# Patient Record
Sex: MALE | Race: BLACK OR AFRICAN AMERICAN | NOT HISPANIC OR LATINO | Employment: OTHER | URBAN - METROPOLITAN AREA
[De-identification: names, ages, dates, MRNs, and addresses within clinical notes are randomized per-mention and may not be internally consistent; named-entity substitution may affect disease eponyms.]

---

## 2023-03-07 LAB
6-ACETYLMORPHINE: <25 NG/ML
7-AMINOCLONAZEPAM: <25 NG/ML
ALPHA-HYDROXYALPRAZOLAM: <25 NG/ML
ALPHA-HYDROXYMIDAZOLAM: <25 NG/ML
ALPRAZOLAM: <25 NG/ML
AMPHETAMINE (PRESENCE) IN URINE BY SCREEN METHOD: ABNORMAL
BARBITURATES PRESENCE IN URINE BY SCREEN METHOD: ABNORMAL
CANNABINOIDS IN URINE BY SCREEN METHOD: ABNORMAL
CHLORDIAZEPOXIDE: <25 NG/ML
CLONAZEPAM: <25 NG/ML
COCAINE (PRESENCE) IN URINE BY SCREEN METHOD: ABNORMAL
CODEINE: <50 NG/ML
CREATINE, URINE FOR DRUG: 83.6 MG/DL
DIAZEPAM: <25 NG/ML
DRUG SCREEN COMMENT URINE: ABNORMAL
EDDP: <25 NG/ML
FENTANYL CONFIRMATION, URINE: <2.5 NG/ML
HYDROCODONE: <25 NG/ML
HYDROMORPHONE: <25 NG/ML
LORAZEPAM: <25 NG/ML
METHADONE CONFIRMATION,URINE: <25 NG/ML
MIDAZOLAM: <25 NG/ML
MORPHINE URINE: <50 NG/ML
NORDIAZEPAM: <25 NG/ML
NORFENTANYL: <2.5 NG/ML
NORHYDROCODONE: <25 NG/ML
NOROXYCODONE: 268 NG/ML
O-DESMETHYLTRAMADOL: <50 NG/ML
OXAZEPAM: <25 NG/ML
OXYCODONE: 400 NG/ML
OXYMORPHONE: 186 NG/ML
PHENCYCLIDINE (PRESENCE) IN URINE BY SCREEN METHOD: ABNORMAL
TEMAZEPAM: <25 NG/ML
TRAMADOL: <50 NG/ML
ZOLPIDEM METABOLITE (ZCA): <25 NG/ML
ZOLPIDEM: <25 NG/ML

## 2023-03-12 DIAGNOSIS — M25.562 PAIN IN BOTH KNEES, UNSPECIFIED CHRONICITY: Primary | ICD-10-CM

## 2023-03-12 DIAGNOSIS — M25.561 PAIN IN BOTH KNEES, UNSPECIFIED CHRONICITY: Primary | ICD-10-CM

## 2023-03-14 RX ORDER — DICLOFENAC SODIUM 10 MG/G
GEL TOPICAL
Qty: 100 G | Refills: 2 | Status: SHIPPED | OUTPATIENT
Start: 2023-03-14 | End: 2023-04-11

## 2023-03-31 DIAGNOSIS — M96.1 LUMBAR POST-LAMINECTOMY SYNDROME: Primary | ICD-10-CM

## 2023-03-31 RX ORDER — ASPIRIN 81 MG/1
TABLET ORAL 2 TIMES DAILY
COMMUNITY
End: 2023-11-24 | Stop reason: ALTCHOICE

## 2023-03-31 RX ORDER — OXYCODONE AND ACETAMINOPHEN 5; 325 MG/1; MG/1
1 TABLET ORAL EVERY 6 HOURS PRN
Qty: 90 TABLET | Refills: 0 | Status: SHIPPED | OUTPATIENT
Start: 2023-03-31 | End: 2023-04-25 | Stop reason: SDUPTHER

## 2023-03-31 RX ORDER — ATORVASTATIN CALCIUM 80 MG/1
80 TABLET, FILM COATED ORAL NIGHTLY
COMMUNITY
End: 2023-07-03

## 2023-03-31 RX ORDER — NALOXONE HYDROCHLORIDE 4 MG/.1ML
4 SPRAY NASAL AS NEEDED
Qty: 2 EACH | Refills: 11 | Status: SHIPPED | OUTPATIENT
Start: 2023-03-31 | End: 2024-01-29 | Stop reason: WASHOUT

## 2023-03-31 RX ORDER — ALBUTEROL SULFATE 0.83 MG/ML
SOLUTION RESPIRATORY (INHALATION)
COMMUNITY
Start: 2018-06-07 | End: 2023-11-20

## 2023-03-31 RX ORDER — ALBUTEROL SULFATE 90 UG/1
AEROSOL, METERED RESPIRATORY (INHALATION)
COMMUNITY
End: 2023-10-17 | Stop reason: SDUPTHER

## 2023-03-31 RX ORDER — AMLODIPINE BESYLATE 10 MG/1
10 TABLET ORAL DAILY
COMMUNITY
End: 2023-11-14

## 2023-03-31 RX ORDER — CHOLECALCIFEROL (VITAMIN D3) 50 MCG
50 TABLET ORAL DAILY
COMMUNITY
End: 2023-08-29 | Stop reason: SDUPTHER

## 2023-03-31 RX ORDER — METFORMIN HYDROCHLORIDE 850 MG/1
850 TABLET ORAL DAILY
COMMUNITY
End: 2023-07-03

## 2023-03-31 RX ORDER — BACLOFEN 10 MG/1
TABLET ORAL
COMMUNITY
Start: 2016-12-15 | End: 2023-08-01 | Stop reason: ALTCHOICE

## 2023-03-31 RX ORDER — BLOOD-GLUCOSE METER
KIT MISCELLANEOUS
COMMUNITY
Start: 2017-06-22 | End: 2023-11-24 | Stop reason: ALTCHOICE

## 2023-03-31 RX ORDER — BUDESONIDE AND FORMOTEROL FUMARATE DIHYDRATE 160; 4.5 UG/1; UG/1
2 AEROSOL RESPIRATORY (INHALATION) 2 TIMES DAILY
COMMUNITY

## 2023-03-31 RX ORDER — OXYCODONE AND ACETAMINOPHEN 5; 325 MG/1; MG/1
1 TABLET ORAL EVERY 6 HOURS PRN
COMMUNITY
End: 2023-03-31 | Stop reason: SDUPTHER

## 2023-03-31 RX ORDER — NALOXONE HYDROCHLORIDE 4 MG/.1ML
SPRAY NASAL
COMMUNITY
End: 2023-03-31 | Stop reason: SDUPTHER

## 2023-03-31 RX ORDER — TAMSULOSIN HYDROCHLORIDE 0.4 MG/1
1 CAPSULE ORAL NIGHTLY
COMMUNITY
Start: 2021-04-13 | End: 2023-08-06 | Stop reason: SDUPTHER

## 2023-03-31 NOTE — TELEPHONE ENCOUNTER
Patient called the refill line requesting a refill for pended medication. He has a follow up appt scheduled for April. He states he is almost out of the medication.     -----------------------  Received above message:   - Patient sent refill in previous EMR, seen 3/1/23, with no concerns. Previous sent script for 30 days, with second script for 30 days.  - Called pharmacy and they did not have a copy of second script sent 3/9/23.  ([facility] on file)[id number]  - UDS as expected    - OARRS checked with no concerns.     Sending over 30 day supply, appointment scheduled in 4/23. No further refills until patients appointment with provider.   Also refilled Narcan for patient, with multiple refills on file.      Bradley Hoffman MD  FM & PM Resident

## 2023-04-01 DIAGNOSIS — N52.9 ERECTILE DYSFUNCTION, UNSPECIFIED ERECTILE DYSFUNCTION TYPE: Primary | ICD-10-CM

## 2023-04-10 DIAGNOSIS — M25.561 PAIN IN BOTH KNEES, UNSPECIFIED CHRONICITY: ICD-10-CM

## 2023-04-10 DIAGNOSIS — J30.9 ALLERGIC RHINITIS, UNSPECIFIED SEASONALITY, UNSPECIFIED TRIGGER: Primary | ICD-10-CM

## 2023-04-10 DIAGNOSIS — M25.562 PAIN IN BOTH KNEES, UNSPECIFIED CHRONICITY: ICD-10-CM

## 2023-04-11 RX ORDER — DICLOFENAC SODIUM 10 MG/G
GEL TOPICAL
Qty: 100 G | Refills: 2 | Status: SHIPPED | OUTPATIENT
Start: 2023-04-11 | End: 2023-06-09 | Stop reason: SDUPTHER

## 2023-04-11 RX ORDER — LORATADINE 10 MG/1
10 TABLET ORAL DAILY
Qty: 30 TABLET | Refills: 2 | Status: SHIPPED | OUTPATIENT
Start: 2023-04-11 | End: 2023-07-27

## 2023-04-12 RX ORDER — SILDENAFIL 100 MG/1
TABLET, FILM COATED ORAL
COMMUNITY
End: 2023-04-13 | Stop reason: SDUPTHER

## 2023-04-13 RX ORDER — SILDENAFIL 100 MG/1
TABLET, FILM COATED ORAL
Qty: 10 TABLET | Refills: 3 | Status: SHIPPED | OUTPATIENT
Start: 2023-04-13 | End: 2023-08-01 | Stop reason: SDUPTHER

## 2023-04-20 NOTE — PROGRESS NOTES
Phone Call 4/20/22.     I received two notifications regarding a refill for this patient. They state he has an upcoming appointment with you but I do not see it.     He was last seen in clinic 3/6, meds refilled with additional refill sent on 3/31. That should last him until 5/6. He had an appointment scheduled 4/26 however it was a scheduling error and I was not in clinic.     Spoke to patient who states that both Dr. Mcnair and myself have no upcoming appointments and does not need medications at this time. Just wanted to get a FU Apt. Prior to his next expected refill. Clerical staff at [facility] was tasked and will FU w/ patient to schedule in the next 2 weeks.     FYI:   Patient last seen 3/6/23. Scripts were sent on 3/6/23 w/ refill on 3/31/23. Will need appointment before 3/5/23.      Reviewed and approved by CELINE THEODORE on 4/20/23 at 5:51 PM.

## 2023-04-25 ENCOUNTER — OFFICE VISIT (OUTPATIENT)
Dept: PRIMARY CARE | Facility: CLINIC | Age: 48
End: 2023-04-25
Payer: MEDICARE

## 2023-04-25 VITALS
DIASTOLIC BLOOD PRESSURE: 73 MMHG | HEART RATE: 67 BPM | TEMPERATURE: 97.1 F | OXYGEN SATURATION: 95 % | HEIGHT: 65 IN | BODY MASS INDEX: 47.23 KG/M2 | WEIGHT: 283.5 LBS | SYSTOLIC BLOOD PRESSURE: 129 MMHG

## 2023-04-25 DIAGNOSIS — Z79.891 OPIOID CONTRACT EXISTS: ICD-10-CM

## 2023-04-25 DIAGNOSIS — M96.1 LUMBAR POST-LAMINECTOMY SYNDROME: ICD-10-CM

## 2023-04-25 DIAGNOSIS — F11.90 CHRONIC, CONTINUOUS USE OF OPIOIDS: Primary | ICD-10-CM

## 2023-04-25 PROCEDURE — 80373 DRUG SCREENING TRAMADOL: CPT

## 2023-04-25 PROCEDURE — 80361 OPIATES 1 OR MORE: CPT

## 2023-04-25 PROCEDURE — 80365 DRUG SCREENING OXYCODONE: CPT

## 2023-04-25 PROCEDURE — 80354 DRUG SCREENING FENTANYL: CPT

## 2023-04-25 PROCEDURE — 80358 DRUG SCREENING METHADONE: CPT

## 2023-04-25 PROCEDURE — 80346 BENZODIAZEPINES1-12: CPT

## 2023-04-25 PROCEDURE — 99214 OFFICE O/P EST MOD 30 MIN: CPT | Performed by: STUDENT IN AN ORGANIZED HEALTH CARE EDUCATION/TRAINING PROGRAM

## 2023-04-25 PROCEDURE — 80368 SEDATIVE HYPNOTICS: CPT

## 2023-04-25 PROCEDURE — 80307 DRUG TEST PRSMV CHEM ANLYZR: CPT

## 2023-04-25 RX ORDER — OXYCODONE AND ACETAMINOPHEN 5; 325 MG/1; MG/1
1 TABLET ORAL EVERY 6 HOURS PRN
Qty: 90 TABLET | Refills: 0 | Status: SHIPPED | OUTPATIENT
Start: 2023-05-01 | End: 2023-06-09 | Stop reason: SDUPTHER

## 2023-04-25 ASSESSMENT — PAIN SCALES - GENERAL: PAINLEVEL: 4

## 2023-04-25 NOTE — PROGRESS NOTES
Tomasz Maxwell is a 47 y.o. male who is here today for controlled substance refill.   He is on Percocet 5-325mg q6h PRN for chronic low back pian.     Opioids:  What is the patient's goal of therapy? Improvement of pain   Is this being achieved with current treatment? Yes     I have calculated the patient's Morphine Dose Equivalent (MED): 23.3  I have considered referral to Pain Management and/or a specialist, and do not feel it is necessary at this time.    I feel that it is clinically indicated to continue this current medication regimen after consideration of alternative therapies, and other non-opioid treatment.    Opioid Risk Screening:  No data recorded    Pain Assessment:  Analgesia  What was your pain level on average during the past week?: 6  What was your pain level at its worst during the past week?: 10 - Pain as bad as it can be  What percentage of your pain has been relieved during the past week?: 6 %  Is the amount of pain relief you are now obtaining from your current pain relievers enough to make a real difference in your life?: Y  Query to Clinician: Is the patient's pain relief clinically significant?: Yes    Activities of Daily Living  Physical Functioning: Better  Family Relationships: Better  Social Relationships: Better  Mood: Better  Sleep Patterns: Better  Overall Functioning: Better    Adverse Events  Is patient experiencing any side effects from current pain relievers?: N  Patient's Overall Severity of Side Effects: None      Assessment  Is your overall impression that this patient is benefiting from opioid therapy?: Yes  Specific Analgesic Plan: Continue present regimen      Review of systems:   Review of systems is negative besides what is mentioned in the HPI      Objcetives:   Vital signs:   Vitals:    04/25/23 1120   BP: 129/73   BP Location: Left arm   Patient Position: Sitting   BP Cuff Size: Adult long   Pulse: 67   Temp: 36.2 °C (97.1 °F)   TempSrc: Temporal   SpO2: 95%   Weight:  "129 kg (283 lb 8 oz)   Height: 1.651 m (5' 5\")        Physical exam:   General: Alert and oriented*3, not in acute distress   Cardiac: S1, S2 normal, no murmurs, no lower extremity edema.  Respiratory: CTAB, no wheezing or crackles   Psych: appropriate mood and affect to the clinical setting      Assessment and plan:   Tomasz Maxwell is a 47 y.o. male   Here today for controlled substance refill, addressed as follows:     # Controlled substance refill:  - OARRS reviewed and doesn't show any alarming/ red flags   - patient is well known to clinic/ PCP Dr. Hoffman,  and has a valid controlled substance agreement Aug/2022   - monitoring labs ordered today (urine), last test consistent with Rx    - refill for one month, and follow up with PCP for further management/ refills.     Patient's HPI, physical exam and plan of care was discussed with the attending physician Dr. aTte Lock MD  Family Medicine, PGY-3  [place]     "

## 2023-04-25 NOTE — PROGRESS NOTES
I saw and evaluated the patient. I personally obtained the key and critical portions of the history and physical exam or was physically present for key and critical portions performed by the resident/fellow. I reviewed the resident/fellow's documentation and discussed the patient with the resident/fellow. I agree with the resident/fellow's medical decision making as documented in the note.    Avelino Ybarra MD

## 2023-04-27 LAB
6-ACETYLMORPHINE: <25 NG/ML
7-AMINOCLONAZEPAM: <25 NG/ML
ALPHA-HYDROXYALPRAZOLAM: <25 NG/ML
ALPHA-HYDROXYMIDAZOLAM: <25 NG/ML
ALPRAZOLAM: <25 NG/ML
AMPHETAMINE (PRESENCE) IN URINE BY SCREEN METHOD: ABNORMAL
BARBITURATES PRESENCE IN URINE BY SCREEN METHOD: ABNORMAL
CANNABINOIDS IN URINE BY SCREEN METHOD: ABNORMAL
CHLORDIAZEPOXIDE: <25 NG/ML
CLONAZEPAM: <25 NG/ML
COCAINE (PRESENCE) IN URINE BY SCREEN METHOD: ABNORMAL
CODEINE: <50 NG/ML
CREATINE, URINE FOR DRUG: 220.7 MG/DL
DIAZEPAM: <25 NG/ML
DRUG SCREEN COMMENT URINE: ABNORMAL
EDDP: <25 NG/ML
FENTANYL CONFIRMATION, URINE: <2.5 NG/ML
HYDROCODONE: <25 NG/ML
HYDROMORPHONE: <25 NG/ML
LORAZEPAM: <25 NG/ML
METHADONE CONFIRMATION,URINE: <25 NG/ML
MIDAZOLAM: <25 NG/ML
MORPHINE URINE: <50 NG/ML
NORDIAZEPAM: <25 NG/ML
NORFENTANYL: <2.5 NG/ML
NORHYDROCODONE: <25 NG/ML
NOROXYCODONE: 186 NG/ML
O-DESMETHYLTRAMADOL: <50 NG/ML
OXAZEPAM: <25 NG/ML
OXYCODONE: 362 NG/ML
OXYMORPHONE: 87 NG/ML
PHENCYCLIDINE (PRESENCE) IN URINE BY SCREEN METHOD: ABNORMAL
TEMAZEPAM: <25 NG/ML
TRAMADOL: <50 NG/ML
ZOLPIDEM METABOLITE (ZCA): <25 NG/ML
ZOLPIDEM: <25 NG/ML

## 2023-04-28 ENCOUNTER — APPOINTMENT (OUTPATIENT)
Dept: PRIMARY CARE | Facility: CLINIC | Age: 48
End: 2023-04-28
Payer: MEDICARE

## 2023-05-10 ENCOUNTER — TELEPHONE (OUTPATIENT)
Dept: PRIMARY CARE | Facility: CLINIC | Age: 48
End: 2023-05-10
Payer: MEDICARE

## 2023-05-10 NOTE — TELEPHONE ENCOUNTER
Patient needs a referral to get his toenails clipped and his medication needs a refill put in .   FAX NUMBER [phone] FOR THE REFERRAL TO BE FAXED OVER

## 2023-05-12 DIAGNOSIS — L60.2 ONYCHAUXIS: Primary | ICD-10-CM

## 2023-05-17 ENCOUNTER — TELEPHONE (OUTPATIENT)
Dept: PRIMARY CARE | Facility: CLINIC | Age: 48
End: 2023-05-17
Payer: MEDICARE

## 2023-06-09 ENCOUNTER — OFFICE VISIT (OUTPATIENT)
Dept: PRIMARY CARE | Facility: CLINIC | Age: 48
End: 2023-06-09
Payer: MEDICARE

## 2023-06-09 VITALS
TEMPERATURE: 98.6 F | DIASTOLIC BLOOD PRESSURE: 72 MMHG | SYSTOLIC BLOOD PRESSURE: 124 MMHG | WEIGHT: 290 LBS | BODY MASS INDEX: 48.32 KG/M2 | OXYGEN SATURATION: 98 % | HEIGHT: 65 IN | HEART RATE: 89 BPM

## 2023-06-09 DIAGNOSIS — M25.561 PAIN IN BOTH KNEES, UNSPECIFIED CHRONICITY: ICD-10-CM

## 2023-06-09 DIAGNOSIS — M25.562 PAIN IN BOTH KNEES, UNSPECIFIED CHRONICITY: ICD-10-CM

## 2023-06-09 DIAGNOSIS — M96.1 LUMBAR POST-LAMINECTOMY SYNDROME: ICD-10-CM

## 2023-06-09 PROCEDURE — 1036F TOBACCO NON-USER: CPT | Performed by: STUDENT IN AN ORGANIZED HEALTH CARE EDUCATION/TRAINING PROGRAM

## 2023-06-09 PROCEDURE — 99214 OFFICE O/P EST MOD 30 MIN: CPT | Performed by: STUDENT IN AN ORGANIZED HEALTH CARE EDUCATION/TRAINING PROGRAM

## 2023-06-09 RX ORDER — OXYCODONE AND ACETAMINOPHEN 5; 325 MG/1; MG/1
1 TABLET ORAL EVERY 4 HOURS PRN
COMMUNITY
End: 2023-06-09 | Stop reason: SDUPTHER

## 2023-06-09 RX ORDER — DICLOFENAC SODIUM 10 MG/G
GEL TOPICAL
Qty: 100 G | Refills: 2 | Status: SHIPPED | OUTPATIENT
Start: 2023-06-09 | End: 2023-08-01 | Stop reason: ALTCHOICE

## 2023-06-09 RX ORDER — OXYCODONE AND ACETAMINOPHEN 5; 325 MG/1; MG/1
1 TABLET ORAL EVERY 6 HOURS PRN
Qty: 90 TABLET | Refills: 0 | Status: SHIPPED | OUTPATIENT
Start: 2023-06-09 | End: 2023-06-10 | Stop reason: SDUPTHER

## 2023-06-09 RX ORDER — CYCLOBENZAPRINE HCL 10 MG
10 TABLET ORAL EVERY 8 HOURS PRN
COMMUNITY
Start: 2015-10-06 | End: 2023-08-01 | Stop reason: ALTCHOICE

## 2023-06-09 ASSESSMENT — ENCOUNTER SYMPTOMS
POLYPHAGIA: 0
PALPITATIONS: 0
ACTIVITY CHANGE: 0
DYSURIA: 0
SINUS PAIN: 0
CHILLS: 0
LIGHT-HEADEDNESS: 0
SINUS PRESSURE: 0
POLYDIPSIA: 0
TREMORS: 0
HEADACHES: 0
COLOR CHANGE: 0
SORE THROAT: 0
EYE REDNESS: 0
FEVER: 0

## 2023-06-09 ASSESSMENT — PAIN SCALES - GENERAL: PAINLEVEL: 8

## 2023-06-09 NOTE — PROGRESS NOTES
"Subjective   Patient ID: Tomasz Maxwell is a 47 y.o. male who presents for Follow-up and Med Refill.    Pain Assessment:  Analgesia  What was your pain level on average during the past week?: 10 - Pain as bad as it can be  What was your pain level at its worst during the past week?: 10 - Pain as bad as it can be  What percentage of your pain has been relieved during the past week?: 40 %  Is the amount of pain relief you are now obtaining from your current pain relievers enough to make a real difference in your life?: Y  Query to Clinician: Is the patient's pain relief clinically significant?: Yes    Activities of Daily Living  Physical Functioning: Better  Family Relationships: Better  Social Relationships: Better  Mood: Better  Sleep Patterns: Better  Overall Functioning: Better    Adverse Events  Is patient experiencing any side effects from current pain relievers?: N  Patient's Overall Severity of Side Effects: None      Assessment  Is your overall impression that this patient is benefiting from opioid therapy?: Yes  Specific Analgesic Plan: Continue present regimen    Med Refill  Pertinent negatives include no chest pain, chills, fever, headaches, rash or sore throat.        Review of Systems   Constitutional:  Negative for activity change, chills and fever.   HENT:  Negative for sinus pressure, sinus pain and sore throat.    Eyes:  Negative for redness.   Cardiovascular:  Negative for chest pain and palpitations.   Endocrine: Negative for polydipsia, polyphagia and polyuria.   Genitourinary:  Negative for dysuria.   Skin:  Negative for color change and rash.   Neurological:  Negative for tremors, light-headedness and headaches.       Objective   /72 (BP Location: Right arm)   Pulse 89   Temp 37 °C (98.6 °F) (Temporal)   Ht 1.651 m (5' 5\")   Wt 132 kg (290 lb)   SpO2 98%   BMI 48.26 kg/m²     Physical Exam  Constitutional:       Appearance: Normal appearance. He is normal weight.   HENT:      Head: " Normocephalic and atraumatic.      Right Ear: External ear normal.      Left Ear: External ear normal.      Mouth/Throat:      Mouth: Mucous membranes are moist.   Eyes:      Extraocular Movements: Extraocular movements intact.   Cardiovascular:      Rate and Rhythm: Normal rate and regular rhythm.      Heart sounds: No murmur heard.     No friction rub. No gallop.   Pulmonary:      Effort: Pulmonary effort is normal. No respiratory distress.   Musculoskeletal:         General: Normal range of motion.      Cervical back: Normal range of motion.   Skin:     General: Skin is warm and dry.   Neurological:      General: No focal deficit present.      Mental Status: He is alert.   Psychiatric:         Mood and Affect: Mood normal.         Behavior: Behavior normal.       Assessment/Plan   Problem List Items Addressed This Visit    None  Visit Diagnoses       Lumbar post-laminectomy syndrome        Relevant Medications    oxyCODONE-acetaminophen (Percocet) 5-325 mg tablet    Other Relevant Orders    [facility]    Pain in both knees, unspecified chronicity        Relevant Medications    diclofenac sodium (Voltaren) 1 % gel gel    Other Relevant Orders    Referral to [facility]            Tomasz Maxwell is a 48yo M w/ PMHx. Chronic pain due to lumbar postlaminectomy syndrome with recent right TKR and rotator cuff dysfunction presents to clinic for Medication Refill.     Opioids:  What is the patient's goal of therapy?  To help w/ improving quality of life and functionality.   Is this being achieved with current treatment? Yes, patient has seen a drastic (40%) improvement in his pain since starting treatment    I have calculated the patient's Morphine Dose Equivalent (MED): 22.5    After discussion w/ patient, he is amenable to referral to [facility] and will have no issues w/ transportation to the new facility. He understands that treatment will require more  than managing pain but also the mental effect it can have and is willing to get treatment at this time.     I feel that it is clinically indicated to continue this current medication regimen after consideration of alternative therapies, and other non-opioid treatment.    Opioid Risk Screening: Score 0   OARRS reviewed w/ no acute concerns. Patient has no other providers of Opioid. Current Overdose Risk Score of 180.   Patient has Narcan at home and knows appropriate use of it.   He takes a pill 3x a day every 6-8 hours aside from when he sleeps and did not require any other refills prior to his appointment.   Last Urine Drug Screen completed 4/25, presumably negative at that time.     Refilled patients Oxy 5- Acetaminophen 325mgs Q6-8Hs x 90 for 30 days.   Refilled Voltaren 1% Gel    #Next Appointment  - Patient will require an independent appointment for HM & NIDDM FU.   - Labs Due: HgA1C, Urine Albumin Spot, Hep C Ab, Lipid Panel,   - Opioid Screening w/ UDS.   - Ensure patient has FU Apt w/ Podiatry and Ophthalmology  - Due for Tdap,   - Colonoscopy completed in 2018, w/ 1 Hyperplastic Polyp of 5mm. Repeat Colonoscopy in 2022    Ensure patient has contact information for [facility], including my personal business card.     RTC in 2months for FU or as need.     Seen and Discussed w/ Dr. Edward.     Bradley Hoffamn MD  FM & PM Resident

## 2023-06-10 RX ORDER — OXYCODONE AND ACETAMINOPHEN 5; 325 MG/1; MG/1
1 TABLET ORAL EVERY 6 HOURS PRN
Qty: 90 TABLET | Refills: 0 | Status: SHIPPED | OUTPATIENT
Start: 2023-06-10 | End: 2023-08-01 | Stop reason: SDUPTHER

## 2023-06-21 NOTE — PROGRESS NOTES
I reviewed with the resident the medical history and the resident’s findings on physical examination.  I discussed with the resident the patient’s diagnosis and concur with the treatment plan as documented in the resident note.     Today's reason for visit: medication management  Verónica Edward MD

## 2023-06-30 DIAGNOSIS — E11.9 TYPE 2 DIABETES MELLITUS WITHOUT COMPLICATION, WITHOUT LONG-TERM CURRENT USE OF INSULIN (MULTI): ICD-10-CM

## 2023-06-30 DIAGNOSIS — E78.2 MIXED HYPERLIPIDEMIA: Primary | ICD-10-CM

## 2023-07-03 RX ORDER — METFORMIN HYDROCHLORIDE 850 MG/1
TABLET ORAL
Qty: 90 TABLET | Refills: 3 | Status: SHIPPED | OUTPATIENT
Start: 2023-07-03 | End: 2023-11-24 | Stop reason: ALTCHOICE

## 2023-07-03 RX ORDER — ATORVASTATIN CALCIUM 80 MG/1
TABLET, FILM COATED ORAL
Qty: 90 TABLET | Refills: 3 | Status: SHIPPED | OUTPATIENT
Start: 2023-07-03

## 2023-08-01 ENCOUNTER — OFFICE VISIT (OUTPATIENT)
Dept: PRIMARY CARE | Facility: CLINIC | Age: 48
End: 2023-08-01
Payer: MEDICARE

## 2023-08-01 ENCOUNTER — LAB (OUTPATIENT)
Dept: LAB | Facility: LAB | Age: 48
End: 2023-08-01
Payer: MEDICARE

## 2023-08-01 VITALS
HEIGHT: 65 IN | WEIGHT: 291 LBS | SYSTOLIC BLOOD PRESSURE: 129 MMHG | BODY MASS INDEX: 48.48 KG/M2 | HEART RATE: 64 BPM | DIASTOLIC BLOOD PRESSURE: 74 MMHG | OXYGEN SATURATION: 96 % | TEMPERATURE: 97.1 F

## 2023-08-01 DIAGNOSIS — R35.1 NOCTURIA: ICD-10-CM

## 2023-08-01 DIAGNOSIS — F11.90 CHRONIC, CONTINUOUS USE OF OPIOIDS: ICD-10-CM

## 2023-08-01 DIAGNOSIS — M96.1 LUMBAR POST-LAMINECTOMY SYNDROME: ICD-10-CM

## 2023-08-01 DIAGNOSIS — K59.04 CHRONIC IDIOPATHIC CONSTIPATION: ICD-10-CM

## 2023-08-01 DIAGNOSIS — N52.9 ERECTILE DYSFUNCTION, UNSPECIFIED ERECTILE DYSFUNCTION TYPE: ICD-10-CM

## 2023-08-01 DIAGNOSIS — M96.1 LUMBAR POST-LAMINECTOMY SYNDROME: Primary | ICD-10-CM

## 2023-08-01 LAB — PROSTATE SPECIFIC ANTIGEN,SCREEN: <0.1 NG/ML (ref 0–4)

## 2023-08-01 PROCEDURE — G0103 PSA SCREENING: HCPCS

## 2023-08-01 PROCEDURE — 36415 COLL VENOUS BLD VENIPUNCTURE: CPT

## 2023-08-01 PROCEDURE — 99213 OFFICE O/P EST LOW 20 MIN: CPT | Performed by: STUDENT IN AN ORGANIZED HEALTH CARE EDUCATION/TRAINING PROGRAM

## 2023-08-01 PROCEDURE — 1036F TOBACCO NON-USER: CPT | Performed by: STUDENT IN AN ORGANIZED HEALTH CARE EDUCATION/TRAINING PROGRAM

## 2023-08-01 RX ORDER — AMOXICILLIN 250 MG
1 CAPSULE ORAL DAILY
Qty: 30 TABLET | Refills: 11 | Status: SHIPPED | OUTPATIENT
Start: 2023-08-01 | End: 2024-01-29 | Stop reason: WASHOUT

## 2023-08-01 RX ORDER — SILDENAFIL 100 MG/1
TABLET, FILM COATED ORAL
Qty: 10 TABLET | Refills: 3 | Status: SHIPPED | OUTPATIENT
Start: 2023-08-01 | End: 2023-09-08 | Stop reason: SDUPTHER

## 2023-08-01 RX ORDER — OXYCODONE AND ACETAMINOPHEN 5; 325 MG/1; MG/1
1 TABLET ORAL EVERY 6 HOURS PRN
Qty: 90 TABLET | Refills: 0 | Status: SHIPPED | OUTPATIENT
Start: 2023-08-01 | End: 2023-08-28 | Stop reason: SDUPTHER

## 2023-08-01 RX ORDER — POLYETHYLENE GLYCOL 3350 17 G/17G
17 POWDER, FOR SOLUTION ORAL DAILY
Qty: 51 G | Refills: 0 | Status: SHIPPED | OUTPATIENT
Start: 2023-08-01 | End: 2023-08-04

## 2023-08-01 ASSESSMENT — ENCOUNTER SYMPTOMS
ARTHRALGIAS: 1
BACK PAIN: 1
SHORTNESS OF BREATH: 0
CONSTIPATION: 0
NECK PAIN: 1
FEVER: 0
MYALGIAS: 1
FREQUENCY: 1
CHILLS: 0
LIGHT-HEADEDNESS: 0
DIZZINESS: 0

## 2023-08-01 ASSESSMENT — PAIN SCALES - GENERAL: PAINLEVEL: 5

## 2023-08-01 NOTE — PROGRESS NOTES
CHRONIC PAIN ASSESSMENT FORM    1. Where do you feel your pain? {PAIN LOCATION:92237}    2. Which word best describes your pain? {PAIN QUALITIES:99525}    3. On a scale of 0 to 10, which best describes your pain at its WORST in the last month: {PAIN SCALE NUMBERS:34649}    4. On a scale of 0 to 10, which best describes your pain at its LEAST in the last month: {PAIN SCALE NUMBERS:21002}    5. On a scale of 0 to 10, which best describes your pain at ON AVERAGE in the last month: {PAIN SCALE NUMBERS:89951}    6. On a scale of 0 to 10, which best describes your pain RIGHT NOW: {PAIN SCALE NUMBERS:36282}    7. What side effects are you having?    a. Nausea: {PAIN SCALE NUMBERS:59362}  b. Vomiting: {PAIN SCALE NUMBERS:54025}  c. Constipation: {PAIN SCALE NUMBERS:84852}  d. Lack of appetite: {PAIN SCALE NUMBERS:22128}  e. Tired: {PAIN SCALE NUMBERS:03078}  f.  Itching: {PAIN SCALE NUMBERS:43629}  g. Nightmares: {PAIN SCALE NUMBERS:58747}  h. Sweating: {PAIN SCALE NUMBERS:66448}  i. Trouble thinking: {PAIN SCALE NUMBERS:06867}  j. Insomnia: {PAIN SCALE NUMBERS:51111}    8. On a scale of 0 to 10, which best describes how, DURING THE PAST WEEK, the pain has INTERFERED with your:    a. General activity: {PAIN SCALE NUMBERS:28863}  b. Mood: {PAIN SCALE NUMBERS:59499}  c. Normal work: {PAIN SCALE NUMBERS:97837}  d. Sleep: {PAIN SCALE NUMBERS:28512}  e. Enjoyment of life: {PAIN SCALE NUMBERS:22180}   f. Ability to concentrate: {PAIN SCALE NUMBERS:24292}  g. Relations with others: {PAIN SCALE NUMBERS:53645}

## 2023-08-01 NOTE — PROGRESS NOTES
Subjective   Patient ID: Tomasz Maxwell is a 47 y.o. male with a PMH of lumbar post-laminectomy syndrome, recent TKR (Feb 2022, revision in March 2022), and rotator cuff dysfunction (surgery in early 2023), 3 abdominal surgeries, 2 hip replacements who presents for Med Refill and Follow-up. He needs refills of Percocet and Viagra.    HPI   Patient presenting for medication refill. He has an extensive is surgical history and is following with a pain management doctor. He continues to experience pain throughout his body. He has an MRI of his neck and back scheduled for 8/12. He has a spine stimulator for pain.    Pain level with medication and spine stimulator: 5/10  Without medication: 8 or 9/10    Pain assessment:  What percentage of your pain has been relieved during the past week?: 30% when used with spine stimulator  Is the amount of pain relief you are now obtaining from your current pain relievers enough to make a real difference in your life?: Y  Query to Clinician: Is the patient's pain relief clinically significant?: Yes    Opioids:  What is the patient's goal of therapy? Adequate pain control for ADLs  Is this being achieved with current treatment? Yes    OARRS:  No data recorded  I have personally reviewed the OARRS report for Tomasz Maxwell. I have considered the risks of abuse, dependence, addiction and diversion    Is the patient prescribed a combination of a benzodiazepine and opioid?  No    Last Urine Drug Screen:  Recent Results (from the past 97207 hour(s))   OPIATE/OPIOID/BENZO PRESCRIPTION COMPLIANCE    Collection Time: 04/25/23 12:02 PM   Result Value Ref Range    DRUG SCREEN COMMENT URINE SEE BELOW     Creatine, Urine 220.7 mg/dL    Amphetamine Screen, Urine PRESUMPTIVE NEGATIVE NEGATIVE    Barbiturate Screen, Urine PRESUMPTIVE NEGATIVE NEGATIVE    Cannabinoid Screen, Urine PRESUMPTIVE NEGATIVE NEGATIVE    Cocaine Screen, Urine PRESUMPTIVE NEGATIVE NEGATIVE    PCP Screen, Urine PRESUMPTIVE  NEGATIVE NEGATIVE    7-Aminoclonazepam <25 Cutoff <25 ng/mL    Alpha-Hydroxyalprazolam <25 Cutoff <25 ng/mL    Alpha-Hydroxymidazolam <25 Cutoff <25 ng/mL    Alprazolam <25 Cutoff <25 ng/mL    Chlordiazepoxide <25 Cutoff <25 ng/mL    Clonazepam <25 Cutoff <25 ng/mL    Diazepam <25 Cutoff <25 ng/mL    Lorazepam <25 Cutoff <25 ng/mL    Midazolam <25 Cutoff <25 ng/mL    Nordiazepam <25 Cutoff <25 ng/mL    Oxazepam <25 Cutoff <25 ng/mL    Temazepam <25 Cutoff <25 ng/mL    Zolpidem <25 Cutoff <25 ng/mL    Zolpidem Metabolite (ZCA) <25 Cutoff <25 ng/mL    6-Acetylmorphine <25 Cutoff <25 ng/mL    Codeine <50 Cutoff <50 ng/mL    Hydrocodone <25 Cutoff <25 ng/mL    Hydromorphone <25 Cutoff <25 ng/mL    Morphine Urine <50 Cutoff <50 ng/mL    Norhydrocodone <25 Cutoff <25 ng/mL    Noroxycodone 186 (A) Cutoff <25 ng/mL    Oxycodone 362 (A) Cutoff <25 ng/mL    Oxymorphone 87 (A) Cutoff <25 ng/mL    Tramadol <50 Cutoff <50 ng/mL    O-Desmethyltramadol <50 Cutoff <50 ng/mL    Fentanyl <2.5 Cutoff<2.5 ng/mL    Norfentanyl <2.5 Cutoff<2.5 ng/mL    METHADONE CONFIRMATION,URINE <25 Cutoff <25 ng/mL    EDDP <25 Cutoff <25 ng/mL   Drug Screen, Urine With Reflex to Confirmation    Collection Time: 12/07/22  3:06 PM   Result Value Ref Range    DRUG SCREEN COMMENT URINE SEE BELOW     Amphetamine Screen, Urine PRESUMPTIVE NEGATIVE NEGATIVE    Barbiturate Screen, Urine PRESUMPTIVE NEGATIVE NEGATIVE    BENZODIAZEPINE (PRESENCE) IN URINE BY SCREEN METHOD PRESUMPTIVE NEGATIVE NEGATIVE    Cannabinoid Screen, Urine PRESUMPTIVE NEGATIVE NEGATIVE    Cocaine Screen, Urine PRESUMPTIVE NEGATIVE NEGATIVE    Fentanyl, Ur PRESUMPTIVE NEGATIVE NEGATIVE    Methadone Screen, Urine PRESUMPTIVE NEGATIVE NEGATIVE    Opiate Screen, Urine PRESUMPTIVE NEGATIVE NEGATIVE    Oxycodone Screen, Ur PRESUMPTIVE POSITIVE (A) NEGATIVE    PCP Screen, Urine PRESUMPTIVE NEGATIVE NEGATIVE     Results are as expected.     Controlled Substance Agreement:  Date of the Last  "Agreement: today (8/1/2023)  Reviewed Controlled Substance Agreement including but not limited to the benefits, risks, and alternatives to treatment with a Controlled Substance medication(s).    I have calculated the patient's Morphine Dose Equivalent (MED):   Patient is already following with Pain Management and a specialist, and I do not feel another referral is necessary at this time.    #Urinary incontinence  Patient endorses dribbling throughout the day and nocturia (around 4 times a night). He is taking tamsulosin 0.4mg but does not think it helps.     Review of Systems   Constitutional:  Negative for chills and fever.   Respiratory:  Negative for shortness of breath.    Cardiovascular:  Negative for chest pain.   Gastrointestinal:  Negative for constipation.   Genitourinary:  Positive for frequency and urgency.   Musculoskeletal:  Positive for arthralgias, back pain, myalgias and neck pain.   Neurological:  Negative for dizziness and light-headedness.     Objective   /74 (BP Location: Left arm, Patient Position: Sitting, BP Cuff Size: Adult long)   Pulse 64   Temp 36.2 °C (97.1 °F) (Temporal)   Ht 1.651 m (5' 5\")   Wt 132 kg (291 lb)   SpO2 96%   BMI 48.42 kg/m²     Physical Exam  Constitutional:       Appearance: Normal appearance.   HENT:      Head: Normocephalic.   Cardiovascular:      Rate and Rhythm: Normal rate and regular rhythm.      Heart sounds: Normal heart sounds.   Pulmonary:      Breath sounds: Normal breath sounds.   Neurological:      Mental Status: He is alert and oriented to person, place, and time.   Psychiatric:         Mood and Affect: Mood normal.         Behavior: Behavior normal.       Assessment/Plan     #Lumbar Post-Laminectomy Syndrome  #Chronic Back Pain  #Chronic Neuropathy  - Stable, well controlled with percocet and spinal stimulator  - I feel that it is clinically indicated to continue this current medication regimen after consideration of alternative therapies, and " other non-opioid treatment.  - Refilled Percocet 5-325mg QID  - Ordered Senokot and Miralax for bowel regimen  - Reviewed OAARS. Completed CSA. Last UDS 4/2023 w/ expected results    #Erectile dysfunction  - likely 2/2 nerve damage from multiple lumbar surgeries  - Stable with Viagra  - Refilled Viagra 100 mg    #Nocturia  #Urinary Incontinence  - multiple urinary issues likely 2/2 BPH, overflow incontinence, and nerve damage from multiple lumbar surgeries  - Appears uncontrolled on tamsulosin.  - Increased tamsulosin dose to 0.8mg  - Ordered PSA screening due to multiple risk factors (, family history). Last PSA in 2017: 0.11  - Referral to urology for urinary issues and ED    RTC in 1-2 months  This patient was seen and discussed with the attending physician Dr. Hector.    Lanette Rendon  MS3, CWRUSOM       I saw and evaluated the patient. I personally obtained the key and critical portions of the history and physical exam. I reviewed and modified the student's documentation and discussed the patient with the medical student. I agree with the above documentation and medical decision making.      Bella Fernando MD  Family Medicine, PGY-3

## 2023-08-06 RX ORDER — TAMSULOSIN HYDROCHLORIDE 0.4 MG/1
0.8 CAPSULE ORAL NIGHTLY
Qty: 180 CAPSULE | Refills: 3 | Status: SHIPPED | OUTPATIENT
Start: 2023-08-06 | End: 2024-08-05

## 2023-08-07 NOTE — PROGRESS NOTES
I saw and evaluated the patient. I personally obtained the key and critical portions of the history and physical exam or was physically present for key and critical portions performed by the resident/fellow. I reviewed the resident/fellow's documentation and discussed the patient with the resident/fellow. I agree with the resident/fellow's medical decision making as documented in the note.    Chuy Hector MD       Mirvaso Counseling: Mirvaso is a topical medication which can decrease superficial blood flow where applied. Side effects are uncommon and include stinging, redness and allergic reactions.

## 2023-08-28 DIAGNOSIS — J30.9 CHRONIC ALLERGIC RHINITIS: Primary | ICD-10-CM

## 2023-08-28 DIAGNOSIS — M96.1 LUMBAR POST-LAMINECTOMY SYNDROME: ICD-10-CM

## 2023-08-28 RX ORDER — OXYCODONE AND ACETAMINOPHEN 5; 325 MG/1; MG/1
1 TABLET ORAL EVERY 6 HOURS PRN
Qty: 90 TABLET | Refills: 0 | Status: CANCELLED | OUTPATIENT
Start: 2023-08-28 | End: 2023-09-27

## 2023-08-28 RX ORDER — LORATADINE 10 MG/1
10 TABLET ORAL DAILY
Qty: 90 TABLET | Refills: 3 | Status: SHIPPED | OUTPATIENT
Start: 2023-08-28 | End: 2023-11-24 | Stop reason: ALTCHOICE

## 2023-08-28 RX ORDER — OXYCODONE AND ACETAMINOPHEN 5; 325 MG/1; MG/1
1 TABLET ORAL EVERY 6 HOURS PRN
Qty: 90 TABLET | Refills: 0 | Status: SHIPPED | OUTPATIENT
Start: 2023-09-01 | End: 2023-09-28 | Stop reason: SDUPTHER

## 2023-08-28 RX ORDER — LORATADINE 10 MG/1
1 TABLET ORAL DAILY
COMMUNITY
End: 2023-08-28 | Stop reason: SDUPTHER

## 2023-08-29 ENCOUNTER — TELEPHONE (OUTPATIENT)
Dept: PRIMARY CARE | Facility: CLINIC | Age: 48
End: 2023-08-29
Payer: MEDICARE

## 2023-08-29 DIAGNOSIS — J30.2 SEASONAL ALLERGIC RHINITIS, UNSPECIFIED TRIGGER: Primary | ICD-10-CM

## 2023-08-29 DIAGNOSIS — E55.9 VITAMIN D DEFICIENCY: ICD-10-CM

## 2023-08-29 RX ORDER — MONTELUKAST SODIUM 10 MG/1
10 TABLET ORAL NIGHTLY
Qty: 90 TABLET | Refills: 3 | Status: SHIPPED | OUTPATIENT
Start: 2023-08-29 | End: 2024-08-28

## 2023-08-29 RX ORDER — CHOLECALCIFEROL (VITAMIN D3) 50 MCG
50 TABLET ORAL DAILY
Qty: 90 TABLET | Refills: 3 | Status: SHIPPED | OUTPATIENT
Start: 2023-08-29 | End: 2024-08-28

## 2023-08-29 RX ORDER — MONTELUKAST SODIUM 10 MG/1
10 TABLET ORAL NIGHTLY
COMMUNITY
Start: 2023-08-06 | End: 2023-08-29 | Stop reason: SDUPTHER

## 2023-09-01 DIAGNOSIS — N52.9 ERECTILE DYSFUNCTION, UNSPECIFIED ERECTILE DYSFUNCTION TYPE: ICD-10-CM

## 2023-09-06 NOTE — TELEPHONE ENCOUNTER
I called the pharmacy to verify if he needed the pending order and the pharmacist said he doesn't see anything in the system. So can you resend his Sildenafil over again.

## 2023-09-08 DIAGNOSIS — N52.9 ERECTILE DYSFUNCTION, UNSPECIFIED ERECTILE DYSFUNCTION TYPE: ICD-10-CM

## 2023-09-08 RX ORDER — SILDENAFIL 100 MG/1
TABLET, FILM COATED ORAL
Qty: 10 TABLET | Refills: 0 | Status: SHIPPED | OUTPATIENT
Start: 2023-09-08 | End: 2023-09-28 | Stop reason: SDUPTHER

## 2023-09-08 NOTE — TELEPHONE ENCOUNTER
Pt requesting refill viagra to [facility] pharmacy. In system, sent 8/1/23, but not in pharmacy system per pt. Notes made via MA to confirm not on pharmacy end following phone call to pharmacy. PCP on vacation. Preceptor made aware.

## 2023-09-13 RX ORDER — SILDENAFIL 100 MG/1
TABLET, FILM COATED ORAL
Qty: 10 TABLET | Refills: 3 | OUTPATIENT
Start: 2023-09-13

## 2023-09-27 DIAGNOSIS — M96.1 LUMBAR POST-LAMINECTOMY SYNDROME: ICD-10-CM

## 2023-09-27 RX ORDER — OXYCODONE AND ACETAMINOPHEN 5; 325 MG/1; MG/1
1 TABLET ORAL EVERY 6 HOURS PRN
Qty: 90 TABLET | Refills: 0 | Status: CANCELLED | OUTPATIENT
Start: 2023-09-27 | End: 2023-10-27

## 2023-09-27 NOTE — TELEPHONE ENCOUNTER
Pt called requesting refills of Vit D, percocet, and loratadine. Noted available refills loratadine and Vit D. Call placed to update pt. Percocet order pended and routed to provider.

## 2023-09-28 DIAGNOSIS — M96.1 LUMBAR POST-LAMINECTOMY SYNDROME: ICD-10-CM

## 2023-09-28 DIAGNOSIS — N52.9 ERECTILE DYSFUNCTION, UNSPECIFIED ERECTILE DYSFUNCTION TYPE: ICD-10-CM

## 2023-09-28 RX ORDER — SILDENAFIL 100 MG/1
TABLET, FILM COATED ORAL
Qty: 10 TABLET | Refills: 0 | OUTPATIENT
Start: 2023-09-28

## 2023-09-28 RX ORDER — OXYCODONE AND ACETAMINOPHEN 5; 325 MG/1; MG/1
1 TABLET ORAL EVERY 6 HOURS PRN
Qty: 90 TABLET | Refills: 0 | Status: SHIPPED | OUTPATIENT
Start: 2023-09-28 | End: 2023-10-10 | Stop reason: SDUPTHER

## 2023-09-28 RX ORDER — SILDENAFIL 100 MG/1
TABLET, FILM COATED ORAL
Qty: 10 TABLET | Refills: 3 | Status: SHIPPED | OUTPATIENT
Start: 2023-09-28 | End: 2024-04-16 | Stop reason: SDUPTHER

## 2023-09-28 NOTE — TELEPHONE ENCOUNTER
Pt calling requesting new rx for percocet as his will run out on October 1st. Order pended and routed to provider yesterday. Call to pt made to inform of communication sent to provider.

## 2023-10-10 DIAGNOSIS — M96.1 LUMBAR POST-LAMINECTOMY SYNDROME: ICD-10-CM

## 2023-10-10 RX ORDER — OXYCODONE AND ACETAMINOPHEN 5; 325 MG/1; MG/1
1 TABLET ORAL EVERY 6 HOURS PRN
Qty: 90 TABLET | Refills: 0 | Status: SHIPPED | OUTPATIENT
Start: 2023-10-30 | End: 2023-11-17 | Stop reason: SDUPTHER

## 2023-10-16 DIAGNOSIS — M54.16 LUMBAR RADICULOPATHY: ICD-10-CM

## 2023-10-17 ENCOUNTER — OFFICE VISIT (OUTPATIENT)
Dept: PRIMARY CARE | Facility: CLINIC | Age: 48
End: 2023-10-17
Payer: MEDICARE

## 2023-10-17 VITALS
SYSTOLIC BLOOD PRESSURE: 127 MMHG | HEART RATE: 58 BPM | BODY MASS INDEX: 47.88 KG/M2 | HEIGHT: 65 IN | OXYGEN SATURATION: 97 % | WEIGHT: 287.4 LBS | DIASTOLIC BLOOD PRESSURE: 85 MMHG | TEMPERATURE: 97.9 F

## 2023-10-17 DIAGNOSIS — F11.90 CHRONIC, CONTINUOUS USE OF OPIOIDS: ICD-10-CM

## 2023-10-17 DIAGNOSIS — Z23 IMMUNIZATION DUE: ICD-10-CM

## 2023-10-17 DIAGNOSIS — N52.9 ERECTILE DYSFUNCTION, UNSPECIFIED ERECTILE DYSFUNCTION TYPE: ICD-10-CM

## 2023-10-17 DIAGNOSIS — M96.1 LUMBAR POST-LAMINECTOMY SYNDROME: Primary | ICD-10-CM

## 2023-10-17 DIAGNOSIS — J45.909 MODERATE ASTHMA, UNSPECIFIED WHETHER COMPLICATED, UNSPECIFIED WHETHER PERSISTENT (HHS-HCC): ICD-10-CM

## 2023-10-17 LAB
AMPHETAMINES UR QL SCN: ABNORMAL
BARBITURATES UR QL SCN: ABNORMAL
BENZODIAZ UR QL SCN: ABNORMAL
BZE UR QL SCN: ABNORMAL
CANNABINOIDS UR QL SCN: ABNORMAL
FENTANYL+NORFENTANYL UR QL SCN: ABNORMAL
OPIATES UR QL SCN: ABNORMAL
OXYCODONE+OXYMORPHONE UR QL SCN: ABNORMAL
PCP UR QL SCN: ABNORMAL

## 2023-10-17 PROCEDURE — 90686 IIV4 VACC NO PRSV 0.5 ML IM: CPT | Performed by: STUDENT IN AN ORGANIZED HEALTH CARE EDUCATION/TRAINING PROGRAM

## 2023-10-17 PROCEDURE — 80307 DRUG TEST PRSMV CHEM ANLYZR: CPT

## 2023-10-17 PROCEDURE — G0008 ADMIN INFLUENZA VIRUS VAC: HCPCS | Performed by: STUDENT IN AN ORGANIZED HEALTH CARE EDUCATION/TRAINING PROGRAM

## 2023-10-17 PROCEDURE — 80365 DRUG SCREENING OXYCODONE: CPT

## 2023-10-17 PROCEDURE — 99214 OFFICE O/P EST MOD 30 MIN: CPT | Performed by: STUDENT IN AN ORGANIZED HEALTH CARE EDUCATION/TRAINING PROGRAM

## 2023-10-17 PROCEDURE — 80361 OPIATES 1 OR MORE: CPT

## 2023-10-17 PROCEDURE — 1036F TOBACCO NON-USER: CPT | Performed by: STUDENT IN AN ORGANIZED HEALTH CARE EDUCATION/TRAINING PROGRAM

## 2023-10-17 RX ORDER — ALBUTEROL SULFATE 90 UG/1
AEROSOL, METERED RESPIRATORY (INHALATION)
Qty: 18 G | Refills: 11 | Status: SHIPPED | OUTPATIENT
Start: 2023-10-17

## 2023-10-17 ASSESSMENT — PAIN SCALES - GENERAL: PAINLEVEL: 8

## 2023-10-17 ASSESSMENT — ENCOUNTER SYMPTOMS
OCCASIONAL FEELINGS OF UNSTEADINESS: 0
LOSS OF SENSATION IN FEET: 0
DEPRESSION: 0

## 2023-10-17 NOTE — PROGRESS NOTES
Subjective   Patient ID: Tomasz Maxwell is a 47 y.o. male who presents for Follow-up and Med Refill.    HPI    #Spinal Stenosis  #Herniated Disc  - pain stimulator not effective  - still using percocets, but pain control is still poor  - follows w/ Pain Mangement  - may need another surgery given new areas of stenosis on recent imaging    #Asthma  - need refill of albuterol inhaler  - only uses intermittently    OARRS:  No data recorded  I have personally reviewed the OARRS report for Tomasz Maxwell. I have considered the risks of abuse, dependence, addiction and diversion    Is the patient prescribed a combination of a benzodiazepine and opioid?  Yes, I feel it is clincially indicated to continue the medication and have discussed with the patient risks/benefits/alternatives.    Last Urine Drug Screen / ordered today: Yes  Recent Results (from the past 8760 hour(s))   Opiate Confirmation, Urine    Collection Time: 10/17/23 10:13 AM   Result Value Ref Range    6-Acetylmorphine <25 <25 ng/mL    Codeine <50 <50 ng/mL    Hydrocodone <25 <25 ng/mL    Hydromorphone <25 <25 ng/mL    Morphine  <50 <50 ng/mL    Norhydrocodone <25 <25 ng/mL    Noroxycodone 437 (H) <25 ng/mL    Oxycodone 736 (H) <25 ng/mL    Oxymorphone 400 (H) <25 ng/mL   Drug Screen, Urine With Reflex to Confirmation    Collection Time: 10/17/23 10:13 AM   Result Value Ref Range    Amphetamine Screen, Urine Presumptive Negative Presumptive Negative    Barbiturate Screen, Urine Presumptive Negative Presumptive Negative    Benzodiazepines Screen, Urine Presumptive Negative Presumptive Negative    Cannabinoid Screen, Urine Presumptive Negative Presumptive Negative    Cocaine Metabolite Screen, Urine Presumptive Negative Presumptive Negative    Fentanyl Screen, Urine Presumptive Negative Presumptive Negative    Opiate Screen, Urine Presumptive Negative Presumptive Negative    Oxycodone Screen, Urine Presumptive Positive (A) Presumptive Negative    PCP  Screen, Urine Presumptive Negative Presumptive Negative   OPIATE/OPIOID/BENZO PRESCRIPTION COMPLIANCE    Collection Time: 04/25/23 12:02 PM   Result Value Ref Range    DRUG SCREEN COMMENT URINE SEE BELOW     Creatine, Urine 220.7 mg/dL    Amphetamine Screen, Urine PRESUMPTIVE NEGATIVE NEGATIVE    Barbiturate Screen, Urine PRESUMPTIVE NEGATIVE NEGATIVE    Cannabinoid Screen, Urine PRESUMPTIVE NEGATIVE NEGATIVE    Cocaine Screen, Urine PRESUMPTIVE NEGATIVE NEGATIVE    PCP Screen, Urine PRESUMPTIVE NEGATIVE NEGATIVE    7-Aminoclonazepam <25 Cutoff <25 ng/mL    Alpha-Hydroxyalprazolam <25 Cutoff <25 ng/mL    Alpha-Hydroxymidazolam <25 Cutoff <25 ng/mL    Alprazolam <25 Cutoff <25 ng/mL    Chlordiazepoxide <25 Cutoff <25 ng/mL    Clonazepam <25 Cutoff <25 ng/mL    Diazepam <25 Cutoff <25 ng/mL    Lorazepam <25 Cutoff <25 ng/mL    Midazolam <25 Cutoff <25 ng/mL    Nordiazepam <25 Cutoff <25 ng/mL    Oxazepam <25 Cutoff <25 ng/mL    Temazepam <25 Cutoff <25 ng/mL    Zolpidem <25 Cutoff <25 ng/mL    Zolpidem Metabolite (ZCA) <25 Cutoff <25 ng/mL    6-Acetylmorphine <25 Cutoff <25 ng/mL    Codeine <50 Cutoff <50 ng/mL    Hydrocodone <25 Cutoff <25 ng/mL    Hydromorphone <25 Cutoff <25 ng/mL    Morphine Urine <50 Cutoff <50 ng/mL    Norhydrocodone <25 Cutoff <25 ng/mL    Noroxycodone 186 (A) Cutoff <25 ng/mL    Oxycodone 362 (A) Cutoff <25 ng/mL    Oxymorphone 87 (A) Cutoff <25 ng/mL    Tramadol <50 Cutoff <50 ng/mL    O-Desmethyltramadol <50 Cutoff <50 ng/mL    Fentanyl <2.5 Cutoff<2.5 ng/mL    Norfentanyl <2.5 Cutoff<2.5 ng/mL    METHADONE CONFIRMATION,URINE <25 Cutoff <25 ng/mL    EDDP <25 Cutoff <25 ng/mL     Results are as expected.         Controlled Substance Agreement:  Date of the Last Agreement: 8/2023  Reviewed Controlled Substance Agreement including but not limited to the benefits, risks, and alternatives to treatment with a Controlled Substance medication(s).    Opioids:  What is the patient's goal of therapy?  "Pain control  Is this being achieved with current treatment? yes    I have calculated the patient's Morphine Dose Equivalent (MED):   I have considered referral to Pain Management and/or a specialist, and do not feel it is necessary at this time.    I feel that it is clinically indicated to continue this current medication regimen after consideration of alternative therapies, and other non-opioid treatment.    Opioid Risk Screening:  No data recorded    Pain Assessment:  Analgesia  What was your pain level on average during the past week?: 8  What was your pain level at its worst during the past week?: 10 - Pain as bad as it can be  What percentage of your pain has been relieved during the past week?: 100 %  Is the amount of pain relief you are now obtaining from your current pain relievers enough to make a real difference in your life?: Y  Query to Clinician: Is the patient's pain relief clinically significant?: Yes        Review of Systems   Constitutional:  Negative for chills and fever.   HENT:  Negative for congestion.    Eyes:  Negative for visual disturbance.   Respiratory:  Negative for cough and shortness of breath.    Cardiovascular:  Negative for chest pain.   Gastrointestinal:  Negative for abdominal pain, diarrhea and nausea.   Genitourinary: Negative.    Musculoskeletal:  Negative for arthralgias and joint swelling.   Neurological:  Negative for dizziness and headaches.   Psychiatric/Behavioral:          No changes in mood or behavior       Objective   /85 (BP Location: Right arm, Patient Position: Sitting, BP Cuff Size: Large adult)   Pulse 58   Temp 36.6 °C (97.9 °F) (Temporal)   Ht 1.651 m (5' 5\")   Wt 130 kg (287 lb 6.4 oz)   SpO2 97%   BMI 47.83 kg/m²      Physical Exam  Constitutional:       General: He is not in acute distress.  Eyes:      Extraocular Movements: Extraocular movements intact.      Conjunctiva/sclera: Conjunctivae normal.   Cardiovascular:      Rate and Rhythm: Normal " rate and regular rhythm.      Heart sounds: Normal heart sounds. No murmur heard.  Pulmonary:      Effort: Pulmonary effort is normal.      Breath sounds: Normal breath sounds.   Abdominal:      General: There is no distension.      Palpations: Abdomen is soft.   Musculoskeletal:         General: Normal range of motion.      Cervical back: Normal range of motion.      Comments: Uses cane for mobilization   Neurological:      General: No focal deficit present.      Mental Status: He is alert and oriented to person, place, and time.      Comments: CN 2-12 grossly intact   Psychiatric:         Mood and Affect: Mood normal.         Behavior: Behavior normal.       Assessment/Plan   Diagnoses and all orders for this visit:  Lumbar post-laminectomy syndrome  Comments:  Chronic, worsening. Will likely undergo another surgery given findings of worsening stenosis. Following w/ Pain Medicine and Neurosurgery  Orders:  -     Follow Up In Primary Care - Health Maintenance  -     Drug Screen, Urine With Reflex to Confirmation; Future  -     Follow Up In Primary Care - Health Maintenance; Future  -     Opiate Confirmation, Urine  -     OOB Internal Tracking  Chronic, continuous use of opioids  Comments:  Stable, still requiring pain meds as pain stimulator has stopped being helpful. CSA 8/2023. UDS 8/2023. Ordered repeat as pt already provided sample.  Moderate asthma, unspecified whether complicated, unspecified whether persistent  Comments:  Stable, only intermittent albuterol use. Given refill on albuterol.  Orders:  -     Ventolin HFA 90 mcg/actuation inhaler; INHALE 1 OR 2 PUFFS EVERY 4 TO 6 HOURS AS NEEDED  Erectile dysfunction, unspecified erectile dysfunction type  Comments:  Stable, likely 2/2 chronic opioid use. Will further evaluate at next visit. Due to health maintence also. Refilled viagra.  Immunization due  Comments:  Due for flu shot. Given today.  Orders:  -     Flu vaccine (IIV4) age 6 months and greater,  preservative free    Follow up/Return to the clinic in 1 month    Attending Supervision: seen and discussed with attending physician, Dr. Mo Fernando MD  Family Medicine, PGY-3

## 2023-10-19 PROBLEM — N52.9 ERECTILE DYSFUNCTION: Status: ACTIVE | Noted: 2023-10-19

## 2023-10-19 PROBLEM — F11.90 CHRONIC, CONTINUOUS USE OF OPIOIDS: Status: ACTIVE | Noted: 2023-10-19

## 2023-10-19 PROBLEM — M96.1 LUMBAR POST-LAMINECTOMY SYNDROME: Status: ACTIVE | Noted: 2023-10-19

## 2023-10-19 PROBLEM — J45.909 MODERATE ASTHMA (HHS-HCC): Status: ACTIVE | Noted: 2023-10-19

## 2023-10-19 LAB
6MAM UR CFM-MCNC: <25 NG/ML
CODEINE UR CFM-MCNC: <50 NG/ML
HYDROCODONE CTO UR CFM-MCNC: <25 NG/ML
HYDROMORPHONE UR CFM-MCNC: <25 NG/ML
MORPHINE UR CFM-MCNC: <50 NG/ML
NORHYDROCODONE UR CFM-MCNC: <25 NG/ML
NOROXYCODONE UR CFM-MCNC: 437 NG/ML
OXYCODONE UR CFM-MCNC: 736 NG/ML
OXYMORPHONE UR CFM-MCNC: 400 NG/ML

## 2023-10-19 ASSESSMENT — ENCOUNTER SYMPTOMS
FEVER: 0
NAUSEA: 0
ARTHRALGIAS: 0
CHILLS: 0
SHORTNESS OF BREATH: 0
DIARRHEA: 0
HEADACHES: 0
JOINT SWELLING: 0
COUGH: 0
DIZZINESS: 0
ABDOMINAL PAIN: 0

## 2023-10-20 NOTE — PROGRESS NOTES
I saw and evaluated the patient. I personally obtained the key and critical portions of the history and physical exam or was physically present for key and critical portions performed by the resident/fellow. I reviewed the resident/fellow's documentation and discussed the patient with the resident/fellow. I agree with the resident/fellow's medical decision making as documented in the note.    Haylee Hassan MD

## 2023-10-24 ENCOUNTER — APPOINTMENT (OUTPATIENT)
Dept: PAIN MEDICINE | Facility: HOSPITAL | Age: 48
End: 2023-10-24
Payer: MEDICARE

## 2023-10-28 PROBLEM — T84.84XA PAIN DUE TO LEFT HIP JOINT PROSTHESIS (CMS-HCC): Status: ACTIVE | Noted: 2023-10-28

## 2023-10-28 PROBLEM — Z96.651 STATUS POST TOTAL RIGHT KNEE REPLACEMENT: Status: ACTIVE | Noted: 2023-10-28

## 2023-10-28 PROBLEM — F44.6 HEARING LOSS, FUNCTIONAL: Status: ACTIVE | Noted: 2023-10-28

## 2023-10-28 PROBLEM — H35.413 LATTICE DEGENERATION OF BOTH RETINAS: Status: ACTIVE | Noted: 2023-10-28

## 2023-10-28 PROBLEM — H31.002 CHORIORETINAL SCAR OF LEFT EYE: Status: ACTIVE | Noted: 2023-10-28

## 2023-10-28 PROBLEM — H81.10 BPPV (BENIGN PAROXYSMAL POSITIONAL VERTIGO): Status: ACTIVE | Noted: 2023-10-28

## 2023-10-28 PROBLEM — H90.12 CONDUCTIVE HEARING LOSS OF LEFT EAR WITH UNRESTRICTED HEARING OF RIGHT EAR: Status: ACTIVE | Noted: 2023-10-28

## 2023-10-28 PROBLEM — G89.29 CHRONIC LOW BACK PAIN: Status: ACTIVE | Noted: 2023-10-28

## 2023-10-28 PROBLEM — F32.A DEPRESSION: Status: ACTIVE | Noted: 2023-10-28

## 2023-10-28 PROBLEM — H52.4 REGULAR ASTIGMATISM OF BOTH EYES WITH PRESBYOPIA: Status: ACTIVE | Noted: 2023-10-28

## 2023-10-28 PROBLEM — G57.50 TARSAL TUNNEL SYNDROME: Status: ACTIVE | Noted: 2023-10-28

## 2023-10-28 PROBLEM — M16.11 PRIMARY OSTEOARTHRITIS OF RIGHT HIP: Status: ACTIVE | Noted: 2023-10-28

## 2023-10-28 PROBLEM — R73.03 PREDIABETES: Status: ACTIVE | Noted: 2023-10-28

## 2023-10-28 PROBLEM — K43.2 RECURRENT VENTRAL HERNIA: Status: ACTIVE | Noted: 2023-10-28

## 2023-10-28 PROBLEM — M95.5 PELVIS TILTED: Status: ACTIVE | Noted: 2022-02-11

## 2023-10-28 PROBLEM — F91.9 CONDUCT DISORDER, UNSPECIFIED: Status: ACTIVE | Noted: 2023-10-28

## 2023-10-28 PROBLEM — H52.13 MYOPIA OF BOTH EYES WITH REGULAR ASTIGMATISM: Status: ACTIVE | Noted: 2023-10-28

## 2023-10-28 PROBLEM — M19.90 OSTEOARTHRITIS: Status: ACTIVE | Noted: 2023-10-28

## 2023-10-28 PROBLEM — H43.393 FLOATERS, BILATERAL: Status: ACTIVE | Noted: 2023-10-28

## 2023-10-28 PROBLEM — H53.142 PHOTOPHOBIA OF LEFT EYE: Status: ACTIVE | Noted: 2023-10-28

## 2023-10-28 PROBLEM — H53.8 BLURRED VISION, BILATERAL: Status: ACTIVE | Noted: 2023-10-28

## 2023-10-28 PROBLEM — M17.11 OSTEOARTHRITIS OF RIGHT KNEE: Status: ACTIVE | Noted: 2023-10-28

## 2023-10-28 PROBLEM — M75.82 TENDINITIS OF LEFT ROTATOR CUFF: Status: ACTIVE | Noted: 2023-10-28

## 2023-10-28 PROBLEM — H53.2 DIPLOPIA: Status: ACTIVE | Noted: 2023-10-28

## 2023-10-28 PROBLEM — H90.3 ASYMMETRIC SNHL (SENSORINEURAL HEARING LOSS): Status: ACTIVE | Noted: 2023-10-28

## 2023-10-28 PROBLEM — H52.223 MYOPIA OF BOTH EYES WITH REGULAR ASTIGMATISM: Status: ACTIVE | Noted: 2023-10-28

## 2023-10-28 PROBLEM — L60.3 DYSTROPHIA UNGUIUM: Status: ACTIVE | Noted: 2022-02-11

## 2023-10-28 PROBLEM — M16.12 PRIMARY OSTEOARTHRITIS OF LEFT HIP: Status: ACTIVE | Noted: 2023-10-28

## 2023-10-28 PROBLEM — R06.09 DYSPNEA ON MINIMAL EXERTION: Status: ACTIVE | Noted: 2023-10-28

## 2023-10-28 PROBLEM — N17.9 ACUTE KIDNEY INJURY (CMS-HCC): Status: ACTIVE | Noted: 2023-10-28

## 2023-10-28 PROBLEM — H04.123 DRY EYES, BILATERAL: Status: ACTIVE | Noted: 2023-10-28

## 2023-10-28 PROBLEM — H53.139 ACUTE VISUAL LOSS: Status: ACTIVE | Noted: 2023-10-28

## 2023-10-28 PROBLEM — H52.223 REGULAR ASTIGMATISM OF BOTH EYES WITH PRESBYOPIA: Status: ACTIVE | Noted: 2023-10-28

## 2023-10-28 PROBLEM — M54.10 RADICULOPATHY OF LEG: Status: ACTIVE | Noted: 2023-10-28

## 2023-10-28 PROBLEM — M23.303 DERANGEMENT OF MEDIAL MENISCUS OF RIGHT KNEE: Status: ACTIVE | Noted: 2023-10-28

## 2023-10-28 PROBLEM — K21.9 ESOPHAGEAL REFLUX: Status: ACTIVE | Noted: 2023-10-28

## 2023-10-28 PROBLEM — R63.2 POLYPHAGIA: Status: ACTIVE | Noted: 2023-10-28

## 2023-10-28 PROBLEM — R32 URINARY INCONTINENCE: Status: ACTIVE | Noted: 2023-10-28

## 2023-10-28 PROBLEM — H81.02: Status: ACTIVE | Noted: 2023-10-28

## 2023-10-28 PROBLEM — G47.33 OSA (OBSTRUCTIVE SLEEP APNEA): Status: ACTIVE | Noted: 2023-10-28

## 2023-10-28 PROBLEM — I10 HYPERTENSION: Status: ACTIVE | Noted: 2023-10-28

## 2023-10-28 PROBLEM — Z96.642 PAIN DUE TO LEFT HIP JOINT PROSTHESIS (CMS-HCC): Status: ACTIVE | Noted: 2023-10-28

## 2023-10-28 PROBLEM — I10 ESSENTIAL HYPERTENSION: Status: ACTIVE | Noted: 2022-02-24

## 2023-10-28 PROBLEM — M54.50 CHRONIC LOW BACK PAIN: Status: ACTIVE | Noted: 2023-10-28

## 2023-10-28 PROBLEM — F45.42 PAIN DISORDER ASSOCIATED WITH PSYCHOLOGICAL AND PHYSICAL FACTORS: Status: ACTIVE | Noted: 2023-10-28

## 2023-10-28 PROBLEM — R19.06 EPIGASTRIC MASS: Status: ACTIVE | Noted: 2023-10-28

## 2023-10-28 PROBLEM — M21.70 LEG LENGTH INEQUALITY: Status: ACTIVE | Noted: 2022-02-11

## 2023-10-28 PROBLEM — H33.322 RETINAL HOLE OF LEFT EYE: Status: ACTIVE | Noted: 2023-10-28

## 2023-10-28 PROBLEM — E55.9 VITAMIN D DEFICIENCY: Status: ACTIVE | Noted: 2023-10-28

## 2023-10-28 PROBLEM — E11.9 TYPE 2 DIABETES MELLITUS WITHOUT COMPLICATIONS (MULTI): Status: ACTIVE | Noted: 2022-02-24

## 2023-10-28 PROBLEM — Z98.84 BARIATRIC SURGERY STATUS: Status: ACTIVE | Noted: 2023-10-28

## 2023-10-28 PROBLEM — M17.10 ARTHRITIS OF KNEE: Status: ACTIVE | Noted: 2023-10-28

## 2023-10-28 PROBLEM — H90.3 BILATERAL SENSORINEURAL HEARING LOSS: Status: ACTIVE | Noted: 2023-10-28

## 2023-10-28 RX ORDER — MUPIROCIN 20 MG/G
OINTMENT TOPICAL
COMMUNITY
End: 2023-11-24 | Stop reason: ALTCHOICE

## 2023-10-28 RX ORDER — DICLOFENAC SODIUM 10 MG/G
2 GEL TOPICAL 4 TIMES DAILY PRN
COMMUNITY
End: 2023-11-24 | Stop reason: ALTCHOICE

## 2023-10-28 RX ORDER — LIDOCAINE 560 MG/1
1 PATCH PERCUTANEOUS; TOPICAL; TRANSDERMAL DAILY
COMMUNITY
End: 2023-11-24 | Stop reason: ALTCHOICE

## 2023-10-28 RX ORDER — BACLOFEN 10 MG/1
10 TABLET ORAL
COMMUNITY
End: 2023-11-24 | Stop reason: ALTCHOICE

## 2023-10-30 ENCOUNTER — HOSPITAL ENCOUNTER (OUTPATIENT)
Dept: RADIOLOGY | Facility: HOSPITAL | Age: 48
Discharge: HOME | End: 2023-10-30
Payer: MEDICARE

## 2023-10-30 VITALS
OXYGEN SATURATION: 96 % | HEART RATE: 67 BPM | TEMPERATURE: 98 F | HEIGHT: 65 IN | WEIGHT: 265 LBS | SYSTOLIC BLOOD PRESSURE: 118 MMHG | DIASTOLIC BLOOD PRESSURE: 42 MMHG | RESPIRATION RATE: 16 BRPM | BODY MASS INDEX: 44.15 KG/M2

## 2023-10-30 DIAGNOSIS — M54.16 LUMBAR RADICULOPATHY: ICD-10-CM

## 2023-10-30 PROCEDURE — 64483 NJX AA&/STRD TFRM EPI L/S 1: CPT | Performed by: ANESTHESIOLOGY

## 2023-10-30 PROCEDURE — 77003 FLUOROGUIDE FOR SPINE INJECT: CPT

## 2023-10-30 PROCEDURE — 2500000004 HC RX 250 GENERAL PHARMACY W/ HCPCS (ALT 636 FOR OP/ED): Performed by: ANESTHESIOLOGY

## 2023-10-30 RX ORDER — ROPIVACAINE HYDROCHLORIDE 5 MG/ML
10 INJECTION, SOLUTION EPIDURAL; INFILTRATION; PERINEURAL ONCE
Status: CANCELLED | OUTPATIENT
Start: 2023-10-30 | End: 2023-10-30

## 2023-10-30 RX ORDER — FENTANYL CITRATE 50 UG/ML
50 INJECTION, SOLUTION INTRAMUSCULAR; INTRAVENOUS ONCE
Status: CANCELLED | OUTPATIENT
Start: 2023-10-30 | End: 2023-10-30

## 2023-10-30 RX ORDER — FENTANYL CITRATE 50 UG/ML
25 INJECTION, SOLUTION INTRAMUSCULAR; INTRAVENOUS ONCE
Status: CANCELLED | OUTPATIENT
Start: 2023-10-30 | End: 2023-10-30

## 2023-10-30 RX ORDER — MIDAZOLAM HYDROCHLORIDE 1 MG/ML
INJECTION INTRAMUSCULAR; INTRAVENOUS AS NEEDED
Status: COMPLETED | OUTPATIENT
Start: 2023-10-30 | End: 2023-10-30

## 2023-10-30 RX ORDER — LIDOCAINE HYDROCHLORIDE 20 MG/ML
10 INJECTION, SOLUTION EPIDURAL; INFILTRATION; INTRACAUDAL; PERINEURAL AS NEEDED
Status: CANCELLED | OUTPATIENT
Start: 2023-10-30

## 2023-10-30 RX ORDER — MIDAZOLAM HYDROCHLORIDE 1 MG/ML
2 INJECTION, SOLUTION INTRAMUSCULAR; INTRAVENOUS ONCE
Status: CANCELLED | OUTPATIENT
Start: 2023-10-30 | End: 2023-10-30

## 2023-10-30 RX ORDER — DEXAMETHASONE SODIUM PHOSPHATE 100 MG/10ML
10 INJECTION INTRAMUSCULAR; INTRAVENOUS AS NEEDED
Status: CANCELLED | OUTPATIENT
Start: 2023-10-30

## 2023-10-30 RX ORDER — MIDAZOLAM HYDROCHLORIDE 1 MG/ML
2 INJECTION, SOLUTION INTRAMUSCULAR; INTRAVENOUS AS NEEDED
Status: CANCELLED | OUTPATIENT
Start: 2023-10-30

## 2023-10-30 RX ORDER — BUPIVACAINE HYDROCHLORIDE 2.5 MG/ML
10 INJECTION, SOLUTION INFILTRATION; PERINEURAL ONCE
Status: CANCELLED | OUTPATIENT
Start: 2023-10-30 | End: 2023-10-30

## 2023-10-30 RX ORDER — LIDOCAINE HYDROCHLORIDE 5 MG/ML
10 INJECTION, SOLUTION INFILTRATION; PERINEURAL ONCE
Status: CANCELLED | OUTPATIENT
Start: 2023-10-30 | End: 2023-10-30

## 2023-10-30 RX ADMIN — MIDAZOLAM HYDROCHLORIDE 2 MG: 1 INJECTION INTRAMUSCULAR; INTRAVENOUS at 08:56

## 2023-10-30 RX ADMIN — MIDAZOLAM HYDROCHLORIDE 2 MG: 1 INJECTION INTRAMUSCULAR; INTRAVENOUS at 08:54

## 2023-10-30 ASSESSMENT — PAIN SCALES - GENERAL
PAINLEVEL_OUTOF10: 5 - MODERATE PAIN
PAINLEVEL_OUTOF10: 7
PAINLEVEL_OUTOF10: 8
PAINLEVEL_OUTOF10: 5 - MODERATE PAIN
PAINLEVEL_OUTOF10: 8

## 2023-10-30 ASSESSMENT — PAIN - FUNCTIONAL ASSESSMENT
PAIN_FUNCTIONAL_ASSESSMENT: 0-10

## 2023-10-30 NOTE — PROCEDURES
Preoperative diagnosis/postoperative diagnosis: Lumbar disc disease, congenital narrowing of the spine, failed back surgery syndrome  Procedure: Bilateral L4 Lumbar transforaminal epidural steroid injection under fluoroscopic guidance  Surgeon: Avelina Covington  Assistant:  Fellow  Anesthesia: Local, 4 mg midazolam  Complications: Apparently none    Clinical note: Tomasz Maxwell is a 47-year-old male with history of back and leg symptoms.  He has congenital stenosis and a recurrent disc issue at the L4 level.  He is here today for the aforementioned procedure.  Of note he did see spine surgery and they do not want to do any surgical intervention.    Procedure note: The patient was met in the preoperative holding area after risks benefits and alternatives to procedure were discussed with the patient, informed consent was obtained. Patient brought back to the procedure room and placed in the prone position on the fluoroscopy table. Area over the back was exposed, prepped, draped, in the usual sterile fashion.  Skin and subcutaneous tissues to the neuroforamen was anesthetized using 0.5% lidocaine.  22-gauge Sprotte needles were inserted in the skin and advanced into the foramen. Needle tip position was confirmed in AP oblique and lateral views.  Contrast was injected which showed appropriate epidural spread, no intravascular or intrathecal uptake.  In order to see contrast spread we had 2 oblique to the left and right sides as the patient has an implanted device.  A total of 2 mL of 0.5% lidocaine mixed with 10 mg dexamethasone was injected in divided doses among the 2 needles. Needles removed, bandage applied, patient tolerated the procedure well with no immediate complications.

## 2023-10-30 NOTE — H&P
History Of Present Illness  Tomasz Maxwell is a 47 y.o. male presents for procedure state below. Endorses no changes in past medical history or medical health since last seen in clinic.     Past Medical History  He has a past medical history of Contusion of left hand, initial encounter (04/24/2017), Crushing injury of unspecified finger(s), initial encounter (04/24/2017), Essential (primary) hypertension (05/16/2022), Hyperlipidemia, unspecified (10/03/2022), Hypokalemia (04/24/2017), Idiopathic gout, right knee (04/24/2017), Latent syphilis, unspecified as early or late (04/13/2018), Local infection of the skin and subcutaneous tissue, unspecified (12/05/2017), Long term (current) use of opiate analgesic (04/24/2017), Mastitis without abscess (04/24/2017), Mastodynia (04/24/2017), Muscle spasm of back (04/24/2017), Nocturia (04/24/2017), Other microscopic hematuria (04/24/2017), Other specified postprocedural states (04/24/2017), Other specified postprocedural states (04/24/2017), Other specified symptoms and signs involving the digestive system and abdomen (09/08/2017), Pain in left wrist (04/24/2017), Personal history of other diseases of the musculoskeletal system and connective tissue, Personal history of other diseases of the nervous system and sense organs (03/07/2016), Personal history of other endocrine, nutritional and metabolic disease, Personal history of other infectious and parasitic diseases (09/10/2017), Personal history of other infectious and parasitic diseases (09/08/2017), Personal history of other infectious and parasitic diseases (04/13/2018), Personal history of other specified conditions (04/30/2018), Personal history of other specified conditions (01/23/2018), Personal history of other specified conditions (07/28/2017), Personal history of other specified conditions (10/08/2017), Proteinuria, unspecified (04/24/2017), Radiculopathy, lumbar region (04/24/2017), Spinal stenosis, site  unspecified (08/19/2015), Unspecified abnormal finding in specimens from other organs, systems and tissues (04/24/2017), and Unspecified asthma, uncomplicated.    Surgical History  He has a past surgical history that includes Hernia repair (08/26/2015); Lumbar laminectomy (08/26/2015); Other surgical history (05/20/2021); Knee surgery (03/29/2017); Other surgical history (02/24/2021); Other surgical history (09/26/2016); Back surgery (09/26/2016); MR angio head wo IV contrast (5/21/2022); MR angio neck wo IV contrast (5/21/2022); MR angio head wo IV contrast (3/12/2017); and MR angio neck wo IV contrast (3/12/2017).     Social History  He reports that he has never smoked. He has never used smokeless tobacco. He reports current drug use. Drug: Oxycodone. He reports that he does not drink alcohol.    Family History  Family History   Problem Relation Name Age of Onset    Cancer Mother      Diabetes Mother      Asthma Mother      Asthma Child          Allergies  Patient has no known allergies.    Review of Symptoms:   Constitutional: Negative for chills, diaphoresis or fever  HENT: Negative for neck swelling  Eyes:.  Negative for eye pain  Respiratory:.  Negative for cough, shortness of breath or wheezing    Cardiovascular:.  Negative for chest pain or palpitations  Gastrointestinal:.  Negative for abdominal pain, nausea and vomiting  Genitourinary:.  Negative for urgency  Musculoskeletal:  Positive for back pain. Positive for joint pain. Denies falls within the past 3 months.  Skin: Negative for wounds or itching   Neurological: Negative for dizziness, seizures, loss of consciousness and weakness  Endo/Heme/Allergies: Does not bruise/bleed easily  Psychiatric/Behavioral: Negative for depression. The patient does not appear anxious.       PHYSICAL EXAM  Vitals signs reviewed  Constitutional:       General: Not in acute distress     Appearance: Normal appearance. Not ill-appearing.  HENT:     Head: Normocephalic and  atraumatic  Eyes:     Conjunctiva/sclera: Conjunctivae normal  Cardiovascular:     Rate and Rhythm: Normal rate and regular rhythm  Pulmonary:     Effort: No respiratory distress  Abdominal:     Palpations: Abdomen is soft  Musculoskeletal: PHILLIPS  Skin:     General: Skin is warm and dry  Neurological:     General: No focal deficit present  Psychiatric:         Mood and Affect: Mood normal         Behavior: Behavior normal     Last Recorded Vitals  There were no vitals taken for this visit.    Relevant Results  Current Outpatient Medications   Medication Instructions    albuterol 2.5 mg /3 mL (0.083 %) nebulizer solution inhalation    amLODIPine (NORVASC) 10 mg, oral, Daily, as directed    aspirin 81 mg EC tablet oral, 2 times daily    atorvastatin (Lipitor) 80 mg tablet TAKE 1 TABLET AT BEDTIME.    baclofen (LIORESAL) 10 mg    cholecalciferol (VITAMIN D-3) 50 mcg, oral, Daily    diclofenac sodium (VOLTAREN) 2 g, Topical, 4 times daily PRN    FreeStyle Lite Strips strip TEST 3 TIMES DAILY.    lidocaine 4 % patch 1 patch, transdermal, Daily, Remove & discard patch within 12 hours or as directed by MD.    loratadine (CLARITIN) 10 mg, oral, Daily    metFORMIN (Glucophage) 850 mg tablet TAKE 1 TABLET BY MOUTH EVERY DAY    montelukast (SINGULAIR) 10 mg, oral, Nightly    mupirocin (Bactroban) 2 % ointment Topical, 3 times daily RT    naloxone (NARCAN) 4 mg, nasal, As needed, May repeat every 2-3 minutes if needed, alternating nostrils, until medical assistance becomes available.    oxyCODONE-acetaminophen (Percocet) 5-325 mg tablet 1 tablet, oral, Every 6 hours PRN    sennosides-docusate sodium (Maribell-Colace) 8.6-50 mg tablet 1 tablet, oral, Daily    sildenafil (Viagra) 100 mg tablet TAKE 1 TABLET DAILY 1 HOUR BEFORE NEEDED    Symbicort 160-4.5 mcg/actuation inhaler 2 puffs, inhalation, 2 times daily, Rinse mouth after use.    tamsulosin (FLOMAX) 0.8 mg, oral, Nightly    Ventolin HFA 90 mcg/actuation inhaler INHALE 1 OR 2  PUFFS EVERY 4 TO 6 HOURS AS NEEDED         MR cervical spine wo IV contrast 08/12/2023    Narrative  Interpreted By:  SARAHY SHERIDAN MD  MRN: [id number]  Patient Name: ASHLEIGH MURGUIA    STUDY:  MRI CERVICAL WO;  8/12/2023 12:22 pm    INDICATION:  pain  M54.2: Cervical pain.    COMPARISON:  None.    ACCESSION NUMBER(S):  [id number]    ORDERING CLINICIAN:  OLIVIA VELEZ    TECHNIQUE:  The cervical spine was studied in the sagittal and axial planes  utilizing T1 and T2 weighted images.    FINDINGS:  The craniovertebral junction is normal. The cord is normal in size  and signal. The marrow signal is normal.  Serial axial images reveal the following:  C2/C3  There is normal alignment and vertebral body height. The disc space  is normal. There is no evidence of canal or foraminal narrowing.  There is no evidence of bulging or herniated disc.  C3/C4  Asymmetrical bulging disc or uncovertebral joint hypertrophy to the  left with focal left-sided foraminal narrowing. No measurable canal  stenosis.  C4/C5  Asymmetrical bulging disc or uncovertebral joint hypertrophy toward  the left with left-sided foraminal narrowing. No measurable canal  stenosis.  C5/C6  Asymmetrical bulging intervertebral disc or uncovertebral joint  hypertrophy toward the left with mild left-sided foraminal narrowing.  No measurable canal stenosis.  C6/C7  Mild circumferential bulging intervertebral disc to the left without  canal stenosis. Mild left-sided foraminal narrowing.  C7/T1  There is normal alignment and vertebral body height. The disc space  is normal. There is no evidence of canal or foraminal narrowing.  There is no evidence of bulging or herniated disc.    Impression  * Cervical spondylosis as described with left-sided foraminal  narrowing at several levels  *No measurable canal stenosis or cord compression.    THIS EXAMINATION WAS INTERPRETED AT [facility] [place]      MR lumbar spine wo IV contrast 08/12/2023    Narrative  Interpreted By:   SARAHY SHERIDAN MD  MRN: [id number]  Patient Name: ASHLEIGH MAXWELL    STUDY:  MRI L-SPINE WO;  8/12/2023 12:21 pm    INDICATION:  pain  M96.1: Lumbar post-laminectomy syndrome.    COMPARISON:  August 2019.    ACCESSION NUMBER(S):  [id number]    ORDERING CLINICIAN:  OLIVIA VELEZ    TECHNIQUE:  The lumbar spine was studied in the sagital, axial and coronal planes  utiliing T1 and T2 weighted images.    FINDINGS:  The marrow signal and vertebral body height are normal. The conus and  sacrum are normal.  Images at each interspace reveal the following:  L1/L2  Bilateral facet hypertrophy without canal or foraminal narrowing  L2/L3  Bilateral facet hypertrophy without canal or foraminal narrowing  L3/L4  Previous laminectomy without canal stenosis. Circumferential bulging  of the intervertebral disc slightly asymmetrical to the left. No  measurable canal stenosis. Minor left-sided foraminal narrowing  L4/L5  Previous laminectomy without canal stenosis. Bilateral facet  hypertrophy. Interval development of herniated nucleus polyposis to  the left measuring approximately 1 cm in size on T2 weighted axial  image 44/62. Focal narrowing of the left lateral recess and neural  foramen.  L5/S1  Previous laminectomy. Bilateral facet hypertrophy. No measurable  canal or foraminal narrowing.    Impression  * Interval development of herniated nucleus polyposis or osteophyte  formation at L4/L5 on the left.    The examination was interpreted [facility]     No image results found.       1. Lumbar radiculopathy  Transforaminal    Transforaminal    FL pain management TC    FL pain management TC           ASSESSMENT/PLAN  Ashleigh Maxwell is a 47 y.o. male presents for Lumbar Transforaminal Epidural Injection    Our plan is as follows:  - Follow In pain clinic  - Continue to participate in physical therapy as well as physician directed home exercises  - Continue pain medications as prescribed       Rodriguez Cutler MD

## 2023-11-11 DIAGNOSIS — I10 HYPERTENSION, UNSPECIFIED TYPE: Primary | ICD-10-CM

## 2023-11-14 DIAGNOSIS — M96.1 LUMBAR POST-LAMINECTOMY SYNDROME: ICD-10-CM

## 2023-11-14 RX ORDER — AMLODIPINE BESYLATE 10 MG/1
10 TABLET ORAL DAILY
Qty: 90 TABLET | Refills: 3 | Status: SHIPPED | OUTPATIENT
Start: 2023-11-14 | End: 2024-11-13

## 2023-11-17 ENCOUNTER — HOSPITAL ENCOUNTER (OUTPATIENT)
Dept: RADIOLOGY | Facility: HOSPITAL | Age: 48
Discharge: HOME | End: 2023-11-17
Payer: MEDICARE

## 2023-11-17 ENCOUNTER — OFFICE VISIT (OUTPATIENT)
Dept: ORTHOPEDIC SURGERY | Facility: HOSPITAL | Age: 48
End: 2023-11-17
Payer: MEDICARE

## 2023-11-17 DIAGNOSIS — M25.511 RIGHT SHOULDER PAIN, UNSPECIFIED CHRONICITY: ICD-10-CM

## 2023-11-17 PROCEDURE — 99213 OFFICE O/P EST LOW 20 MIN: CPT | Mod: 25 | Performed by: ORTHOPAEDIC SURGERY

## 2023-11-17 PROCEDURE — 1036F TOBACCO NON-USER: CPT | Performed by: ORTHOPAEDIC SURGERY

## 2023-11-17 PROCEDURE — 73030 X-RAY EXAM OF SHOULDER: CPT | Mod: RIGHT SIDE | Performed by: RADIOLOGY

## 2023-11-17 PROCEDURE — 99213 OFFICE O/P EST LOW 20 MIN: CPT | Performed by: ORTHOPAEDIC SURGERY

## 2023-11-17 PROCEDURE — 3044F HG A1C LEVEL LT 7.0%: CPT | Performed by: ORTHOPAEDIC SURGERY

## 2023-11-17 PROCEDURE — 73030 X-RAY EXAM OF SHOULDER: CPT | Mod: RT

## 2023-11-17 RX ORDER — OXYCODONE AND ACETAMINOPHEN 5; 325 MG/1; MG/1
1 TABLET ORAL EVERY 6 HOURS PRN
Qty: 90 TABLET | Refills: 0 | Status: SHIPPED | OUTPATIENT
Start: 2023-11-30 | End: 2023-12-27 | Stop reason: SDUPTHER

## 2023-11-17 NOTE — PROGRESS NOTES
Continued symptoms in his right shoulder he has known arthritis he has had injections which only gave transient relief.  He now finds he is having compromise of his daily activities and is dropping things.    Right shoulder active elevation 90 degrees passive elevation 100 degrees with pain.  Patient has painful arc of motion at the extremes of range of motion in each direction.  His motor power is actually fairly well-preserved in abduction is 4+ external rotation 5 internal rotation 5  strength 5.    X-rays and MRI scan confirmed degenerative arthritic changes of the glenohumeral joint with preserved rotator cuff    Patient has glenohumeral arthritis of the right shoulder    I have recommended the patient consider surgery we discussed our anatomic arthroplasty in detail and the rehabilitation sequence was explained patient is interested in pursuing surgery would like to schedule sometime in the near future the procedure be right shoulder anatomic arthroplasty.    This was dictated using voice recognition software and not corrected for grammatical or spelling errors.

## 2023-11-20 DIAGNOSIS — M19.011 ARTHRITIS OF RIGHT SHOULDER REGION: ICD-10-CM

## 2023-11-20 DIAGNOSIS — J45.909 UNSPECIFIED ASTHMA, UNCOMPLICATED (HHS-HCC): ICD-10-CM

## 2023-11-20 RX ORDER — ALBUTEROL SULFATE 0.83 MG/ML
SOLUTION RESPIRATORY (INHALATION)
Qty: 60 ML | Refills: 11 | Status: SHIPPED | OUTPATIENT
Start: 2023-11-20

## 2023-11-24 ENCOUNTER — TELEMEDICINE CLINICAL SUPPORT (OUTPATIENT)
Dept: PREADMISSION TESTING | Facility: HOSPITAL | Age: 48
End: 2023-11-24
Payer: MEDICARE

## 2023-11-24 RX ORDER — LORATADINE 10 MG/1
10 TABLET ORAL DAILY
COMMUNITY

## 2023-11-24 RX ORDER — METFORMIN HYDROCHLORIDE 850 MG/1
850 TABLET ORAL
COMMUNITY
End: 2024-03-22

## 2023-11-27 ENCOUNTER — ANESTHESIA EVENT (OUTPATIENT)
Dept: OPERATING ROOM | Facility: HOSPITAL | Age: 48
End: 2023-11-27
Payer: MEDICARE

## 2023-11-27 ENCOUNTER — LAB (OUTPATIENT)
Dept: LAB | Facility: LAB | Age: 48
End: 2023-11-27
Payer: MEDICARE

## 2023-11-27 ENCOUNTER — PRE-ADMISSION TESTING (OUTPATIENT)
Dept: PREADMISSION TESTING | Facility: HOSPITAL | Age: 48
End: 2023-11-27
Payer: MEDICARE

## 2023-11-27 VITALS
DIASTOLIC BLOOD PRESSURE: 69 MMHG | WEIGHT: 273.37 LBS | SYSTOLIC BLOOD PRESSURE: 126 MMHG | RESPIRATION RATE: 18 BRPM | TEMPERATURE: 96.4 F | HEIGHT: 66 IN | BODY MASS INDEX: 43.93 KG/M2 | OXYGEN SATURATION: 97 % | HEART RATE: 64 BPM

## 2023-11-27 DIAGNOSIS — N17.9 ACUTE KIDNEY INJURY (CMS-HCC): ICD-10-CM

## 2023-11-27 DIAGNOSIS — E11.9 TYPE 2 DIABETES MELLITUS WITHOUT COMPLICATION, UNSPECIFIED WHETHER LONG TERM INSULIN USE (MULTI): ICD-10-CM

## 2023-11-27 DIAGNOSIS — N17.9 ACUTE KIDNEY INJURY (CMS-HCC): Primary | ICD-10-CM

## 2023-11-27 DIAGNOSIS — Z01.818 PREPROCEDURAL EXAMINATION: ICD-10-CM

## 2023-11-27 PROBLEM — E66.9 OBESITY: Status: ACTIVE | Noted: 2023-11-27

## 2023-11-27 LAB
ABO GROUP (TYPE) IN BLOOD: NORMAL
ALBUMIN SERPL BCP-MCNC: 4.1 G/DL (ref 3.4–5)
ALP SERPL-CCNC: 61 U/L (ref 33–120)
ALT SERPL W P-5'-P-CCNC: 26 U/L (ref 10–52)
ANION GAP SERPL CALC-SCNC: 14 MMOL/L (ref 10–20)
ANTIBODY SCREEN: NORMAL
AST SERPL W P-5'-P-CCNC: 20 U/L (ref 9–39)
BASOPHILS # BLD AUTO: 0.05 X10*3/UL (ref 0–0.1)
BASOPHILS NFR BLD AUTO: 0.9 %
BILIRUB SERPL-MCNC: 0.8 MG/DL (ref 0–1.2)
BUN SERPL-MCNC: 15 MG/DL (ref 6–23)
CALCIUM SERPL-MCNC: 8.6 MG/DL (ref 8.6–10.3)
CHLORIDE SERPL-SCNC: 101 MMOL/L (ref 98–107)
CO2 SERPL-SCNC: 26 MMOL/L (ref 21–32)
CREAT SERPL-MCNC: 1.12 MG/DL (ref 0.5–1.3)
EOSINOPHIL # BLD AUTO: 0.16 X10*3/UL (ref 0–0.7)
EOSINOPHIL NFR BLD AUTO: 2.7 %
ERYTHROCYTE [DISTWIDTH] IN BLOOD BY AUTOMATED COUNT: 15.1 % (ref 11.5–14.5)
EST. AVERAGE GLUCOSE BLD GHB EST-MCNC: 148 MG/DL
GFR SERPL CREATININE-BSD FRML MDRD: 81 ML/MIN/1.73M*2
GLUCOSE SERPL-MCNC: 99 MG/DL (ref 74–99)
HBA1C MFR BLD: 6.8 %
HCT VFR BLD AUTO: 42.8 % (ref 41–52)
HGB BLD-MCNC: 13.3 G/DL (ref 13.5–17.5)
IMM GRANULOCYTES # BLD AUTO: 0.01 X10*3/UL (ref 0–0.7)
IMM GRANULOCYTES NFR BLD AUTO: 0.2 % (ref 0–0.9)
LYMPHOCYTES # BLD AUTO: 2.5 X10*3/UL (ref 1.2–4.8)
LYMPHOCYTES NFR BLD AUTO: 42.8 %
MCH RBC QN AUTO: 27.5 PG (ref 26–34)
MCHC RBC AUTO-ENTMCNC: 31.1 G/DL (ref 32–36)
MCV RBC AUTO: 88 FL (ref 80–100)
MONOCYTES # BLD AUTO: 0.63 X10*3/UL (ref 0.1–1)
MONOCYTES NFR BLD AUTO: 10.8 %
NEUTROPHILS # BLD AUTO: 2.49 X10*3/UL (ref 1.2–7.7)
NEUTROPHILS NFR BLD AUTO: 42.6 %
NRBC BLD-RTO: 0 /100 WBCS (ref 0–0)
PLATELET # BLD AUTO: 328 X10*3/UL (ref 150–450)
POTASSIUM SERPL-SCNC: 4.3 MMOL/L (ref 3.5–5.3)
PROT SERPL-MCNC: 6.7 G/DL (ref 6.4–8.2)
RBC # BLD AUTO: 4.84 X10*6/UL (ref 4.5–5.9)
RH FACTOR (ANTIGEN D): NORMAL
SODIUM SERPL-SCNC: 137 MMOL/L (ref 136–145)
WBC # BLD AUTO: 5.8 X10*3/UL (ref 4.4–11.3)

## 2023-11-27 PROCEDURE — 86901 BLOOD TYPING SEROLOGIC RH(D): CPT

## 2023-11-27 PROCEDURE — 85025 COMPLETE CBC W/AUTO DIFF WBC: CPT

## 2023-11-27 PROCEDURE — 83036 HEMOGLOBIN GLYCOSYLATED A1C: CPT

## 2023-11-27 PROCEDURE — 87081 CULTURE SCREEN ONLY: CPT | Mod: AHULAB | Performed by: NURSE PRACTITIONER

## 2023-11-27 PROCEDURE — 80053 COMPREHEN METABOLIC PANEL: CPT

## 2023-11-27 PROCEDURE — 36415 COLL VENOUS BLD VENIPUNCTURE: CPT

## 2023-11-27 PROCEDURE — 86850 RBC ANTIBODY SCREEN: CPT

## 2023-11-27 PROCEDURE — 86900 BLOOD TYPING SEROLOGIC ABO: CPT

## 2023-11-27 PROCEDURE — 99204 OFFICE O/P NEW MOD 45 MIN: CPT | Performed by: NURSE PRACTITIONER

## 2023-11-27 RX ORDER — CHLORHEXIDINE GLUCONATE ORAL RINSE 1.2 MG/ML
15 SOLUTION DENTAL DAILY
Qty: 30 ML | Refills: 0 | Status: SHIPPED | OUTPATIENT
Start: 2023-11-27 | End: 2023-11-29

## 2023-11-27 ASSESSMENT — ENCOUNTER SYMPTOMS
NECK NEGATIVE: 1
ARTHRALGIAS: 1
RESPIRATORY NEGATIVE: 1
CARDIOVASCULAR NEGATIVE: 1
GASTROINTESTINAL NEGATIVE: 1
CONSTITUTIONAL NEGATIVE: 1
NEUROLOGICAL NEGATIVE: 1

## 2023-11-27 NOTE — CPM/PAT H&P
CPM/PAT Evaluation       Name: Tomasz Maxwell (Tomasz Maxwell)  /Age: 1975/47 y.o.     SURGEON :DR LAKISHA CLAROS    Surgery, Date, and Length:  Right Shoulder Total Arthroplasty , 23    HPI:  This a 47  y.o. male who presents for presurgical evaluation for  for above mentioned procedure. Pt states has had steroid injections. S/P LTSA.Imaging shows severe osteoarthritis right shoulder.  . After discussion of the risks and benefits with Dr. Claros the patient elects to proceed with the planned procedure.       Past Medical History:   Diagnosis Date    Contusion of left hand, initial encounter 2017    Contusion of left hand, initial encounter    Crushing injury of unspecified finger(s), initial encounter 2017    Injury, crush, finger    Essential (primary) hypertension 2022    Hypertension    Hyperlipidemia, unspecified 10/03/2022    Hyperlipidemia    Hypokalemia 2017    Hypokalemia, k= 3.8 on 23    Idiopathic gout, right knee 2017    Acute idiopathic gout of right knee    Latent syphilis, unspecified as early or late 2018    Latent syphilis with positive serology    Local infection of the skin and subcutaneous tissue, unspecified 2017    Skin infection    Long term (current) use of opiate analgesic 2017    Opioid contract exists    Mastitis without abscess 2017    Mastitis, acute    Mastodynia 2017    Breast pain, right    Muscle spasm of back 2017    Paraspinal muscle spasm    Nocturia 2017    Nocturia    Other microscopic hematuria 2017    Hematuria, microscopic    Other specified postprocedural states 2017    H/O arthroscopic knee surgery    Other specified postprocedural states 2017    Status post lumbar laminectomy    Other specified symptoms and signs involving the digestive system and abdomen 2017    Umbilical discharge    Pain in left wrist 2017    Left wrist pain    Personal  history of other diseases of the musculoskeletal system and connective tissue     History of arthritis    Personal history of other diseases of the nervous system and sense organs 03/07/2016    History of acute otitis media    Personal history of other endocrine, nutritional and metabolic disease     History of diabetes mellitus    Personal history of other infectious and parasitic diseases 09/10/2017    History of tinea corporis    Personal history of other infectious and parasitic diseases 09/08/2017    History of trichomoniasis    Personal history of other infectious and parasitic diseases 04/13/2018    History of trichomoniasis    Personal history of other specified conditions 04/30/2018    History of dyspnea    Personal history of other specified conditions 01/23/2018    History of epigastric pain    Personal history of other specified conditions 07/28/2017    History of urinary frequency    Personal history of other specified conditions 10/08/2017    History of chest pain    Proteinuria, unspecified 04/24/2017    Proteinuria    Radiculopathy, lumbar region 04/24/2017    Lumbar radiculopathy    Spinal stenosis, site unspecified 08/19/2015    Spinal stenosis, multiple sites in spine    Unspecified abnormal finding in specimens from other organs, systems and tissues 04/24/2017    Abnormal laboratory test result    Unspecified asthma, uncomplicated     Active asthma    Vertigo     ECHO 5/21/22       Past Surgical History:   Procedure Laterality Date    BACK SURGERY  09/26/2016    Back Surgeryx5, has spinal stimulator    HERNIA REPAIR  08/26/2015    Hernia Repairx2    KNEE SURGERY Right 03/29/2017    Knee Surgery, right TKR    LUMBAR LAMINECTOMY  08/26/2015    Laminectomy Lumbar    MR HEAD ANGIO WO IV CONTRAST  05/21/2022    MR HEAD ANGIO WO IV CONTRAST [place] [facility]    MR HEAD ANGIO WO IV CONTRAST  03/12/2017    MR HEAD ANGIO WO IV CONTRAST 3/12/2017 [facility]    MR NECK ANGIO WO IV  CONTRAST  05/21/2022    MR NECK ANGIO WO IV CONTRAST 5/21/2022 [facility] [place] [facility]    MR NECK ANGIO WO IV CONTRAST  03/12/2017    MR NECK ANGIO WO IV CONTRAST 3/12/2017 [facility] [place] [facility]    OTHER SURGICAL HISTORY  05/20/2021    Abdominal surgery    OTHER SURGICAL HISTORY Bilateral 02/24/2021    Hip replacement    OTHER SURGICAL HISTORY  09/26/2016    Gastrectomy    SHOULDER SURGERY Left     left TSR       Anesthesia History  Pt denies any past history of anesthetic complications such as PONV, awareness, prolonged sedation, dental damage, aspiration, cardiac arrest, difficult intubation, difficult I.V. access or unexpected hospital admissions.  NO malignant hyperthermia and or pseudo cholinesterase deficiency.    The patient is not  a [employer] and will accept blood and blood products if medically indicated.   No history of blood transfusions .Type and screen sent.     Social History  Social History     Substance and Sexual Activity   Drug Use Yes    Types: Oxycodone      Social History     Substance and Sexual Activity   Alcohol Use Never      Social History     Tobacco Use   Smoking Status Never   Smokeless Tobacco Never          Family History   Problem Relation Name Age of Onset    Hypertension Mother      Diabetes Mother      Asthma Mother      Deep vein thrombosis Mother      No Known Problems Father      No Known Problems Sister      Asthma Child         No Known Allergies    Prior to Admission medications    Medication Sig Start Date End Date Taking? Authorizing Provider   albuterol 2.5 mg /3 mL (0.083 %) nebulizer solution USE 1 VIAL IN NEBULIZER EVERY 4 HOURS - and as needed 11/20/23   Vianney Gilliland, APRN-CNP   amLODIPine (Norvasc) 10 mg tablet Take 1 tablet (10 mg) by mouth once daily. 11/14/23 11/13/24  Bella Fernando MD   atorvastatin (Lipitor) 80 mg tablet TAKE 1 TABLET AT BEDTIME. 7/3/23   Bella Fernando MD   chlorhexidine (Peridex) 0.12 % solution Use 15 mL in the mouth or  throat once daily for 2 days. 11/27/23 11/29/23  Belle Ramos, APRN-CNP   cholecalciferol (Vitamin D-3) 50 MCG (2000 UT) tablet Take 1 tablet (50 mcg) by mouth once daily. 8/29/23 8/28/24  Bella Fernando MD   loratadine (Claritin) 10 mg tablet Take 1 tablet (10 mg) by mouth once daily.    Historical Provider, MD   metFORMIN (Glucophage) 850 mg tablet Take 1 tablet (850 mg) by mouth once daily with a meal.    Historical Provider, MD   montelukast (Singulair) 10 mg tablet Take 1 tablet (10 mg) by mouth once daily at bedtime.  Patient not taking: Reported on 10/17/2023 8/29/23 8/28/24  Bella Fernando MD   naloxone (Narcan) 4 mg/0.1 mL nasal spray Administer 1 spray (4 mg) into affected nostril(s) if needed for opioid reversal. May repeat every 2-3 minutes if needed, alternating nostrils, until medical assistance becomes available.  Patient not taking: Reported on 10/17/2023 3/31/23   Bradley Hoffman MD   oxyCODONE-acetaminophen (Percocet) 5-325 mg tablet Take 1 tablet by mouth every 6 hours if needed for severe pain (7 - 10). Do not start before November 30, 2023. 11/30/23 12/30/23  Bella Fernando MD   sennosides-docusate sodium (Maribell-Colace) 8.6-50 mg tablet Take 1 tablet by mouth once daily.  Patient taking differently: Take 1 tablet by mouth if needed. 8/1/23 7/31/24  Bella Fernando MD   sildenafil (Viagra) 100 mg tablet TAKE 1 TABLET DAILY 1 HOUR BEFORE NEEDED 9/28/23   Bella Fernando MD   Symbicort 160-4.5 mcg/actuation inhaler Inhale 2 puffs 2 times a day. Rinse mouth after use.    Historical Provider, MD   tamsulosin (Flomax) 0.4 mg 24 hr capsule Take 2 capsules (0.8 mg) by mouth once daily at bedtime. 8/6/23 8/5/24  Bella Fernando MD   Ventolin HFA 90 mcg/actuation inhaler INHALE 1 OR 2 PUFFS EVERY 4 TO 6 HOURS AS NEEDED 10/17/23   Bella Fernando MD   aspirin 81 mg EC tablet Take by mouth twice a day.  11/24/23  Historical Provider, MD   baclofen (Lioresal) 10 mg tablet 1 tablet  (10 mg).  11/24/23  Historical Provider, MD   diclofenac sodium (Voltaren) 1 % gel gel Apply 0.5 Applications topically 4 times a day as needed.  11/24/23  Historical Provider, MD   FreeStyle Lite Strips strip TEST 3 TIMES DAILY. 6/22/17 11/24/23  Historical Provider, MD   lidocaine 4 % patch Place 1 patch on the skin once daily. Remove & discard patch within 12 hours or as directed by MD.  11/24/23  Historical Provider, MD   loratadine (Claritin) 10 mg tablet Take 1 tablet (10 mg) by mouth once daily.  Patient not taking: Reported on 10/17/2023 8/28/23 11/24/23  Bella Fernando MD   metFORMIN (Glucophage) 850 mg tablet TAKE 1 TABLET BY MOUTH EVERY DAY 7/3/23 11/24/23  Bella Fernando MD   mupirocin (Bactroban) 2 % ointment Apply topically 3 times a day.  11/24/23  Historical Provider, MD RANGEL ROS:   Constitutional:   neg    Neuro/Psych:   neg    Eyes:   Ears:   Nose:   neg    Mouth:   neg    Throat:   neg    Neck:   neg    Cardio:   neg    Respiratory:   neg    Endocrine:   GI:   neg    :   neg    Musculoskeletal:    arthralgias  Hematologic:   neg    Skin:  neg        Physical Exam  Vitals reviewed.   Constitutional:       Appearance: Normal appearance.   HENT:      Head: Normocephalic and atraumatic.   Eyes:      Extraocular Movements: Extraocular movements intact.      Pupils: Pupils are equal, round, and reactive to light.   Cardiovascular:      Rate and Rhythm: Regular rhythm.      Pulses: Normal pulses.      Heart sounds: Normal heart sounds.   Pulmonary:      Effort: Pulmonary effort is normal.      Breath sounds: Normal breath sounds.   Musculoskeletal:      Cervical back: Normal range of motion.      Comments: Rt shoulder pain decrease rom    Skin:     General: Skin is warm and dry.   Neurological:      Mental Status: He is oriented to person, place, and time.   Psychiatric:         Mood and Affect: Mood normal.          PAT AIRWAY:   Airway:     Mallampati::  III  normal      /69    "Pulse 64   Temp 35.8 °C (96.4 °F)   Resp 18   Ht 1.676 m (5' 6\")   Wt 124 kg (273 lb 5.9 oz)   SpO2 97%   BMI 44.12 kg/m²     Lab Results   Component Value Date    WBC 5.8 11/27/2023    HGB 13.3 (L) 11/27/2023    HCT 42.8 11/27/2023    MCV 88 11/27/2023     11/27/2023     Lab Results   Component Value Date    GLUCOSE 99 11/27/2023    CALCIUM 8.6 11/27/2023     11/27/2023    K 4.3 11/27/2023    CO2 26 11/27/2023     11/27/2023    BUN 15 11/27/2023    CREATININE 1.12 11/27/2023     Lab Results   Component Value Date    HGBA1C 6.5 (H) 01/26/2023     EKG   NSR     ASSESSMENT/PLAN    Patient is a 47year-old  scheduled for Right Shoulder Total Arthroplasty  with Dr. Claros  on  11/28/23 .  CARDIOVASCULAR:  RCRI score / Risk: The patients score is 0  based on history . Per ACC/AHA guidelines this places him at  3.9% risk for MACE undergoing a intermediate  risk procedure . The patient has the following risk factors:denies   Functional Capacity: The patients exercise tolerance is  4  METS. This is based on the patients abililty to ascend a flight of stairs  and walk 2 block w/o chest pain or other anginal equivalents.  . Patient denies  active cardiac symptoms or anginal equivalents .      PULMONARY:  The patient has the following factors that place them at increased risk of perioperative pulmonary complications;asthma//BMI greater than 27/greater than 2.5 hour procedure.  Postoperatively the patient would benefit from early pulmonary toilet/incentive spirometry q 1-2 hours while awake/pulse oximetry/cautious use of respiratory depressant medications such as opioids/elevate the HOB/oral hygiene.    DM:  The patient has well controlled diabetes.Currently the patient manages their diabetes with oral agents, his  recent A1C was 6.4  on11/24/23.  The morning of surgery, the patient hold oral antihyperglycemic medications.    HYPOKALEMIA:  The patient has a history of hypokalemia.  The patient’s " potassium appears stable when compared to prior labs.      BPH:  Pt is on (alpha 5- reductase inhibitors) Recommendations: continue throughout perioperative period, monitior for urinary retention, avoid rocha catheter if able..    DVT:  CAPRINI SCORE=8  The patient has the following factors that increase his  Risk for thrombus formation ; Virchow's triad ,htn  , Surgical procedure >2 hrs  procedure .    Recommendations: DVT prophylaxis  per Dr. Claros protocol . SCD's, CRISTIANO's, and early ambulation are recommended. Heparin or LMWH is recommended for the very high risk .      Risk assessment complete.  Patient is scheduled for  intermediate  surgical risk procedure.  Patient is considered an acceptable  risk to proceed with the planned procedure.      Preoperative medication instructions were provided and reviewed with the patient.  Any additional testing or evaluation was explained to the patient.  Nothing by mouth instructions were discussed and patient's questions were answered prior to conclusion to this encounter.  Patient verbalized understanding of preoperative instructions given in preadmission testing; discharge instructions available in EMR.

## 2023-11-27 NOTE — PREPROCEDURE INSTRUCTIONS
Medication List            Accurate as of November 27, 2023  9:09 AM. Always use your most recent med list.                amLODIPine 10 mg tablet  Commonly known as: Norvasc  Take 1 tablet (10 mg) by mouth once daily.  Medication Adjustments for Surgery: Take morning of surgery with sip of water, no other fluids     atorvastatin 80 mg tablet  Commonly known as: Lipitor  TAKE 1 TABLET AT BEDTIME.  Medication Adjustments for Surgery: Take morning of surgery with sip of water, no other fluids     chlorhexidine 0.12 % solution  Commonly known as: Peridex  Use 15 mL in the mouth or throat once daily for 2 days.  Notes to patient: Use as directed      cholecalciferol 50 MCG (2000 UT) tablet  Commonly known as: Vitamin D-3  Take 1 tablet (50 mcg) by mouth once daily.  Medication Adjustments for Surgery: Continue until night before surgery     loratadine 10 mg tablet  Commonly known as: Claritin  Medication Adjustments for Surgery: Take morning of surgery with sip of water, no other fluids     metFORMIN 850 mg tablet  Commonly known as: Glucophage  Medication Adjustments for Surgery: Stop 1 day before surgery     montelukast 10 mg tablet  Commonly known as: Singulair  Take 1 tablet (10 mg) by mouth once daily at bedtime.  Medication Adjustments for Surgery: Take morning of surgery with sip of water, no other fluids     naloxone 4 mg/0.1 mL nasal spray  Commonly known as: Narcan  Administer 1 spray (4 mg) into affected nostril(s) if needed for opioid reversal. May repeat every 2-3 minutes if needed, alternating nostrils, until medical assistance becomes available.  Notes to patient: Use if needed      oxyCODONE-acetaminophen 5-325 mg tablet  Commonly known as: Percocet  Take 1 tablet by mouth every 6 hours if needed for severe pain (7 - 10). Do not start before November 30, 2023.  Start taking on: November 30, 2023  Notes to patient: Post op order      sennosides-docusate sodium 8.6-50 mg tablet  Commonly known as:  Maribell-Colace  Take 1 tablet by mouth once daily.  Medication Adjustments for Surgery: Continue until night before surgery     sildenafil 100 mg tablet  Commonly known as: Viagra  TAKE 1 TABLET DAILY 1 HOUR BEFORE NEEDED  Medication Adjustments for Surgery: Stop 2 days before surgery     Symbicort 160-4.5 mcg/actuation inhaler  Generic drug: budesonide-formoteroL  Medication Adjustments for Surgery: Take morning of surgery with sip of water, no other fluids     tamsulosin 0.4 mg 24 hr capsule  Commonly known as: Flomax  Take 2 capsules (0.8 mg) by mouth once daily at bedtime.  Medication Adjustments for Surgery: Take morning of surgery with sip of water, no other fluids     * Ventolin HFA 90 mcg/actuation inhaler  Generic drug: albuterol  INHALE 1 OR 2 PUFFS EVERY 4 TO 6 HOURS AS NEEDED  Notes to patient: BRING TO HOSPITAL DAY OF SURGERY , USE IF NEEDED      * albuterol 2.5 mg /3 mL (0.083 %) nebulizer solution  USE 1 VIAL IN NEBULIZER EVERY 4 HOURS - and as needed  Notes to patient: Use if needed            * This list has 2 medication(s) that are the same as other medications prescribed for you. Read the directions carefully, and ask your doctor or other care provider to review them with you.                            CONTACT SURGEON'S OFFICE IF YOU DEVELOP:  * Fever = 100.4 F   * New respiratory symptoms (e.g. cough, shortness of breath, respiratory distress, sore throat)  * Recent loss of taste or smell  *Flu like symptoms such as headache, fatigue or gastrointestinal symptoms  * You develop any open sores, shingles, burning or painful urination   AND/OR:  * You no longer wish to have the surgery.  * Any other personal circumstances change that may lead to the need to cancel or defer this surgery.  *You were admitted to any hospital within one week of your planned procedure.    SMOKING:  *Quitting smoking can make a huge difference to your health and recovery from surgery.    *If you need help with quitting, call  1-800-QUIT-NOW.    THE DAY BEFORE SURGERY:  *Do not eat any food after midnight the night before surgery.   *You are permitted to drink clear liquids (i.e. water, black coffee, tea, clear broth, apple juice) up to 2 hours before your surgery.  DIABETICS:  Please check fasting blood sugar  upon waking up.  If fasting sugar is <80 mg/dl, please drink 100ml/3oz of apple juice no later than 2 hours prior to surgery.      SURGICAL TIME  *You will be contacted between 2 p.m. and 6 p.m. the business day before your surgery with your arrival time.  *If you haven't received a call by 6pm, call [phone].  *Scheduled surgery times may change and you will be notified if this occurs-check your personal voicemail for any updates.    ON THE MORNING OF SURGERY:  *Wear comfortable, loose fitting clothing.   *Do not use moisturizers, creams, lotions or perfume.  *All jewelry and valuables should be left at home.  *Prosthetic devices such as contact lenses, hearing aids, dentures, eyelash extensions, hairpins and body piercing must be removed before surgery.    BRING WITH YOU:  *Photo ID and insurance card  *Current list of medicines and allergies  *Pacemaker/Defibrillator/Heart stent cards  *CPAP machine and mask  *Slings/splints/crutches  *Copy of your complete Advanced Directive/DHPOA-if applicable  *Neurostimulator implant remote    PARKING AND ARRIVAL:  *Check in at the Main Entrance desk and let them know you are here for surgery.  *You will be directed to the 2nd floor surgical waiting area.    AFTER OUTPATIENT SURGERY:  *A responsible adult MUST accompany you at the time of discharge and stay with you for 24 hours after your surgery.  *You may NOT drive yourself home after surgery.  *You may use a taxi or ride sharing service ([employer], [employer]) to return home ONLY if you are accompanied by a friend or family member.  *Instructions for resuming your medications will be provided by your surgeon.      YOU HAVE REVIEWED THE  MEDICATIONS ON THIS SHEET AND YOU VERIFY THESE ARE ALL THE MEDICATIONS AND OVER THE COUNTER MEDICATIONS THAT YOU TAKE .

## 2023-11-27 NOTE — ANESTHESIA PREPROCEDURE EVALUATION
Patient: Tomasz Maxwell    Procedure Information       Date/Time: 11/28/23 0830    Procedure: Right Shoulder Total Arthroplasty (Right: Shoulder)    Location: [facility] A OR 07 / [facility] A OR    Surgeons: Devon Claros MD                                                    Pre-Anesthesia Evaluation      Tomasz Maxwell is a 47 y.o. male who presents for procedure stated above. Patient endorses no changes in health or medications since PAT visit.       Medical History reviewed  He has a past medical history of Essential (primary) hypertension (05/16/2022), Hyperlipidemia, unspecified (10/03/2022), Idiopathic gout, right knee (04/24/2017), Latent syphilis, unspecified as early or late (04/13/2018),  Long term (current) use of opiate analgesic (04/24/2017),  Proteinuria, unspecified (04/24/2017), Radiculopathy, lumbar region (04/24/2017), Spinal stenosis, site unspecified (08/19/2015), Unspecified abnormal finding in specimens from other organs, systems and tissues (04/24/2017), Unspecified asthma, uncomplicated, and Vertigo.    Past Surgical History:   Procedure Laterality Date   • BACK SURGERY  09/26/2016    Back Surgeryx5, has spinal stimulator   • HERNIA REPAIR  08/26/2015    Hernia Repairx2   • KNEE SURGERY Right 03/29/2017    Knee Surgery, right TKR   • LUMBAR LAMINECTOMY  08/26/2015    Laminectomy Lumbar   • MR HEAD ANGIO WO IV CONTRAST  05/21/2022    MR HEAD ANGIO WO IV CONTRAST 5/21/2022 [facility] [place] [facility]   • MR HEAD ANGIO WO IV CONTRAST  03/12/2017    MR HEAD ANGIO WO IV CONTRAST 3/12/2017 [facility] [place] [facility]   • MR NECK ANGIO WO IV CONTRAST  05/21/2022    MR NECK ANGIO WO IV CONTRAST 5/21/2022 [facility] [place] [facility]   • MR NECK ANGIO WO IV CONTRAST  03/12/2017    MR NECK ANGIO WO IV CONTRAST 3/12/2017 [facility] [place] [facility]   • OTHER SURGICAL HISTORY  05/20/2021    Abdominal surgery   • OTHER SURGICAL HISTORY Bilateral 02/24/2021    Hip replacement   • OTHER SURGICAL HISTORY  09/26/2016    Gastrectomy  "  • SHOULDER SURGERY Left     left TSR    reviewed    Social History reviewed  He reports that he has never smoked. He has never used smokeless tobacco. He reports current drug use. Drug: Oxycodone. He reports that he does not drink alcohol.    Allergies and Medications reviewed  Patient has no known allergies.    Current Outpatient Medications   Medication Instructions   • albuterol 2.5 mg /3 mL (0.083 %) nebulizer solution USE 1 VIAL IN NEBULIZER EVERY 4 HOURS - and as needed   • amLODIPine (NORVASC) 10 mg, oral, Daily   • atorvastatin (Lipitor) 80 mg tablet TAKE 1 TABLET AT BEDTIME.   • chlorhexidine (Peridex) 0.12 % solution 15 mL, Mouth/Throat, Daily   • cholecalciferol (VITAMIN D-3) 50 mcg, oral, Daily   • loratadine (CLARITIN) 10 mg, oral, Daily   • metFORMIN (GLUCOPHAGE) 850 mg, oral, Daily with breakfast   • montelukast (SINGULAIR) 10 mg, oral, Nightly   • naloxone (NARCAN) 4 mg, nasal, As needed, May repeat every 2-3 minutes if needed, alternating nostrils, until medical assistance becomes available.   • [START ON 11/30/2023] oxyCODONE-acetaminophen (Percocet) 5-325 mg tablet 1 tablet, oral, Every 6 hours PRN   • sennosides-docusate sodium (Maribell-Colace) 8.6-50 mg tablet 1 tablet, oral, Daily   • sildenafil (Viagra) 100 mg tablet TAKE 1 TABLET DAILY 1 HOUR BEFORE NEEDED   • Symbicort 160-4.5 mcg/actuation inhaler 2 puffs, inhalation, 2 times daily, Rinse mouth after use.   • tamsulosin (FLOMAX) 0.8 mg, oral, Nightly   • Ventolin HFA 90 mcg/actuation inhaler INHALE 1 OR 2 PUFFS EVERY 4 TO 6 HOURS AS NEEDED           Relevant Results reviewed  No results found for: \"STAPHMRSASCR\"  Lab Results   Component Value Date    ABO A 11/27/2023     Lab Results   Component Value Date    WBC 5.8 11/27/2023    HGB 13.3 (L) 11/27/2023    HCT 42.8 11/27/2023     11/27/2023       Chemistry    Lab Results   Component Value Date/Time     11/27/2023 0954    K 4.3 11/27/2023 0954     11/27/2023 0954    CO2 26 " 11/27/2023 0954    BUN 15 11/27/2023 0954    CREATININE 1.12 11/27/2023 0954    Lab Results   Component Value Date/Time    CALCIUM 8.6 11/27/2023 0954    ALKPHOS 61 11/27/2023 0954    AST 20 11/27/2023 0954    ALT 26 11/27/2023 0954    BILITOT 0.8 11/27/2023 0954          Lab Results   Component Value Date/Time    PROTIME 12.4 03/29/2022 1237    INR 1.1 03/29/2022 1237   Echo 5/22- The left ventricular systolic function is normal with a 60% estimated ejection fraction.     No results found for this or any previous visit (from the past 4464 hour(s)).  No results found for this or any previous visit.  No results found for this or any previous visit.  MR cervical spine wo IV contrast 08/12/2023    Narrative  Interpreted By:  SARAHY SHERIDAN MD  MRN: [id number]  Patient Name: ASHLEIGH MURGUIA    STUDY:  MRI CERVICAL WO;  8/12/2023 12:22 pm    INDICATION:  pain  M54.2: Cervical pain.    COMPARISON:  None.    ACCESSION NUMBER(S):  [id number]    ORDERING CLINICIAN:  OLIVIA VELEZ    TECHNIQUE:  The cervical spine was studied in the sagittal and axial planes  utilizing T1 and T2 weighted images.    FINDINGS:  The craniovertebral junction is normal. The cord is normal in size  and signal. The marrow signal is normal.  Serial axial images reveal the following:  C2/C3  There is normal alignment and vertebral body height. The disc space  is normal. There is no evidence of canal or foraminal narrowing.  There is no evidence of bulging or herniated disc.  C3/C4  Asymmetrical bulging disc or uncovertebral joint hypertrophy to the  left with focal left-sided foraminal narrowing. No measurable canal  stenosis.  C4/C5  Asymmetrical bulging disc or uncovertebral joint hypertrophy toward  the left with left-sided foraminal narrowing. No measurable canal  stenosis.  C5/C6  Asymmetrical bulging intervertebral disc or uncovertebral joint  hypertrophy toward the left with mild left-sided foraminal narrowing.  No measurable canal  stenosis.  C6/C7  Mild circumferential bulging intervertebral disc to the left without  canal stenosis. Mild left-sided foraminal narrowing.  C7/T1  There is normal alignment and vertebral body height. The disc space  is normal. There is no evidence of canal or foraminal narrowing.  There is no evidence of bulging or herniated disc.    Impression  * Cervical spondylosis as described with left-sided foraminal  narrowing at several levels  *No measurable canal stenosis or cord compression.    THIS EXAMINATION WAS INTERPRETED AT [facility] [place] [facility]      MR lumbar spine wo IV contrast 08/12/2023    Narrative  Interpreted By:  SARAHY SHERIDAN MD  MRN: [id number]  Patient Name: ASHLEIGH MURGUIA    STUDY:  MRI L-SPINE WO;  8/12/2023 12:21 pm    INDICATION:  pain  M96.1: Lumbar post-laminectomy syndrome.    COMPARISON:  August 2019.    ACCESSION NUMBER(S):  [id number]    ORDERING CLINICIAN:  OLIVIA VELEZ    TECHNIQUE:  The lumbar spine was studied in the sagital, axial and coronal planes  utiliing T1 and T2 weighted images.    FINDINGS:  The marrow signal and vertebral body height are normal. The conus and  sacrum are normal.  Images at each interspace reveal the following:  L1/L2  Bilateral facet hypertrophy without canal or foraminal narrowing  L2/L3  Bilateral facet hypertrophy without canal or foraminal narrowing  L3/L4  Previous laminectomy without canal stenosis. Circumferential bulging  of the intervertebral disc slightly asymmetrical to the left. No  measurable canal stenosis. Minor left-sided foraminal narrowing  L4/L5  Previous laminectomy without canal stenosis. Bilateral facet  hypertrophy. Interval development of herniated nucleus polyposis to  the left measuring approximately 1 cm in size on T2 weighted axial  image 44/62. Focal narrowing of the left lateral recess and neural  foramen.  L5/S1  Previous laminectomy. Bilateral facet hypertrophy. No measurable  canal or foraminal narrowing.    Impression  * Interval  "development of herniated nucleus polyposis or osteophyte  formation at L4/L5 on the left.    The examination was interpreted [facility]     No image results found.    Vitals  Visit Vitals  /74   Pulse 70   Temp 36.1 °C (97 °F) (Temporal)   Resp 20   Ht 1.67 m (5' 5.75\")   Wt 131 kg (289 lb 3.9 oz)   SpO2 97%   BMI 47.04 kg/m²   Smoking Status Never   BSA 2.47 m²                         Relevant Problems   Anesthesia (within normal limits)      Cardiovascular   (+) Essential hypertension   (+) Hyperlipidemia   (+) Hypertension      Endocrine   (+) Obesity   (+) Type 2 diabetes mellitus without complications (CMS/HCC)      GI   (+) Esophageal reflux      /Renal   (+) Acute kidney injury (CMS/HCC)      Neuro/Psych   (+) Depression   (+) Opioid use disorder   (+) Radiculopathy of leg   (+) Tarsal tunnel syndrome      Pulmonary   (+) Dyspnea on minimal exertion   (+) Moderate asthma   (+) ES (obstructive sleep apnea)      Musculoskeletal   (+) Chronic low back pain   (+) Osteoarthritis   (+) Osteoarthritis of right knee   (+) Primary osteoarthritis of left hip   (+) Primary osteoarthritis of right hip      Eyes, Ears, Nose, and Throat   (+) Asymmetric SNHL (sensorineural hearing loss)   (+) Bilateral sensorineural hearing loss   (+) Conductive hearing loss of left ear with unrestricted hearing of right ear   (+) Hearing loss, functional      Other   (+) Arthritis of knee       Clinical information reviewed:    Allergies  Meds               NPO Detail:  NPO/Void Status  Date of Last Liquid: 11/27/23  Time of Last Liquid: 2030  Date of Last Solid: 11/27/23  Time of Last Solid: 1930         Physical Exam    Airway  Mallampati: III  TM distance: >3 FB  Neck ROM: full  Comments: Short neck, Likely difficult airway   Cardiovascular - normal exam     Dental - normal exam     Pulmonary - normal exam     Abdominal          Anesthesia Plan    ASA 3     general and regional     intravenous induction "   Anesthetic plan and risks discussed with patient.  Use of blood products discussed with patient who consented to blood products.    Plan discussed with CRNA and CAA.

## 2023-11-27 NOTE — PREPROCEDURE INSTRUCTIONS
Medication List            Accurate as of November 27, 2023  8:51 AM. Always use your most recent med list.                amLODIPine 10 mg tablet  Commonly known as: Norvasc  Take 1 tablet (10 mg) by mouth once daily.  Medication Adjustments for Surgery: Take morning of surgery with sip of water, no other fluids     atorvastatin 80 mg tablet  Commonly known as: Lipitor  TAKE 1 TABLET AT BEDTIME.  Medication Adjustments for Surgery: Take morning of surgery with sip of water, no other fluids     chlorhexidine 0.12 % solution  Commonly known as: Peridex  Use 15 mL in the mouth or throat once daily for 2 days.  Notes to patient: Use as directed      cholecalciferol 50 MCG (2000 UT) tablet  Commonly known as: Vitamin D-3  Take 1 tablet (50 mcg) by mouth once daily.  Medication Adjustments for Surgery: Continue until night before surgery     loratadine 10 mg tablet  Commonly known as: Claritin  Medication Adjustments for Surgery: Take morning of surgery with sip of water, no other fluids     metFORMIN 850 mg tablet  Commonly known as: Glucophage  Medication Adjustments for Surgery: Stop 1 day before surgery     montelukast 10 mg tablet  Commonly known as: Singulair  Take 1 tablet (10 mg) by mouth once daily at bedtime.  Medication Adjustments for Surgery: Take morning of surgery with sip of water, no other fluids     naloxone 4 mg/0.1 mL nasal spray  Commonly known as: Narcan  Administer 1 spray (4 mg) into affected nostril(s) if needed for opioid reversal. May repeat every 2-3 minutes if needed, alternating nostrils, until medical assistance becomes available.  Notes to patient: Use if needed      oxyCODONE-acetaminophen 5-325 mg tablet  Commonly known as: Percocet  Take 1 tablet by mouth every 6 hours if needed for severe pain (7 - 10). Do not start before November 30, 2023.  Start taking on: November 30, 2023  Notes to patient: Post op order      sennosides-docusate sodium 8.6-50 mg tablet  Commonly known as:  Maribell-Colace  Take 1 tablet by mouth once daily.  Medication Adjustments for Surgery: Continue until night before surgery     sildenafil 100 mg tablet  Commonly known as: Viagra  TAKE 1 TABLET DAILY 1 HOUR BEFORE NEEDED     Symbicort 160-4.5 mcg/actuation inhaler  Generic drug: budesonide-formoteroL  Medication Adjustments for Surgery: Stop 2 days before surgery     tamsulosin 0.4 mg 24 hr capsule  Commonly known as: Flomax  Take 2 capsules (0.8 mg) by mouth once daily at bedtime.  Medication Adjustments for Surgery: Take morning of surgery with sip of water, no other fluids     * Ventolin HFA 90 mcg/actuation inhaler  Generic drug: albuterol  INHALE 1 OR 2 PUFFS EVERY 4 TO 6 HOURS AS NEEDED  Notes to patient: BRING TO HOSPITAL DAY OF SURGERY , USE IF NEEDED      * albuterol 2.5 mg /3 mL (0.083 %) nebulizer solution  USE 1 VIAL IN NEBULIZER EVERY 4 HOURS - and as needed  Notes to patient: Use if needed            * This list has 2 medication(s) that are the same as other medications prescribed for you. Read the directions carefully, and ask your doctor or other care provider to review them with you.                       CONTACT SURGEON'S OFFICE IF YOU DEVELOP:  * Fever = 100.4 F   * New respiratory symptoms (e.g. cough, shortness of breath, respiratory distress, sore throat)  * Recent loss of taste or smell  *Flu like symptoms such as headache, fatigue or gastrointestinal symptoms  * You develop any open sores, shingles, burning or painful urination   AND/OR:  * You no longer wish to have the surgery.  * Any other personal circumstances change that may lead to the need to cancel or defer this surgery.  *You were admitted to any hospital within one week of your planned procedure.    SMOKING:  *Quitting smoking can make a huge difference to your health and recovery from surgery.    *If you need help with quitting, call 7-298-QUIT-NOW.    THE DAY BEFORE SURGERY:  *Do not eat any food after midnight the night before  surgery.   *You are permitted to drink clear liquids (i.e. water, black coffee, tea, clear broth, apple juice) up to 2 hours before your surgery.  DIABETICS:  Please check fasting blood sugar  upon waking up.  If fasting sugar is <80 mg/dl, please drink 100ml/3oz of apple juice no later than 2 hours prior to surgery.      SURGICAL TIME  *You will be contacted between 2 p.m. and 6 p.m. the business day before your surgery with your arrival time.  *If you haven't received a call by 6pm, call [phone].  *Scheduled surgery times may change and you will be notified if this occurs-check your personal voicemail for any updates.    ON THE MORNING OF SURGERY:  *Wear comfortable, loose fitting clothing.   *Do not use moisturizers, creams, lotions or perfume.  *All jewelry and valuables should be left at home.  *Prosthetic devices such as contact lenses, hearing aids, dentures, eyelash extensions, hairpins and body piercing must be removed before surgery.    BRING WITH YOU:  *Photo ID and insurance card  *Current list of medicines and allergies  *Pacemaker/Defibrillator/Heart stent cards  *CPAP machine and mask  *Slings/splints/crutches  *Copy of your complete Advanced Directive/DHPOA-if applicable  *Neurostimulator implant remote    PARKING AND ARRIVAL:  *Check in at the Main Entrance desk and let them know you are here for surgery.  *You will be directed to the 2nd floor surgical waiting area.    AFTER OUTPATIENT SURGERY:  *A responsible adult MUST accompany you at the time of discharge and stay with you for 24 hours after your surgery.  *You may NOT drive yourself home after surgery.  *You may use a taxi or ride sharing service ([employer], [employer]) to return home ONLY if you are accompanied by a friend or family member.  *Instructions for resuming your medications will be provided by your surgeon.           YOU HAVE REVIEWED THE MEDICATIONS ON THIS SHEET AND YOU VERIFY THESE ARE ALL THE MEDICATIONS AND OVER THE COUNTER  MEDICATIONS THAT YOU TAKE .

## 2023-11-28 ENCOUNTER — HOSPITAL ENCOUNTER (OUTPATIENT)
Facility: HOSPITAL | Age: 48
Setting detail: OUTPATIENT SURGERY
Discharge: HOME | End: 2023-11-28
Attending: ORTHOPAEDIC SURGERY | Admitting: ORTHOPAEDIC SURGERY
Payer: MEDICARE

## 2023-11-28 ENCOUNTER — ANESTHESIA (OUTPATIENT)
Dept: OPERATING ROOM | Facility: HOSPITAL | Age: 48
End: 2023-11-28
Payer: MEDICARE

## 2023-11-28 ENCOUNTER — APPOINTMENT (OUTPATIENT)
Dept: RADIOLOGY | Facility: HOSPITAL | Age: 48
End: 2023-11-28
Payer: MEDICARE

## 2023-11-28 VITALS
DIASTOLIC BLOOD PRESSURE: 75 MMHG | WEIGHT: 289.24 LBS | SYSTOLIC BLOOD PRESSURE: 142 MMHG | HEIGHT: 66 IN | OXYGEN SATURATION: 95 % | RESPIRATION RATE: 16 BRPM | BODY MASS INDEX: 46.49 KG/M2 | TEMPERATURE: 99 F | HEART RATE: 74 BPM

## 2023-11-28 DIAGNOSIS — M19.011 ARTHRITIS OF RIGHT SHOULDER REGION: Primary | ICD-10-CM

## 2023-11-28 PROBLEM — Z01.818 AT RISK FOR DIFFICULT INTUBATION ON PRE-INTUBATION AIRWAY ASSESSMENT: Status: ACTIVE | Noted: 2023-11-28

## 2023-11-28 PROBLEM — Z91.89 AT RISK FOR DIFFICULT INTUBATION ON PRE-INTUBATION AIRWAY ASSESSMENT: Status: ACTIVE | Noted: 2023-11-28

## 2023-11-28 LAB
GLUCOSE BLD MANUAL STRIP-MCNC: 110 MG/DL (ref 74–99)
GLUCOSE BLD MANUAL STRIP-MCNC: 123 MG/DL (ref 74–99)

## 2023-11-28 PROCEDURE — 73020 X-RAY EXAM OF SHOULDER: CPT | Mod: RT,FY

## 2023-11-28 PROCEDURE — 3700000002 HC GENERAL ANESTHESIA TIME - EACH INCREMENTAL 1 MINUTE: Performed by: ORTHOPAEDIC SURGERY

## 2023-11-28 PROCEDURE — 82947 ASSAY GLUCOSE BLOOD QUANT: CPT

## 2023-11-28 PROCEDURE — 3600000004 HC OR TIME - INITIAL BASE CHARGE - PROCEDURE LEVEL FOUR: Performed by: ORTHOPAEDIC SURGERY

## 2023-11-28 PROCEDURE — 3600000009 HC OR TIME - EACH INCREMENTAL 1 MINUTE - PROCEDURE LEVEL FOUR: Performed by: ORTHOPAEDIC SURGERY

## 2023-11-28 PROCEDURE — 23472 RECONSTRUCT SHOULDER JOINT: CPT | Performed by: ORTHOPAEDIC SURGERY

## 2023-11-28 PROCEDURE — 2500000004 HC RX 250 GENERAL PHARMACY W/ HCPCS (ALT 636 FOR OP/ED): Performed by: ORTHOPAEDIC SURGERY

## 2023-11-28 PROCEDURE — 94762 N-INVAS EAR/PLS OXIMTRY CONT: CPT

## 2023-11-28 PROCEDURE — A23472 PR RECONSTR TOTAL SHOULDER IMPLANT: Performed by: ANESTHESIOLOGY

## 2023-11-28 PROCEDURE — 2780000003 HC OR 278 NO HCPCS: Performed by: ORTHOPAEDIC SURGERY

## 2023-11-28 PROCEDURE — C1776 JOINT DEVICE (IMPLANTABLE): HCPCS | Performed by: ORTHOPAEDIC SURGERY

## 2023-11-28 PROCEDURE — 73020 X-RAY EXAM OF SHOULDER: CPT | Mod: RIGHT SIDE | Performed by: RADIOLOGY

## 2023-11-28 PROCEDURE — 2500000005 HC RX 250 GENERAL PHARMACY W/O HCPCS: Performed by: NURSE ANESTHETIST, CERTIFIED REGISTERED

## 2023-11-28 PROCEDURE — 2500000004 HC RX 250 GENERAL PHARMACY W/ HCPCS (ALT 636 FOR OP/ED): Performed by: ANESTHESIOLOGY

## 2023-11-28 PROCEDURE — C1713 ANCHOR/SCREW BN/BN,TIS/BN: HCPCS | Performed by: ORTHOPAEDIC SURGERY

## 2023-11-28 PROCEDURE — 64415 NJX AA&/STRD BRCH PLXS IMG: CPT | Performed by: ANESTHESIOLOGY

## 2023-11-28 PROCEDURE — 7100000002 HC RECOVERY ROOM TIME - EACH INCREMENTAL 1 MINUTE: Performed by: ORTHOPAEDIC SURGERY

## 2023-11-28 PROCEDURE — 7100000001 HC RECOVERY ROOM TIME - INITIAL BASE CHARGE: Performed by: ORTHOPAEDIC SURGERY

## 2023-11-28 PROCEDURE — 23430 REPAIR BICEPS TENDON: CPT | Performed by: PHYSICIAN ASSISTANT

## 2023-11-28 PROCEDURE — 2500000002 HC RX 250 W HCPCS SELF ADMINISTERED DRUGS (ALT 637 FOR MEDICARE OP, ALT 636 FOR OP/ED): Performed by: STUDENT IN AN ORGANIZED HEALTH CARE EDUCATION/TRAINING PROGRAM

## 2023-11-28 PROCEDURE — 3700000001 HC GENERAL ANESTHESIA TIME - INITIAL BASE CHARGE: Performed by: ORTHOPAEDIC SURGERY

## 2023-11-28 PROCEDURE — 23430 REPAIR BICEPS TENDON: CPT | Performed by: ORTHOPAEDIC SURGERY

## 2023-11-28 PROCEDURE — 7100000009 HC PHASE TWO TIME - INITIAL BASE CHARGE: Performed by: ORTHOPAEDIC SURGERY

## 2023-11-28 PROCEDURE — 2500000005 HC RX 250 GENERAL PHARMACY W/O HCPCS: Performed by: ANESTHESIOLOGY

## 2023-11-28 PROCEDURE — 2500000004 HC RX 250 GENERAL PHARMACY W/ HCPCS (ALT 636 FOR OP/ED): Performed by: STUDENT IN AN ORGANIZED HEALTH CARE EDUCATION/TRAINING PROGRAM

## 2023-11-28 PROCEDURE — A23472 PR RECONSTR TOTAL SHOULDER IMPLANT: Performed by: NURSE ANESTHETIST, CERTIFIED REGISTERED

## 2023-11-28 PROCEDURE — 7100000010 HC PHASE TWO TIME - EACH INCREMENTAL 1 MINUTE: Performed by: ORTHOPAEDIC SURGERY

## 2023-11-28 PROCEDURE — A4217 STERILE WATER/SALINE, 500 ML: HCPCS | Performed by: ORTHOPAEDIC SURGERY

## 2023-11-28 PROCEDURE — 23472 RECONSTRUCT SHOULDER JOINT: CPT | Performed by: PHYSICIAN ASSISTANT

## 2023-11-28 PROCEDURE — 2500000004 HC RX 250 GENERAL PHARMACY W/ HCPCS (ALT 636 FOR OP/ED): Performed by: NURSE ANESTHETIST, CERTIFIED REGISTERED

## 2023-11-28 PROCEDURE — 2720000007 HC OR 272 NO HCPCS: Performed by: ORTHOPAEDIC SURGERY

## 2023-11-28 PROCEDURE — A6213 FOAM DRG >16<=48 SQ IN W/BDR: HCPCS | Performed by: ORTHOPAEDIC SURGERY

## 2023-11-28 DEVICE — IMPLANTABLE DEVICE: Type: IMPLANTABLE DEVICE | Site: SHOULDER | Status: FUNCTIONAL

## 2023-11-28 DEVICE — CEMENT, BONE, SIMPLEX P, RADIOPAQUE, FULL DOSE, 40 GM: Type: IMPLANTABLE DEVICE | Site: SHOULDER | Status: FUNCTIONAL

## 2023-11-28 DEVICE — GUIDE PIN, 3 X 75MM, STERILE: Type: IMPLANTABLE DEVICE | Site: SHOULDER | Status: NON-FUNCTIONAL

## 2023-11-28 RX ORDER — SODIUM CHLORIDE, SODIUM LACTATE, POTASSIUM CHLORIDE, CALCIUM CHLORIDE 600; 310; 30; 20 MG/100ML; MG/100ML; MG/100ML; MG/100ML
100 INJECTION, SOLUTION INTRAVENOUS CONTINUOUS
Status: DISCONTINUED | OUTPATIENT
Start: 2023-11-28 | End: 2023-11-28 | Stop reason: HOSPADM

## 2023-11-28 RX ORDER — LIDOCAINE HYDROCHLORIDE 10 MG/ML
INJECTION, SOLUTION EPIDURAL; INFILTRATION; INTRACAUDAL; PERINEURAL
Status: DISCONTINUED
Start: 2023-11-28 | End: 2023-11-28 | Stop reason: HOSPADM

## 2023-11-28 RX ORDER — MIDAZOLAM HYDROCHLORIDE 1 MG/ML
2 INJECTION, SOLUTION INTRAMUSCULAR; INTRAVENOUS ONCE
Status: COMPLETED | OUTPATIENT
Start: 2023-11-28 | End: 2023-11-28

## 2023-11-28 RX ORDER — SODIUM CHLORIDE, SODIUM LACTATE, POTASSIUM CHLORIDE, CALCIUM CHLORIDE 600; 310; 30; 20 MG/100ML; MG/100ML; MG/100ML; MG/100ML
100 INJECTION, SOLUTION INTRAVENOUS ONCE
Status: CANCELLED | OUTPATIENT
Start: 2023-11-28 | End: 2023-11-28

## 2023-11-28 RX ORDER — HYDRALAZINE HYDROCHLORIDE 20 MG/ML
5 INJECTION INTRAMUSCULAR; INTRAVENOUS EVERY 30 MIN PRN
Status: DISCONTINUED | OUTPATIENT
Start: 2023-11-28 | End: 2023-11-28 | Stop reason: HOSPADM

## 2023-11-28 RX ORDER — ONDANSETRON HYDROCHLORIDE 2 MG/ML
4 INJECTION, SOLUTION INTRAVENOUS ONCE AS NEEDED
Status: DISCONTINUED | OUTPATIENT
Start: 2023-11-28 | End: 2023-11-28 | Stop reason: HOSPADM

## 2023-11-28 RX ORDER — METHOCARBAMOL 100 MG/ML
INJECTION, SOLUTION INTRAMUSCULAR; INTRAVENOUS AS NEEDED
Status: DISCONTINUED | OUTPATIENT
Start: 2023-11-28 | End: 2023-11-28

## 2023-11-28 RX ORDER — KETOROLAC TROMETHAMINE 30 MG/ML
INJECTION, SOLUTION INTRAMUSCULAR; INTRAVENOUS AS NEEDED
Status: DISCONTINUED | OUTPATIENT
Start: 2023-11-28 | End: 2023-11-28

## 2023-11-28 RX ORDER — FENTANYL CITRATE 50 UG/ML
25 INJECTION, SOLUTION INTRAMUSCULAR; INTRAVENOUS ONCE
Status: COMPLETED | OUTPATIENT
Start: 2023-11-28 | End: 2023-11-28

## 2023-11-28 RX ORDER — DEXAMETHASONE SODIUM PHOSPHATE 4 MG/ML
INJECTION, SOLUTION INTRA-ARTICULAR; INTRALESIONAL; INTRAMUSCULAR; INTRAVENOUS; SOFT TISSUE
Status: DISCONTINUED
Start: 2023-11-28 | End: 2023-11-28 | Stop reason: HOSPADM

## 2023-11-28 RX ORDER — PROPOFOL 10 MG/ML
INJECTION, EMULSION INTRAVENOUS AS NEEDED
Status: DISCONTINUED | OUTPATIENT
Start: 2023-11-28 | End: 2023-11-28

## 2023-11-28 RX ORDER — DEXAMETHASONE SODIUM PHOSPHATE 4 MG/ML
INJECTION, SOLUTION INTRA-ARTICULAR; INTRALESIONAL; INTRAMUSCULAR; INTRAVENOUS; SOFT TISSUE AS NEEDED
Status: DISCONTINUED | OUTPATIENT
Start: 2023-11-28 | End: 2023-11-28

## 2023-11-28 RX ORDER — CEFADROXIL 500 MG/1
500 CAPSULE ORAL 2 TIMES DAILY
Qty: 14 CAPSULE | Refills: 0 | Status: SHIPPED | OUTPATIENT
Start: 2023-11-28 | End: 2023-12-05

## 2023-11-28 RX ORDER — IPRATROPIUM BROMIDE AND ALBUTEROL SULFATE 2.5; .5 MG/3ML; MG/3ML
3 SOLUTION RESPIRATORY (INHALATION)
Status: DISCONTINUED | OUTPATIENT
Start: 2023-11-28 | End: 2023-11-28 | Stop reason: HOSPADM

## 2023-11-28 RX ORDER — ONDANSETRON HYDROCHLORIDE 2 MG/ML
INJECTION, SOLUTION INTRAVENOUS AS NEEDED
Status: DISCONTINUED | OUTPATIENT
Start: 2023-11-28 | End: 2023-11-28

## 2023-11-28 RX ORDER — DOCUSATE SODIUM 100 MG/1
100 CAPSULE, LIQUID FILLED ORAL 2 TIMES DAILY
Qty: 14 CAPSULE | Refills: 0 | Status: SHIPPED | OUTPATIENT
Start: 2023-11-28 | End: 2024-01-29 | Stop reason: WASHOUT

## 2023-11-28 RX ORDER — KETAMINE HYDROCHLORIDE 100 MG/ML
INJECTION, SOLUTION INTRAMUSCULAR; INTRAVENOUS AS NEEDED
Status: DISCONTINUED | OUTPATIENT
Start: 2023-11-28 | End: 2023-11-28

## 2023-11-28 RX ORDER — LABETALOL HYDROCHLORIDE 5 MG/ML
INJECTION, SOLUTION INTRAVENOUS AS NEEDED
Status: DISCONTINUED | OUTPATIENT
Start: 2023-11-28 | End: 2023-11-28

## 2023-11-28 RX ORDER — CEFAZOLIN 1 G/1
3 INJECTION, POWDER, FOR SOLUTION INTRAVENOUS ONCE
Status: COMPLETED | OUTPATIENT
Start: 2023-11-28 | End: 2023-11-28

## 2023-11-28 RX ORDER — OXYCODONE HYDROCHLORIDE 5 MG/1
5 TABLET ORAL EVERY 4 HOURS PRN
Status: DISCONTINUED | OUTPATIENT
Start: 2023-11-28 | End: 2023-11-28 | Stop reason: HOSPADM

## 2023-11-28 RX ORDER — ROCURONIUM BROMIDE 10 MG/ML
INJECTION, SOLUTION INTRAVENOUS AS NEEDED
Status: DISCONTINUED | OUTPATIENT
Start: 2023-11-28 | End: 2023-11-28

## 2023-11-28 RX ORDER — BUPIVACAINE HCL/EPINEPHRINE 0.5-1:200K
VIAL (ML) INJECTION
Status: DISCONTINUED
Start: 2023-11-28 | End: 2023-11-28 | Stop reason: HOSPADM

## 2023-11-28 RX ORDER — SODIUM CHLORIDE 0.9 G/100ML
IRRIGANT IRRIGATION AS NEEDED
Status: DISCONTINUED | OUTPATIENT
Start: 2023-11-28 | End: 2023-11-28 | Stop reason: HOSPADM

## 2023-11-28 RX ORDER — OXYCODONE AND ACETAMINOPHEN 7.5; 325 MG/1; MG/1
1 TABLET ORAL EVERY 6 HOURS PRN
Qty: 24 TABLET | Refills: 0 | Status: SHIPPED | OUTPATIENT
Start: 2023-11-28 | End: 2023-12-08 | Stop reason: WASHOUT

## 2023-11-28 RX ORDER — SUCCINYLCHOLINE CHLORIDE 20 MG/ML
INJECTION INTRAMUSCULAR; INTRAVENOUS AS NEEDED
Status: DISCONTINUED | OUTPATIENT
Start: 2023-11-28 | End: 2023-11-28

## 2023-11-28 RX ADMIN — GLYCOPYRROLATE 0.2 MCG: 0.2 INJECTION, SOLUTION INTRAMUSCULAR; INTRAVITREAL at 09:10

## 2023-11-28 RX ADMIN — ROCURONIUM BROMIDE 20 MG: 10 INJECTION, SOLUTION INTRAVENOUS at 10:15

## 2023-11-28 RX ADMIN — LABETALOL HYDROCHLORIDE 5 MG: 5 INJECTION, SOLUTION INTRAVENOUS at 11:15

## 2023-11-28 RX ADMIN — ROCURONIUM BROMIDE 30 MG: 10 INJECTION, SOLUTION INTRAVENOUS at 09:13

## 2023-11-28 RX ADMIN — SUCCINYLCHOLINE CHLORIDE 200 MG: 20 INJECTION, SOLUTION INTRAMUSCULAR; INTRAVENOUS at 09:01

## 2023-11-28 RX ADMIN — MIDAZOLAM HYDROCHLORIDE 5 MG: 1 INJECTION, SOLUTION INTRAMUSCULAR; INTRAVENOUS at 07:30

## 2023-11-28 RX ADMIN — METHOCARBAMOL 500 MG: 1000 INJECTION, SOLUTION INTRAMUSCULAR; INTRAVENOUS at 10:02

## 2023-11-28 RX ADMIN — SUGAMMADEX 200 MG: 100 INJECTION, SOLUTION INTRAVENOUS at 11:02

## 2023-11-28 RX ADMIN — ONDANSETRON 4 MG: 2 INJECTION INTRAMUSCULAR; INTRAVENOUS at 10:30

## 2023-11-28 RX ADMIN — HYDROMORPHONE HYDROCHLORIDE 0.5 MG: 1 INJECTION, SOLUTION INTRAMUSCULAR; INTRAVENOUS; SUBCUTANEOUS at 11:45

## 2023-11-28 RX ADMIN — PROPOFOL 230 MG: 10 INJECTION, EMULSION INTRAVENOUS at 09:01

## 2023-11-28 RX ADMIN — Medication 2 L/MIN: at 12:45

## 2023-11-28 RX ADMIN — IPRATROPIUM BROMIDE AND ALBUTEROL SULFATE 3 ML: .5; 3 SOLUTION RESPIRATORY (INHALATION) at 14:59

## 2023-11-28 RX ADMIN — KETOROLAC TROMETHAMINE 30 MG: 30 INJECTION, SOLUTION INTRAMUSCULAR at 10:37

## 2023-11-28 RX ADMIN — CEFAZOLIN SODIUM 3 G: 1 POWDER, FOR SOLUTION INTRAMUSCULAR; INTRAVENOUS at 09:05

## 2023-11-28 RX ADMIN — DEXAMETHASONE SODIUM PHOSPHATE 4 MG: 4 INJECTION, SOLUTION INTRA-ARTICULAR; INTRALESIONAL; INTRAMUSCULAR; INTRAVENOUS; SOFT TISSUE at 09:10

## 2023-11-28 RX ADMIN — KETAMINE HYDROCHLORIDE 60 MG: 100 INJECTION, SOLUTION, CONCENTRATE INTRAMUSCULAR; INTRAVENOUS at 10:20

## 2023-11-28 RX ADMIN — FENTANYL CITRATE 100 MCG: 50 INJECTION, SOLUTION INTRAMUSCULAR; INTRAVENOUS at 09:01

## 2023-11-28 RX ADMIN — METHOCARBAMOL 500 MG: 1000 INJECTION, SOLUTION INTRAMUSCULAR; INTRAVENOUS at 10:30

## 2023-11-28 RX ADMIN — ROCURONIUM BROMIDE 20 MG: 10 INJECTION, SOLUTION INTRAVENOUS at 09:30

## 2023-11-28 RX ADMIN — HYDROMORPHONE HYDROCHLORIDE 0.5 MG: 1 INJECTION, SOLUTION INTRAMUSCULAR; INTRAVENOUS; SUBCUTANEOUS at 11:58

## 2023-11-28 RX ADMIN — KETAMINE HYDROCHLORIDE 130 MG: 100 INJECTION, SOLUTION, CONCENTRATE INTRAMUSCULAR; INTRAVENOUS at 09:23

## 2023-11-28 RX ADMIN — SODIUM CHLORIDE, SODIUM LACTATE, POTASSIUM CHLORIDE, AND CALCIUM CHLORIDE: 600; 310; 30; 20 INJECTION, SOLUTION INTRAVENOUS at 08:45

## 2023-11-28 ASSESSMENT — PAIN SCALES - GENERAL
PAINLEVEL_OUTOF10: 0 - NO PAIN
PAINLEVEL_OUTOF10: 7
PAINLEVEL_OUTOF10: 0 - NO PAIN
PAINLEVEL_OUTOF10: 10 - WORST POSSIBLE PAIN
PAINLEVEL_OUTOF10: 0 - NO PAIN
PAINLEVEL_OUTOF10: 8
PAINLEVEL_OUTOF10: 9
PAINLEVEL_OUTOF10: 0 - NO PAIN
PAINLEVEL_OUTOF10: 3
PAINLEVEL_OUTOF10: 0 - NO PAIN
PAINLEVEL_OUTOF10: 10 - WORST POSSIBLE PAIN
PAINLEVEL_OUTOF10: 0 - NO PAIN
PAINLEVEL_OUTOF10: 10 - WORST POSSIBLE PAIN
PAINLEVEL_OUTOF10: 2

## 2023-11-28 ASSESSMENT — PAIN - FUNCTIONAL ASSESSMENT
PAIN_FUNCTIONAL_ASSESSMENT: 0-10

## 2023-11-28 NOTE — PROGRESS NOTES
Occupational Therapy                 Therapy Communication Note    Patient Name: Tomasz Maxwell  MRN: [id number]  Today's Date: 11/28/2023     Discipline: Occupational Therapy    Missed Visit Reason: Missed Visit Reason: Other (Comment) (RN deferred therapy at this time due to patient's low oxygen levels and requiring supplementation post op.  RN given handout for Shoulder Protocol since patient will not be ready for therapy within staffing hours.)    Missed Time: Attempt

## 2023-11-28 NOTE — SIGNIFICANT EVENT
OT came to bedside, pt not ready for discharge, reviewed a couple of tips with patient and information sheet left.

## 2023-11-28 NOTE — ANESTHESIA POSTPROCEDURE EVALUATION
Patient: Tomasz Maxwell    Procedure Summary       Date: 11/28/23 Room / Location: [facility] OR 07 / [facility]    Anesthesia Start: 0845 Anesthesia Stop: 1131    Procedure: Right Shoulder Total Arthroplasty (Right: Shoulder) Diagnosis:       Arthritis of right shoulder region      (Arthritis of right shoulder region [M19.011])    Surgeons: Devon Claros MD Responsible Provider: Nuha Nuñez MD    Anesthesia Type: general, regional ASA Status: 3            Anesthesia Type: general, regional    Vitals Value Taken Time   /78 11/28/23 1231   Temp 37.2 °C (99 °F) 11/28/23 1200   Pulse 76 11/28/23 1235   Resp 18 11/28/23 1230   SpO2 97 % 11/28/23 1235   Vitals shown include unvalidated device data.    Anesthesia Post Evaluation    Patient location during evaluation: bedside  Patient participation: complete - patient participated  Level of consciousness: awake  Pain management: satisfactory to patient  Multimodal analgesia pain management approach  Airway patency: patent  Cardiovascular status: stable  Respiratory status: spontaneous ventilation and unassisted  Hydration status: acceptable  Postoperative Nausea and Vomiting: none and mild        No notable events documented.

## 2023-11-28 NOTE — SIGNIFICANT EVENT
Dr. Roman called to bedside to assess patient readiness to go home, secondary to oxygen saturation. Pt sitting up in chair on room air. Pox ranges from 90-95%, lungs clear bilaterally, pt using incentive spirometer intermittently. Dr. Roman would like patient to maintain over 93% for 30 minutes before being comfortable with being discharge. Will continue to monitor

## 2023-11-28 NOTE — H&P
History Of Present Illness  Tomasz Maxwell is a 47 y.o. male presenting with   Right shoulder arthritis.     Past Medical History  Past Medical History:   Diagnosis Date    Contusion of left hand, initial encounter 04/24/2017    Contusion of left hand, initial encounter    Crushing injury of unspecified finger(s), initial encounter 04/24/2017    Injury, crush, finger    Essential (primary) hypertension 05/16/2022    Hypertension    Hyperlipidemia, unspecified 10/03/2022    Hyperlipidemia    Hypokalemia 04/24/2017    Hypokalemia, k= 3.8 on 1/26/23    Idiopathic gout, right knee 04/24/2017    Acute idiopathic gout of right knee    Latent syphilis, unspecified as early or late 04/13/2018    Latent syphilis with positive serology    Local infection of the skin and subcutaneous tissue, unspecified 12/05/2017    Skin infection    Long term (current) use of opiate analgesic 04/24/2017    Opioid contract exists    Mastitis without abscess 04/24/2017    Mastitis, acute    Mastodynia 04/24/2017    Breast pain, right    Muscle spasm of back 04/24/2017    Paraspinal muscle spasm    Nocturia 04/24/2017    Nocturia    Other microscopic hematuria 04/24/2017    Hematuria, microscopic    Other specified postprocedural states 04/24/2017    H/O arthroscopic knee surgery    Other specified postprocedural states 04/24/2017    Status post lumbar laminectomy    Other specified symptoms and signs involving the digestive system and abdomen 09/08/2017    Umbilical discharge    Pain in left wrist 04/24/2017    Left wrist pain    Personal history of other diseases of the musculoskeletal system and connective tissue     History of arthritis    Personal history of other diseases of the nervous system and sense organs 03/07/2016    History of acute otitis media    Personal history of other endocrine, nutritional and metabolic disease     History of diabetes mellitus    Personal history of other infectious and parasitic diseases 09/10/2017     History of tinea corporis    Personal history of other infectious and parasitic diseases 09/08/2017    History of trichomoniasis    Personal history of other infectious and parasitic diseases 04/13/2018    History of trichomoniasis    Personal history of other specified conditions 04/30/2018    History of dyspnea    Personal history of other specified conditions 01/23/2018    History of epigastric pain    Personal history of other specified conditions 07/28/2017    History of urinary frequency    Personal history of other specified conditions 10/08/2017    History of chest pain    Proteinuria, unspecified 04/24/2017    Proteinuria    Radiculopathy, lumbar region 04/24/2017    Lumbar radiculopathy    Spinal stenosis, site unspecified 08/19/2015    Spinal stenosis, multiple sites in spine    Unspecified abnormal finding in specimens from other organs, systems and tissues 04/24/2017    Abnormal laboratory test result    Unspecified asthma, uncomplicated     Active asthma    Vertigo     ECHO 5/21/22       Surgical History  Past Surgical History:   Procedure Laterality Date    BACK SURGERY  09/26/2016    Back Surgeryx5, has spinal stimulator    HERNIA REPAIR  08/26/2015    Hernia Repairx2    KNEE SURGERY Right 03/29/2017    Knee Surgery, right TKR    LUMBAR LAMINECTOMY  08/26/2015    Laminectomy Lumbar    MR HEAD ANGIO WO IV CONTRAST  05/21/2022    MR HEAD ANGIO WO IV CONTRAST 5/21/2022 [facility] [place] [facility]    MR HEAD ANGIO WO IV CONTRAST  03/12/2017    MR HEAD ANGIO WO IV CONTRAST 3/12/2017 [facility] [place] [facility]    MR NECK ANGIO WO IV CONTRAST  05/21/2022    MR NECK ANGIO WO IV CONTRAST 5/21/2022 [facility] [place] [facility]    MR NECK ANGIO WO IV CONTRAST  03/12/2017    MR NECK ANGIO WO IV CONTRAST 3/12/2017 [facility] [place] [facility]    OTHER SURGICAL HISTORY  05/20/2021    Abdominal surgery    OTHER SURGICAL HISTORY Bilateral 02/24/2021    Hip replacement    OTHER SURGICAL HISTORY  09/26/2016    Gastrectomy    SHOULDER SURGERY Left  "    left TSR        Social History  He reports that he has never smoked. He has never used smokeless tobacco. He reports current drug use. Drug: Oxycodone. He reports that he does not drink alcohol.    Family History  Family History   Problem Relation Name Age of Onset    Hypertension Mother      Diabetes Mother      Asthma Mother      Deep vein thrombosis Mother      No Known Problems Father      No Known Problems Sister      Asthma Child          Allergies  Patient has no known allergies.    Review of Systems     Physical Exam     Last Recorded Vitals  Blood pressure 129/74, pulse 70, temperature 36.1 °C (97 °F), temperature source Temporal, resp. rate 20, height 1.67 m (5' 5.75\"), weight 131 kg (289 lb 3.9 oz), SpO2 97 %.    Relevant Results          X-rays and MRI scan confirms arthritic degeneration of the glenohumeral joint     Assessment/Plan   Principal Problem:    Arthritis of right shoulder region  Active Problems:    Opioid use disorder    Obesity        Plan is for right shoulder arthroplasty       I spent   15 minutes in the professional and overall care of this patient.      Devon Claros MD    "

## 2023-11-28 NOTE — ANESTHESIA PROCEDURE NOTES
Airway  Date/Time: 11/28/2023 9:05 AM  Urgency: elective    Difficult airway    Staffing  Performed: CRNA   Authorized by: Nuha Nuñez MD    Performed by: SYLVAIN Barnhart-HALEY  Patient location during procedure: OR    Indications and Patient Condition  Indications for airway management: anesthesia  Spontaneous Ventilation: absent  Sedation level: deep  Preoxygenated: yes (Preoxygenated with 100% FiO2 x 5 minutes)  Patient position: sniffing  MILS maintained throughout  Mask difficulty assessment: 3 - difficult mask (inadequate, unstable or two providers) +/- NMBA    Final Airway Details  Final airway type: endotracheal airway      Successful airway: ETT  Cuffed: yes   Successful intubation technique: video laryngoscopy (Glidescope 4 Blade)  Facilitating devices/methods: intubating stylet and cricoid pressure  Endotracheal tube insertion site: oral  Blade size: #4  ETT size (mm): 7.5  Cormack-Lehane Classification: grade III - view of epiglottis only  Placement verified by: chest auscultation and capnometry   Cuff volume (mL): 7  Measured from: lips  ETT to lips (cm): 23  Number of attempts at approach: 1  Ventilation between attempts: 2 hand mask  Number of other approaches attempted: 0    Additional Comments  Preoxygenated 100% FiO2/pt in reverse trendelenberg position for induction, smooth IV induction, difficult mask ventilations due to large face/large tongue requiring oral airway #5, 2 person mask ventilations required.  Atraumatic DVL/intubation using glidescope #4 blade, (+) view/confirmation of ETT passing through vocal cords, teeth/lips intact.

## 2023-11-28 NOTE — DISCHARGE INSTRUCTIONS
Activity:  Keep arm in sling until follow up visit (will be in sling for approx 4 weeks total).  May not return to work/school until follow up visit.  May not drive  until follow up visit or while taking narcotics  No weight bearing with affected arm      Wound care:  Surgical incision Keep surgical dressing clean dry and intact until your next follow-up appointment in 1 week.  If needed, you may reinforce the dressing but do not remove the original dressing until follow-up.  DO NOT shower until folow up

## 2023-11-28 NOTE — ANESTHESIA PROCEDURE NOTES
Peripheral Block    Patient location during procedure: post-op  Start time: 11/28/2023 12:20 PM  End time: 11/28/2023 12:30 PM  Reason for block: at surgeon's request and post-op pain management  Staffing  Performed: attending   Authorized by: Nuha Nuñez MD    Performed by: Nuha Nuñez MD  Preanesthetic Checklist  Completed: patient identified, IV checked, site marked, risks and benefits discussed, surgical consent, monitors and equipment checked, pre-op evaluation and timeout performed   Timeout performed at: 11/28/2023 12:19 PM  Peripheral Block  Patient position: sitting  Prep: ChloraPrep and site prepped and draped  Patient monitoring: heart rate, cardiac monitor and continuous pulse ox  Block type: brachial plexus and supraclavicular  Laterality: right  Injection technique: single-shot  Guidance: nerve stimulator, ultrasound guided and ultrasound image saved to chart.  Local infiltration: lidocaine  Infiltration strength: 1 %  Dose: 2 mL  Needle  Needle type: short-bevel   Needle gauge: 22 G  Needle length: 5 cm  Needle localization: anatomical landmarks, nerve stimulator and ultrasound guidance  Test dose: negative  Assessment  Injection assessment: negative aspiration for heme, no paresthesia on injection, incremental injection and local visualized surrounding nerve on ultrasound  Paresthesia pain: none  Heart rate change: no  Slow fractionated injection: no  Additional Notes  Interscalene block planned for pre-op. Procedure-related and patient-specific complications were discussed. Verbal consent was provided by the patient and/or surrogate decision maker. Anticoagulation (if any) was held per [facility] guidelines. Midazolam 5 mg I.v. given for sedation. Poor visualization with ultrasound due to body habitus. Procedure abandoned.   Supraclavicular block performed in PACU.   Aseptic prep and draping was performed. A 22 G stimuplex needle was used with a nerve stimulator. No Evoked Motor Response was visible  at < 0.3 mA. 30 ml of 0.5% Bupivacaine with epi 5 mcg/ml and decadron 4 mg was injected in 5 ml increments. There was no resistance to injection and appropriate injection pressures were maintained based on tactile feedback. Throughout the procedure, there was consistent and meaningful verbal communication with the patient. Post procedure vital signs were stable and no immediate complications were noted. PACU will provide written instructions that outline the specific precautions to be taken when caring for an insensate extremity. Patient reported good pain relief after the block.

## 2023-11-29 LAB — STAPHYLOCOCCUS SPEC CULT: NORMAL

## 2023-11-29 ASSESSMENT — PAIN SCALES - GENERAL: PAINLEVEL_OUTOF10: 5 - MODERATE PAIN

## 2023-12-06 ENCOUNTER — HOSPITAL ENCOUNTER (OUTPATIENT)
Facility: HOSPITAL | Age: 48
Setting detail: OBSERVATION
LOS: 1 days | Discharge: LEFT BEFORE DISCHARGE | DRG: 315 | End: 2023-12-07
Attending: EMERGENCY MEDICINE | Admitting: STUDENT IN AN ORGANIZED HEALTH CARE EDUCATION/TRAINING PROGRAM
Payer: MEDICARE

## 2023-12-06 ENCOUNTER — OFFICE VISIT (OUTPATIENT)
Dept: ORTHOPEDIC SURGERY | Facility: HOSPITAL | Age: 48
End: 2023-12-06
Payer: MEDICARE

## 2023-12-06 ENCOUNTER — APPOINTMENT (OUTPATIENT)
Dept: RADIOLOGY | Facility: HOSPITAL | Age: 48
DRG: 315 | End: 2023-12-06
Payer: MEDICARE

## 2023-12-06 VITALS — WEIGHT: 279 LBS | BODY MASS INDEX: 46.48 KG/M2 | HEIGHT: 65 IN

## 2023-12-06 DIAGNOSIS — R06.02 SHORTNESS OF BREATH: Primary | ICD-10-CM

## 2023-12-06 DIAGNOSIS — R09.89 PULMONARY VASCULAR CONGESTION: ICD-10-CM

## 2023-12-06 DIAGNOSIS — Z47.1 AFTERCARE FOLLOWING RIGHT SHOULDER JOINT REPLACEMENT SURGERY: ICD-10-CM

## 2023-12-06 DIAGNOSIS — M19.011 LOCALIZED OSTEOARTHRITIS OF RIGHT SHOULDER: Primary | ICD-10-CM

## 2023-12-06 DIAGNOSIS — Z96.611 AFTERCARE FOLLOWING RIGHT SHOULDER JOINT REPLACEMENT SURGERY: ICD-10-CM

## 2023-12-06 LAB
ALBUMIN SERPL BCP-MCNC: 4 G/DL (ref 3.4–5)
ALP SERPL-CCNC: 64 U/L (ref 33–120)
ALT SERPL W P-5'-P-CCNC: 24 U/L (ref 10–52)
ANION GAP SERPL CALC-SCNC: 13 MMOL/L (ref 10–20)
AST SERPL W P-5'-P-CCNC: 19 U/L (ref 9–39)
BASOPHILS # BLD AUTO: 0.06 X10*3/UL (ref 0–0.1)
BASOPHILS NFR BLD AUTO: 0.7 %
BILIRUB SERPL-MCNC: 0.6 MG/DL (ref 0–1.2)
BNP SERPL-MCNC: 14 PG/ML (ref 0–99)
BUN SERPL-MCNC: 13 MG/DL (ref 6–23)
CALCIUM SERPL-MCNC: 9.1 MG/DL (ref 8.6–10.6)
CARDIAC TROPONIN I PNL SERPL HS: 3 NG/L (ref 0–53)
CHLORIDE SERPL-SCNC: 99 MMOL/L (ref 98–107)
CO2 SERPL-SCNC: 30 MMOL/L (ref 21–32)
CREAT SERPL-MCNC: 1.12 MG/DL (ref 0.5–1.3)
D DIMER PPP FEU-MCNC: 1370 NG/ML FEU
EOSINOPHIL # BLD AUTO: 0.21 X10*3/UL (ref 0–0.7)
EOSINOPHIL NFR BLD AUTO: 2.5 %
ERYTHROCYTE [DISTWIDTH] IN BLOOD BY AUTOMATED COUNT: 14.5 % (ref 11.5–14.5)
GFR SERPL CREATININE-BSD FRML MDRD: 81 ML/MIN/1.73M*2
GLUCOSE SERPL-MCNC: 140 MG/DL (ref 74–99)
HCT VFR BLD AUTO: 36.9 % (ref 41–52)
HGB BLD-MCNC: 12 G/DL (ref 13.5–17.5)
IMM GRANULOCYTES # BLD AUTO: 0.03 X10*3/UL (ref 0–0.7)
IMM GRANULOCYTES NFR BLD AUTO: 0.4 % (ref 0–0.9)
INR PPP: 1.1 (ref 0.9–1.1)
LYMPHOCYTES # BLD AUTO: 3.21 X10*3/UL (ref 1.2–4.8)
LYMPHOCYTES NFR BLD AUTO: 38.1 %
MCH RBC QN AUTO: 28.2 PG (ref 26–34)
MCHC RBC AUTO-ENTMCNC: 32.5 G/DL (ref 32–36)
MCV RBC AUTO: 87 FL (ref 80–100)
MONOCYTES # BLD AUTO: 0.8 X10*3/UL (ref 0.1–1)
MONOCYTES NFR BLD AUTO: 9.5 %
NEUTROPHILS # BLD AUTO: 4.11 X10*3/UL (ref 1.2–7.7)
NEUTROPHILS NFR BLD AUTO: 48.8 %
NRBC BLD-RTO: 0 /100 WBCS (ref 0–0)
PLATELET # BLD AUTO: 346 X10*3/UL (ref 150–450)
POTASSIUM SERPL-SCNC: 3.9 MMOL/L (ref 3.5–5.3)
PROT SERPL-MCNC: 6.9 G/DL (ref 6.4–8.2)
PROTHROMBIN TIME: 12.2 SECONDS (ref 9.8–12.8)
RBC # BLD AUTO: 4.26 X10*6/UL (ref 4.5–5.9)
SODIUM SERPL-SCNC: 138 MMOL/L (ref 136–145)
WBC # BLD AUTO: 8.4 X10*3/UL (ref 4.4–11.3)

## 2023-12-06 PROCEDURE — 71275 CT ANGIOGRAPHY CHEST: CPT | Performed by: SURGERY

## 2023-12-06 PROCEDURE — 3044F HG A1C LEVEL LT 7.0%: CPT | Performed by: PHYSICIAN ASSISTANT

## 2023-12-06 PROCEDURE — 36415 COLL VENOUS BLD VENIPUNCTURE: CPT | Performed by: PHYSICIAN ASSISTANT

## 2023-12-06 PROCEDURE — 2550000001 HC RX 255 CONTRASTS: Mod: SE | Performed by: EMERGENCY MEDICINE

## 2023-12-06 PROCEDURE — 84484 ASSAY OF TROPONIN QUANT: CPT | Performed by: PHYSICIAN ASSISTANT

## 2023-12-06 PROCEDURE — 99285 EMERGENCY DEPT VISIT HI MDM: CPT | Performed by: EMERGENCY MEDICINE

## 2023-12-06 PROCEDURE — 71046 X-RAY EXAM CHEST 2 VIEWS: CPT | Performed by: SURGERY

## 2023-12-06 PROCEDURE — 93010 ELECTROCARDIOGRAM REPORT: CPT | Performed by: EMERGENCY MEDICINE

## 2023-12-06 PROCEDURE — 83880 ASSAY OF NATRIURETIC PEPTIDE: CPT | Performed by: EMERGENCY MEDICINE

## 2023-12-06 PROCEDURE — 80053 COMPREHEN METABOLIC PANEL: CPT | Performed by: EMERGENCY MEDICINE

## 2023-12-06 PROCEDURE — 71275 CT ANGIOGRAPHY CHEST: CPT

## 2023-12-06 PROCEDURE — 85379 FIBRIN DEGRADATION QUANT: CPT | Performed by: EMERGENCY MEDICINE

## 2023-12-06 PROCEDURE — 85025 COMPLETE CBC W/AUTO DIFF WBC: CPT | Performed by: EMERGENCY MEDICINE

## 2023-12-06 PROCEDURE — 36415 COLL VENOUS BLD VENIPUNCTURE: CPT | Performed by: EMERGENCY MEDICINE

## 2023-12-06 PROCEDURE — 99024 POSTOP FOLLOW-UP VISIT: CPT | Performed by: PHYSICIAN ASSISTANT

## 2023-12-06 PROCEDURE — L3670 SO ACRO/CLAV CAN WEB PRE OTS: HCPCS | Performed by: PHYSICIAN ASSISTANT

## 2023-12-06 PROCEDURE — 85610 PROTHROMBIN TIME: CPT | Performed by: EMERGENCY MEDICINE

## 2023-12-06 PROCEDURE — 1036F TOBACCO NON-USER: CPT | Performed by: PHYSICIAN ASSISTANT

## 2023-12-06 PROCEDURE — 84484 ASSAY OF TROPONIN QUANT: CPT | Performed by: EMERGENCY MEDICINE

## 2023-12-06 PROCEDURE — 71046 X-RAY EXAM CHEST 2 VIEWS: CPT

## 2023-12-06 RX ADMIN — IOHEXOL 100 ML: 350 INJECTION, SOLUTION INTRAVENOUS at 23:27

## 2023-12-06 ASSESSMENT — LIFESTYLE VARIABLES
HAVE YOU EVER FELT YOU SHOULD CUT DOWN ON YOUR DRINKING: NO
HAVE PEOPLE ANNOYED YOU BY CRITICIZING YOUR DRINKING: NO
EVER FELT BAD OR GUILTY ABOUT YOUR DRINKING: NO
EVER HAD A DRINK FIRST THING IN THE MORNING TO STEADY YOUR NERVES TO GET RID OF A HANGOVER: NO
REASON UNABLE TO ASSESS: YES

## 2023-12-06 ASSESSMENT — PAIN DESCRIPTION - DESCRIPTORS: DESCRIPTORS: ACHING

## 2023-12-06 ASSESSMENT — COLUMBIA-SUICIDE SEVERITY RATING SCALE - C-SSRS
2. HAVE YOU ACTUALLY HAD ANY THOUGHTS OF KILLING YOURSELF?: NO
1. IN THE PAST MONTH, HAVE YOU WISHED YOU WERE DEAD OR WISHED YOU COULD GO TO SLEEP AND NOT WAKE UP?: NO
6. HAVE YOU EVER DONE ANYTHING, STARTED TO DO ANYTHING, OR PREPARED TO DO ANYTHING TO END YOUR LIFE?: NO

## 2023-12-06 ASSESSMENT — PAIN - FUNCTIONAL ASSESSMENT: PAIN_FUNCTIONAL_ASSESSMENT: 0-10

## 2023-12-06 ASSESSMENT — PAIN SCALES - GENERAL: PAINLEVEL_OUTOF10: 4

## 2023-12-06 NOTE — ED TRIAGE NOTES
Pt reports hx asthma. Had R shoulder replacement 11/29. Says that after surgery his pulse ox was low. SOB worsening since then. Inhaler not helping. Reporting CP.

## 2023-12-06 NOTE — PROGRESS NOTES
Tomasz presents to clinic today for first postoperative visit after right shoulder glenohumeral arthroplasty performed on 11/28.  Overall he is doing well having some mild/moderate amount of pain.  Does not have therapy set up yet at this time.  He did have some drainage from his wound however this has resolved denies any fevers or chills.    Examination: Surgical incision is clean and intact no surrounding erythema active drains or signs of any active infection.  Light sensation intact  strength 5/5 moderate anterior ecchymosis.  Palpable distal radial pulse    Impression: Right shoulder glenohumeral arthritis    Plan: We have provided him with a better more comfortable sling.  It he was instructed to call to set up therapy referral was given to him today.  He will continue with medication as needed.  He will follow-up with our office in 3 weeks.    Patient was prescribed a cool sling for right shoulder arthritis. The patient has weakness, instability and/or deformity of their right shoulder which requires stabilization from this orthosis to improve their function.      Verbal and written instructions for the use, wear schedule, cleaning and application of this item were given.  Patient was instructed that should the brace result in increased pain, decreased sensation, increased swelling, or an overall worsening of their medical condition, to please contact our office immediately.     Orthotic management and training was provided for skin care, modifications due to healing tissues, edema changes, interruption in skin integrity, and safety precautions with the orthosis.    Alejandrina Chawla PA-C  Department of Orthopaedic Surgery  [facility]    Dictation performed with the use of voice recognition software. Syntax and grammatical errors may exist.

## 2023-12-07 ENCOUNTER — APPOINTMENT (OUTPATIENT)
Dept: CARDIOLOGY | Facility: HOSPITAL | Age: 48
DRG: 315 | End: 2023-12-07
Payer: MEDICARE

## 2023-12-07 ENCOUNTER — ANCILLARY PROCEDURE (OUTPATIENT)
Dept: EMERGENCY MEDICINE | Facility: HOSPITAL | Age: 48
DRG: 315 | End: 2023-12-07
Payer: MEDICARE

## 2023-12-07 VITALS
RESPIRATION RATE: 16 BRPM | SYSTOLIC BLOOD PRESSURE: 147 MMHG | OXYGEN SATURATION: 96 % | TEMPERATURE: 97.7 F | DIASTOLIC BLOOD PRESSURE: 70 MMHG | HEART RATE: 71 BPM

## 2023-12-07 PROBLEM — R09.89 PULMONARY VASCULAR CONGESTION: Status: ACTIVE | Noted: 2023-12-07

## 2023-12-07 PROBLEM — R06.02 SHORTNESS OF BREATH: Status: ACTIVE | Noted: 2023-10-28

## 2023-12-07 LAB
AORTIC VALVE PEAK VELOCITY: 1.6
ATRIAL RATE: 76 BPM
AV PEAK GRADIENT: 10.2
AVA (PEAK VEL): 2.96
CARDIAC TROPONIN I PNL SERPL HS: 3 NG/L (ref 0–53)
EJECTION FRACTION APICAL 4 CHAMBER: 68.3
EJECTION FRACTION: 66
FLUAV RNA RESP QL NAA+PROBE: NOT DETECTED
FLUBV RNA RESP QL NAA+PROBE: NOT DETECTED
LEFT ATRIUM VOLUME AREA LENGTH INDEX BSA: 29.2
LEFT VENTRICULAR OUTFLOW TRACT DIAMETER: 2
MITRAL VALVE E/A RATIO: 1.75
MITRAL VALVE E/E' RATIO: 6.02
P AXIS: 63 DEGREES
P OFFSET: 193 MS
P ONSET: 136 MS
PR INTERVAL: 162 MS
PROCALCITONIN SERPL-MCNC: 0.03 NG/ML
Q ONSET: 217 MS
QRS COUNT: 12 BEATS
QRS DURATION: 84 MS
QT INTERVAL: 374 MS
QTC CALCULATION(BAZETT): 420 MS
QTC FREDERICIA: 404 MS
R AXIS: 70 DEGREES
RIGHT VENTRICLE FREE WALL PEAK S': 14.5
SARS-COV-2 RNA RESP QL NAA+PROBE: NOT DETECTED
T AXIS: 50 DEGREES
T OFFSET: 404 MS
VENTRICULAR RATE: 76 BPM

## 2023-12-07 PROCEDURE — 87636 SARSCOV2 & INF A&B AMP PRB: CPT

## 2023-12-07 PROCEDURE — 1210000001 HC SEMI-PRIVATE ROOM DAILY

## 2023-12-07 PROCEDURE — 2500000004 HC RX 250 GENERAL PHARMACY W/ HCPCS (ALT 636 FOR OP/ED)

## 2023-12-07 PROCEDURE — 99222 1ST HOSP IP/OBS MODERATE 55: CPT

## 2023-12-07 PROCEDURE — 2500000001 HC RX 250 WO HCPCS SELF ADMINISTERED DRUGS (ALT 637 FOR MEDICARE OP)

## 2023-12-07 PROCEDURE — G0378 HOSPITAL OBSERVATION PER HR: HCPCS

## 2023-12-07 PROCEDURE — 36415 COLL VENOUS BLD VENIPUNCTURE: CPT

## 2023-12-07 PROCEDURE — 93306 TTE W/DOPPLER COMPLETE: CPT

## 2023-12-07 PROCEDURE — 93306 TTE W/DOPPLER COMPLETE: CPT | Performed by: INTERNAL MEDICINE

## 2023-12-07 PROCEDURE — 94640 AIRWAY INHALATION TREATMENT: CPT

## 2023-12-07 PROCEDURE — 2500000002 HC RX 250 W HCPCS SELF ADMINISTERED DRUGS (ALT 637 FOR MEDICARE OP, ALT 636 FOR OP/ED): Mod: SE

## 2023-12-07 PROCEDURE — 93005 ELECTROCARDIOGRAM TRACING: CPT

## 2023-12-07 PROCEDURE — 96374 THER/PROPH/DIAG INJ IV PUSH: CPT

## 2023-12-07 PROCEDURE — 84145 PROCALCITONIN (PCT): CPT

## 2023-12-07 RX ORDER — PREDNISONE 20 MG/1
40 TABLET ORAL DAILY
Status: DISCONTINUED | OUTPATIENT
Start: 2023-12-08 | End: 2023-12-08 | Stop reason: HOSPADM

## 2023-12-07 RX ORDER — FUROSEMIDE 10 MG/ML
20 INJECTION INTRAMUSCULAR; INTRAVENOUS ONCE
Status: COMPLETED | OUTPATIENT
Start: 2023-12-07 | End: 2023-12-07

## 2023-12-07 RX ORDER — FUROSEMIDE 40 MG/1
40 TABLET ORAL DAILY
Status: DISCONTINUED | OUTPATIENT
Start: 2023-12-07 | End: 2023-12-08 | Stop reason: HOSPADM

## 2023-12-07 RX ORDER — FLUTICASONE FUROATE AND VILANTEROL 200; 25 UG/1; UG/1
1 POWDER RESPIRATORY (INHALATION)
Status: DISCONTINUED | OUTPATIENT
Start: 2023-12-07 | End: 2023-12-08 | Stop reason: HOSPADM

## 2023-12-07 RX ORDER — ALBUTEROL SULFATE 0.83 MG/ML
2.5 SOLUTION RESPIRATORY (INHALATION) EVERY 4 HOURS PRN
Status: DISCONTINUED | OUTPATIENT
Start: 2023-12-07 | End: 2023-12-08 | Stop reason: HOSPADM

## 2023-12-07 RX ORDER — CHOLECALCIFEROL (VITAMIN D3) 25 MCG
2000 TABLET ORAL DAILY
Status: DISCONTINUED | OUTPATIENT
Start: 2023-12-07 | End: 2023-12-08 | Stop reason: HOSPADM

## 2023-12-07 RX ORDER — AMLODIPINE BESYLATE 5 MG/1
10 TABLET ORAL DAILY
Status: DISCONTINUED | OUTPATIENT
Start: 2023-12-07 | End: 2023-12-08 | Stop reason: HOSPADM

## 2023-12-07 RX ORDER — LIDOCAINE 560 MG/1
3 PATCH PERCUTANEOUS; TOPICAL; TRANSDERMAL DAILY
Status: DISCONTINUED | OUTPATIENT
Start: 2023-12-07 | End: 2023-12-08 | Stop reason: HOSPADM

## 2023-12-07 RX ORDER — AMOXICILLIN 250 MG
1 CAPSULE ORAL AS NEEDED
Status: DISCONTINUED | OUTPATIENT
Start: 2023-12-07 | End: 2023-12-08 | Stop reason: HOSPADM

## 2023-12-07 RX ORDER — DOCUSATE SODIUM 100 MG/1
100 CAPSULE, LIQUID FILLED ORAL 2 TIMES DAILY
Status: DISCONTINUED | OUTPATIENT
Start: 2023-12-07 | End: 2023-12-08 | Stop reason: HOSPADM

## 2023-12-07 RX ORDER — TAMSULOSIN HYDROCHLORIDE 0.4 MG/1
0.8 CAPSULE ORAL NIGHTLY
Status: DISCONTINUED | OUTPATIENT
Start: 2023-12-07 | End: 2023-12-08 | Stop reason: HOSPADM

## 2023-12-07 RX ORDER — OXYCODONE HYDROCHLORIDE 5 MG/1
5 TABLET ORAL EVERY 6 HOURS PRN
Status: DISCONTINUED | OUTPATIENT
Start: 2023-12-07 | End: 2023-12-08 | Stop reason: HOSPADM

## 2023-12-07 RX ORDER — NALOXONE HYDROCHLORIDE 4 MG/.1ML
4 SPRAY NASAL AS NEEDED
Status: DISCONTINUED | OUTPATIENT
Start: 2023-12-07 | End: 2023-12-08 | Stop reason: HOSPADM

## 2023-12-07 RX ORDER — PREDNISONE 20 MG/1
40 TABLET ORAL ONCE
Status: COMPLETED | OUTPATIENT
Start: 2023-12-07 | End: 2023-12-07

## 2023-12-07 RX ORDER — ENOXAPARIN SODIUM 100 MG/ML
40 INJECTION SUBCUTANEOUS 2 TIMES DAILY
Status: DISCONTINUED | OUTPATIENT
Start: 2023-12-07 | End: 2023-12-08 | Stop reason: HOSPADM

## 2023-12-07 RX ORDER — MONTELUKAST SODIUM 10 MG/1
10 TABLET ORAL NIGHTLY
Status: DISCONTINUED | OUTPATIENT
Start: 2023-12-07 | End: 2023-12-08 | Stop reason: HOSPADM

## 2023-12-07 RX ORDER — LORATADINE 10 MG/1
10 TABLET ORAL DAILY
Status: DISCONTINUED | OUTPATIENT
Start: 2023-12-07 | End: 2023-12-08 | Stop reason: HOSPADM

## 2023-12-07 RX ORDER — FUROSEMIDE 40 MG/1
40 TABLET ORAL
Status: DISCONTINUED | OUTPATIENT
Start: 2023-12-07 | End: 2023-12-07

## 2023-12-07 RX ORDER — POLYETHYLENE GLYCOL 3350 17 G/17G
17 POWDER, FOR SOLUTION ORAL DAILY
Status: DISCONTINUED | OUTPATIENT
Start: 2023-12-07 | End: 2023-12-08 | Stop reason: HOSPADM

## 2023-12-07 RX ORDER — ACETAMINOPHEN 325 MG/1
975 TABLET ORAL EVERY 8 HOURS
Status: DISCONTINUED | OUTPATIENT
Start: 2023-12-07 | End: 2023-12-08 | Stop reason: HOSPADM

## 2023-12-07 RX ORDER — ATORVASTATIN CALCIUM 20 MG/1
80 TABLET, FILM COATED ORAL NIGHTLY
Status: DISCONTINUED | OUTPATIENT
Start: 2023-12-07 | End: 2023-12-08 | Stop reason: HOSPADM

## 2023-12-07 RX ADMIN — FUROSEMIDE 40 MG: 40 TABLET ORAL at 10:13

## 2023-12-07 RX ADMIN — AMLODIPINE BESYLATE 10 MG: 5 TABLET ORAL at 09:09

## 2023-12-07 RX ADMIN — FLUTICASONE FUROATE AND VILANTEROL TRIFENATATE 1 PUFF: 200; 25 POWDER RESPIRATORY (INHALATION) at 07:00

## 2023-12-07 RX ADMIN — DOCUSATE SODIUM 100 MG: 100 CAPSULE, LIQUID FILLED ORAL at 09:09

## 2023-12-07 RX ADMIN — ACETAMINOPHEN 975 MG: 325 TABLET ORAL at 10:47

## 2023-12-07 RX ADMIN — FUROSEMIDE 20 MG: 10 INJECTION, SOLUTION INTRAMUSCULAR; INTRAVENOUS at 04:00

## 2023-12-07 RX ADMIN — LORATADINE 10 MG: 10 TABLET ORAL at 09:09

## 2023-12-07 RX ADMIN — ALBUTEROL SULFATE 2.5 MG: 2.5 SOLUTION RESPIRATORY (INHALATION) at 05:45

## 2023-12-07 RX ADMIN — ACETAMINOPHEN 975 MG: 325 TABLET ORAL at 03:20

## 2023-12-07 RX ADMIN — MONTELUKAST 10 MG: 10 TABLET, FILM COATED ORAL at 21:00

## 2023-12-07 RX ADMIN — PREDNISONE 40 MG: 20 TABLET ORAL at 15:46

## 2023-12-07 RX ADMIN — OXYCODONE HYDROCHLORIDE 5 MG: 5 TABLET ORAL at 18:27

## 2023-12-07 RX ADMIN — Medication 2000 UNITS: at 09:12

## 2023-12-07 RX ADMIN — PERFLUTREN 2 ML OF DILUTION: 6.52 INJECTION, SUSPENSION INTRAVENOUS at 08:35

## 2023-12-07 RX ADMIN — ATORVASTATIN CALCIUM 80 MG: 20 TABLET, FILM COATED ORAL at 21:00

## 2023-12-07 RX ADMIN — OXYCODONE HYDROCHLORIDE 5 MG: 5 TABLET ORAL at 03:36

## 2023-12-07 ASSESSMENT — ENCOUNTER SYMPTOMS
COUGH: 0
CHOKING: 0
PSYCHIATRIC NEGATIVE: 1
LIGHT-HEADEDNESS: 0
CONSTITUTIONAL NEGATIVE: 1
HEADACHES: 0
HEMATOLOGIC/LYMPHATIC NEGATIVE: 1
APNEA: 0
FACIAL ASYMMETRY: 0
WHEEZING: 0
ALLERGIC/IMMUNOLOGIC NEGATIVE: 1
ENDOCRINE NEGATIVE: 1
DIZZINESS: 1
CHEST TIGHTNESS: 0
MUSCULOSKELETAL NEGATIVE: 1
GASTROINTESTINAL NEGATIVE: 1
STRIDOR: 0
PALPITATIONS: 0
SHORTNESS OF BREATH: 1
EYES NEGATIVE: 1

## 2023-12-07 ASSESSMENT — PAIN SCALES - GENERAL
PAINLEVEL_OUTOF10: 4
PAINLEVEL_OUTOF10: 6
PAINLEVEL_OUTOF10: 8

## 2023-12-07 NOTE — ED PROVIDER NOTES
CC: Shortness of Breath     HPI: Tomasz Maxwell is an 47 y.o. male with history including shoulder replacement, osteoarthritis, asthma, hyperlipidemia presenting to the emergency department for shortness of breath.  Patient reports a recent shoulder replacement on 11/2 8 at [facility] who is presenting today with shortness of breath with exertion.  States it is worse when he is walking.  Notes past medical history of asthma but not currently on medication for it.  Denies chest pain, denies worse pain with inspiration.  Has not taken anything for pain control.  Denies nausea and vomiting.  Denies history of cardiac abnormalities.  Denies fevers, chills, cough or congestion.    Limitations to History: None  Additional History Obtained from: Chart was reviewed including H&P from recent short surgery    PMHx/PSHx:  Per HPI.   - has a past medical history of Contusion of left hand, initial encounter (04/24/2017), Crushing injury of unspecified finger(s), initial encounter (04/24/2017), Essential (primary) hypertension (05/16/2022), Hyperlipidemia, unspecified (10/03/2022), Hypokalemia (04/24/2017), Idiopathic gout, right knee (04/24/2017), Latent syphilis, unspecified as early or late (04/13/2018), Local infection of the skin and subcutaneous tissue, unspecified (12/05/2017), Long term (current) use of opiate analgesic (04/24/2017), Mastitis without abscess (04/24/2017), Mastodynia (04/24/2017), Muscle spasm of back (04/24/2017), Nocturia (04/24/2017), Other microscopic hematuria (04/24/2017), Other specified postprocedural states (04/24/2017), Other specified postprocedural states (04/24/2017), Other specified symptoms and signs involving the digestive system and abdomen (09/08/2017), Pain in left wrist (04/24/2017), Personal history of other diseases of the musculoskeletal system and connective tissue, Personal history of other diseases of the nervous system and sense organs (03/07/2016), Personal history of other  endocrine, nutritional and metabolic disease, Personal history of other infectious and parasitic diseases (09/10/2017), Personal history of other infectious and parasitic diseases (09/08/2017), Personal history of other infectious and parasitic diseases (04/13/2018), Personal history of other specified conditions (04/30/2018), Personal history of other specified conditions (01/23/2018), Personal history of other specified conditions (07/28/2017), Personal history of other specified conditions (10/08/2017), Proteinuria, unspecified (04/24/2017), Radiculopathy, lumbar region (04/24/2017), Spinal stenosis, site unspecified (08/19/2015), Unspecified abnormal finding in specimens from other organs, systems and tissues (04/24/2017), Unspecified asthma, uncomplicated, and Vertigo.  - has a past surgical history that includes Hernia repair (08/26/2015); Lumbar laminectomy (08/26/2015); Other surgical history (05/20/2021); Knee surgery (Right, 03/29/2017); Other surgical history (Bilateral, 02/24/2021); Other surgical history (09/26/2016); Back surgery (09/26/2016); MR angio head wo IV contrast (05/21/2022); MR angio neck wo IV contrast (05/21/2022); MR angio head wo IV contrast (03/12/2017); MR angio neck wo IV contrast (03/12/2017); and Shoulder surgery (Left).    Social History:  - Tobacco:  reports that he has never smoked. He has never used smokeless tobacco.   - Alcohol:  reports no history of alcohol use.   - Drugs:  reports current drug use. Drug: Oxycodone.     Medications: Reviewed in EMR.     Allergies:  Patient has no known allergies.    ???????????????????????????????????????????????????????????????  Triage Vitals:  T 36.2 °C (97.2 °F)  HR 81  /83  RR 16  O2 98 %      Physical Exam  Constitutional:       General: He is not in acute distress.  HENT:      Head: Normocephalic.   Cardiovascular:      Rate and Rhythm: Normal rate.   Pulmonary:      Effort: Pulmonary effort is normal. No respiratory  distress.      Breath sounds: Decreased breath sounds present. No wheezing.   Abdominal:      General: Abdomen is flat.   Musculoskeletal:         General: Normal range of motion.   Skin:     General: Skin is dry.   Neurological:      Mental Status: He is alert and oriented to person, place, and time. Mental status is at baseline.       ???????????????????????????????????????????????????????????????  EKG (per my interpretation): EKG was independently interpreted and shows normal sinus rhythm at a normal rate.  Nonspecific T wave changes.  Left axis deviation    ED Course/Medical Decision Making:  Patient is a 47-year-old male with a past medical history of a recent shoulder replacement, arthritis who is presenting today with shortness of breath.  Patient's symptoms are concerning for PE versus pneumonia versus ACS.  Low concern for myocarditis, pneumothorax, rib fracture.  EKG was nonischemic.  Lab work was remarkable for a anemia and hyperglycemia.  D-dimer was elevated to 1300.  CT angio was ordered and showed no obvious PE per the resident reviewed.  Patient was still short of breath.  Walking pulse ox led to him due to satting to 89% and he was symptomatic with shortness of breath and lightheadedness from this.  Given that the patient needs a cardiac workup for his dyspnea and high risk syncope workup for his episode of syncope episodes related to his shortness of breath.  Patient was admitted to medicine for further workup.    External records reviewed: recent inpatient, clinic, and prior ED notes  Diagnostic imaging independently reviewed/interpreted by me (as reflected in MDM) includes: Chest x-ray showed congestion but overall negative for a focal pneumonia.  Social Determinants Affecting Care:   Discussion of management with other providers: ED attending Prescription Drug Consideration: Considered albuterol inhalers or DuoNebs for patient's history of asthma.  However below concern for this being an asthma  exacerbation.  Escalation of Care: Admission for further workup.    Impression:   Dyspnea  Syncope    Disposition: Admitted      Procedures ? [employer] last updated 12/7/2023 12:30 AM        Ebonie Zepeda MD  Resident  12/07/23 0110    -------------------------------------------  This patient was seen by the resident physician.  I have seen and examined the patient, agree with the workup, evaluation, management and diagnosis. I reviewed and edited the above documentation where necessary.     I have looked at the EKG and agree with the interpretation.    Avelino Gaffney MD   Attending Physician        Avelino Gaffney MD MPH  12/08/23 020

## 2023-12-07 NOTE — H&P
History Of Present Illness  Tomasz Maxwell is a 47 y.o. male presenting with w/ HTN, HLD, OA of multiple joints, ES on CPAP, Asthma, depression, T2DM (A1C 6.8% on 11/27/23), obesity (BMI: 46.43), ED, lumbar post-laminectomy syndrome, on chronic opioid therapy with Hx of recent R Shoulder total arthroplasty who presented today with concerns of dyspnea on exertion which patient has been experiencing since undergoing elective shoulder surgery on 11/28/2023.  Patient endorses that after he underwent the elective surgery he was held in the PACU from 1:00 pm to around 9:00 pm at night due to him not maintaining his oxygen saturation (endorse had low numbers of 88-92%) for an extended period of time.  Then around 8:00 PM his O2 started to improve was staying around 94%, after that he was discharged home to follow up outpatient.  Endorses that since then he has noticed progressively worsening SOB.  Reports that he has been using his albuterol inhaler consistently without relief of his SOB.  Endorses continued compliance with his daily inhaler (symbicort, the “red one”) has prescribed as well.  Patient reports that he was trying to hold out until his follow up appointment with pulmonology on Friday (12/8/23), but today his SOB became some much worse that he decided to come to the ED for evaluation.      Patient also endorse that yesterday while he was sitting in a chair at home with his symptoms, he started experiencing dizziness and passed out/blacked out.      Of note, patient endorses that he has continued to take his Percocet (5-325mg) as prescribed every 6 hours as needed for pain, and has not been using more.  Reports history of smoking when he was younger but stop many years prior.    Patient and wife also endorse significant concern in regards to the pulmonary nodule that was found on imaging and are wondering what is should be done about that.      ED course:  Vitals: T 36.2C, HR 81, /83, RR 16, O2  98%  CMP: 138/3.9/99/30/13/1.12/140, ALP 64, ALT 24, AST 19  CBC: 8.4>12.0/36.9<346  Trops: 3 on repeat 3  BNP 14   INR: 1.1, PT 12.2  D-Dimer: 1370    CXR:   IMPRESSION:  Severely limited exam.      Pulmonary vascular congestion suggests volume overload, with possible mild acute pulmonary interstitial edema. No focal consolidation. Asymmetric hazy opacity about the left lateral costophrenic angle probably relates to chest wall soft tissue although airspace disease such as atelectasis or pneumonia with or without small effusion cannot be excluded. No sizable effusion on the right. No pneumothorax.    CT PE  IMPRESSION:  Examination is limited by suboptimal pulmonary artery contrast opacification and respiratory motion. With these limitations:      No pulmonary embolism to the lobar level in the lower lobes or to the proximal segmental level in the upper lobes or right middle lobe. No evidence of acute pathology in the chest.      Incidentally noted 9 mm right upper lobe pulmonary nodule, concerning for neoplasm. Further evaluation with PET-CT recommended. A yellow alert was sent..     Past Medical History  Past Medical History:   Diagnosis Date    Contusion of left hand, initial encounter 04/24/2017    Contusion of left hand, initial encounter    Crushing injury of unspecified finger(s), initial encounter 04/24/2017    Injury, crush, finger    Essential (primary) hypertension 05/16/2022    Hypertension    Hyperlipidemia, unspecified 10/03/2022    Hyperlipidemia    Hypokalemia 04/24/2017    Hypokalemia, k= 3.8 on 1/26/23    Idiopathic gout, right knee 04/24/2017    Acute idiopathic gout of right knee    Latent syphilis, unspecified as early or late 04/13/2018    Latent syphilis with positive serology    Local infection of the skin and subcutaneous tissue, unspecified 12/05/2017    Skin infection    Long term (current) use of opiate analgesic 04/24/2017    Opioid contract exists    Mastitis without abscess 04/24/2017     Mastitis, acute    Mastodynia 04/24/2017    Breast pain, right    Muscle spasm of back 04/24/2017    Paraspinal muscle spasm    Nocturia 04/24/2017    Nocturia    Other microscopic hematuria 04/24/2017    Hematuria, microscopic    Other specified postprocedural states 04/24/2017    H/O arthroscopic knee surgery    Other specified postprocedural states 04/24/2017    Status post lumbar laminectomy    Other specified symptoms and signs involving the digestive system and abdomen 09/08/2017    Umbilical discharge    Pain in left wrist 04/24/2017    Left wrist pain    Personal history of other diseases of the musculoskeletal system and connective tissue     History of arthritis    Personal history of other diseases of the nervous system and sense organs 03/07/2016    History of acute otitis media    Personal history of other endocrine, nutritional and metabolic disease     History of diabetes mellitus    Personal history of other infectious and parasitic diseases 09/10/2017    History of tinea corporis    Personal history of other infectious and parasitic diseases 09/08/2017    History of trichomoniasis    Personal history of other infectious and parasitic diseases 04/13/2018    History of trichomoniasis    Personal history of other specified conditions 04/30/2018    History of dyspnea    Personal history of other specified conditions 01/23/2018    History of epigastric pain    Personal history of other specified conditions 07/28/2017    History of urinary frequency    Personal history of other specified conditions 10/08/2017    History of chest pain    Proteinuria, unspecified 04/24/2017    Proteinuria    Radiculopathy, lumbar region 04/24/2017    Lumbar radiculopathy    Spinal stenosis, site unspecified 08/19/2015    Spinal stenosis, multiple sites in spine    Unspecified abnormal finding in specimens from other organs, systems and tissues 04/24/2017    Abnormal laboratory test result    Unspecified asthma,  uncomplicated     Active asthma    Vertigo     ECHO 5/21/22       Surgical History  Past Surgical History:   Procedure Laterality Date    BACK SURGERY  09/26/2016    Back Surgeryx5, has spinal stimulator    HERNIA REPAIR  08/26/2015    Hernia Repairx2    KNEE SURGERY Right 03/29/2017    Knee Surgery, right TKR    LUMBAR LAMINECTOMY  08/26/2015    Laminectomy Lumbar    MR HEAD ANGIO WO IV CONTRAST  05/21/2022    MR HEAD ANGIO WO IV CONTRAST 5/21/2022 [facility]    MR HEAD ANGIO WO IV CONTRAST  03/12/2017    MR HEAD ANGIO WO IV CONTRAST 3/12/2017 [facility]    MR NECK ANGIO WO IV CONTRAST  05/21/2022    MR NECK ANGIO WO IV CONTRAST 5/21/2022 [facility]    MR NECK ANGIO WO IV CONTRAST  03/12/2017    MR NECK ANGIO WO IV CONTRAST 3/12/2017 [facility]    OTHER SURGICAL HISTORY  05/20/2021    Abdominal surgery    OTHER SURGICAL HISTORY Bilateral 02/24/2021    Hip replacement    OTHER SURGICAL HISTORY  09/26/2016    Gastrectomy    SHOULDER SURGERY Left     left TSR        Social History  He reports that he has never smoked. He has never used smokeless tobacco. He reports current drug use. Drug: Oxycodone. He reports that he does not drink alcohol.    Family History  Family History   Problem Relation Name Age of Onset    Hypertension Mother      Diabetes Mother      Asthma Mother      Deep vein thrombosis Mother      No Known Problems Father      No Known Problems Sister      Asthma Child          Allergies  Patient has no known allergies.    Review of Systems   Constitutional: Negative.    HENT: Negative.     Eyes: Negative.    Respiratory:  Positive for shortness of breath. Negative for apnea, cough, choking, chest tightness, wheezing and stridor.    Cardiovascular:  Negative for chest pain, palpitations and leg swelling.   Gastrointestinal: Negative.    Endocrine: Negative.    Genitourinary: Negative.    Musculoskeletal: Negative.    Skin: Negative.    Allergic/Immunologic:  Negative.    Neurological:  Positive for dizziness. Negative for facial asymmetry, light-headedness and headaches.   Hematological: Negative.    Psychiatric/Behavioral: Negative.          Physical Exam  Vitals reviewed.   Constitutional:       General: He is not in acute distress.     Appearance: Normal appearance. He is obese. He is not ill-appearing, toxic-appearing or diaphoretic.   HENT:      Head: Normocephalic and atraumatic.      Right Ear: External ear normal.      Left Ear: External ear normal.      Nose: Nose normal.      Mouth/Throat:      Mouth: Mucous membranes are moist.      Pharynx: Oropharynx is clear.   Eyes:      Conjunctiva/sclera: Conjunctivae normal.   Cardiovascular:      Rate and Rhythm: Normal rate and regular rhythm.      Pulses: Normal pulses.      Heart sounds: Normal heart sounds. No murmur heard.     No friction rub. No gallop.      Comments: Auscultation limited due to body habitus   Pulmonary:      Effort: Pulmonary effort is normal. No respiratory distress.      Comments: Diminished breath sounds in all lung fields anteriorly and posteriorly, auscultation limited due to body habitus  Chest:      Chest wall: No tenderness.   Abdominal:      General: Abdomen is flat. Bowel sounds are normal. There is no distension.      Palpations: Abdomen is soft.      Tenderness: There is no abdominal tenderness. There is no guarding.   Musculoskeletal:      Right lower leg: No edema.      Left lower leg: No edema.      Comments: R shoulder in sling   Skin:     General: Skin is warm and dry.      Capillary Refill: Capillary refill takes less than 2 seconds.      Comments: Multiple surgical scars on patient's back and abdomen    Neurological:      General: No focal deficit present.      Mental Status: He is alert and oriented to person, place, and time.      Cranial Nerves: No cranial nerve deficit.   Psychiatric:         Mood and Affect: Mood normal.         Behavior: Behavior normal.         Thought  Content: Thought content normal.         Judgment: Judgment normal.       Last Recorded Vitals  Blood pressure 121/81, pulse 65, temperature 36.7 °C (98.1 °F), temperature source Oral, resp. rate 20, SpO2 96 %.    Relevant Results  Scheduled medications  acetaminophen, 975 mg, oral, q8h  amLODIPine, 10 mg, oral, Daily  atorvastatin, 80 mg, oral, Nightly  cholecalciferol, 2,000 Units, oral, Daily  docusate sodium, 100 mg, oral, BID  enoxaparin, 40 mg, subcutaneous, BID  fluticasone furoate-vilanteroL, 1 puff, inhalation, Daily  lidocaine, 3 patch, transdermal, Daily  loratadine, 10 mg, oral, Daily  montelukast, 10 mg, oral, Nightly  tamsulosin, 0.8 mg, oral, Nightly      Continuous medications     PRN medications  PRN medications: albuterol, naloxone, oxyCODONE, sennosides-docusate sodium    Results for orders placed or performed during the hospital encounter of 12/06/23 (from the past 24 hour(s))   CBC with Differential   Result Value Ref Range    WBC 8.4 4.4 - 11.3 x10*3/uL    nRBC 0.0 0.0 - 0.0 /100 WBCs    RBC 4.26 (L) 4.50 - 5.90 x10*6/uL    Hemoglobin 12.0 (L) 13.5 - 17.5 g/dL    Hematocrit 36.9 (L) 41.0 - 52.0 %    MCV 87 80 - 100 fL    MCH 28.2 26.0 - 34.0 pg    MCHC 32.5 32.0 - 36.0 g/dL    RDW 14.5 11.5 - 14.5 %    Platelets 346 150 - 450 x10*3/uL    Neutrophils % 48.8 40.0 - 80.0 %    Immature Granulocytes %, Automated 0.4 0.0 - 0.9 %    Lymphocytes % 38.1 13.0 - 44.0 %    Monocytes % 9.5 2.0 - 10.0 %    Eosinophils % 2.5 0.0 - 6.0 %    Basophils % 0.7 0.0 - 2.0 %    Neutrophils Absolute 4.11 1.20 - 7.70 x10*3/uL    Immature Granulocytes Absolute, Automated 0.03 0.00 - 0.70 x10*3/uL    Lymphocytes Absolute 3.21 1.20 - 4.80 x10*3/uL    Monocytes Absolute 0.80 0.10 - 1.00 x10*3/uL    Eosinophils Absolute 0.21 0.00 - 0.70 x10*3/uL    Basophils Absolute 0.06 0.00 - 0.10 x10*3/uL   Comprehensive Metabolic Panel   Result Value Ref Range    Glucose 140 (H) 74 - 99 mg/dL    Sodium 138 136 - 145 mmol/L    Potassium  3.9 3.5 - 5.3 mmol/L    Chloride 99 98 - 107 mmol/L    Bicarbonate 30 21 - 32 mmol/L    Anion Gap 13 10 - 20 mmol/L    Urea Nitrogen 13 6 - 23 mg/dL    Creatinine 1.12 0.50 - 1.30 mg/dL    eGFR 81 >60 mL/min/1.73m*2    Calcium 9.1 8.6 - 10.6 mg/dL    Albumin 4.0 3.4 - 5.0 g/dL    Alkaline Phosphatase 64 33 - 120 U/L    Total Protein 6.9 6.4 - 8.2 g/dL    AST 19 9 - 39 U/L    Bilirubin, Total 0.6 0.0 - 1.2 mg/dL    ALT 24 10 - 52 U/L   Brain Natriuretic Peptide   Result Value Ref Range    BNP 14 0 - 99 pg/mL   Troponin I, High Sensitivity   Result Value Ref Range    Troponin I, High Sensitivity 3 0 - 53 ng/L   Protime-INR   Result Value Ref Range    Protime 12.2 9.8 - 12.8 seconds    INR 1.1 0.9 - 1.1   D-dimer, VTE Exclusion   Result Value Ref Range    D-Dimer, Quantitative VTE Exclusion 1,370 (H) <=500 ng/mL FEU   Troponin I, High Sensitivity   Result Value Ref Range    Troponin I, High Sensitivity 3 0 - 53 ng/L   ECG 12 lead   Result Value Ref Range    Ventricular Rate 76 BPM    Atrial Rate 76 BPM    MS Interval 162 ms    QRS Duration 84 ms    QT Interval 374 ms    QTC Calculation(Bazett) 420 ms    P Axis 63 degrees    R Axis 70 degrees    T Axis 50 degrees    QRS Count 12 beats    Q Onset 217 ms    P Onset 136 ms    P Offset 193 ms    T Offset 404 ms    QTC Fredericia 404 ms     ECG 12 lead    Result Date: 12/7/2023  Normal sinus rhythm Nonspecific ST and T wave abnormality Abnormal ECG When compared with ECG of 27-NOV-2023 08:56, No significant change was found See ED provider note for full interpretation and clinical correlation Confirmed by Nadia Davila ([id number]) on 12/7/2023 2:30:45 AM    CT angio chest for pulmonary embolism    Result Date: 12/7/2023  Interpreted By:  Alvaro Rivas and Benza Andrew STUDY: CT ANGIO CHEST FOR PULMONARY EMBOLISM;  12/6/2023 11:26 pm   INDICATION: Signs/Symptoms:sob, recent surgery. positive D-dimer.   COMPARISON: None.   ACCESSION NUMBER(S): [id number]   ORDERING CLINICIAN:  GEORGINA BURT   TECHNIQUE: Contiguous axial images of the chest were obtained after the intravenous administration of 100 mL Omnipaque 350 contrast using angiographic PE protocol. Coronal and sagittal reformatted images were reconstructed from the axial data. MIP images were created on an independent workstation and reviewed.   FINDINGS: Examination is limited by suboptimal pulmonary artery contrast opacification and respiratory motion.   MEDIASTINUM AND LYMPH NODES: No enlarged intrathoracic or axillary lymph nodes. No pneumomediastinum.   VESSELS: Normal caliber aorta without dissection. Mild aortic atherosclerosis. No pulmonary embolism to the lobar level in the lower lobes or to the proximal segmental level in the upper lobes or right middle lobe.   HEART: Normal in size. No coronary artery calcifications. No significant pericardial effusion.   LUNG, AIRWAYS, AND PLEURA: No consolidation, pulmonary edema, pleural effusion or pneumothorax. There is a 9-mm right upper lobe pulmonary nodule (series 502, image 93).   OSSEOUS STRUCTURES: No acute osseous abnormality.   CHEST WALL SOFT TISSUES: No discernible abnormality.   UPPER ABDOMEN/OTHER: No acute abnormality. Multilevel degenerative changes of the thoracic spine. Right glenohumeral hemiarthroplasty changes.   Thoracic spinal stimulator leads noted.       Examination is limited by suboptimal pulmonary artery contrast opacification and respiratory motion. With these limitations:   No pulmonary embolism to the lobar level in the lower lobes or to the proximal segmental level in the upper lobes or right middle lobe. No evidence of acute pathology in the chest.   Incidentally noted 9 mm right upper lobe pulmonary nodule, concerning for neoplasm. Further evaluation with PET-CT recommended. A yellow alert was sent.   Additional findings as discussed above.   I personally reviewed the images/study and I agree with the findings as stated above by resident physician,  Adrian Alejandre MD. This study was interpreted at [facility], [place], [place].   MACRO: Critical Finding:  See findings. Notification was initiated on 12/7/2023 at 1:51 am by  Alvaro Rivas.  (**-YCF-**) Instructions:   Signed by: Alvaro Rivas 12/7/2023 1:52 AM Dictation workstation:   [id number]    XR chest 2 views    Result Date: 12/6/2023  Interpreted By:  Alvaro Rivas and Liller Gregory STUDY: XR CHEST 2 VIEWS;  12/6/2023 9:20 pm   INDICATION: Signs/Symptoms:sob.   COMPARISON: 04/01/2022   ACCESSION NUMBER(S): [id number]   ORDERING CLINICIAN: GEORGINA BURT   FINDINGS: PA and lateral radiographs of the chest were provided.   Severely limited by soft tissue attenuation factors.   Spinal cord stimulator leads overlie the T9 vertebral body.   CARDIOMEDIASTINAL SILHOUETTE: Cardiomediastinal silhouette is normal in size and configuration.   LUNGS: Pulmonary vascular congestion suggests volume overload, with possible mild acute pulmonary interstitial edema. No focal consolidation. Asymmetric hazy opacity about the left lateral costophrenic angle probably relates to chest wall soft tissue although airspace disease such as atelectasis or pneumonia with or without small effusion cannot be excluded. No sizable effusion on the right. No pneumothorax.   ABDOMEN: No remarkable upper abdominal findings.   BONES: No osseous injury.       Severely limited exam.   Pulmonary vascular congestion suggests volume overload, with possible mild acute pulmonary interstitial edema. No focal consolidation. Asymmetric hazy opacity about the left lateral costophrenic angle probably relates to chest wall soft tissue although airspace disease such as atelectasis or pneumonia with or without small effusion cannot be excluded. No sizable effusion on the right. No pneumothorax.   I personally reviewed the images/study and I agree with the findings as stated above by resident physician, Dr. Eduin SOTO  Rosario. The study was interpreted at [facility] in [facility].   MACRO: none.   Signed by: Alvaro Rivas 12/6/2023 10:59 PM Dictation workstation:   [id number]        Assessment/Plan   Principal Problem:    Shortness of breath  Active Problems:    Pulmonary vascular congestion  Tomasz Maxwell is 46 y/o M w/ HTN, HLD, OA of multiple joints, ES on CPAP, Asthma, depression, T2DM (A1C 6.8% on 11/27/23), obesity (BMI: 46.43), ED, lumbar post-laminectomy syndrome, on chronic opioid therapy with Hx of recent R Shoulder total arthroplasty who presented to [facility] ED for progressive worsening of shortness of breath.  ON arrival to the ED the patient was not to be HDS and in NAD, but symptoms were concerning ACS vs pneumonia vs PE vs ADHF.  CXR demonstrated pulmonary vascular congestions with concerns of volume overload.  Troponin's were negative x2 (3, and 3).  CMP was unremarkable except for hyperglycemia. BNP resulted at 14.  D-dimer was significant elevated 1370.  CT PE was negative for PE, but demonstrated a pulmonary lung nodule (9 mm) in RUL.  A walking pulse ox was performed in the ED and the patient was noted to have an oxygen desaturation 89% with patient being symptomatic.  Therefore the patient was admitted to medicine for work of dyspnea with exertion and possible syncopal episode at home.  ON my exam the patient was noted to be HDS and NAD.  PE exam was significant for diminished breath sounds in all lung fields on auscultation of both anterior and posterior chest.  Though patient's BNP was 14, this may be falsely low due to the patient noted obesity.  Along with the pulmonary vascular congestion on CXR and symptomatic SOB, have not completely ruled out ADHF.  Therefore, plan on giving the patient 20mg of IV lasix for the vascular pulmonary congestion, and ordering TTE to evaluate the patient's cardiac function.  Patient is HDS and appropriate for care on RNF.  Plan  below:    #SOB  #Pulmonary vascular congestion  - differentials of ADHF vs worsening asthma with possible development of COPD  - BNP 14, may be low due to patient body habitus   - O2 desaturation to 89% on walking pulse ox in ED on RA  - will give 20mg IV lasix since patient is lasix naive, and monitor for symptomatic improvement   - ordered TTE to evaluate cardiac function   - consider repeat CXR, to evaluate for pulmonary vascular congestion s/p diuretics treatment    - monitor with continuous pulse ox  - repeat walking pulse ox    - albuterol Q4H PRN   - Symbicort substitute with Breo-Ellipta   - c/w montelukast 10mg and loratadine 10mg every day    #Chronic pain on long term opioids   #Recent R Shoulder surgery  - tylenol 975mg Q8H   - 5mg Oxycodone Q6H PRN severe pain (7-10)  - lidocaine patches  - Narcan PRN   - Colace 100mg BID  - Miralax daily     #Pulmonary nodule   - incidental finding on CT PE  - Radiology recommend further evaluation with PET-CT  - consider outpatient follow up    F: Replete PRN  E: Replete PRN  N: diabetic diet   A; PIV  GI: none  DVT: Lovenox BID   Full code confirmed on admission         Patient was staffed with Dr. Matthew Gudino MD MS  PGY-2 Family Medicine         Mason Gudino MD

## 2023-12-08 ENCOUNTER — OFFICE VISIT (OUTPATIENT)
Dept: PULMONOLOGY | Facility: HOSPITAL | Age: 48
End: 2023-12-08
Payer: MEDICARE

## 2023-12-08 VITALS
DIASTOLIC BLOOD PRESSURE: 63 MMHG | WEIGHT: 291 LBS | HEART RATE: 68 BPM | OXYGEN SATURATION: 96 % | BODY MASS INDEX: 48.42 KG/M2 | TEMPERATURE: 97.2 F | SYSTOLIC BLOOD PRESSURE: 110 MMHG

## 2023-12-08 DIAGNOSIS — R06.09 DOE (DYSPNEA ON EXERTION): ICD-10-CM

## 2023-12-08 DIAGNOSIS — J45.909 ASTHMA, UNSPECIFIED ASTHMA SEVERITY, UNSPECIFIED WHETHER COMPLICATED, UNSPECIFIED WHETHER PERSISTENT (HHS-HCC): Primary | ICD-10-CM

## 2023-12-08 DIAGNOSIS — R91.1 LUNG NODULE: ICD-10-CM

## 2023-12-08 DIAGNOSIS — R49.0 HOARSENESS: ICD-10-CM

## 2023-12-08 PROCEDURE — 3044F HG A1C LEVEL LT 7.0%: CPT | Performed by: NURSE PRACTITIONER

## 2023-12-08 PROCEDURE — 1036F TOBACCO NON-USER: CPT | Performed by: NURSE PRACTITIONER

## 2023-12-08 PROCEDURE — 99215 OFFICE O/P EST HI 40 MIN: CPT | Performed by: NURSE PRACTITIONER

## 2023-12-08 PROCEDURE — 3074F SYST BP LT 130 MM HG: CPT | Performed by: NURSE PRACTITIONER

## 2023-12-08 PROCEDURE — 3078F DIAST BP <80 MM HG: CPT | Performed by: NURSE PRACTITIONER

## 2023-12-08 ASSESSMENT — ENCOUNTER SYMPTOMS
NUMBNESS: 0
FATIGUE: 0
JOINT SWELLING: 0
FEVER: 0
VOICE CHANGE: 0
VOMITING: 0
WEAKNESS: 0
HEADACHES: 0
SINUS PRESSURE: 0
ABDOMINAL PAIN: 0
RHINORRHEA: 0
DIZZINESS: 0
EYE PAIN: 0
DIARRHEA: 0
NERVOUS/ANXIOUS: 0
MYALGIAS: 1
BACK PAIN: 1
AGITATION: 0
NAUSEA: 0
ARTHRALGIAS: 0
PALPITATIONS: 0

## 2023-12-08 ASSESSMENT — PATIENT HEALTH QUESTIONNAIRE - PHQ9
1. LITTLE INTEREST OR PLEASURE IN DOING THINGS: NOT AT ALL
SUM OF ALL RESPONSES TO PHQ9 QUESTIONS 1 & 2: 0
2. FEELING DOWN, DEPRESSED OR HOPELESS: NOT AT ALL

## 2023-12-08 ASSESSMENT — PAIN SCALES - GENERAL: PAINLEVEL: 7

## 2023-12-08 ASSESSMENT — LIFESTYLE VARIABLES: HOW OFTEN DO YOU HAVE A DRINK CONTAINING ALCOHOL: NEVER

## 2023-12-08 NOTE — PATIENT INSTRUCTIONS
1. Asthma: diagnosed in childhood - intubated twice as a teenager when he was smoking. PFTs without obstruction or restriction --> never diagnosed with COPD. Back surgery - stimulater adjusted. Recent worsening SKY since his shoulder surgery   - continue symbicort 160 - 2 puffs twice daily - rinse mouth out afterwards -- if this is not covered call and let me know   - continue albuterol 2 puffs or albuterol nebulizer every 4-6 hours as needed for shortness of breath   - flu shot this fall   - referral to ENT to evaluate for vocal cord injury   - will get up dated PFTs - Full pre/post      2. Allergic rhinitis: unclear if this possible triggered recent exacerbation - no nasal congestion/runny nose, but does have post nasal drip   - continue montelukast (singulair) 10mg daily at bedtime   - continue loratadine (claritin) 10mg daily      3. Lung nodule: 9mm - seen on imaging   - plan for CT chest in 3 months     4.  ES: Following with Zeinab LOPEZ - wearing BiPAP nightly   - continue to follow up with sleep      5. Weight loss: interested in bariatric surgery - does not want to wait for so long. Comprehensive weight loss referral and nutrition referral last visit.      Thank you for visiting the Pulmonary clinic today!   Return to clinic 1 month with PFTs or sooner if needed   Vianney Gilliland CNP  My office number is [phone]  Sofia is my  and Carline is my nurse.   Radiology scheduling [phone]   Appointment scheduling [phone]

## 2023-12-08 NOTE — SIGNIFICANT EVENT
Notified by nursing staff in the ED that the patient had eloped from the ED.  Went down to see if patient was still in the ED or the general area, but was informed by bedside nurses that the patient had already left without waiting to talk about the risk of leaving the hospital.    LILA Gudino MD MS  PGY-2 Family Medicine

## 2023-12-08 NOTE — PROGRESS NOTES
Patient: Tomasz Maxwell    [id number]  : 1975 -- AGE 47 y.o.    Provider: SYLVAIN Velásquez-CNP     Location [facility]   Service Date: 2023              [facility]  Follow up visit note      HISTORY OF PRESENT ILLNESS       HISTORY OF PRESENT ILLNESS     Mr. Maxwell is a 47 year old AAM (former smoker) here for follow up for asthma. Last seen in clinic on 10/14/22.     He had his shoulder replaced recently - he said before surgery his pulse ox was 97%. He states they had trouble getting his oxygen up after surgery. He said he got up to 94% and they sent him home later that night. He has had worsening SKY. He states his inhalers are not working. He had significant worsening of his dyspnea a few nights ago -- he may have passed out. He states he was woken up by his family and went to the ED. He was on observation and held pulse ox was 86%. He had a CTPE which showed no PE and echo with normal EF. He has been using his symbicort twice daily, albuterol HFA 2-3 x per day, and albuterol nebulizers 3x a day. He states his inhalers are not helping. He denies any cough, but he does have some wheezing. He has SKY with walking a few minutes on level ground, but denies any SOB at rest. He has had a hoarse voice - `new since he had his surgery. He denies feeling sick or bad post nasal drip contributing. He has been taking his montelukast and loratadine daily. He denies any GERD or chest pain.  He has been wearing his BIPAP nightly. He does not have a pulse ox at home.     ACT today 16     10/14/22: Since last visit he has been doing well. He has been using his symbicort twice daily and albuterol twice a week. He has not needed his albuterol nebulizers recently. He had knee surgery and it ended up infected and he needed to be admitted and had an I&D. He states since then he has been doing well. He denies cough or SOB at rest. He denies chest  pain, but does have episodes of chest tightness that are alleviated with using his rescue/nebulizer. He will notice wheezing with exertion. He has SKY with walking long distances and going up stairs. He states he does not have any allergy symptoms - currently taking montelukast daily, but is not sure if he has loratadine. He denies any GERD symptoms. He has been wearing his CPAP nightly.   ACT 20      3/17/22: Since last visit he has been doing ok. He had his knee replaced, but has issues with pain. He has had pain, swelling, and warmth. He had sharp pains last night -- waking him up. He has an appt with ortho later today. He was in the ED recently -- US and xray normal. He uses his symbicort twice daily and albuterol nebulizers 3x a day. He missed his nebulizer yesterday and noticed he didn't feel as well. He uses his rescue more frequently related to knee pain/ issues. He has been using it 4x a day. He denies any cough. He denies wheezing, but his friend told him he heard him wheezing. He feels he has SKY, but his knee is more bothersome than his breathing currently. He has had pain and feels this makes him SOB. He denies any SOB at rest, CP, or GERD. For his allergies he takes montelukast and claritin with good control. He has been wearing his CPAP at night.   ACT today 15     10/11/21: Since last visit he unfortunately had a significant exacerbation recently. He was doing nebulizers 3-4x per day and rescue 6x a times a day. He had significant nighttime symptoms. He was prescribed a prednisone taper with significant improvement. He had significant SKY and had to go [facility] up the stairs and take a break. He was having difficulty wearing his CPAP -- he states he was waking up gasping for air and he had to use his rescue several times a night.   -He now is using his rescue 2x a day, nebulizers a few times a week, and symbicort twice daily. He still has a dry cough, but it is much improved. He was having  chest/headache he was coughing so much. His cough was productive with thick yellow sputum. He had significant wheezing, but his has also improved. His SKY is much improved. He is able to go up the stairs, but he has to sit once he is up and use his rescue. He has had SOB at rest - a few times with wheezing. He has some post nasal drip. He denies any GERD symptoms or CP. He had chest tightness with recent exacerbation, but this resolved with prednisone.   ACT today 15      5/7/21: Since last visit he has been using his symbicort twice daily, nebulizers twice a week, and ventolin when he is out and about. He recently had abdominal surgery on 4/30 and has residual soreness. He states when he coughs it is especially painful and he carries a blanket with him at all times as a brace. He has a dry cough. He has wheezing occasionally, but much less than previous. He has SKY that limits his mobility as well as pain. He is not able to walk related to back pain, knee pain, and abdominal pain. His next ketamine therapy next week. He states he had it at another hospital and it was not done well. He has his second round coming up here at . He states he never woke up before and the outside hospital he did. He is unable to do water PT related to his stimulator. Discussed increasing his activity however he is able to. He denies any SOB at rest, allergies, or GERD. He has had one episode of CP since we last saw each other, but it was brief and resolved quickly. CP had been intermittent, but more frequent prior to last visit.   ACT 15      3/12/21: Since last visit he has had multiple surgeries including both hips replaced. He had somewhat been lost to follow up and is no longer on a maintenance inhaler. He is currently just using his PRN ventolin 2-3 x per day on average. He has noticed worsening SKY over the last month. He is only able to walk 100m and has to stop and take a break. He has significant back pain and he has a  stimulator in place. Exertion is limited both on pain as well as breathing. He denies any cough, but states his girlfriend has told him he wheezes and makes him use his ventolin at night. He denies any SOB at rest. He denies any allergy symptoms, but has been using his montelukast daily at bedtime. He denies any acid reflux, but has had episodes of chest pain over the last few weeks. He states both episodes he had - one a few weeks ago and one yesterday occurred at rest. He states pain was on the right side of his chest and described it as a soreness. The episodes yesterday lasted about 10 minutes and the episode a few weeks ago lasted almost an hour. He did not notice any aggravating/alleviated factors and pain went away on its own. He states a few years ago he had chest pain that was more sharp/stabbing for which he went to the ED for evaluation. He states they did testing and sent him home at that time. He previously had a nebulizer machine, but it broke.   ACT today 16      3/14/18: 41 yo with asthma here after a 2 year hiatus.  Is on biPAP now, but doesn't think it's working. Has noted a lot of gasping and is tired during the day. He is more dyspneic before and has episodes of sob that come and go, not directly related to exertion.  His PCP has been refilling his inhalers  uses his rescue inhaler a lot and runs out before he's due for a refill.  Has seen ENT but they concentrated on his hearing rather than his sinuses.  Hasn't made a follow up appointment. Hearing normal currently.  pmhx:  Asthma with COPD per PFTs (needs repeat)  Dyspnea on exertion, MMRC 2  sleep disturbance: ES/CSA on bipap but it doesn't appear to be working  daytime sleepiness: yes (see above)  sinus congestion, denies allergies  inhalers: symbicort, ventolin, monteleukast  spirometry: severe, but suspect other issues     Previous pulmonary history: Diagnosed with asthma as a child. He was smoking as a teenager and had a significant  exacerbation in which he was hospitalized and intubated twice. He quit smoking after this hospitalization and has not been hospitalized since.        ALLERGIES AND MEDICATIONS     ALLERGIES  Not on File    MEDICATIONS  Current Outpatient Medications   Medication Sig Dispense Refill    albuterol 2.5 mg /3 mL (0.083 %) nebulizer solution USE 1 VIAL IN NEBULIZER EVERY 4 HOURS - and as needed 60 mL 11    amLODIPine (Norvasc) 10 mg tablet Take 1 tablet (10 mg) by mouth once daily. 90 tablet 3    atorvastatin (Lipitor) 80 mg tablet TAKE 1 TABLET AT BEDTIME. 90 tablet 3    cholecalciferol (Vitamin D-3) 50 MCG (2000 UT) tablet Take 1 tablet (50 mcg) by mouth once daily. 90 tablet 3    docusate sodium (Colace) 100 mg capsule Take 1 capsule (100 mg) by mouth 2 times a day. 14 capsule 0    loratadine (Claritin) 10 mg tablet Take 1 tablet (10 mg) by mouth once daily.      metFORMIN (Glucophage) 850 mg tablet Take 1 tablet (850 mg) by mouth once daily with a meal.      montelukast (Singulair) 10 mg tablet Take 1 tablet (10 mg) by mouth once daily at bedtime. 90 tablet 3    naloxone (Narcan) 4 mg/0.1 mL nasal spray Administer 1 spray (4 mg) into affected nostril(s) if needed for opioid reversal. May repeat every 2-3 minutes if needed, alternating nostrils, until medical assistance becomes available. 2 each 11    oxyCODONE-acetaminophen (Percocet) 5-325 mg tablet Take 1 tablet by mouth every 6 hours if needed for severe pain (7 - 10). Do not start before November 30, 2023. 90 tablet 0    sennosides-docusate sodium (Maribell-Colace) 8.6-50 mg tablet Take 1 tablet by mouth once daily. (Patient taking differently: Take 1 tablet by mouth if needed.) 30 tablet 11    sildenafil (Viagra) 100 mg tablet TAKE 1 TABLET DAILY 1 HOUR BEFORE NEEDED 10 tablet 3    Symbicort 160-4.5 mcg/actuation inhaler Inhale 2 puffs 2 times a day. Rinse mouth after use.      tamsulosin (Flomax) 0.4 mg 24 hr capsule Take 2 capsules (0.8 mg) by mouth once daily at  bedtime. 180 capsule 3    Ventolin HFA 90 mcg/actuation inhaler INHALE 1 OR 2 PUFFS EVERY 4 TO 6 HOURS AS NEEDED 18 g 11    oxyCODONE-acetaminophen (Percocet) 7.5-325 mg tablet Take 1 tablet by mouth every 6 hours if needed for severe pain (7 - 10) for up to 6 days. 24 tablet 0     No current facility-administered medications for this visit.         PAST HISTORY     PAST MEDICAL HISTORY  PMH:   - HTN   - HLD   - Obesity -- recently wt went from 400 -> 259lbs (since 2019)  - bilateral hip replacement   - back surgery   - chronic back pain   - ES on BiPAP   - hernias - s/p repair -- 4/30/21 abdominal wall mass removed         PAST SURGICAL HISTORY  Past Surgical History:   Procedure Laterality Date    BACK SURGERY  09/26/2016    Back Surgeryx5, has spinal stimulator    HERNIA REPAIR  08/26/2015    Hernia Repairx2    KNEE SURGERY Right 03/29/2017    Knee Surgery, right TKR    LUMBAR LAMINECTOMY  08/26/2015    Laminectomy Lumbar    MR HEAD ANGIO WO IV CONTRAST  05/21/2022    MR HEAD ANGIO WO IV CONTRAST 5/21/2022 [facility] [place] [facility]    MR HEAD ANGIO WO IV CONTRAST  03/12/2017    MR HEAD ANGIO WO IV CONTRAST 3/12/2017 [facility] [place] [facility]    MR NECK ANGIO WO IV CONTRAST  05/21/2022    MR NECK ANGIO WO IV CONTRAST 5/21/2022 [facility] [place] [facility]    MR NECK ANGIO WO IV CONTRAST  03/12/2017    MR NECK ANGIO WO IV CONTRAST 3/12/2017 [facility] [place] [facility]    OTHER SURGICAL HISTORY  05/20/2021    Abdominal surgery    OTHER SURGICAL HISTORY Bilateral 02/24/2021    Hip replacement    OTHER SURGICAL HISTORY  09/26/2016    Gastrectomy    SHOULDER SURGERY Left     left TSR       IMMUNIZATION HISTORY  Immunization History   Administered Date(s) Administered    Flu vaccine (IIV4), preservative free *Check age/dose* 10/17/2023    Influenza, seasonal, injectable 10/03/2015, 01/04/2019, 09/27/2019, 09/25/2020, 11/04/2020, 10/14/2022    Moderna SARS-CoV-2 Vaccination 05/14/2021, 06/09/2021, 01/26/2022    Pneumococcal conjugate vaccine,  13-valent (PREVNAR 13) 12/07/2022    Pneumococcal polysaccharide vaccine, 23-valent, age 2 years and older (PNEUMOVAX 23) 07/25/2015, 10/06/2015, 03/30/2022       SOCIAL HISTORY  smoking: 13- 15   drinking: none  illicit drug use: none    OCCUPATIONAL/ENVIRONMENTAL HISTORY  Occupation: He is now on disability, but previously worked as a cook. No known toxic exposures.     FAMILY HISTORY  FAMILY HISTORY: No family Hx of lung disease or lung cancer.    RESULTS/DATA     Pulmonary Function Test Results   PFTs:  - 3/7/16: FEV1/FVC 0.70/ FEV1 1.21 (42%)/ FVC 1.73 (49%)   - 12/4/19: FEV1/FVC 0.73/ FEV1 1.75 (62%- almost BD)/ FVC 2.21 (64%)/ TLC 3.42 (66%)/ DLCO 50%   - 5/7/21: FEV1/FVC 0.75/ FEV1 1.94 (69%)/ FVC 2.50 (72%)/ TLC 4.23 (81%)/ DLCO 79%      6 MWTs: 5/7/21 - 119m () 98 -> 96% on RA (FALGUNI 4)         Chest Radiograph     XR chest 2 views 12/06/2023  Impression - Severely limited exam.  Pulmonary vascular congestion suggests volume overload, with possible  mild acute pulmonary interstitial edema. No focal consolidation.  Asymmetric hazy opacity about the left lateral costophrenic angle  probably relates to chest wall soft tissue although airspace disease  such as atelectasis or pneumonia with or without small effusion  cannot be excluded. No sizable effusion on the right. No pneumothorax.        Chest CT Scan     CT angio chest for pulmonary embolism 12/06/2023  Impression  Examination is limited by suboptimal pulmonary artery contrast  opacification and respiratory motion. With these limitations:  No pulmonary embolism to the lobar level in the lower lobes or to the  proximal segmental level in the upper lobes or right middle lobe. No  evidence of acute pathology in the chest.  Incidentally noted 9 mm right upper lobe pulmonary nodule, concerning  for neoplasm. Further evaluation with PET-CT recommended. A yellow  alert was sent.  Additional findings as discussed above.    Echocardiogram     Echo:   -  4/1/21 - EF 60-65%, RA normal size, RV normal size and function.   -  12/7/23 Left ventricular systolic function is normal with a 60-65% estimated ejection fraction. RA normal size, RV normal size and function.     EKG   - 3/12/21 - SR with SA   - 12/7/23 - NSR- non specific T wave abnormality (when compared to 11/27/23- no significant change was found)    Labs/ Other testing        REVIEW OF SYSTEMS     REVIEW OF SYSTEMS  Review of Systems   Constitutional:  Negative for fatigue and fever.   HENT:  Negative for congestion, postnasal drip, rhinorrhea, sinus pressure and voice change.    Eyes:  Negative for pain and visual disturbance.   Cardiovascular:  Negative for chest pain, palpitations and leg swelling.   Gastrointestinal:  Negative for abdominal pain, diarrhea, nausea and vomiting.   Endocrine: Negative for cold intolerance and heat intolerance.   Musculoskeletal:  Positive for back pain and myalgias. Negative for arthralgias and joint swelling.   Skin:  Negative for rash.   Neurological:  Negative for dizziness, weakness, numbness and headaches.   Psychiatric/Behavioral:  Negative for agitation. The patient is not nervous/anxious.          PHYSICAL EXAM     VITAL SIGNS: /63   Pulse 68   Temp 36.2 °C (97.2 °F)   Wt 132 kg (291 lb)   SpO2 96% Comment: RA  BMI 48.42 kg/m²      CURRENT WEIGHT: [unfilled]  BMI: [unfilled]  PREVIOUS WEIGHTS:  Wt Readings from Last 3 Encounters:   12/08/23 132 kg (291 lb)   12/06/23 127 kg (279 lb)   11/28/23 131 kg (289 lb 3.9 oz)       Physical Exam  Vitals reviewed.   Constitutional:       General: He is not in acute distress.     Appearance: Normal appearance. He is not ill-appearing or toxic-appearing.   HENT:      Head: Normocephalic.      Nose: No rhinorrhea.   Cardiovascular:      Rate and Rhythm: Normal rate and regular rhythm.      Heart sounds: Normal heart sounds.   Pulmonary:      Effort: Pulmonary effort is normal. No respiratory distress.      Breath sounds:  Normal breath sounds. No stridor.   Abdominal:      General: Abdomen is flat.   Musculoskeletal:         General: No swelling. Normal range of motion.   Skin:     General: Skin is warm and dry.      Nails: There is no clubbing.   Neurological:      General: No focal deficit present.      Mental Status: He is alert.   Psychiatric:         Mood and Affect: Mood normal.         Behavior: Behavior normal.         Judgment: Judgment normal.       ASSESSMENT/PLAN        1. Asthma: diagnosed in childhood - intubated twice as a teenager when he was smoking. PFTs without obstruction or restriction --> never diagnosed with COPD. Back surgery - stimulater adjusted. Recent worsening SKY since his shoulder surgery - CTPE without PE, Echo with normal EF, EKG NSR.    - continue symbicort 160 - 2 puffs twice daily - rinse mouth out afterwards -- if this is not covered call and let me know   - continue albuterol 2 puffs or albuterol nebulizer every 4-6 hours as needed for shortness of breath   - flu shot this fall   - referral to ENT to evaluate for vocal cord injury   - will get up dated PFTs - Full pre/post   - will refer to Cardiology for cardiac work up      2. Allergic rhinitis: unclear if this possible triggered recent exacerbation - no nasal congestion/runny nose, but does have post nasal drip   - continue montelukast (singulair) 10mg daily at bedtime   - continue loratadine (claritin) 10mg daily      3. Lung nodule: 9mm - seen on imaging   - plan for CT chest in 3 months     4.  ES: Following with Zeinab LOPEZ - wearing BiPAP nightly   - continue to follow up with sleep      5. Weight loss: interested in bariatric surgery - does not want to wait for so long. Comprehensive weight loss referral and nutrition referral last visit.

## 2023-12-27 ENCOUNTER — TELEPHONE (OUTPATIENT)
Dept: PULMONOLOGY | Facility: CLINIC | Age: 48
End: 2023-12-27
Payer: MEDICARE

## 2023-12-27 DIAGNOSIS — M96.1 LUMBAR POST-LAMINECTOMY SYNDROME: ICD-10-CM

## 2023-12-27 RX ORDER — OXYCODONE AND ACETAMINOPHEN 5; 325 MG/1; MG/1
1 TABLET ORAL EVERY 6 HOURS PRN
Qty: 90 TABLET | Refills: 0 | Status: SHIPPED | OUTPATIENT
Start: 2023-12-27 | End: 2024-03-18 | Stop reason: ALTCHOICE

## 2023-12-27 NOTE — TELEPHONE ENCOUNTER
Received letter from [facility].    Bi-level    Air Curve 10 Bilevel Auto    Serial # [id number]    Vitera Full Face Mask

## 2024-01-02 ENCOUNTER — HOSPITAL ENCOUNTER (OUTPATIENT)
Dept: RESPIRATORY THERAPY | Facility: HOSPITAL | Age: 49
Discharge: HOME | End: 2024-01-02
Payer: MEDICARE

## 2024-01-02 DIAGNOSIS — R06.09 DOE (DYSPNEA ON EXERTION): ICD-10-CM

## 2024-01-02 DIAGNOSIS — R49.0 HOARSENESS: ICD-10-CM

## 2024-01-02 PROCEDURE — 94729 DIFFUSING CAPACITY: CPT | Performed by: INTERNAL MEDICINE

## 2024-01-02 PROCEDURE — 94060 EVALUATION OF WHEEZING: CPT | Performed by: INTERNAL MEDICINE

## 2024-01-02 PROCEDURE — 94726 PLETHYSMOGRAPHY LUNG VOLUMES: CPT | Performed by: INTERNAL MEDICINE

## 2024-01-02 PROCEDURE — 94726 PLETHYSMOGRAPHY LUNG VOLUMES: CPT

## 2024-01-03 ENCOUNTER — APPOINTMENT (OUTPATIENT)
Dept: ORTHOPEDIC SURGERY | Facility: HOSPITAL | Age: 49
End: 2024-01-03
Payer: MEDICARE

## 2024-01-04 LAB
MGC ASCENT PFT - FEV1 - POST: 1.68
MGC ASCENT PFT - FEV1 - PRE: 1.69
MGC ASCENT PFT - FEV1 - PREDICTED: 3.18
MGC ASCENT PFT - FVC - POST: 2.24
MGC ASCENT PFT - FVC - PRE: 2.29
MGC ASCENT PFT - FVC - PREDICTED: 3.92

## 2024-01-10 ENCOUNTER — OFFICE VISIT (OUTPATIENT)
Dept: ORTHOPEDIC SURGERY | Facility: HOSPITAL | Age: 49
End: 2024-01-10
Payer: MEDICARE

## 2024-01-10 ENCOUNTER — HOSPITAL ENCOUNTER (OUTPATIENT)
Dept: RADIOLOGY | Facility: HOSPITAL | Age: 49
Discharge: HOME | End: 2024-01-10
Payer: MEDICARE

## 2024-01-10 ENCOUNTER — LAB (OUTPATIENT)
Dept: LAB | Facility: LAB | Age: 49
End: 2024-01-10
Payer: MEDICARE

## 2024-01-10 DIAGNOSIS — Z47.1 AFTERCARE FOLLOWING RIGHT SHOULDER JOINT REPLACEMENT SURGERY: ICD-10-CM

## 2024-01-10 DIAGNOSIS — Z96.611 AFTERCARE FOLLOWING RIGHT SHOULDER JOINT REPLACEMENT SURGERY: ICD-10-CM

## 2024-01-10 LAB
BASOPHILS # BLD AUTO: 0.05 X10*3/UL (ref 0–0.1)
BASOPHILS NFR BLD AUTO: 0.7 %
CRP SERPL-MCNC: 0.38 MG/DL
EOSINOPHIL # BLD AUTO: 0.13 X10*3/UL (ref 0–0.7)
EOSINOPHIL NFR BLD AUTO: 1.9 %
ERYTHROCYTE [DISTWIDTH] IN BLOOD BY AUTOMATED COUNT: 15.6 % (ref 11.5–14.5)
ERYTHROCYTE [SEDIMENTATION RATE] IN BLOOD BY WESTERGREN METHOD: 17 MM/H (ref 0–15)
HCT VFR BLD AUTO: 41.7 % (ref 41–52)
HGB BLD-MCNC: 13 G/DL (ref 13.5–17.5)
IMM GRANULOCYTES # BLD AUTO: 0.01 X10*3/UL (ref 0–0.7)
IMM GRANULOCYTES NFR BLD AUTO: 0.1 % (ref 0–0.9)
LYMPHOCYTES # BLD AUTO: 2.74 X10*3/UL (ref 1.2–4.8)
LYMPHOCYTES NFR BLD AUTO: 40.8 %
MCH RBC QN AUTO: 27.6 PG (ref 26–34)
MCHC RBC AUTO-ENTMCNC: 31.2 G/DL (ref 32–36)
MCV RBC AUTO: 89 FL (ref 80–100)
MONOCYTES # BLD AUTO: 0.65 X10*3/UL (ref 0.1–1)
MONOCYTES NFR BLD AUTO: 9.7 %
NEUTROPHILS # BLD AUTO: 3.13 X10*3/UL (ref 1.2–7.7)
NEUTROPHILS NFR BLD AUTO: 46.8 %
NRBC BLD-RTO: 0 /100 WBCS (ref 0–0)
PLATELET # BLD AUTO: 325 X10*3/UL (ref 150–450)
RBC # BLD AUTO: 4.71 X10*6/UL (ref 4.5–5.9)
WBC # BLD AUTO: 6.7 X10*3/UL (ref 4.4–11.3)

## 2024-01-10 PROCEDURE — 85025 COMPLETE CBC W/AUTO DIFF WBC: CPT

## 2024-01-10 PROCEDURE — 1036F TOBACCO NON-USER: CPT | Performed by: ORTHOPAEDIC SURGERY

## 2024-01-10 PROCEDURE — 73030 X-RAY EXAM OF SHOULDER: CPT | Mod: RT

## 2024-01-10 PROCEDURE — 85652 RBC SED RATE AUTOMATED: CPT

## 2024-01-10 PROCEDURE — 36415 COLL VENOUS BLD VENIPUNCTURE: CPT

## 2024-01-10 PROCEDURE — 73030 X-RAY EXAM OF SHOULDER: CPT | Mod: RIGHT SIDE | Performed by: RADIOLOGY

## 2024-01-10 PROCEDURE — 99024 POSTOP FOLLOW-UP VISIT: CPT | Performed by: ORTHOPAEDIC SURGERY

## 2024-01-10 PROCEDURE — 86140 C-REACTIVE PROTEIN: CPT

## 2024-01-10 NOTE — PROGRESS NOTES
Patient is following up he is over 1 month status post right shoulder surgery he has been having physical therapy and it is quite painful.  He has a lot of distracting pain and can barely lift his right arm.  He is not having any constitutional symptoms.  Pain is in the deltoid anterior shoulder into the neck at times.    Right shoulder incision is healed well there is no swelling no erythema no induration no drainage.  No peripheral edema radial pulse easily palpable  strength 5/5.  Passive elevation to 90 degrees active elevation to 70 degrees with pain.    New x-rays obtained today.  Demonstrates prosthetic components in good position with no evidence of loosening.    Painful right shoulder arthroplasty    No evidence of problems with prosthesis although we did order serologies including CBC ESR and CRP today.  Patient will communicate with his primary physician for maintenance of pain medication he will follow-up in 2 weeks.    This was dictated using voice recognition software and not corrected for grammatical or spelling errors.

## 2024-01-16 ENCOUNTER — APPOINTMENT (OUTPATIENT)
Dept: ORTHOPEDIC SURGERY | Facility: CLINIC | Age: 49
End: 2024-01-16
Payer: MEDICARE

## 2024-01-22 ENCOUNTER — OFFICE VISIT (OUTPATIENT)
Dept: PULMONOLOGY | Facility: HOSPITAL | Age: 49
End: 2024-01-22
Payer: MEDICARE

## 2024-01-22 VITALS
DIASTOLIC BLOOD PRESSURE: 61 MMHG | OXYGEN SATURATION: 95 % | HEART RATE: 76 BPM | SYSTOLIC BLOOD PRESSURE: 120 MMHG | WEIGHT: 295 LBS | BODY MASS INDEX: 49.09 KG/M2 | TEMPERATURE: 97.3 F

## 2024-01-22 DIAGNOSIS — R49.0 HOARSENESS: ICD-10-CM

## 2024-01-22 DIAGNOSIS — R91.1 LUNG NODULE: ICD-10-CM

## 2024-01-22 DIAGNOSIS — J45.909 ASTHMA, UNSPECIFIED ASTHMA SEVERITY, UNSPECIFIED WHETHER COMPLICATED, UNSPECIFIED WHETHER PERSISTENT (HHS-HCC): Primary | ICD-10-CM

## 2024-01-22 DIAGNOSIS — R06.09 DOE (DYSPNEA ON EXERTION): ICD-10-CM

## 2024-01-22 PROCEDURE — 3074F SYST BP LT 130 MM HG: CPT | Performed by: NURSE PRACTITIONER

## 2024-01-22 PROCEDURE — 99214 OFFICE O/P EST MOD 30 MIN: CPT | Performed by: NURSE PRACTITIONER

## 2024-01-22 PROCEDURE — 1036F TOBACCO NON-USER: CPT | Performed by: NURSE PRACTITIONER

## 2024-01-22 PROCEDURE — 3078F DIAST BP <80 MM HG: CPT | Performed by: NURSE PRACTITIONER

## 2024-01-22 SDOH — ECONOMIC STABILITY: FOOD INSECURITY: WITHIN THE PAST 12 MONTHS, THE FOOD YOU BOUGHT JUST DIDN'T LAST AND YOU DIDN'T HAVE MONEY TO GET MORE.: NEVER TRUE

## 2024-01-22 SDOH — ECONOMIC STABILITY: FOOD INSECURITY: WITHIN THE PAST 12 MONTHS, YOU WORRIED THAT YOUR FOOD WOULD RUN OUT BEFORE YOU GOT MONEY TO BUY MORE.: NEVER TRUE

## 2024-01-22 ASSESSMENT — ENCOUNTER SYMPTOMS
PALPITATIONS: 0
VOMITING: 0
RHINORRHEA: 0
DIARRHEA: 0
NERVOUS/ANXIOUS: 0
ARTHRALGIAS: 1
WEAKNESS: 0
HEADACHES: 0
NUMBNESS: 0
ABDOMINAL PAIN: 0
MYALGIAS: 1
BACK PAIN: 1
DIZZINESS: 0
VOICE CHANGE: 0
JOINT SWELLING: 1
EYE PAIN: 0
NAUSEA: 0
FATIGUE: 0
FEVER: 0
SINUS PRESSURE: 0
AGITATION: 0

## 2024-01-22 ASSESSMENT — PATIENT HEALTH QUESTIONNAIRE - PHQ9
2. FEELING DOWN, DEPRESSED OR HOPELESS: NOT AT ALL
SUM OF ALL RESPONSES TO PHQ9 QUESTIONS 1 & 2: 0
1. LITTLE INTEREST OR PLEASURE IN DOING THINGS: NOT AT ALL

## 2024-01-22 ASSESSMENT — LIFESTYLE VARIABLES
HOW OFTEN DO YOU HAVE A DRINK CONTAINING ALCOHOL: NEVER
HOW MANY STANDARD DRINKS CONTAINING ALCOHOL DO YOU HAVE ON A TYPICAL DAY: PATIENT DOES NOT DRINK
HOW OFTEN DO YOU HAVE SIX OR MORE DRINKS ON ONE OCCASION: NEVER
AUDIT-C TOTAL SCORE: 0
SKIP TO QUESTIONS 9-10: 1

## 2024-01-22 ASSESSMENT — PAIN SCALES - GENERAL: PAINLEVEL: 7

## 2024-01-22 NOTE — PATIENT INSTRUCTIONS
1. Asthma: diagnosed in childhood - intubated twice as a teenager when he was smoking. PFTs without obstruction or restriction --> never diagnosed with COPD. Back surgery - stimulater adjusted. Recent worsening SKY since his shoulder surgery - CTPE without PE, Echo with normal EF, EKG NSR.  Repeat PFTs with reduction in all volumes -- likely restricted related to pain/weight.   - continue symbicort 160 - 2 puffs twice daily - rinse mouth out afterwards -- if this is not covered call and let me know   - continue albuterol 2 puffs or albuterol nebulizer every 4-6 hours as needed for shortness of breath   - flu shot this fall   - upcoming appt with  ENT to evaluate for vocal cord injury   - upcoming appt with Cardiology for cardiac work up      2. Allergic rhinitis: unclear if this possible triggered recent exacerbation - no nasal congestion/runny nose, but does have post nasal drip   - continue montelukast (singulair) 10mg daily at bedtime   - continue loratadine (claritin) 10mg daily      3. Lung nodule: 9mm - seen on imaging   - plan for CT chest in 3 months - scheduled for 3/8/24     4.  ES: Following with Zeinab LOPEZ - wearing BiPAP nightly   - continue to follow up with sleep      5. Weight loss: interested in bariatric surgery - does not want to wait for so long. Comprehensive weight loss referral and nutrition referral last visit.      Thank you for visiting the Pulmonary clinic today!   Return to clinic 3 months or sooner if needed   Vianney Gilliland CNP  My office -  [phone]- Sofia is my  and Carline is my nurse.   Radiology scheduling [phone]   Appointment scheduling [phone]   Pulmonary function testing - [phone]

## 2024-01-22 NOTE — PROGRESS NOTES
Patient: Tomasz Maxwell    [id number]  : 1975 -- AGE 48 y.o.    Provider: SYLVAIN Velásquez-CNP     Location [facility]   Service Date: 2024              [facility]  Follow up visit note      HISTORY OF PRESENT ILLNESS       HISTORY OF PRESENT ILLNESS     Mr. Maxwell is a 48 year old AAM (former smoker) here for follow up for asthma. Last seen in clinic on 23.     Since last visit he continues to feel his breathing is not doing well. He has been using his symbicort twice daily and albuterol  1-2 x per week. He denies any cough. He will notice wheezing intermittently. He has SKY with walking groceries in. He will notice some SOB at rest at times. He states he is not taking as deep of breaths related to pain. He states he has episodes where he feels he can't get air in suddenly - he states most of the time his rescue helps, but sometimes needs 3-4 puffs.  He has been wearing his BiPAP nightly. He feels his allergies are doing well with daily montelukast and loratadine. He has significant pain and has not been as active. He completed the PT with his shoulder - he feels it did not help. Ortho thinks his shoulder may be frozen. He also has spinal stenosis in his neck which he feels may also be contributing. He will have intermittent chest pains - sharp pain right in the middle of his chest. He states it does not last long. He had it two days in a row a few weeks ago which is abnormal. He states he is getting it less than once a month. He denies any GERD. He has been losing weight - was 282 on his scale last week. No LE edema.     ACT today 17     23: He had his shoulder replaced recently - he said before surgery his pulse ox was 97%. He states they had trouble getting his oxygen up after surgery. He said he got up to 94% and they sent him home later that night. He has had worsening SKY. He states his inhalers are not working.  He had significant worsening of his dyspnea a few nights ago -- he may have passed out. He states he was woken up by his family and went to the ED. He was on observation and held pulse ox was 86%. He had a CTPE which showed no PE and echo with normal EF. He has been using his symbicort twice daily, albuterol HFA 2-3 x per day, and albuterol nebulizers 3x a day. He states his inhalers are not helping. He denies any cough, but he does have some wheezing. He has SKY with walking a few minutes on level ground, but denies any SOB at rest. He has had a hoarse voice - `new since he had his surgery. He denies feeling sick or bad post nasal drip contributing. He has been taking his montelukast and loratadine daily. He denies any GERD or chest pain.  He has been wearing his BIPAP nightly. He does not have a pulse ox at home.   ACT today 16     10/14/22: Since last visit he has been doing well. He has been using his symbicort twice daily and albuterol twice a week. He has not needed his albuterol nebulizers recently. He had knee surgery and it ended up infected and he needed to be admitted and had an I&D. He states since then he has been doing well. He denies cough or SOB at rest. He denies chest pain, but does have episodes of chest tightness that are alleviated with using his rescue/nebulizer. He will notice wheezing with exertion. He has SKY with walking long distances and going up stairs. He states he does not have any allergy symptoms - currently taking montelukast daily, but is not sure if he has loratadine. He denies any GERD symptoms. He has been wearing his CPAP nightly.   ACT 20      3/17/22: Since last visit he has been doing ok. He had his knee replaced, but has issues with pain. He has had pain, swelling, and warmth. He had sharp pains last night -- waking him up. He has an appt with ortho later today. He was in the ED recently -- US and xray normal. He uses his symbicort twice daily and albuterol nebulizers 3x a  day. He missed his nebulizer yesterday and noticed he didn't feel as well. He uses his rescue more frequently related to knee pain/ issues. He has been using it 4x a day. He denies any cough. He denies wheezing, but his friend told him he heard him wheezing. He feels he has SKY, but his knee is more bothersome than his breathing currently. He has had pain and feels this makes him SOB. He denies any SOB at rest, CP, or GERD. For his allergies he takes montelukast and claritin with good control. He has been wearing his CPAP at night.   ACT today 15     10/11/21: Since last visit he unfortunately had a significant exacerbation recently. He was doing nebulizers 3-4x per day and rescue 6x a times a day. He had significant nighttime symptoms. He was prescribed a prednisone taper with significant improvement. He had significant SKY and had to go [facility] up the stairs and take a break. He was having difficulty wearing his CPAP -- he states he was waking up gasping for air and he had to use his rescue several times a night.   -He now is using his rescue 2x a day, nebulizers a few times a week, and symbicort twice daily. He still has a dry cough, but it is much improved. He was having chest/headache he was coughing so much. His cough was productive with thick yellow sputum. He had significant wheezing, but his has also improved. His SKY is much improved. He is able to go up the stairs, but he has to sit once he is up and use his rescue. He has had SOB at rest - a few times with wheezing. He has some post nasal drip. He denies any GERD symptoms or CP. He had chest tightness with recent exacerbation, but this resolved with prednisone.   ACT today 15      5/7/21: Since last visit he has been using his symbicort twice daily, nebulizers twice a week, and ventolin when he is out and about. He recently had abdominal surgery on 4/30 and has residual soreness. He states when he coughs it is especially painful and he carries a blanket  with him at all times as a brace. He has a dry cough. He has wheezing occasionally, but much less than previous. He has SKY that limits his mobility as well as pain. He is not able to walk related to back pain, knee pain, and abdominal pain. His next ketamine therapy next week. He states he had it at another hospital and it was not done well. He has his second round coming up here at . He states he never woke up before and the outside hospital he did. He is unable to do water PT related to his stimulator. Discussed increasing his activity however he is able to. He denies any SOB at rest, allergies, or GERD. He has had one episode of CP since we last saw each other, but it was brief and resolved quickly. CP had been intermittent, but more frequent prior to last visit.   ACT 15      3/12/21: Since last visit he has had multiple surgeries including both hips replaced. He had somewhat been lost to follow up and is no longer on a maintenance inhaler. He is currently just using his PRN ventolin 2-3 x per day on average. He has noticed worsening SKY over the last month. He is only able to walk 100m and has to stop and take a break. He has significant back pain and he has a stimulator in place. Exertion is limited both on pain as well as breathing. He denies any cough, but states his girlfriend has told him he wheezes and makes him use his ventolin at night. He denies any SOB at rest. He denies any allergy symptoms, but has been using his montelukast daily at bedtime. He denies any acid reflux, but has had episodes of chest pain over the last few weeks. He states both episodes he had - one a few weeks ago and one yesterday occurred at rest. He states pain was on the right side of his chest and described it as a soreness. The episodes yesterday lasted about 10 minutes and the episode a few weeks ago lasted almost an hour. He did not notice any aggravating/alleviated factors and pain went away on its own. He states a few  years ago he had chest pain that was more sharp/stabbing for which he went to the ED for evaluation. He states they did testing and sent him home at that time. He previously had a nebulizer machine, but it broke.   ACT today 16      3/14/18: 41 yo with asthma here after a 2 year hiatus.  Is on biPAP now, but doesn't think it's working. Has noted a lot of gasping and is tired during the day. He is more dyspneic before and has episodes of sob that come and go, not directly related to exertion.  His PCP has been refilling his inhalers  uses his rescue inhaler a lot and runs out before he's due for a refill.  Has seen ENT but they concentrated on his hearing rather than his sinuses.  Hasn't made a follow up appointment. Hearing normal currently.  pmhx:  Asthma with COPD per PFTs (needs repeat)  Dyspnea on exertion, MMRC 2  sleep disturbance: ES/CSA on bipap but it doesn't appear to be working  daytime sleepiness: yes (see above)  sinus congestion, denies allergies  inhalers: symbicort, ventolin, monteleukast  spirometry: severe, but suspect other issues     Previous pulmonary history: Diagnosed with asthma as a child. He was smoking as a teenager and had a significant exacerbation in which he was hospitalized and intubated twice. He quit smoking after this hospitalization and has not been hospitalized since.        ALLERGIES AND MEDICATIONS     ALLERGIES  Not on File    MEDICATIONS  Current Outpatient Medications   Medication Sig Dispense Refill    albuterol 2.5 mg /3 mL (0.083 %) nebulizer solution USE 1 VIAL IN NEBULIZER EVERY 4 HOURS - and as needed 60 mL 11    amLODIPine (Norvasc) 10 mg tablet Take 1 tablet (10 mg) by mouth once daily. 90 tablet 3    atorvastatin (Lipitor) 80 mg tablet TAKE 1 TABLET AT BEDTIME. 90 tablet 3    cholecalciferol (Vitamin D-3) 50 MCG (2000 UT) tablet Take 1 tablet (50 mcg) by mouth once daily. 90 tablet 3    docusate sodium (Colace) 100 mg capsule Take 1 capsule (100 mg) by mouth 2  times a day. 14 capsule 0    loratadine (Claritin) 10 mg tablet Take 1 tablet (10 mg) by mouth once daily.      metFORMIN (Glucophage) 850 mg tablet Take 1 tablet (850 mg) by mouth once daily with a meal.      montelukast (Singulair) 10 mg tablet Take 1 tablet (10 mg) by mouth once daily at bedtime. 90 tablet 3    naloxone (Narcan) 4 mg/0.1 mL nasal spray Administer 1 spray (4 mg) into affected nostril(s) if needed for opioid reversal. May repeat every 2-3 minutes if needed, alternating nostrils, until medical assistance becomes available. 2 each 11    oxyCODONE-acetaminophen (Percocet) 5-325 mg tablet Take 1 tablet by mouth every 6 hours if needed for severe pain (7 - 10). 90 tablet 0    sennosides-docusate sodium (Maribell-Colace) 8.6-50 mg tablet Take 1 tablet by mouth once daily. (Patient taking differently: Take 1 tablet by mouth if needed.) 30 tablet 11    sildenafil (Viagra) 100 mg tablet TAKE 1 TABLET DAILY 1 HOUR BEFORE NEEDED 10 tablet 3    Symbicort 160-4.5 mcg/actuation inhaler Inhale 2 puffs 2 times a day. Rinse mouth after use.      tamsulosin (Flomax) 0.4 mg 24 hr capsule Take 2 capsules (0.8 mg) by mouth once daily at bedtime. 180 capsule 3    Ventolin HFA 90 mcg/actuation inhaler INHALE 1 OR 2 PUFFS EVERY 4 TO 6 HOURS AS NEEDED 18 g 11     No current facility-administered medications for this visit.         PAST HISTORY     PAST MEDICAL HISTORY  PMH:   - HTN   - HLD   - Obesity -- recently wt went from 400 -> 259lbs (since 2019)  - bilateral hip replacement   - back surgery   - chronic back pain   - ES on BiPAP   - hernias - s/p repair -- 4/30/21 abdominal wall mass removed         PAST SURGICAL HISTORY  Past Surgical History:   Procedure Laterality Date    BACK SURGERY  09/26/2016    Back Surgeryx5, has spinal stimulator    HERNIA REPAIR  08/26/2015    Hernia Repairx2    KNEE SURGERY Right 03/29/2017    Knee Surgery, right TKR    LUMBAR LAMINECTOMY  08/26/2015    Laminectomy Lumbar    MR HEAD ANGIO WO  IV CONTRAST  05/21/2022    MR HEAD ANGIO WO IV CONTRAST 5/21/2022 [facility]    MR HEAD ANGIO WO IV CONTRAST  03/12/2017    MR HEAD ANGIO WO IV CONTRAST 3/12/2017 [facility]    MR NECK ANGIO WO IV CONTRAST  05/21/2022    MR NECK ANGIO WO IV CONTRAST 5/21/2022 [facility]    MR NECK ANGIO WO IV CONTRAST  03/12/2017    MR NECK ANGIO WO IV CONTRAST 3/12/2017 [facility]    OTHER SURGICAL HISTORY  05/20/2021    Abdominal surgery    OTHER SURGICAL HISTORY Bilateral 02/24/2021    Hip replacement    OTHER SURGICAL HISTORY  09/26/2016    Gastrectomy    SHOULDER SURGERY Left     left TSR       IMMUNIZATION HISTORY  Immunization History   Administered Date(s) Administered    Flu vaccine (IIV4), preservative free *Check age/dose* 10/17/2023    Influenza, seasonal, injectable 10/03/2015, 01/04/2019, 09/27/2019, 09/25/2020, 11/04/2020, 10/14/2022    Moderna SARS-CoV-2 Vaccination 05/14/2021, 06/09/2021, 01/26/2022    Pneumococcal conjugate vaccine, 13-valent (PREVNAR 13) 12/07/2022    Pneumococcal polysaccharide vaccine, 23-valent, age 2 years and older (PNEUMOVAX 23) 07/25/2015, 10/06/2015, 03/30/2022       SOCIAL HISTORY  smoking: 13- 15   drinking: none  illicit drug use: none    OCCUPATIONAL/ENVIRONMENTAL HISTORY  Occupation: He is now on disability, but previously worked as a cook. No known toxic exposures.     FAMILY HISTORY  FAMILY HISTORY: No family Hx of lung disease or lung cancer.    RESULTS/DATA     Pulmonary Function Test Results   PFTs:  - 3/7/16: FEV1/FVC 0.70/ FEV1 1.21 (42%)/ FVC 1.73 (49%)   - 12/4/19: FEV1/FVC 0.73/ FEV1 1.75 (62%- almost BD)/ FVC 2.21 (64%)/ TLC 3.42 (66%)/ DLCO 50%   - 5/7/21: FEV1/FVC 0.75/ FEV1 1.94 (69%)/ FVC 2.50 (72%)/ TLC 4.23 (81%)/ DLCO 79%   - 1/2/24 - FEV1/FVC 0.74/ FEV1 1.68 (52%)/ FVC 2.24 (56%)/ TLC 3.92 (64%)/ DLCO 81%      6 MWTs: 5/7/21 - 119m () 98 -> 96% on RA (FALGUNI 4)         Chest Radiograph     XR chest 2 views  12/06/2023  Impression - Severely limited exam.  Pulmonary vascular congestion suggests volume overload, with possible  mild acute pulmonary interstitial edema. No focal consolidation.  Asymmetric hazy opacity about the left lateral costophrenic angle  probably relates to chest wall soft tissue although airspace disease  such as atelectasis or pneumonia with or without small effusion  cannot be excluded. No sizable effusion on the right. No pneumothorax.        Chest CT Scan     CT angio chest for pulmonary embolism 12/06/2023  Impression  Examination is limited by suboptimal pulmonary artery contrast  opacification and respiratory motion. With these limitations:  No pulmonary embolism to the lobar level in the lower lobes or to the  proximal segmental level in the upper lobes or right middle lobe. No  evidence of acute pathology in the chest.  Incidentally noted 9 mm right upper lobe pulmonary nodule, concerning  for neoplasm. Further evaluation with PET-CT recommended. A yellow  alert was sent.  Additional findings as discussed above.    Echocardiogram     Echo:   - 4/1/21 - EF 60-65%, RA normal size, RV normal size and function.   -  12/7/23 Left ventricular systolic function is normal with a 60-65% estimated ejection fraction. RA normal size, RV normal size and function.     EKG   - 3/12/21 - SR with SA   - 12/7/23 - NSR- non specific T wave abnormality (when compared to 11/27/23- no significant change was found)    Labs/ Other testing        REVIEW OF SYSTEMS     REVIEW OF SYSTEMS  Review of Systems   Constitutional:  Negative for fatigue and fever.   HENT:  Negative for congestion, postnasal drip, rhinorrhea, sinus pressure and voice change.    Eyes:  Negative for pain and visual disturbance.   Cardiovascular:  Negative for chest pain, palpitations and leg swelling.   Gastrointestinal:  Negative for abdominal pain, diarrhea, nausea and vomiting.   Endocrine: Negative for cold intolerance and heat intolerance.    Musculoskeletal:  Positive for arthralgias, back pain, joint swelling and myalgias.   Skin:  Negative for rash.   Neurological:  Negative for dizziness, weakness, numbness and headaches.   Psychiatric/Behavioral:  Negative for agitation. The patient is not nervous/anxious.          PHYSICAL EXAM     VITAL SIGNS: There were no vitals taken for this visit.     CURRENT WEIGHT: [unfilled]  BMI: [unfilled]  PREVIOUS WEIGHTS:  Wt Readings from Last 3 Encounters:   12/08/23 132 kg (291 lb)   12/06/23 127 kg (279 lb)   11/28/23 131 kg (289 lb 3.9 oz)       Physical Exam  Vitals reviewed.   Constitutional:       General: He is not in acute distress.     Appearance: Normal appearance. He is not ill-appearing or toxic-appearing.   HENT:      Head: Normocephalic.      Nose: No rhinorrhea.   Cardiovascular:      Rate and Rhythm: Normal rate and regular rhythm.      Heart sounds: Normal heart sounds.   Pulmonary:      Effort: Pulmonary effort is normal. No respiratory distress.      Breath sounds: Normal breath sounds. No stridor.   Abdominal:      General: Abdomen is flat.   Musculoskeletal:         General: No swelling. Normal range of motion.      Right lower leg: No edema.      Left lower leg: No edema.   Skin:     General: Skin is warm and dry.      Nails: There is no clubbing.   Neurological:      General: No focal deficit present.      Mental Status: He is alert and oriented to person, place, and time.   Psychiatric:         Mood and Affect: Mood normal.         Behavior: Behavior normal.         Judgment: Judgment normal.         ASSESSMENT/PLAN        1. Asthma: diagnosed in childhood - intubated twice as a teenager when he was smoking. PFTs without obstruction or restriction --> never diagnosed with COPD. Back surgery - stimulater adjusted. Recent worsening SKY since his shoulder surgery - CTPE without PE, Echo with normal EF, EKG NSR.  Repeat PFTs with reduction in all volumes -- likely restricted related to pain/weight.    - continue symbicort 160 - 2 puffs twice daily - rinse mouth out afterwards -- if this is not covered call and let me know   - continue albuterol 2 puffs or albuterol nebulizer every 4-6 hours as needed for shortness of breath   - flu shot this fall   - upcoming appt with  ENT to evaluate for vocal cord injury   - upcoming appt with Cardiology for cardiac work up      2. Allergic rhinitis: unclear if this possible triggered recent exacerbation - no nasal congestion/runny nose, but does have post nasal drip   - continue montelukast (singulair) 10mg daily at bedtime   - continue loratadine (claritin) 10mg daily      3. Lung nodule: 9mm - seen on imaging   - plan for CT chest in 3 months - scheduled for 3/8/24     4.  ES: Following with Zeinab LOPEZ - wearing BiPAP nightly   - continue to follow up with sleep      5. Weight loss: interested in bariatric surgery - does not want to wait for so long. Comprehensive weight loss referral and nutrition referral last visit.

## 2024-01-23 ENCOUNTER — OFFICE VISIT (OUTPATIENT)
Dept: PRIMARY CARE | Facility: CLINIC | Age: 49
End: 2024-01-23
Payer: MEDICARE

## 2024-01-23 VITALS
BODY MASS INDEX: 48.98 KG/M2 | OXYGEN SATURATION: 96 % | DIASTOLIC BLOOD PRESSURE: 84 MMHG | HEIGHT: 65 IN | TEMPERATURE: 97 F | WEIGHT: 294 LBS | SYSTOLIC BLOOD PRESSURE: 136 MMHG | HEART RATE: 71 BPM

## 2024-01-23 DIAGNOSIS — M96.1 LUMBAR POST-LAMINECTOMY SYNDROME: ICD-10-CM

## 2024-01-23 DIAGNOSIS — Z79.891 OPIOID USE AGREEMENT EXISTS: ICD-10-CM

## 2024-01-23 PROCEDURE — 80361 OPIATES 1 OR MORE: CPT

## 2024-01-23 PROCEDURE — 80365 DRUG SCREENING OXYCODONE: CPT

## 2024-01-23 PROCEDURE — 1036F TOBACCO NON-USER: CPT | Performed by: STUDENT IN AN ORGANIZED HEALTH CARE EDUCATION/TRAINING PROGRAM

## 2024-01-23 PROCEDURE — 80307 DRUG TEST PRSMV CHEM ANLYZR: CPT

## 2024-01-23 PROCEDURE — 3079F DIAST BP 80-89 MM HG: CPT | Performed by: STUDENT IN AN ORGANIZED HEALTH CARE EDUCATION/TRAINING PROGRAM

## 2024-01-23 PROCEDURE — 99213 OFFICE O/P EST LOW 20 MIN: CPT | Performed by: STUDENT IN AN ORGANIZED HEALTH CARE EDUCATION/TRAINING PROGRAM

## 2024-01-23 PROCEDURE — 3075F SYST BP GE 130 - 139MM HG: CPT | Performed by: STUDENT IN AN ORGANIZED HEALTH CARE EDUCATION/TRAINING PROGRAM

## 2024-01-23 RX ORDER — OXYCODONE AND ACETAMINOPHEN 7.5; 325 MG/1; MG/1
1 TABLET ORAL EVERY 6 HOURS PRN
Qty: 90 TABLET | Refills: 0 | Status: SHIPPED | OUTPATIENT
Start: 2024-01-23 | End: 2024-02-13 | Stop reason: SDUPTHER

## 2024-01-23 ASSESSMENT — PATIENT HEALTH QUESTIONNAIRE - PHQ9
2. FEELING DOWN, DEPRESSED OR HOPELESS: NOT AT ALL
1. LITTLE INTEREST OR PLEASURE IN DOING THINGS: NOT AT ALL
SUM OF ALL RESPONSES TO PHQ9 QUESTIONS 1 AND 2: 0

## 2024-01-23 ASSESSMENT — PAIN SCALES - GENERAL: PAINLEVEL: 8

## 2024-01-23 ASSESSMENT — ENCOUNTER SYMPTOMS
LOSS OF SENSATION IN FEET: 0
DEPRESSION: 0
OCCASIONAL FEELINGS OF UNSTEADINESS: 0

## 2024-01-23 NOTE — PROGRESS NOTES
Subjective   Patient ID: Tomasz Maxwell is a 48 y.o. male who presents for Follow-up and Med Refill.    HPI    #Asthma  - worsening SOB and recently seen in ED  - following closely w/ Pulm  - going to see sleep med for possible adjustment of CPAP, using CPAP religiously every night, but has not seen Sleep Med in 7 years  - seeing ENT for horseness, given h/o intubation    #Spinal Stenosis of Neck  #Shoulder replacement  #h/o multiple back surgeries  - following  Dr. 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 I have considered the risks of abuse, dependence, addiction and diversion    Is the patient prescribed a combination of a benzodiazepine and opioid?  Yes, I feel it is clincially indicated to continue the medication and have discussed with the patient risks/benefits/alternatives.    Last Urine Drug Screen / ordered today: No  Recent Results (from the past 8760 hour(s))   Opiate Confirmation, Urine    Collection Time: 01/23/24 11:41 AM   Result Value Ref Range    6-Acetylmorphine <25 <25 ng/mL    Codeine <50 <50 ng/mL    Hydrocodone <25 <25 ng/mL    Hydromorphone <25 <25 ng/mL    Morphine  <50 <50 ng/mL    Norhydrocodone <25 <25 ng/mL    Noroxycodone 166 (H) <25 ng/mL    Oxycodone 957 (H) <25 ng/mL    Oxymorphone 155 (H) <25 ng/mL   Drug Screen, Urine With Reflex to Confirmation    Collection Time: 01/23/24 11:41 AM   Result Value Ref Range    Amphetamine Screen, Urine Presumptive Negative Presumptive Negative    Barbiturate Screen, Urine Presumptive Negative Presumptive Negative    Benzodiazepines Screen, Urine  Presumptive Negative Presumptive Negative    Cannabinoid Screen, Urine Presumptive Negative Presumptive Negative    Cocaine Metabolite Screen, Urine Presumptive Negative Presumptive Negative    Fentanyl Screen, Urine Presumptive Negative Presumptive Negative    Opiate Screen, Urine Presumptive Negative Presumptive Negative    Oxycodone Screen, Urine Presumptive Positive (A) Presumptive Negative    PCP Screen, Urine Presumptive Negative Presumptive Negative   OPIATE/OPIOID/BENZO PRESCRIPTION COMPLIANCE    Collection Time: 04/25/23 12:02 PM   Result Value Ref Range    DRUG SCREEN COMMENT URINE SEE BELOW     Creatine, Urine 220.7 mg/dL    Amphetamine Screen, Urine PRESUMPTIVE NEGATIVE NEGATIVE    Barbiturate Screen, Urine PRESUMPTIVE NEGATIVE NEGATIVE    Cannabinoid Screen, Urine PRESUMPTIVE NEGATIVE NEGATIVE    Cocaine Screen, Urine PRESUMPTIVE NEGATIVE NEGATIVE    PCP Screen, Urine PRESUMPTIVE NEGATIVE NEGATIVE    7-Aminoclonazepam <25 Cutoff <25 ng/mL    Alpha-Hydroxyalprazolam <25 Cutoff <25 ng/mL    Alpha-Hydroxymidazolam <25 Cutoff <25 ng/mL    Alprazolam <25 Cutoff <25 ng/mL    Chlordiazepoxide <25 Cutoff <25 ng/mL    Clonazepam <25 Cutoff <25 ng/mL    Diazepam <25 Cutoff <25 ng/mL    Lorazepam <25 Cutoff <25 ng/mL    Midazolam <25 Cutoff <25 ng/mL    Nordiazepam <25 Cutoff <25 ng/mL    Oxazepam <25 Cutoff <25 ng/mL    Temazepam <25 Cutoff <25 ng/mL    Zolpidem <25 Cutoff <25 ng/mL    Zolpidem Metabolite (ZCA) <25 Cutoff <25 ng/mL    6-Acetylmorphine <25 Cutoff <25 ng/mL    Codeine <50 Cutoff <50 ng/mL    Hydrocodone <25 Cutoff <25 ng/mL    Hydromorphone <25 Cutoff <25 ng/mL    Morphine Urine <50 Cutoff <50 ng/mL    Norhydrocodone <25 Cutoff <25 ng/mL    Noroxycodone 186 (A) Cutoff <25 ng/mL    Oxycodone 362 (A) Cutoff <25 ng/mL    Oxymorphone 87 (A) Cutoff <25 ng/mL    Tramadol <50 Cutoff <50 ng/mL    O-Desmethyltramadol <50 Cutoff <50 ng/mL    Fentanyl <2.5 Cutoff<2.5 ng/mL    Norfentanyl <2.5 Cutoff<2.5 ng/mL  "   METHADONE CONFIRMATION,URINE <25 Cutoff <25 ng/mL    EDDP <25 Cutoff <25 ng/mL     Results are as expected.     Controlled Substance Agreement:  Date of the Last Agreement: 8/2023  Reviewed Controlled Substance Agreement including but not limited to the benefits, risks, and alternatives to treatment with a Controlled Substance medication(s).    Opioids:  What is the patient's goal of therapy? Decrease pain to allow more mobility  Is this being achieved with current treatment? Yes    I have calculated the patient's Morphine Dose Equivalent (MED):   I have considered referral to Pain Management and/or a specialist, and do not feel it is necessary at this time.    I feel that it is clinically indicated to continue this current medication regimen after consideration of alternative therapies, and other non-opioid treatment.    Opioid Risk Screening:  No data recorded    Pain Assessment:  No data recorded    Review of Systems   All other systems reviewed and are negative.    Objective   /84 (BP Location: Left arm, Patient Position: Sitting)   Pulse 71   Temp 36.1 °C (97 °F)   Ht 1.651 m (5' 5\")   Wt 133 kg (294 lb)   SpO2 96%   BMI 48.92 kg/m²      Physical Exam  Constitutional:       General: He is not in acute distress.  HENT:      Head: Normocephalic and atraumatic.   Eyes:      Conjunctiva/sclera: Conjunctivae normal.   Cardiovascular:      Heart sounds: Normal heart sounds. No murmur heard.  Pulmonary:      Effort: Pulmonary effort is normal.      Breath sounds: Normal breath sounds.   Abdominal:      Palpations: Abdomen is soft.   Musculoskeletal:         General: Normal range of motion.   Neurological:      Mental Status: He is alert and oriented to person, place, and time.      Comments: CN 2-12 grossly intact   Psychiatric:         Mood and Affect: Mood normal.        Assessment/Plan   Diagnoses and all orders for this visit:  Lumbar post-laminectomy syndrome  Comments:  Chronic, worsening at neck, " likely i/s/o recent R shoulder arthoplasty. Improvevd on 7.5-325mg percocet post-op. Will increase to that dose. UDS ordered  Orders:  -     Follow Up In Primary Care - Health Maintenance  -     oxyCODONE-acetaminophen (Percocet) 7.5-325 mg tablet; Take 1 tablet by mouth every 6 hours if needed for severe pain (7 - 10).  -     Drug Screen, Urine With Reflex to Confirmation; Future  -     Follow Up In Primary Care - Established; Future  -     Opiate Confirmation, Urine  -     OOB Internal Tracking  Opioid use agreement exists    Follow up/Return to the clinic in 2 months    Attending Supervision: discussed with attending physician, Dr. Singh Fernando MD  Family Medicine, PGY-3

## 2024-01-24 ENCOUNTER — APPOINTMENT (OUTPATIENT)
Dept: ORTHOPEDIC SURGERY | Facility: HOSPITAL | Age: 49
End: 2024-01-24
Payer: MEDICARE

## 2024-01-27 LAB
6MAM UR CFM-MCNC: <25 NG/ML
CODEINE UR CFM-MCNC: <50 NG/ML
HYDROCODONE CTO UR CFM-MCNC: <25 NG/ML
HYDROMORPHONE UR CFM-MCNC: <25 NG/ML
MORPHINE UR CFM-MCNC: <50 NG/ML
NORHYDROCODONE UR CFM-MCNC: <25 NG/ML
NOROXYCODONE UR CFM-MCNC: 166 NG/ML
OXYCODONE UR CFM-MCNC: 957 NG/ML
OXYMORPHONE UR CFM-MCNC: 155 NG/ML

## 2024-01-29 PROBLEM — E11.9 DIABETES MELLITUS (MULTI): Status: ACTIVE | Noted: 2022-02-24

## 2024-01-29 PROBLEM — A53.0 LATENT SYPHILIS WITH POSITIVE SEROLOGY: Status: ACTIVE | Noted: 2024-01-29

## 2024-01-29 PROBLEM — R49.0 HOARSENESS: Status: ACTIVE | Noted: 2023-12-08

## 2024-01-29 PROBLEM — S83.519A RUPTURE OF ANTERIOR CRUCIATE LIGAMENT OF KNEE: Status: ACTIVE | Noted: 2023-02-02

## 2024-01-29 PROBLEM — Z86.39 HISTORY OF DIABETES MELLITUS: Status: ACTIVE | Noted: 2024-01-29
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
## 2024-01-29 PROBLEM — H81.09 MENIERE'S DISEASE: Status: ACTIVE | Noted: 2023-10-28

## 2024-01-29 PROBLEM — M25.619 DECREASED RANGE OF MOTION OF SHOULDER: Status: ACTIVE | Noted: 2024-01-29

## 2024-01-29 PROBLEM — N39.0 URINARY TRACT INFECTION: Status: ACTIVE | Noted: 2024-01-29
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
## 2024-01-29 PROBLEM — R07.89 ATYPICAL CHEST PAIN: Status: ACTIVE | Noted: 2024-01-29

## 2024-01-29 PROBLEM — S49.92XA INJURY OF LEFT SHOULDER: Status: ACTIVE | Noted: 2024-01-29

## 2024-01-29 PROBLEM — F11.20 CONTINUOUS OPIOID DEPENDENCE (MULTI): Status: ACTIVE | Noted: 2023-10-19

## 2024-01-29 PROBLEM — Z96.649 HISTORY OF TOTAL HIP REPLACEMENT: Status: ACTIVE | Noted: 2021-11-23

## 2024-01-29 PROBLEM — M79.18 MUSCULOSKELETAL PAIN: Status: ACTIVE | Noted: 2024-01-29

## 2024-01-29 PROBLEM — M75.00 ADHESIVE CAPSULITIS OF SHOULDER: Status: ACTIVE | Noted: 2024-01-29

## 2024-01-29 PROBLEM — A04.8 HELICOBACTER PYLORI INFECTION: Status: ACTIVE | Noted: 2024-01-29

## 2024-01-29 PROBLEM — M23.305 DERANGEMENT OF MEDIAL MENISCUS: Status: ACTIVE | Noted: 2023-10-28

## 2024-01-29 PROBLEM — M25.511 PAIN OF RIGHT SHOULDER REGION: Status: ACTIVE | Noted: 2023-03-25

## 2024-01-29 PROBLEM — H66.90 CHRONIC OTITIS MEDIA: Status: ACTIVE | Noted: 2024-01-29

## 2024-01-29 PROBLEM — R55 SYNCOPE: Status: ACTIVE | Noted: 2024-01-29

## 2024-01-29 PROBLEM — H35.419 RETINAL LATTICE DEGENERATION: Status: ACTIVE | Noted: 2023-10-28

## 2024-01-29 PROBLEM — R89.9 ABNORMAL LABORATORY TEST RESULT: Status: ACTIVE | Noted: 2024-01-29

## 2024-01-29 PROBLEM — K59.04 CHRONIC IDIOPATHIC CONSTIPATION: Status: ACTIVE | Noted: 2024-01-29
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
## 2024-01-29 PROBLEM — H16.299: Status: ACTIVE | Noted: 2024-01-29

## 2024-01-29 PROBLEM — M25.512 PAIN OF LEFT SHOULDER REGION: Status: ACTIVE | Noted: 2022-12-31

## 2024-01-31 ENCOUNTER — OFFICE VISIT (OUTPATIENT)
Dept: OTOLARYNGOLOGY | Facility: HOSPITAL | Age: 49
End: 2024-01-31
Payer: MEDICARE

## 2024-01-31 VITALS — HEIGHT: 65 IN | BODY MASS INDEX: 48.58 KG/M2 | TEMPERATURE: 96.6 F | WEIGHT: 291.6 LBS

## 2024-01-31 DIAGNOSIS — R06.09 DOE (DYSPNEA ON EXERTION): ICD-10-CM

## 2024-01-31 DIAGNOSIS — R49.0 HOARSENESS: ICD-10-CM

## 2024-01-31 DIAGNOSIS — R13.10 DYSPHAGIA, UNSPECIFIED TYPE: Primary | ICD-10-CM

## 2024-01-31 PROCEDURE — 31579 LARYNGOSCOPY TELESCOPIC: CPT | Performed by: OTOLARYNGOLOGY

## 2024-01-31 PROCEDURE — 1036F TOBACCO NON-USER: CPT | Performed by: OTOLARYNGOLOGY

## 2024-01-31 PROCEDURE — 99215 OFFICE O/P EST HI 40 MIN: CPT | Performed by: OTOLARYNGOLOGY

## 2024-01-31 PROCEDURE — 99205 OFFICE O/P NEW HI 60 MIN: CPT | Performed by: OTOLARYNGOLOGY

## 2024-01-31 NOTE — PROGRESS NOTES
Patient: Tomasz Maxwell   MRN: [id number] YOB: 1975   Sex: male Age: 48 y.o.  Date of Service: 2024       ASSESSMENT AND PLAN  I discussed the findings with Tomasz Maxwell and have recommended the followin. Dysphonia, worsened after intubation, slowly improving. 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 Stroboscopy with mild phonotraumatic changes and hyperfunction  - Hydration, humidification  - Recommended voice therapy, he will consider    2. Dysphagia, primarily to solids without history of GERD  - MBS/esophagram  - Follow-up with me afterward to review      CHIEF COMPLAINT  Chief Complaint   Patient presents with    Hoarseness       HISTORY OF PRESENT ILLNESS  Tomasz Maxwell is a 48 y.o. male referred by Vianney Gilliland A* for evaluation of dysphonia.  The patient has a history of ES.  He reports he has had dysphonia since about /2023 after a surgery that got worse since his most recent surgery in Nov/Dec for his shoulder. Since then his voice has gotten better but not normal. Occasional hurts to talk.    Of note, he also reports some dysphagia and discomfort with swallow. Eats soft foods to compensate. Swallowing issues also started after the 2023 surgery.    The dysphagia history includes:      Dysphagia for solids               yes    Dysphagia for liquids              no    Dysphagia for pills                  no  Associated weight loss            no    Recent pneumonia/bronchitis  no  GERD/Acid reflux   no    SH: works as a teacher, non smoker, non drinker    ADDITIONAL HISTORY  Past Medical History  He has a past medical history of Contusion of left hand, initial encounter (2017), Crushing injury of unspecified finger(s), initial encounter (2017), Essential (primary) hypertension (2022), Hyperlipidemia, unspecified (10/03/2022), Hypokalemia (2017), Idiopathic gout, right knee (2017), Latent syphilis,  unspecified as early or late (04/13/2018), Local infection of the skin and subcutaneous tissue, unspecified (12/05/2017), Long term (current) use of opiate analgesic (04/24/2017), Mastitis without abscess (04/24/2017), Mastodynia (04/24/2017), Muscle spasm of back (04/24/2017), Nocturia (04/24/2017), Other microscopic hematuria (04/24/2017), Other specified postprocedural states (04/24/2017), Other specified postprocedural states (04/24/2017), Other specified symptoms and signs involving the digestive system and abdomen (09/08/2017), Pain in left wrist (04/24/2017), Personal history of other diseases of the musculoskeletal system and connective tissue, Personal history of other diseases of the nervous system and sense organs (03/07/2016), Personal history of other endocrine, nutritional and metabolic disease, Personal history of other infectious and parasitic diseases (09/10/2017), Personal history of other infectious and parasitic diseases (09/08/2017), Personal history of other infectious and parasitic diseases (04/13/2018), Personal history of other specified conditions (04/30/2018), Personal history of other specified conditions (01/23/2018), Personal history of other specified conditions (07/28/2017), Personal history of other specified conditions (10/08/2017), Proteinuria, unspecified (04/24/2017), Radiculopathy, lumbar region (04/24/2017), Spinal stenosis, site unspecified (08/19/2015), Unspecified abnormal finding in specimens from other organs, systems and tissues (04/24/2017), Unspecified asthma, uncomplicated, and Vertigo. Surgical History  He has a past surgical history that includes Hernia repair (08/26/2015); Lumbar laminectomy (08/26/2015); Other surgical history (05/20/2021); Knee surgery (Right, 03/29/2017); Other surgical history (Bilateral, 02/24/2021); Other surgical history (09/26/2016); Back surgery (09/26/2016); MR angio head wo IV contrast (05/21/2022); MR angio neck wo IV contrast  "(05/21/2022); MR angio head wo IV contrast (03/12/2017); MR angio neck wo IV contrast (03/12/2017); and Shoulder surgery (Left).   Social History  He reports that he has quit smoking. His smoking use included cigarettes. He has never used smokeless tobacco. He reports current drug use. Drug: Oxycodone. He reports that he does not drink alcohol. Allergies  Patient has no known allergies.     Family History  Family History   Problem Relation Name Age of Onset    Hypertension Mother      Diabetes Mother      Asthma Mother      Deep vein thrombosis Mother      No Known Problems Father      No Known Problems Sister      Asthma Child          REVIEW OF SYSTEMS  All 10 systems were reviewed and negative except for above.      PHYSICAL EXAM  ENT Physical Exam   GENERAL: Well-nourished and developed, alert and appropriate, no distress, voice [id number]  RESPIRATORY: Breathing quietly, no stridor  HEAD: Normocephalic atraumatic  FACE: Symmetric, no masses or lesions  EYES:  Pupils reactive, sclera clear, external ocular muscles intact, no nystagmus.    EARS:  Pinnae normal. External auditory canals clear and tympanic membranes intact.  NOSE:  No anterior lesions, masses or polyps.  ORAL CAVITY/OROPHARYNX:  Buccal mucosa is moist without lesions or masses, tongue midline and palate elevates symmetrically. Tongue mobility intact.  NECK:  Soft. There is no lymphadenopathy or thyromegaly.    NEUROLOGIC:  Cranial nerves II-XII grossly intact.       Last Recorded Vitals  Temperature 35.9 °C (96.6 °F), height 1.651 m (5' 5\"), weight 132 kg (291 lb 9.6 oz).    RESULTS    Patient Reported Outcome Measures  N/A    Laboratory, Radiology, and Pathology  I personally reviewed the following results, with the following interpretation:   CT PE 12/6/23 - Incidentally noted 9 mm right upper lobe pulmonary nodule (planned for follow-up CT in 3 mos by Pulm)    PROCEDURES  Flexible Stroboscopy In Clinic    Date/Time: 1/31/2024 3:57 " PM    Performed by: Ana Rosa Ramey MD  Authorized by: Ana Rosa Ramey MD         Flexible Fiberoptic Laryngoscopy with Stroboscopy    Patient failed a mirror exam due to limitations of equipment and the need for stroboscopy to assess glottic vibration and closure.     PREOPERATIVE DIAGNOSIS: Dysphonia    POSTOPERATIVE DIAGNOSIS: Same    PROCEDURE:  Strobovideolaryngoscopy    ANESTHESIA:  Topical    COMPLICATIONS:  None    SPECIMENS:  None    PROCEDURE IN DETAIL: The patient was seated in an upright position.  The nasal cavity was topically decongested and anesthetized.  The stroboscopic microphone was held to the neck at the level of the larynx.  The distal chip video laryngoscope was passed through the nasal cavity.  The nasal cavity and nasopharynx were within normal limits except noted below.  The following findings on stroboscopy were noted:      Appearance of pharynx/larynx/Other Structural Lesions: crowded pharynx with redundant tissue   Vocal Fold Mobility               Right VF: mobile               Left VF: mobile   TVF Appearance               Edema/Erythema: mild edema               Lesions/vibratory margin irregularities: small subepithelial irregularity along anterior vibratory margin   Glottic Closure Pattern: complete with hyperfunction    Vibration:               Phase: appears normal, limited evaluation due to poor triggering of strobe   Periodicity:                 Amplitude: normal, limited evaluation due to poor triggering of strobe               Waveform: normal, limited evaluation due to poor triggering of strobe   Muscle Tension Patterns:  severe FF, moderate AP   Other abnormal findings: per above    The patient tolerated the procedure well.     ----------------------------------------------------------------------  Ana Rosa Ramey MD, MAEd    [facility]  Department of Otolaryngology - Head and Neck Surgery  [facility]  Center    The total time I spent in care of this patient today (excluding time spent on other billable services) is as follows:    Time Spent  Prep time on day of patient encounter: 10 minutes  Time spent directly with patient, family or caregiver: 30 minutes  Additional Time Spent on Patient Care Activities: 5 minutes  Documentation Time: 10 minutes  Other Time Spent: 0 minutes  Total: 55 minutes

## 2024-02-03 NOTE — PROGRESS NOTES
I reviewed the resident/fellow's documentation and discussed the patient with the resident/fellow. I agree with the resident/fellow's medical decision making as documented in the note. Continue to encourage healthy diet and exercise to improve BMI.    Oksana Winters MD

## 2024-02-06 ENCOUNTER — APPOINTMENT (OUTPATIENT)
Dept: ORTHOPEDIC SURGERY | Facility: CLINIC | Age: 49
End: 2024-02-06
Payer: MEDICARE

## 2024-02-08 DIAGNOSIS — F11.90 CHRONIC, CONTINUOUS USE OF OPIOIDS: ICD-10-CM

## 2024-02-08 DIAGNOSIS — Z79.891 OPIOID USE AGREEMENT EXISTS: Primary | ICD-10-CM

## 2024-02-08 RX ORDER — NALOXONE HYDROCHLORIDE 4 MG/.1ML
4 SPRAY NASAL AS NEEDED
Qty: 2 EACH | Refills: 0 | Status: SHIPPED | OUTPATIENT
Start: 2024-02-08

## 2024-02-12 DIAGNOSIS — M96.1 LUMBAR POST-LAMINECTOMY SYNDROME: ICD-10-CM

## 2024-02-13 DIAGNOSIS — M96.1 LUMBAR POST-LAMINECTOMY SYNDROME: ICD-10-CM

## 2024-02-13 DIAGNOSIS — F11.90 CHRONIC, CONTINUOUS USE OF OPIOIDS: Primary | ICD-10-CM

## 2024-02-13 RX ORDER — OXYCODONE AND ACETAMINOPHEN 7.5; 325 MG/1; MG/1
1 TABLET ORAL EVERY 6 HOURS PRN
Qty: 90 TABLET | Refills: 0 | Status: SHIPPED | OUTPATIENT
Start: 2024-02-13 | End: 2024-03-18 | Stop reason: ALTCHOICE

## 2024-02-13 RX ORDER — OXYCODONE AND ACETAMINOPHEN 5; 325 MG/1; MG/1
1 TABLET ORAL EVERY 6 HOURS PRN
Qty: 90 TABLET | Refills: 0 | OUTPATIENT
Start: 2024-02-13 | End: 2024-03-14

## 2024-02-16 ENCOUNTER — HOSPITAL ENCOUNTER (OUTPATIENT)
Dept: RADIOLOGY | Facility: HOSPITAL | Age: 49
Discharge: HOME | End: 2024-02-16
Payer: MEDICARE

## 2024-02-16 DIAGNOSIS — R13.10 DYSPHAGIA, UNSPECIFIED TYPE: ICD-10-CM

## 2024-02-16 PROCEDURE — 74220 X-RAY XM ESOPHAGUS 1CNTRST: CPT

## 2024-02-16 PROCEDURE — 74230 X-RAY XM SWLNG FUNCJ C+: CPT

## 2024-02-16 PROCEDURE — 74220 X-RAY XM ESOPHAGUS 1CNTRST: CPT | Performed by: RADIOLOGY

## 2024-02-16 PROCEDURE — 92611 MOTION FLUOROSCOPY/SWALLOW: CPT | Mod: GN

## 2024-02-16 PROCEDURE — 2500000001 HC RX 250 WO HCPCS SELF ADMINISTERED DRUGS (ALT 637 FOR MEDICARE OP): Mod: SE | Performed by: OTOLARYNGOLOGY

## 2024-02-16 PROCEDURE — A9698 NON-RAD CONTRAST MATERIALNOC: HCPCS | Mod: SE | Performed by: OTOLARYNGOLOGY

## 2024-02-16 PROCEDURE — 3430000001 HC RX 343 DIAGNOSTIC RADIOPHARMACEUTICALS: Mod: SE | Performed by: OTOLARYNGOLOGY

## 2024-02-16 RX ADMIN — BARIUM SULFATE 50 ML: 960 POWDER, FOR SUSPENSION ORAL at 10:34

## 2024-02-16 RX ADMIN — BARIUM SULFATE 75 ML: 980 POWDER, FOR SUSPENSION ORAL at 10:35

## 2024-02-16 NOTE — PROCEDURES
Speech-Language Pathology    SLP Outpatient MBSS Evaluation    Patient Name: Tomasz Maxwell  MRN: [id number]  Today's Date: 2/16/2024   Time Calculation  Start Time: 0945  Stop Time: 1015  Time Calculation (min): 30 min         Current Problem:   1. Dysphagia, unspecified type  FL modified barium swallow study    FL modified barium swallow study        Recommendations/Treatment:  Regular diet with Thin liquids.  Strict reflux precautions.  Follow up with Dr. Ramey in [facility] clinic for MBSS/esoph results.    Assessment/Impression:  Imaging of the UES limited due to pt's body habitus and positioning. Not all images were saved by recording device for review. Oropharyngeal phases of the swallow WFL. Pt with intact oral prepping skills across all consistencies. Swallow onset timely without evidence of penetration or aspiration before, during or after the swallow. Following the swallow pharyngeal lumen cleared efficiently without evidence of stasis across all consistencies. A-P view obtained for esophageal sweep with thin, mildly thick, pureed and barium pill. Pt with evidence of reduced motility at the mid-distal portion of the esophagus with retrograde flow on mildly thick and purees. Halting pill motion at the cervical and distal portion of the esophagus. The A-P bolus follow-through is not intended to be utilized as a diagnostic assessment of the esophagus, rather a tool to observe the biomechanically aspects of the swallow continuum and to inform the need for further evaluation by medical specialists, as applicable. Please see esophagram for more details.      Prognosis:   Excellent      Plan:  SLP Discharge Recommendations: Home with no further SLP  Follow up with [facility]-Dr. Ramey regarding MBSS and esophagram results.    Treatment Provided:  Treatment Provided  Treatment Provided: No    Oral Prepping: Intact  Oropharyngeal Onset: Intact  Posterior Lingual Propulsion: Intact  Velar Closure: Intact  Pharyngeal  Constriction: Intact  Laryngeal Vestibular Closure: Intact  Upper Esophageal Opening: Intact  Esophageal clearance/ a-p view: Intact  Compensatory strategies: n/a     [place]:  Consistencies/Score: Thin: 1 - Material does not enter airway  Consistencies/Score: Nectar Liquid: 1 - Material does not enter airway  Consistencies/Score: Pudding/Puree: 1 - Material does not enter airway  Consistencies/Score: Solid: 1 - Material does not enter airway  Consistencies/Score: Other: 1 - Material Does Not Enter Airway (barium tablet)    SLP Outcome Measures:  Functional Oral Intake Scale   FIOS level Comment    Level 1  Nothing by Mouth    Level 2 Tube dependent with minimal attempts of food and liquid.    Level 3 Tube dependent with consistent oral intake of food and liquid.    Level 4 Total oral diet of a single consistency.    Level 5 Total oral diet with multiple consistencies, but requires special preparations and compensations.    Level 6 Total oral diet with multiple consistencies without special preparations but specific food limitations.   X Level 7 Total oral diet with no restrictions.        Outpatient Education:  Adult Outpatient Education  Individual(s) Educated: Patient  Risk and Benefits Discussed with Patient/Caregiver/Other: yes  Patient/Caregiver Demonstrated Understanding: yes  Plan of Care Discussed and Agreed Upon: yes  Patient Response to Education: Patient/Caregiver Verbalized Understanding of Information        Consultations/Referrals/Coordination of Services: [facility]

## 2024-02-19 ENCOUNTER — TELEPHONE (OUTPATIENT)
Dept: PULMONOLOGY | Facility: HOSPITAL | Age: 49
End: 2024-02-19
Payer: MEDICARE

## 2024-02-20 ENCOUNTER — OFFICE VISIT (OUTPATIENT)
Dept: ORTHOPEDIC SURGERY | Facility: CLINIC | Age: 49
End: 2024-02-20
Payer: MEDICARE

## 2024-02-20 VITALS — BODY MASS INDEX: 40.82 KG/M2 | WEIGHT: 245 LBS | HEIGHT: 65 IN

## 2024-02-20 DIAGNOSIS — G89.29 CHRONIC RIGHT SHOULDER PAIN: Primary | ICD-10-CM

## 2024-02-20 DIAGNOSIS — M54.12 CERVICAL RADICULOPATHY: ICD-10-CM

## 2024-02-20 DIAGNOSIS — M25.511 CHRONIC RIGHT SHOULDER PAIN: Primary | ICD-10-CM

## 2024-02-20 PROCEDURE — 1036F TOBACCO NON-USER: CPT | Performed by: ORTHOPAEDIC SURGERY

## 2024-02-20 PROCEDURE — 99214 OFFICE O/P EST MOD 30 MIN: CPT | Performed by: ORTHOPAEDIC SURGERY

## 2024-02-21 NOTE — PROGRESS NOTES
HPI:Tomasz Maxwell is a 48-year-old man, who comes in today for follow-up.  He is having neck pain.  He recently had a right shoulder replacement done in October.  Afterwards he started having more neck pain and shoulder pain.  He has occasional symptoms which go down the right arm.  He has been in pain management.  He has done extensive physical therapy over the course of the last 6 months.  At this point he does not feel like he can raise his arm without pain in the shoulder.  He has very limited range of motion as result.  He is scheduled to follow-up with his shoulder surgeon in a week or 2.      ROS:  Reviewed on EMR and patient intake sheet.    PMH/SH:   Reviewed on EMR and patient intake sheet.    Exam:  Physical Exam    Constitutional: Well appearing; no acute distress  Eyes: pupils are equal and round  Psych: normal affect  Respiratory: non-labored breathing  Cardiovascular: regular rate and rhythm  GI: non-distended abdomen  Musculoskeletal: Pain with range of motion of the right shoulder.  Very limited abduction and internal rotation.    Neurologic: [4]/5 strength in the upper extremities bilaterally]; [negative Ortiz's]; [no hyper-reflexia]; negative Spurling's    Radiology:  MRI of the cervical spine was personally reviewed.  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    Diagnosis:  Right shoulder pain; cervical radiculopathy    Assessment and Plan:   48-year-old man, with right shoulder pain which appears to be a combination of mechanical pain secondary to his recent surgery as well as some potential superimposed radiculopathy.  I recommended an EMG to further delineate his radicular symptoms and follow-up with his shoulder surgeon to discuss any further treatment and/or imaging of his right shoulder given his very limited range of motion.  I can see him back after the EMG discuss any further treatment options.    The patient was in agreement with the  plan. At the end of the visit today, the patient felt that all questions had been answered satisfactorily.  The patient was pleased with the visit and very appreciative for the care rendered.     Thank you very much for the kind referral.  It is a privilege, and a pleasure, to partner with you in the care of your patients.  I would be delighted to assist you with any further consultations as needed.      Avelino Lebron MD    Chief of Spine Surgery, [facility]  Director of Spine Service, [facility]  , Department of Orthopaedics  [employer]  [address] [place] [address]  P: [phone]    This note was dictated with voice recognition software.  It has not been proofread for grammatical errors, typographical mistakes or other semantic inconsistencies.

## 2024-02-22 RX ADMIN — BARIUM SULFATE 90 ML: 400 SUSPENSION ORAL at 15:53

## 2024-02-22 RX ADMIN — BARIUM SULFATE 15 ML: 400 PASTE ORAL at 15:53

## 2024-02-22 RX ADMIN — BARIUM SULFATE 700 MG: 700 TABLET ORAL at 15:54

## 2024-02-22 RX ADMIN — BARIUM SULFATE 90 ML: 0.81 POWDER, FOR SUSPENSION ORAL at 15:53

## 2024-02-22 NOTE — ADDENDUM NOTE
Encounter addended by: Chloé Bullard on: 2/22/2024 4:01 PM   Actions taken: Imaging Exam ended, Order list changed, Flowsheet accepted, MAR administration accepted

## 2024-02-23 ENCOUNTER — LAB (OUTPATIENT)
Dept: LAB | Facility: LAB | Age: 49
End: 2024-02-23
Payer: MEDICARE

## 2024-02-23 ENCOUNTER — HOSPITAL ENCOUNTER (OUTPATIENT)
Dept: RADIOLOGY | Facility: CLINIC | Age: 49
Discharge: HOME | End: 2024-02-23
Payer: MEDICARE

## 2024-02-23 ENCOUNTER — OFFICE VISIT (OUTPATIENT)
Dept: ORTHOPEDIC SURGERY | Facility: HOSPITAL | Age: 49
End: 2024-02-23
Payer: MEDICARE

## 2024-02-23 ENCOUNTER — HOSPITAL ENCOUNTER (OUTPATIENT)
Dept: RADIOLOGY | Facility: HOSPITAL | Age: 49
Discharge: HOME | End: 2024-02-23
Payer: MEDICARE

## 2024-02-23 DIAGNOSIS — Z96.611 AFTERCARE FOLLOWING RIGHT SHOULDER JOINT REPLACEMENT SURGERY: ICD-10-CM

## 2024-02-23 DIAGNOSIS — Z47.1 AFTERCARE FOLLOWING RIGHT SHOULDER JOINT REPLACEMENT SURGERY: ICD-10-CM

## 2024-02-23 LAB
BASOPHILS # BLD AUTO: 0.07 X10*3/UL (ref 0–0.1)
BASOPHILS NFR BLD AUTO: 1.1 %
CRP SERPL-MCNC: 0.41 MG/DL
EOSINOPHIL # BLD AUTO: 0.13 X10*3/UL (ref 0–0.7)
EOSINOPHIL NFR BLD AUTO: 2.1 %
ERYTHROCYTE [DISTWIDTH] IN BLOOD BY AUTOMATED COUNT: 15.1 % (ref 11.5–14.5)
ERYTHROCYTE [SEDIMENTATION RATE] IN BLOOD BY WESTERGREN METHOD: 34 MM/H (ref 0–15)
HCT VFR BLD AUTO: 42.7 % (ref 41–52)
HGB BLD-MCNC: 13.3 G/DL (ref 13.5–17.5)
IMM GRANULOCYTES # BLD AUTO: 0.02 X10*3/UL (ref 0–0.7)
IMM GRANULOCYTES NFR BLD AUTO: 0.3 % (ref 0–0.9)
LYMPHOCYTES # BLD AUTO: 2.59 X10*3/UL (ref 1.2–4.8)
LYMPHOCYTES NFR BLD AUTO: 42.5 %
MCH RBC QN AUTO: 27.3 PG (ref 26–34)
MCHC RBC AUTO-ENTMCNC: 31.1 G/DL (ref 32–36)
MCV RBC AUTO: 88 FL (ref 80–100)
MONOCYTES # BLD AUTO: 0.7 X10*3/UL (ref 0.1–1)
MONOCYTES NFR BLD AUTO: 11.5 %
NEUTROPHILS # BLD AUTO: 2.58 X10*3/UL (ref 1.2–7.7)
NEUTROPHILS NFR BLD AUTO: 42.5 %
NRBC BLD-RTO: 0 /100 WBCS (ref 0–0)
PLATELET # BLD AUTO: 323 X10*3/UL (ref 150–450)
RBC # BLD AUTO: 4.88 X10*6/UL (ref 4.5–5.9)
WBC # BLD AUTO: 6.1 X10*3/UL (ref 4.4–11.3)

## 2024-02-23 PROCEDURE — 85652 RBC SED RATE AUTOMATED: CPT

## 2024-02-23 PROCEDURE — 73030 X-RAY EXAM OF SHOULDER: CPT | Mod: RT

## 2024-02-23 PROCEDURE — 86140 C-REACTIVE PROTEIN: CPT

## 2024-02-23 PROCEDURE — 99024 POSTOP FOLLOW-UP VISIT: CPT | Performed by: ORTHOPAEDIC SURGERY

## 2024-02-23 PROCEDURE — 1036F TOBACCO NON-USER: CPT | Performed by: ORTHOPAEDIC SURGERY

## 2024-02-23 PROCEDURE — 85025 COMPLETE CBC W/AUTO DIFF WBC: CPT

## 2024-02-23 PROCEDURE — 73030 X-RAY EXAM OF SHOULDER: CPT | Mod: RIGHT SIDE | Performed by: RADIOLOGY

## 2024-02-23 NOTE — PROGRESS NOTES
Patient continues to have severe unrelenting pain in his shoulder it hurts whether he moves it or not.  Also it hurts when he moves his neck.  The pain goes up into his neck and down his arm.  His right hand is numb.  He recently felt a pop when he moved his shoulder and it was very painful.  He recently saw his spine provider and has spinal stenosis in his cervical spine.  An EMG has been scheduled.    Patient is reluctant to move his shoulder.  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  Radial pulse easily palpable, brisk capillary refill,  strength is 4/5.    X-rays obtained today and they show the prosthetic components in good position with no evidence of loosening, and alignment is perfect.    Right shoulder and arm pain    Seems a prosthetic components are in good position with no evidence of loosening or mechanical problem.  Differential diagnosis favors cervical spinal stenosis is the origin of his pain.  I would like him to have serologies to evaluate for any evidence of infection.  We will obtain those today.  I would like the patient to call after he has had the EMG to review the results.    This was dictated using voice recognition software and not corrected for grammatical or spelling errors.

## 2024-03-04 ENCOUNTER — HOSPITAL ENCOUNTER (OUTPATIENT)
Dept: NEUROLOGY | Facility: HOSPITAL | Age: 49
Discharge: HOME | End: 2024-03-04
Payer: MEDICARE

## 2024-03-04 DIAGNOSIS — M54.12 CERVICAL RADICULOPATHY: ICD-10-CM

## 2024-03-04 PROCEDURE — 95886 MUSC TEST DONE W/N TEST COMP: CPT | Performed by: PSYCHIATRY & NEUROLOGY

## 2024-03-04 PROCEDURE — 95886 MUSC TEST DONE W/N TEST COMP: CPT | Mod: GC | Performed by: PSYCHIATRY & NEUROLOGY

## 2024-03-04 PROCEDURE — 95910 NRV CNDJ TEST 7-8 STUDIES: CPT | Performed by: PSYCHIATRY & NEUROLOGY

## 2024-03-08 ENCOUNTER — HOSPITAL ENCOUNTER (OUTPATIENT)
Dept: RADIOLOGY | Facility: HOSPITAL | Age: 49
Discharge: HOME | End: 2024-03-08
Payer: MEDICARE

## 2024-03-08 DIAGNOSIS — R91.1 LUNG NODULE: ICD-10-CM

## 2024-03-08 PROCEDURE — 71250 CT THORAX DX C-: CPT

## 2024-03-08 PROCEDURE — 71250 CT THORAX DX C-: CPT | Performed by: STUDENT IN AN ORGANIZED HEALTH CARE EDUCATION/TRAINING PROGRAM

## 2024-03-18 DIAGNOSIS — F11.90 CHRONIC, CONTINUOUS USE OF OPIOIDS: ICD-10-CM

## 2024-03-18 DIAGNOSIS — M96.1 LUMBAR POST-LAMINECTOMY SYNDROME: ICD-10-CM

## 2024-03-18 RX ORDER — OXYCODONE AND ACETAMINOPHEN 7.5; 325 MG/1; MG/1
1 TABLET ORAL EVERY 6 HOURS PRN
Qty: 90 TABLET | Refills: 0 | Status: SHIPPED | OUTPATIENT
Start: 2024-03-18 | End: 2024-05-06 | Stop reason: SDUPTHER

## 2024-03-19 ENCOUNTER — OFFICE VISIT (OUTPATIENT)
Dept: ORTHOPEDIC SURGERY | Facility: CLINIC | Age: 49
End: 2024-03-19
Payer: MEDICARE

## 2024-03-19 VITALS — HEIGHT: 65 IN | BODY MASS INDEX: 40.82 KG/M2 | WEIGHT: 245 LBS

## 2024-03-19 DIAGNOSIS — G56.00 CARPAL TUNNEL SYNDROME, UNSPECIFIED LATERALITY: ICD-10-CM

## 2024-03-19 DIAGNOSIS — M25.511 CHRONIC RIGHT SHOULDER PAIN: Primary | ICD-10-CM

## 2024-03-19 DIAGNOSIS — G89.29 CHRONIC RIGHT SHOULDER PAIN: Primary | ICD-10-CM

## 2024-03-19 DIAGNOSIS — M54.12 CERVICAL RADICULOPATHY: ICD-10-CM

## 2024-03-19 PROCEDURE — 1036F TOBACCO NON-USER: CPT | Performed by: ORTHOPAEDIC SURGERY

## 2024-03-19 PROCEDURE — 99214 OFFICE O/P EST MOD 30 MIN: CPT | Performed by: ORTHOPAEDIC SURGERY

## 2024-03-19 ASSESSMENT — PAIN - FUNCTIONAL ASSESSMENT: PAIN_FUNCTIONAL_ASSESSMENT: 0-10

## 2024-03-19 NOTE — PROGRESS NOTES
HPI:Tomasz Maxwell comes in today to review his EMG.  The patient continues to have right shoulder pain with intermittent symptoms that go down the arm.  He complains of some numbness and tingling in his right hand which is worse when he holds a phone for long period of time.      ROS:  Reviewed on EMR and patient intake sheet.    PMH/SH:   Reviewed on EMR and patient intake sheet.    Exam:  Physical Exam    Constitutional: Well appearing; no acute distress  Eyes: pupils are equal and round  Psych: normal affect  Respiratory: non-labored breathing  Cardiovascular: regular rate and rhythm  GI: non-distended abdomen  Musculoskeletal: Right shoulder pain with range of motion of the shoulder   neurologic: [4+]/5 strength in the upper extremities bilaterally]; [negative] Ortiz's; [no hyper-reflexia]    Radiology:  EMG was personally reviewed.  There is no evidence of radiculopathy.  Moderate evidence of carpal tunnel.  MRI demonstrates mild foraminal narrowing at C5-6 and C6-C7    Diagnosis:  Right shoulder pain; carpal tunnel syndrome; cervical radiculopathy    Assessment and Plan:   48-year-old man with mechanical right shoulder pain after shoulder surgery and some intermittent radicular-like symptoms in the upper extremity without MRI evidence of severe nerve root compression and no EMG diagnostic evidence of chronic radiculopathy.  He does however, have EMG evidence of carpal tunnel.  We talked about maybe getting him set up for a diagnostic cervical nerve root injection to see if the radicular pain is in fact emanating from the neck.  In addition, I recommend he see one of our hand surgeons to discuss treatment for his carpal tunnel.  He will follow-up with his shoulder surgeon to discuss his ongoing shoulder pain.  At this time there is no neurologic evidence that his shoulder pain is emanating from his neck.    The patient was in agreement with the plan. At the end of the visit today, the patient felt that all  questions had been answered satisfactorily.  The patient was pleased with the visit and very appreciative for the care rendered.     Thank you very much for the kind referral.  It is a privilege, and a pleasure, to partner with you in the care of your patients.  I would be delighted to assist you with any further consultations as needed.      Avelino Lebron MD    Chief of Spine Surgery, [facility]  Director of Spine Service, [facility]  , Department of Orthopaedics  [facility] [employer]  [address] [id number]  P: [phone]  [facility]    This note was dictated with voice recognition software.  It has not been proofread for grammatical errors, typographical mistakes or other semantic inconsistencies.

## 2024-03-20 ENCOUNTER — OFFICE VISIT (OUTPATIENT)
Dept: ORTHOPEDIC SURGERY | Facility: HOSPITAL | Age: 49
End: 2024-03-20
Payer: MEDICARE

## 2024-03-20 DIAGNOSIS — Z47.1 AFTERCARE FOLLOWING RIGHT SHOULDER JOINT REPLACEMENT SURGERY: ICD-10-CM

## 2024-03-20 DIAGNOSIS — M54.12 CERVICAL RADICULOPATHY: ICD-10-CM

## 2024-03-20 DIAGNOSIS — Z96.611 AFTERCARE FOLLOWING RIGHT SHOULDER JOINT REPLACEMENT SURGERY: ICD-10-CM

## 2024-03-20 PROCEDURE — 99212 OFFICE O/P EST SF 10 MIN: CPT | Performed by: ORTHOPAEDIC SURGERY

## 2024-03-20 PROCEDURE — 1036F TOBACCO NON-USER: CPT | Performed by: ORTHOPAEDIC SURGERY

## 2024-03-20 NOTE — PROGRESS NOTES
Continues to have right shoulder pain after arthroplasty.  The pain goes from his neck down into his hand.  He recently had an EMG which was negative for motor nerve pathology.  There was evidence of median nerve compression at the wrist.    On exam today the patient is better spirits and moving more spontaneously can elevate to 70 degrees actively and 90 degrees passively with pain at the extremes of range of motion.  He still has pain on rotation of the cervical spine to the right side.  His radial pulses easily palpable no peripheral edema incision is healed.    Erythrocyte sedimentation rate is 34    Right shoulder pain.    We discussed differential diagnosis for the patient's right shoulder pain including cervical origin and painful arthroplasty.  I recommended aspiration under fluoroscopy for fluid for culture and also recommended consultation with Dr. Covington for selective nerve root injection as suggested by his spine surgeon.    Follow-up after aspiration    This was dictated using voice recognition software and not corrected for grammatical or spelling errors.

## 2024-03-21 NOTE — ADDENDUM NOTE
Encounter addended by: Addis Gee MD on: 3/21/2024 1:20 PM   Actions taken: Charge Capture section accepted

## 2024-03-22 ENCOUNTER — OFFICE VISIT (OUTPATIENT)
Dept: PRIMARY CARE | Facility: CLINIC | Age: 49
End: 2024-03-22
Payer: MEDICARE

## 2024-03-22 VITALS
DIASTOLIC BLOOD PRESSURE: 83 MMHG | HEART RATE: 66 BPM | TEMPERATURE: 97.2 F | OXYGEN SATURATION: 96 % | WEIGHT: 293 LBS | SYSTOLIC BLOOD PRESSURE: 133 MMHG | HEIGHT: 65 IN | BODY MASS INDEX: 48.82 KG/M2

## 2024-03-22 DIAGNOSIS — E11.9 TYPE 2 DIABETES MELLITUS WITHOUT COMPLICATION, WITHOUT LONG-TERM CURRENT USE OF INSULIN (MULTI): ICD-10-CM

## 2024-03-22 DIAGNOSIS — Z79.891 CHRONIC PRESCRIPTION OPIATE USE: Primary | ICD-10-CM

## 2024-03-22 DIAGNOSIS — Z12.11 COLON CANCER SCREENING: ICD-10-CM

## 2024-03-22 DIAGNOSIS — M96.1 LUMBAR POST-LAMINECTOMY SYNDROME: ICD-10-CM

## 2024-03-22 PROCEDURE — 3075F SYST BP GE 130 - 139MM HG: CPT | Performed by: STUDENT IN AN ORGANIZED HEALTH CARE EDUCATION/TRAINING PROGRAM

## 2024-03-22 PROCEDURE — 99214 OFFICE O/P EST MOD 30 MIN: CPT | Performed by: STUDENT IN AN ORGANIZED HEALTH CARE EDUCATION/TRAINING PROGRAM

## 2024-03-22 PROCEDURE — 1036F TOBACCO NON-USER: CPT | Performed by: STUDENT IN AN ORGANIZED HEALTH CARE EDUCATION/TRAINING PROGRAM

## 2024-03-22 PROCEDURE — 3079F DIAST BP 80-89 MM HG: CPT | Performed by: STUDENT IN AN ORGANIZED HEALTH CARE EDUCATION/TRAINING PROGRAM

## 2024-03-22 RX ORDER — METFORMIN HYDROCHLORIDE 1000 MG/1
1000 TABLET ORAL
Qty: 90 TABLET | Refills: 3 | Status: SHIPPED | OUTPATIENT
Start: 2024-03-22 | End: 2025-03-22

## 2024-03-22 RX ORDER — AMOXICILLIN 250 MG
1 CAPSULE ORAL
COMMUNITY
Start: 2023-08-01 | End: 2024-07-31

## 2024-03-22 ASSESSMENT — PAIN SCALES - GENERAL: PAINLEVEL: 7

## 2024-03-22 NOTE — PROGRESS NOTES
Subjective   Patient ID: Tomasz Maxwell is a 48 y.o. male who presents for Follow-up.    HPI    #Spinal Stenosis of Neck  #Shoulder replacement  #h/o multiple back surgeries  - chronic pain  - plan for injections w/ pain managements and ortho  - new dose of 7.5-325 of percocet better at pain control than prior dose    #T2DM  - metformin script cancelled, out of meds now  - taking 850 mg w/o issues  - last A1c 6.8% on 11/2023    OARRS:  Bella Fernando MD on 3/23/2024  1:25 PM  I have personally reviewed the OARRS report for Tomasz Maxwell. I have considered the risks of abuse, dependence, addiction and diversion    Is the patient prescribed a combination of a benzodiazepine and opioid?  Yes, I feel it is clincially indicated to continue the medication and have discussed with the patient risks/benefits/alternatives.    Last Urine Drug Screen / ordered today: No  Recent Results (from the past 8760 hour(s))   Opiate Confirmation, Urine    Collection Time: 01/23/24 11:41 AM   Result Value Ref Range    6-Acetylmorphine <25 <25 ng/mL    Codeine <50 <50 ng/mL    Hydrocodone <25 <25 ng/mL    Hydromorphone <25 <25 ng/mL    Morphine  <50 <50 ng/mL    Norhydrocodone <25 <25 ng/mL    Noroxycodone 166 (H) <25 ng/mL    Oxycodone 957 (H) <25 ng/mL    Oxymorphone 155 (H) <25 ng/mL   Drug Screen, Urine With Reflex to Confirmation    Collection Time: 01/23/24 11:41 AM   Result Value Ref Range    Amphetamine Screen, Urine Presumptive Negative Presumptive Negative    Barbiturate Screen, Urine Presumptive Negative Presumptive Negative    Benzodiazepines Screen, Urine Presumptive Negative Presumptive Negative    Cannabinoid Screen, Urine Presumptive Negative Presumptive Negative    Cocaine Metabolite Screen, Urine Presumptive Negative Presumptive Negative    Fentanyl Screen, Urine Presumptive Negative Presumptive Negative    Opiate Screen, Urine Presumptive Negative Presumptive Negative    Oxycodone Screen, Urine Presumptive  Positive (A) Presumptive Negative    PCP Screen, Urine Presumptive Negative Presumptive Negative   OPIATE/OPIOID/BENZO PRESCRIPTION COMPLIANCE    Collection Time: 04/25/23 12:02 PM   Result Value Ref Range    DRUG SCREEN COMMENT URINE SEE BELOW     Creatine, Urine 220.7 mg/dL    Amphetamine Screen, Urine PRESUMPTIVE NEGATIVE NEGATIVE    Barbiturate Screen, Urine PRESUMPTIVE NEGATIVE NEGATIVE    Cannabinoid Screen, Urine PRESUMPTIVE NEGATIVE NEGATIVE    Cocaine Screen, Urine PRESUMPTIVE NEGATIVE NEGATIVE    PCP Screen, Urine PRESUMPTIVE NEGATIVE NEGATIVE    7-Aminoclonazepam <25 Cutoff <25 ng/mL    Alpha-Hydroxyalprazolam <25 Cutoff <25 ng/mL    Alpha-Hydroxymidazolam <25 Cutoff <25 ng/mL    Alprazolam <25 Cutoff <25 ng/mL    Chlordiazepoxide <25 Cutoff <25 ng/mL    Clonazepam <25 Cutoff <25 ng/mL    Diazepam <25 Cutoff <25 ng/mL    Lorazepam <25 Cutoff <25 ng/mL    Midazolam <25 Cutoff <25 ng/mL    Nordiazepam <25 Cutoff <25 ng/mL    Oxazepam <25 Cutoff <25 ng/mL    Temazepam <25 Cutoff <25 ng/mL    Zolpidem <25 Cutoff <25 ng/mL    Zolpidem Metabolite (ZCA) <25 Cutoff <25 ng/mL    6-Acetylmorphine <25 Cutoff <25 ng/mL    Codeine <50 Cutoff <50 ng/mL    Hydrocodone <25 Cutoff <25 ng/mL    Hydromorphone <25 Cutoff <25 ng/mL    Morphine Urine <50 Cutoff <50 ng/mL    Norhydrocodone <25 Cutoff <25 ng/mL    Noroxycodone 186 (A) Cutoff <25 ng/mL    Oxycodone 362 (A) Cutoff <25 ng/mL    Oxymorphone 87 (A) Cutoff <25 ng/mL    Tramadol <50 Cutoff <50 ng/mL    O-Desmethyltramadol <50 Cutoff <50 ng/mL    Fentanyl <2.5 Cutoff<2.5 ng/mL    Norfentanyl <2.5 Cutoff<2.5 ng/mL    METHADONE CONFIRMATION,URINE <25 Cutoff <25 ng/mL    EDDP <25 Cutoff <25 ng/mL     Results are as expected.     Controlled Substance Agreement:  Date of the Last Agreement: 8/2023  Reviewed Controlled Substance Agreement including but not limited to the benefits, risks, and alternatives to treatment with a Controlled Substance  "medication(s).    Opioids:  What is the patient's goal of therapy? Pain control   Is this being achieved with current treatment? yes    I have calculated the patient's Morphine Dose Equivalent (MED):   I have considered referral to Pain Management and/or a specialist, and do not feel it is necessary at this time.    I feel that it is clinically indicated to continue this current medication regimen after consideration of alternative therapies, and other non-opioid treatment.    Pain Assessment:  Analgesia  Is the amount of pain relief you are now obtaining from your current pain relievers enough to make a real difference in your life?: Y  Query to Clinician: Is the patient's pain relief clinically significant?: Yes      Review of Systems   All other systems reviewed and are negative.    Objective   /83 (BP Location: Right arm, Patient Position: Sitting)   Pulse 66   Temp 36.2 °C (97.2 °F) (Temporal)   Ht 1.651 m (5' 5\")   Wt 133 kg (293 lb)   SpO2 96%   BMI 48.76 kg/m²      Physical Exam  Constitutional:       General: He is not in acute distress.     Comments: Using cane for mobility   HENT:      Head: Normocephalic and atraumatic.   Eyes:      Conjunctiva/sclera: Conjunctivae normal.   Cardiovascular:      Heart sounds: Normal heart sounds. No murmur heard.  Pulmonary:      Effort: Pulmonary effort is normal.      Breath sounds: Normal breath sounds.   Abdominal:      Palpations: Abdomen is soft.   Musculoskeletal:         General: Normal range of motion.   Neurological:      Mental Status: He is alert and oriented to person, place, and time.      Comments: CN 2-12 grossly intact   Psychiatric:         Mood and Affect: Mood normal.       Assessment/Plan   Diagnoses and all orders for this visit:  Chronic prescription opiate use  Comments:  Stable, still requiring pain meds as pain stimulator has stopped being helpful. CSA 8/2023. UDS 1/2024. Refilled at 7.5-325mg dose due to improved pain " control.  Orders:  -     Follow Up In Primary Care - Established  -     Follow Up In Primary Care - Established; Future  -     oxyCODONE-acetaminophen (Percocet) 7.5-325 mg tablet; Take 1 tablet by mouth every 6 hours if needed for severe pain (7 - 10). Do not start before April 17, 2024.  Lumbar post-laminectomy syndrome  Comments:  Chronic, worsening at neck, likely i/s/o recent R shoulder arthoplasty. Improvevd on 7.5-325mg percocet post-op. Refilled future order today.  Orders:  -     Follow Up In Primary Care - Established  -     Follow Up In Primary Care - Established; Future  -     oxyCODONE-acetaminophen (Percocet) 7.5-325 mg tablet; Take 1 tablet by mouth every 6 hours if needed for severe pain (7 - 10). Do not start before April 17, 2024.  Colon cancer screening  Comments:  Last colonscopy in 2019 w/ hyperplastic polyp. Next one in 5 years (2024). Ordered today.  Orders:  -     Colonoscopy Screening; Average Risk Patient; Future  Type 2 diabetes mellitus without complication, without long-term current use of insulin (CMS/[facility])  Comments:  Chronic, mild worsening w/ last A1c at 6.8, up from 6.5%. Increased metformin to 1000mg QD today from 850mg QD.  Orders:  -     metFORMIN (Glucophage) 1,000 mg tablet; Take 1 tablet (1,000 mg) by mouth once daily with breakfast.  -     Follow Up In Primary Care - Established; Future    Follow up/Return to the clinic in 2 months    Attending Supervision: seen and discussed with attending physician, Dr. Xu Fernando MD  Family Medicine, PGY-3

## 2024-03-23 PROBLEM — Z12.11 COLON CANCER SCREENING: Status: ACTIVE | Noted: 2024-03-23

## 2024-03-23 PROBLEM — K21.9 ESOPHAGEAL REFLUX: Status: RESOLVED | Noted: 2023-10-28 | Resolved: 2024-03-23

## 2024-03-23 PROBLEM — R07.89 ATYPICAL CHEST PAIN: Status: RESOLVED | Noted: 2024-01-29 | Resolved: 2024-03-23

## 2024-03-23 PROBLEM — H66.90 CHRONIC OTITIS MEDIA: Status: RESOLVED | Noted: 2024-01-29 | Resolved: 2024-03-23

## 2024-03-23 PROBLEM — R06.02 SHORTNESS OF BREATH: Status: RESOLVED | Noted: 2023-10-28 | Resolved: 2024-03-23

## 2024-03-23 PROBLEM — R32 URINARY INCONTINENCE: Status: RESOLVED | Noted: 2023-10-28 | Resolved: 2024-03-23

## 2024-03-23 PROBLEM — F11.20 CONTINUOUS OPIOID DEPENDENCE (MULTI): Status: RESOLVED | Noted: 2023-10-19 | Resolved: 2024-03-23

## 2024-03-23 PROBLEM — M23.305 DERANGEMENT OF MEDIAL MENISCUS: Status: RESOLVED | Noted: 2023-10-28 | Resolved: 2024-03-23

## 2024-03-23 PROBLEM — R19.06 EPIGASTRIC MASS: Status: RESOLVED | Noted: 2023-10-28 | Resolved: 2024-03-23

## 2024-03-23 PROBLEM — H53.2 DIPLOPIA: Status: RESOLVED | Noted: 2023-10-28 | Resolved: 2024-03-23

## 2024-03-23 PROBLEM — E66.9 OBESITY: Status: RESOLVED | Noted: 2023-11-27 | Resolved: 2024-03-23

## 2024-03-23 PROBLEM — H53.8 BLURRED VISION, BILATERAL: Status: RESOLVED | Noted: 2023-10-28 | Resolved: 2024-03-23

## 2024-03-23 PROBLEM — M19.011 ARTHRITIS OF RIGHT SHOULDER REGION: Status: RESOLVED | Noted: 2023-11-20 | Resolved: 2024-03-23
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
## 2024-03-23 PROBLEM — N17.9 ACUTE KIDNEY INJURY (CMS-HCC): Status: RESOLVED | Noted: 2023-10-28 | Resolved: 2024-03-23

## 2024-03-23 PROBLEM — M95.5 PELVIS TILTED: Status: RESOLVED | Noted: 2022-02-11 | Resolved: 2024-03-23

## 2024-03-23 PROBLEM — M21.70 LEG LENGTH INEQUALITY: Status: RESOLVED | Noted: 2022-02-11 | Resolved: 2024-03-23
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
## 2024-03-23 PROBLEM — R73.03 PREDIABETES: Status: RESOLVED | Noted: 2023-10-28 | Resolved: 2024-03-23

## 2024-03-23 PROBLEM — R09.89 PULMONARY VASCULAR CONGESTION: Status: RESOLVED | Noted: 2023-12-07 | Resolved: 2024-03-23

## 2024-03-23 PROBLEM — H90.12 CONDUCTIVE HEARING LOSS OF LEFT EAR WITH UNRESTRICTED HEARING OF RIGHT EAR: Status: RESOLVED | Noted: 2023-10-28 | Resolved: 2024-03-23

## 2024-03-23 PROBLEM — R55 SYNCOPE: Status: RESOLVED | Noted: 2024-01-29 | Resolved: 2024-03-23

## 2024-03-23 PROBLEM — M25.619 DECREASED RANGE OF MOTION OF SHOULDER: Status: RESOLVED | Noted: 2024-01-29 | Resolved: 2024-03-23

## 2024-03-23 PROBLEM — H90.3 BILATERAL SENSORINEURAL HEARING LOSS: Status: RESOLVED | Noted: 2023-10-28 | Resolved: 2024-03-23

## 2024-03-23 PROBLEM — Z01.818 AT RISK FOR DIFFICULT INTUBATION ON PRE-INTUBATION AIRWAY ASSESSMENT: Status: RESOLVED | Noted: 2023-11-28 | Resolved: 2024-03-23

## 2024-03-23 PROBLEM — Z86.39 HISTORY OF DIABETES MELLITUS: Status: RESOLVED | Noted: 2024-01-29 | Resolved: 2024-03-23

## 2024-03-23 PROBLEM — Z91.89 AT RISK FOR DIFFICULT INTUBATION ON PRE-INTUBATION AIRWAY ASSESSMENT: Status: RESOLVED | Noted: 2023-11-28 | Resolved: 2024-03-23

## 2024-03-23 PROBLEM — Z98.84 BARIATRIC SURGERY STATUS: Status: RESOLVED | Noted: 2023-10-28 | Resolved: 2024-03-23

## 2024-03-23 PROBLEM — M17.11 OSTEOARTHRITIS OF RIGHT KNEE: Status: RESOLVED | Noted: 2023-10-28 | Resolved: 2024-03-23

## 2024-03-23 PROBLEM — M25.511 PAIN OF RIGHT SHOULDER REGION: Status: RESOLVED | Noted: 2023-03-25 | Resolved: 2024-03-23

## 2024-03-23 PROBLEM — F45.42 PAIN DISORDER ASSOCIATED WITH PSYCHOLOGICAL AND PHYSICAL FACTORS: Status: RESOLVED | Noted: 2023-10-28 | Resolved: 2024-03-23

## 2024-03-23 PROBLEM — H33.322 RETINAL HOLE OF LEFT EYE: Status: RESOLVED | Noted: 2023-10-28 | Resolved: 2024-03-23

## 2024-03-23 PROBLEM — K59.04 CHRONIC IDIOPATHIC CONSTIPATION: Status: RESOLVED | Noted: 2024-01-29 | Resolved: 2024-03-23

## 2024-03-23 PROBLEM — E11.9 TYPE 2 DIABETES MELLITUS WITHOUT COMPLICATIONS (MULTI): Status: RESOLVED | Noted: 2022-02-24 | Resolved: 2024-03-23
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
## 2024-03-23 PROBLEM — M17.10 ARTHRITIS OF KNEE: Status: RESOLVED | Noted: 2023-10-28 | Resolved: 2024-03-23

## 2024-03-23 PROBLEM — H53.139 ACUTE VISUAL LOSS: Status: RESOLVED | Noted: 2023-10-28 | Resolved: 2024-03-23

## 2024-03-23 PROBLEM — H35.419 RETINAL LATTICE DEGENERATION: Status: RESOLVED | Noted: 2023-10-28 | Resolved: 2024-03-23

## 2024-03-23 PROBLEM — N39.0 URINARY TRACT INFECTION: Status: RESOLVED | Noted: 2024-01-29 | Resolved: 2024-03-23

## 2024-03-23 PROBLEM — H90.3 ASYMMETRIC SNHL (SENSORINEURAL HEARING LOSS): Status: RESOLVED | Noted: 2023-10-28 | Resolved: 2024-03-23

## 2024-03-23 PROBLEM — H04.123 DRY EYES, BILATERAL: Status: RESOLVED | Noted: 2023-10-28 | Resolved: 2024-03-23

## 2024-03-23 PROBLEM — M54.10 RADICULOPATHY OF LEG: Status: RESOLVED | Noted: 2023-10-28 | Resolved: 2024-03-23

## 2024-03-23 PROBLEM — Z79.891 USE OF OPIATES FOR THERAPEUTIC PURPOSES: Status: ACTIVE | Noted: 2023-10-19

## 2024-03-23 PROBLEM — R89.9 ABNORMAL LABORATORY TEST RESULT: Status: RESOLVED | Noted: 2024-01-29 | Resolved: 2024-03-23

## 2024-03-23 PROBLEM — H53.142 PHOTOPHOBIA OF LEFT EYE: Status: RESOLVED | Noted: 2023-10-28 | Resolved: 2024-03-23

## 2024-03-23 PROBLEM — M25.512 PAIN OF LEFT SHOULDER REGION: Status: RESOLVED | Noted: 2022-12-31 | Resolved: 2024-03-23

## 2024-03-23 PROBLEM — F91.9 CONDUCT DISORDER, UNSPECIFIED: Status: RESOLVED | Noted: 2023-10-28 | Resolved: 2024-03-23

## 2024-03-23 PROBLEM — R49.0 HOARSENESS: Status: RESOLVED | Noted: 2023-12-08 | Resolved: 2024-03-23

## 2024-03-23 PROBLEM — A04.8 HELICOBACTER PYLORI INFECTION: Status: RESOLVED | Noted: 2024-01-29 | Resolved: 2024-03-23

## 2024-03-23 PROBLEM — S49.92XA INJURY OF LEFT SHOULDER: Status: RESOLVED | Noted: 2024-01-29 | Resolved: 2024-03-23

## 2024-03-23 PROBLEM — R63.2 POLYPHAGIA: Status: RESOLVED | Noted: 2023-10-28 | Resolved: 2024-03-23

## 2024-03-23 PROBLEM — I10 HYPERTENSION: Status: RESOLVED | Noted: 2023-10-28 | Resolved: 2024-03-23

## 2024-03-23 RX ORDER — OXYCODONE AND ACETAMINOPHEN 7.5; 325 MG/1; MG/1
1 TABLET ORAL EVERY 6 HOURS PRN
Qty: 90 TABLET | Refills: 0 | Status: SHIPPED | OUTPATIENT
Start: 2024-04-17 | End: 2024-05-24 | Stop reason: SDUPTHER

## 2024-03-28 ENCOUNTER — APPOINTMENT (OUTPATIENT)
Dept: RADIOLOGY | Facility: HOSPITAL | Age: 49
End: 2024-03-28
Payer: MEDICARE

## 2024-04-02 ENCOUNTER — APPOINTMENT (OUTPATIENT)
Dept: SLEEP MEDICINE | Facility: HOSPITAL | Age: 49
End: 2024-04-02
Payer: MEDICARE

## 2024-04-03 ENCOUNTER — HOSPITAL ENCOUNTER (OUTPATIENT)
Dept: RADIOLOGY | Facility: HOSPITAL | Age: 49
Discharge: HOME | End: 2024-04-03
Payer: MEDICARE

## 2024-04-03 DIAGNOSIS — M25.511 RIGHT SHOULDER PAIN, UNSPECIFIED CHRONICITY: ICD-10-CM

## 2024-04-03 DIAGNOSIS — Z96.611 AFTERCARE FOLLOWING RIGHT SHOULDER JOINT REPLACEMENT SURGERY: ICD-10-CM

## 2024-04-03 DIAGNOSIS — Z47.1 AFTERCARE FOLLOWING RIGHT SHOULDER JOINT REPLACEMENT SURGERY: ICD-10-CM

## 2024-04-03 LAB
CLARITY FLD: ABNORMAL
COLOR FLD: ABNORMAL
RBC # FLD AUTO: ABNORMAL /UL
WBC # FLD AUTO: 387 /UL

## 2024-04-03 PROCEDURE — 89050 BODY FLUID CELL COUNT: CPT | Performed by: RADIOLOGY

## 2024-04-03 PROCEDURE — 87102 FUNGUS ISOLATION CULTURE: CPT | Performed by: RADIOLOGY

## 2024-04-03 PROCEDURE — 77002 NEEDLE LOCALIZATION BY XRAY: CPT | Mod: RIGHT SIDE | Performed by: RADIOLOGY

## 2024-04-03 PROCEDURE — 20610 DRAIN/INJ JOINT/BURSA W/O US: CPT | Mod: RIGHT SIDE | Performed by: RADIOLOGY

## 2024-04-03 PROCEDURE — 77002 NEEDLE LOCALIZATION BY XRAY: CPT | Mod: RT

## 2024-04-03 PROCEDURE — 87075 CULTR BACTERIA EXCEPT BLOOD: CPT | Mod: 91 | Performed by: RADIOLOGY

## 2024-04-03 NOTE — POST-PROCEDURE NOTE
Interventional Radiology Brief Postprocedure Note    Attending: Tavo    Assistant: Arthur    Diagnosis: Right shoulder pain, concern for infection    Description of procedure: Informed verbal/written consent was obtained prior to the procedure. The risks, benefits, and alternatives were discussed.      The patient was placed in the supine position on the fluoroscopic table and was prepped and draped in the usual sterile fashion.      A [20] gauge needle was advanced into the  [right shoulder] joint utilizing local anesthesia, sterile technique, and fluoroscopy guidance. 3 mL of clear/yellow fluid was aspirated and sent to the lab for analysis.     Fluoroscopy time was [0.2] minutes.      The patient tolerated the procedure well and no immediate complication was noted.      Procedure was performed by  [].        Anesthesia:  Local    Complications: None    Estimated Blood Loss: none    Medications (Filter: Administrations occurring from 1408 to 1408 on 04/03/24)      None          No specimens collected      See detailed result report with images in PACS.    The patient tolerated the procedure well without incident or complication and is in stable condition.     Reviewed and approved by WILLIS KONG on 4/3/24 at 2:10 PM.

## 2024-04-04 NOTE — PROGRESS NOTES
I saw and evaluated the patient. I personally obtained the key and critical portions of the history and physical exam or was physically present for key and critical portions performed by the resident/fellow. I reviewed the resident/fellow's documentation and discussed the patient with the resident/fellow. I agree with the resident/fellow's medical decision making as documented in the note.    Tamika Mcnair MD

## 2024-04-06 LAB
BACTERIA FLD CULT: NORMAL
GRAM STN SPEC: NORMAL
GRAM STN SPEC: NORMAL

## 2024-04-09 ENCOUNTER — OFFICE VISIT (OUTPATIENT)
Dept: SLEEP MEDICINE | Facility: HOSPITAL | Age: 49
End: 2024-04-09
Payer: MEDICARE

## 2024-04-09 VITALS
WEIGHT: 283 LBS | DIASTOLIC BLOOD PRESSURE: 73 MMHG | HEART RATE: 72 BPM | TEMPERATURE: 97.2 F | SYSTOLIC BLOOD PRESSURE: 121 MMHG | OXYGEN SATURATION: 95 % | BODY MASS INDEX: 47.09 KG/M2

## 2024-04-09 DIAGNOSIS — G47.33 OSA (OBSTRUCTIVE SLEEP APNEA): Primary | ICD-10-CM

## 2024-04-09 PROCEDURE — 3078F DIAST BP <80 MM HG: CPT | Performed by: GENERAL PRACTICE

## 2024-04-09 PROCEDURE — 99213 OFFICE O/P EST LOW 20 MIN: CPT | Performed by: GENERAL PRACTICE

## 2024-04-09 PROCEDURE — 1036F TOBACCO NON-USER: CPT | Performed by: GENERAL PRACTICE

## 2024-04-09 PROCEDURE — 3074F SYST BP LT 130 MM HG: CPT | Performed by: GENERAL PRACTICE

## 2024-04-09 SDOH — ECONOMIC STABILITY: FOOD INSECURITY: WITHIN THE PAST 12 MONTHS, THE FOOD YOU BOUGHT JUST DIDN'T LAST AND YOU DIDN'T HAVE MONEY TO GET MORE.: NEVER TRUE

## 2024-04-09 SDOH — ECONOMIC STABILITY: FOOD INSECURITY: WITHIN THE PAST 12 MONTHS, YOU WORRIED THAT YOUR FOOD WOULD RUN OUT BEFORE YOU GOT MONEY TO BUY MORE.: NEVER TRUE

## 2024-04-09 ASSESSMENT — LIFESTYLE VARIABLES
HOW OFTEN DO YOU HAVE A DRINK CONTAINING ALCOHOL: NEVER
SKIP TO QUESTIONS 9-10: 1
HOW OFTEN DO YOU HAVE A DRINK CONTAINING ALCOHOL: NEVER
HOW MANY STANDARD DRINKS CONTAINING ALCOHOL DO YOU HAVE ON A TYPICAL DAY: PATIENT DOES NOT DRINK
HOW OFTEN DO YOU HAVE SIX OR MORE DRINKS ON ONE OCCASION: NEVER
HOW OFTEN DO YOU HAVE SIX OR MORE DRINKS ON ONE OCCASION: NEVER
HOW MANY STANDARD DRINKS CONTAINING ALCOHOL DO YOU HAVE ON A TYPICAL DAY: PATIENT DOES NOT DRINK
AUDIT-C TOTAL SCORE: 0
AUDIT-C TOTAL SCORE: 0
SKIP TO QUESTIONS 9-10: 1

## 2024-04-09 ASSESSMENT — PAIN SCALES - GENERAL: PAINLEVEL: 5

## 2024-04-09 NOTE — PROGRESS NOTES
Patient: Tomasz Maxwell    [id number]  : 1975 -- AGE 48 y.o.    Provider: Magdiel Villegas DO     [facility]   Service Date: 2024              [facility]  Follow up Visit Note        HISTORY OF PRESENT ILLNESS       HISTORY OF PRESENT ILLNESS   Tomasz Maxwell is a 48 y.o. male who presents to a [facility] for a sleep medicine evaluation with concerns of Pt here today for FUV. and Sleep Apnea.     The patient  has a past medical history of Contusion of left hand, initial encounter (2017), Crushing injury of unspecified finger(s), initial encounter (2017), Essential (primary) hypertension (2022), Hyperlipidemia, unspecified (10/03/2022), Hypokalemia (2017), Idiopathic gout, right knee (2017), Latent syphilis, unspecified as early or late (2018), Local infection of the skin and subcutaneous tissue, unspecified (2017), Long term (current) use of opiate analgesic (2017), Mastitis without abscess (2017), Mastodynia (2017), Muscle spasm of back (2017), Nocturia (2017), Other microscopic hematuria (2017), Other specified postprocedural states (2017), Other specified postprocedural states (2017), Other specified symptoms and signs involving the digestive system and abdomen (2017), Pain in left wrist (2017), Personal history of other diseases of the musculoskeletal system and connective tissue, Personal history of other diseases of the nervous system and sense organs (2016), Personal history of other endocrine, nutritional and metabolic disease, Personal history of other infectious and parasitic diseases (09/10/2017), Personal history of other infectious and parasitic diseases (2017), Personal history of other infectious and parasitic diseases (2018), Personal history of other specified  conditions (04/30/2018), Personal history of other specified conditions (01/23/2018), Personal history of other specified conditions (07/28/2017), Personal history of other specified conditions (10/08/2017), Proteinuria, unspecified (04/24/2017), Radiculopathy, lumbar region (04/24/2017), Spinal stenosis, site unspecified (08/19/2015), Unspecified abnormal finding in specimens from other organs, systems and tissues (04/24/2017), Unspecified asthma, uncomplicated, and Vertigo..    PAST SLEEP HISTORY    M with pmhx including asthma, DM2,HTN, back pain on narcotics.      patient was tested for sleep apnea due to witnessed apneas and snoring. He was started on pap therapy at that time and is using it regularly.   split 3/17/2016--> very severe ES, .3, with SpO2 colten 73%. consider Bilevel 17/13cwp.   He is establishing care for sleep apnea, used to follow with [facility].     CURRENT HISTORY    On today's visit, 4/9/2024, the patient reports that he received his new pap machine through [facility] but has been getting supplies through [facility]?      PAP Related Problems: dry mouth, but~no skin irritation from mask~and~no snoring with PAP.   Perceived Benefits of PAP Therapy: refreshing sleep,~decreased nocturia,~decreased dry mouth or sore throat,~decreased daytime sleepiness,~decreased nocturnal awakenings~and~decreased snoring/ choking/ gasping.     Sleep schedule  on weekdays / work days:  Usual Bedtime: 9:30pm  Sleep latency: few min  Wake time : varies  Total sleep time average/day: 6-8 hours/day  Awakenings: nocturia, 2x per week, short.   Naps: no      Sleep schedule  on weekends/non work days :  Same as above.     Sleep aids: no  Stimulants: no    Occupation: disability for his medical conditions.     Preferred sleeping position: SLEEP POSITION: sidelying    Sleep-related ROS:    Snoring:  n  Witnessed apneas: n       Gasping/ choking: n      Am Dry mouth:   n          Nasal congestion:  n       am headaches: n   night  sweats:     Sleep is described as refreshing.    Drowsy driving: n  Hx of car accident: n  Near-miss Car accident: n      RLS screen:  RLSSCREEN: - Sensations: Patient does not have unusual sensations in their extremities that cause an urge to move them     Sleep-related behaviors: DENIES      ESS: 4        REVIEW OF SYSTEMS     REVIEW OF SYSTEMS  Review of Systems   All other systems reviewed and are negative.        ALLERGIES AND MEDICATIONS     ALLERGIES  No Known Allergies    MEDICATIONS  Current Outpatient Medications   Medication Sig Dispense Refill    albuterol 2.5 mg /3 mL (0.083 %) nebulizer solution USE 1 VIAL IN NEBULIZER EVERY 4 HOURS - and as needed 60 mL 11    amLODIPine (Norvasc) 10 mg tablet Take 1 tablet (10 mg) by mouth once daily. 90 tablet 3    atorvastatin (Lipitor) 80 mg tablet TAKE 1 TABLET AT BEDTIME. 90 tablet 3    cholecalciferol (Vitamin D-3) 50 MCG (2000 UT) tablet Take 1 tablet (50 mcg) by mouth once daily. 90 tablet 3    loratadine (Claritin) 10 mg tablet Take 1 tablet (10 mg) by mouth once daily.      metFORMIN (Glucophage) 1,000 mg tablet Take 1 tablet (1,000 mg) by mouth once daily with breakfast. 90 tablet 3    montelukast (Singulair) 10 mg tablet Take 1 tablet (10 mg) by mouth once daily at bedtime. 90 tablet 3    naloxone (Narcan) 4 mg/0.1 mL nasal spray Administer 1 spray (4 mg) into affected nostril(s) if needed for opioid reversal. May repeat every 2-3 minutes if needed, alternating nostrils, until medical assistance becomes available. 2 each 0    oxyCODONE-acetaminophen (Percocet) 7.5-325 mg tablet Take 1 tablet by mouth every 6 hours if needed for severe pain (7 - 10). 90 tablet 0    [START ON 4/17/2024] oxyCODONE-acetaminophen (Percocet) 7.5-325 mg tablet Take 1 tablet by mouth every 6 hours if needed for severe pain (7 - 10). Do not start before April 17, 2024. 90 tablet 0    sennosides-docusate sodium (Maribell-Colace) 8.6-50 mg tablet Take 1 tablet by mouth once daily.       sildenafil (Viagra) 100 mg tablet TAKE 1 TABLET DAILY 1 HOUR BEFORE NEEDED 10 tablet 3    Symbicort 160-4.5 mcg/actuation inhaler Inhale 2 puffs 2 times a day. Rinse mouth after use.      tamsulosin (Flomax) 0.4 mg 24 hr capsule Take 2 capsules (0.8 mg) by mouth once daily at bedtime. 180 capsule 3    Ventolin HFA 90 mcg/actuation inhaler INHALE 1 OR 2 PUFFS EVERY 4 TO 6 HOURS AS NEEDED 18 g 11     No current facility-administered medications for this visit.         PAST HISTORY     PAST MEDICAL HISTORY  He  has a past medical history of Contusion of left hand, initial encounter (04/24/2017), Crushing injury of unspecified finger(s), initial encounter (04/24/2017), Essential (primary) hypertension (05/16/2022), Hyperlipidemia, unspecified (10/03/2022), Hypokalemia (04/24/2017), Idiopathic gout, right knee (04/24/2017), Latent syphilis, unspecified as early or late (04/13/2018), Local infection of the skin and subcutaneous tissue, unspecified (12/05/2017), Long term (current) use of opiate analgesic (04/24/2017), Mastitis without abscess (04/24/2017), Mastodynia (04/24/2017), Muscle spasm of back (04/24/2017), Nocturia (04/24/2017), Other microscopic hematuria (04/24/2017), Other specified postprocedural states (04/24/2017), Other specified postprocedural states (04/24/2017), Other specified symptoms and signs involving the digestive system and abdomen (09/08/2017), Pain in left wrist (04/24/2017), Personal history of other diseases of the musculoskeletal system and connective tissue, Personal history of other diseases of the nervous system and sense organs (03/07/2016), Personal history of other endocrine, nutritional and metabolic disease, Personal history of other infectious and parasitic diseases (09/10/2017), Personal history of other infectious and parasitic diseases (09/08/2017), Personal history of other infectious and parasitic diseases (04/13/2018), Personal history of other specified conditions  (04/30/2018), Personal history of other specified conditions (01/23/2018), Personal history of other specified conditions (07/28/2017), Personal history of other specified conditions (10/08/2017), Proteinuria, unspecified (04/24/2017), Radiculopathy, lumbar region (04/24/2017), Spinal stenosis, site unspecified (08/19/2015), Unspecified abnormal finding in specimens from other organs, systems and tissues (04/24/2017), Unspecified asthma, uncomplicated, and Vertigo.      PAST SURGICAL HISTORY:  Past Surgical History:   Procedure Laterality Date    BACK SURGERY  09/26/2016    Back Surgeryx5, has spinal stimulator    HERNIA REPAIR  08/26/2015    Hernia Repairx2    KNEE SURGERY Right 03/29/2017    Knee Surgery, right TKR    LUMBAR LAMINECTOMY  08/26/2015    Laminectomy Lumbar    MR HEAD ANGIO WO IV CONTRAST  05/21/2022    MR HEAD ANGIO WO IV CONTRAST [place]20[place] [facility]    MR HEAD ANGIO WO IV CONTRAST  03/12/2017    MR HEAD ANGIO WO IV CONTRAST 3/12/2017 [facility]    MR NECK ANGIO WO IV CONTRAST  05/21/2022    MR NECK ANGIO WO IV CONTRAST 5/21/2022 [facility]    MR NECK ANGIO WO IV CONTRAST  03/12/2017    MR NECK ANGIO WO IV CONTRAST 3/12/2017 [facility]    OTHER SURGICAL HISTORY  05/20/2021    Abdominal surgery    OTHER SURGICAL HISTORY Bilateral 02/24/2021    Hip replacement    OTHER SURGICAL HISTORY  09/26/2016    Gastrectomy    SHOULDER SURGERY Left     left TSR       FAMILY HISTORY  Family History   Problem Relation Name Age of Onset    Hypertension Mother      Diabetes Mother      Asthma Mother      Deep vein thrombosis Mother      No Known Problems Father      No Known Problems Sister      Asthma Child         SOCIAL HISTORY  He  reports that he has quit smoking. His smoking use included cigarettes. He has never used smokeless tobacco. He reports that he does not drink alcohol and does not use drugs.     Caffeine consumption: no      PHYSICAL EXAM     VITAL SIGNS: BP  121/73   Pulse 72   Temp 36.2 °C (97.2 °F)   Wt 128 kg (283 lb)   SpO2 95%   BMI 47.09 kg/m²      PREVIOUS WEIGHTS:  Wt Readings from Last 3 Encounters:   04/09/24 128 kg (283 lb)   03/22/24 133 kg (293 lb)   03/19/24 111 kg (245 lb)       Physical Exam  Constitutional: Alert and oriented, cooperative, no obvious distress.   HENT: normocephalic.   Neurologic: AOx3.   psychiatric: appropriate mood and affect.  Integumentary: no significant rashes observed over pap mask area.         RESULTS/DATA     Iron (ug/dL)   Date Value   01/25/2022 77   10/15/2019 88     Iron Saturation (%)   Date Value   01/25/2022 20 (L)   10/15/2019 24 (L)     TIBC (ug/dL)   Date Value   01/25/2022 384   10/15/2019 368     Ferritin (ug/L)   Date Value   01/25/2022 121   10/15/2019 342 (H)         ASSESSMENT/PLAN     Mr. Maxwell is a 48 y.o. male and  has a past medical history of Contusion of left hand, initial encounter (04/24/2017), Crushing injury of unspecified finger(s), initial encounter (04/24/2017), Essential (primary) hypertension (05/16/2022), Hyperlipidemia, unspecified (10/03/2022), Hypokalemia (04/24/2017), Idiopathic gout, right knee (04/24/2017), Latent syphilis, unspecified as early or late (04/13/2018), Local infection of the skin and subcutaneous tissue, unspecified (12/05/2017), Long term (current) use of opiate analgesic (04/24/2017), Mastitis without abscess (04/24/2017), Mastodynia (04/24/2017), Muscle spasm of back (04/24/2017), Nocturia (04/24/2017), Other microscopic hematuria (04/24/2017), Other specified postprocedural states (04/24/2017), Other specified postprocedural states (04/24/2017), Other specified symptoms and signs involving the digestive system and abdomen (09/08/2017), Pain in left wrist (04/24/2017), Personal history of other diseases of the musculoskeletal system and connective tissue, Personal history of other diseases of the nervous system and sense organs (03/07/2016), Personal history of  other endocrine, nutritional and metabolic disease, Personal history of other infectious and parasitic diseases (09/10/2017), Personal history of other infectious and parasitic diseases (09/08/2017), Personal history of other infectious and parasitic diseases (04/13/2018), Personal history of other specified conditions (04/30/2018), Personal history of other specified conditions (01/23/2018), Personal history of other specified conditions (07/28/2017), Personal history of other specified conditions (10/08/2017), Proteinuria, unspecified (04/24/2017), Radiculopathy, lumbar region (04/24/2017), Spinal stenosis, site unspecified (08/19/2015), Unspecified abnormal finding in specimens from other organs, systems and tissues (04/24/2017), Unspecified asthma, uncomplicated, and Vertigo. He is following up on ES.     Problem List Items Addressed This Visit    None      Problem List and Orders    M with pmhx including asthma, DM2,HTN, back pain on narcotics.      1- ES   split 3/17/2016--> very severe ES, .3, with SpO2 colten 73%. consider Bilevel 17/13cwp.      Reviewed and discussed the above sleep study results as well as download data and management options in details. All questions answered, patient verbalizes understanding.      -cont. Bilevel 17/13 cwp, rAHI 0.5 but has a large leak.   -ordered supplies, [facility]  -ordered mask refitting, [facility]; also provided sample mask in clinic today.   -his new pap is through [facility]   -patient would like to use [facility] for his supplies.     -he feels that the pressure is too low when he first turns on his machine--> changing ramp from 6cwp to 12cwp for 15min.     You can use a chin strap to help with your dry mouth, you can also adjust your humidity level to your comfort. You can use a nasal spray to help you breathe better from your nose.    -Please work with your DME to find a mask that fits you well, and controls your leak. May try top attaching tubing and chin strap.       -do not  drive or operate heavy machinery if drowsy.  -avoid sleeping on your back.   -avoid sedating substances/ medication, alcohol, illicit drugs and tobacco.     2- Obesity  counseled on eating a healthy diet and exercising as tolerated.     f/u  6 months or sooner as needed.

## 2024-04-09 NOTE — PATIENT INSTRUCTIONS
[facility] Sleep Medicine  [facility]  [address]  [facility] [address]  [phone]       NAME: Tomasz Maxwell   DATE: 4/9/2024     Your Sleep Provider Today: Magdiel Villegas DO  Your Primary Care Physician: Bella Fernando MD   Your Referring Provider: No ref. provider found    DIAGNOSIS:   1. ES (obstructive sleep apnea)            Thank you for coming to the Sleep Medicine Clinic today! Your sleep medicine provider today was: Magdiel Villegas DO Below is a summary of your treatment plan, other important information, and our contact numbers:      TREATMENT PLAN   Follow up in 6 months or sooner as needed.     Instructions - Common ES Recs: - For your sleep apnea, continue to use your PAP every night and use it whenever you are sleeping.   - Avoid alcohol or sedatives several hours prior to sleeping.   - Get additional supplies for your PAP (e.g., mask, hose, filters) every 3 months or as your insurance allows from your [employer] company. Replacement cushions for your PAP mask can be requested monthly if airseals are an issue.  - Remember to clean your mask, tubings, and water chamber regularly as instructed.  - Avoid driving or operating heavy machinery when drowsy. A person driving while sleepy is five (5) times more likely to have an accident. If you feel sleepy, pull over and take a short power nap (sleep for less than 30 minutes). Otherwise, ask somebody to drive you.        IMPORTANT INFORMATION     Call 911 for medical emergencies.  Our offices are generally open from Monday-Friday, 9 am - 5 pm.  If you need to get in touch with me, you may either call me and my team(number is below) or you can use [place].  If a referral for a test, for CPAP, or for another specialist was made, and you have not heard about scheduling this within a week, please call scheduling at [phone]).  If you are unable to make your appointment for  clinic or an overnight study, kindly call the office at least 48 hours in advance to cancel and reschedule.  If you are on CPAP, please bring your device's card or the device to each clinic appointment.   There are no supporting services by either the sleep doctors or their staff on weekends and Holidays, or after 5 PM on weekdays.   If you have been asked to come to a sleep study, make sure you bring toiletries, a comfy pillow, and any nighttime medications that you may regularly take. Also be sure to eat dinner before you arrive. We generally do not provide meals.      PRESCRIPTIONS     We require 7 days advanced notice for prescription refills. If we do not receive the request in this time, we cannot guarantee that your medication will be refilled in time.      IMPORTANT PHONE NUMBERS     Sleep Medicine Clinic Fax: [phone]  Appointments (for Pediatric Sleep Clinic): [phone] - option 1  Appointments (for Adult Sleep Clinic): [phone] - option 2  Appointments (For Sleep Studies): [phone] - option 3  Behavioral Sleep Medicine: [phone]  Sleep Surgery: [phone]  ENT (Otolaryngology): [phone]  Headache Clinic (Neurology): [phone]  Neurology: [phone]  Psychiatry: [phone]  Pulmonary Function Testing (PFT) Center: [phone]  Pulmonary Medicine: [phone]  [employer] (DME): [phone]  [employer] (DME): [phone]  [facility] ([facility]): [phone]      OUR ADULT SLEEP MEDICINE TEAM   Please do not hesitate to call the office or sleep nurse with any questions between appointments:    Adult Sleep Nurses (Suha Rainey, RN and Lindsay Hernandez RN):  For clinical questions and refilling prescriptions: [phone]  Email sleep diaries and other documents at: [email or web address]    Adult Sleep Medicine Secretaries:  Yamini Reece (For Erick/Foss/Richardson/Golden/Mariam/Delfino):   P: [phone]  F:  [phone]  Deborah Kaufman (For Sagastume/Guggenyancyler): P: [phone]  Fax: [phone]  Tabby Haskaitlin (For Jurjeronimo/Blank): P: [phone]  F: [phone]  Amy Oneal (For Michelle): P: [phone]  F: [phone]  Luann Summers (For Eliza/Chastity/Zakhary): P: [phone]  F: [phone]  Maddy Masters (For Demetrius/Toby): P: [phone]  F: [phone]     Adult Sleep Medicine Advanced Practice Providers:  Bucky Horton ([place], [place])  Naomie Haywood ([facility])  Effie Meza CNP ([place], [place], [place])  Lilia Bai CNP ([place], [place], [place])  Hope Martinez ([place], [place], [place])  Arash Luis CNP ([facility])        OUR SLEEP TESTING LOCATIONS     Our team will contact you to schedule your sleep study, however, you can contact us as follow:  Main Phone Line (scheduling only): [phone] ([phone]), option 3  Adult and Pediatric Locations  [facility] (6 years and older): [facility] - 4th floor ([address] [place] [address]) After hours line: [phone]  [facility] (Main campus: All ages): [facility], 6th floor. After hours line: [phone]   [place] (5 years and older; younger considered on case-by-case basis): [address]; [address], [address]. Scheduling  After hours line: [phone]   [place] (6 years and older): [address]; [address]; [address]   [place] (6 years and older): [address] [facility] [address]  After hours line: [phone]   [place] (13 years and older) in [place]: [address], 2nd floor  After hours line: [phone]   [place] (13 year and older): [address]  After hours line: [phone]     Adult Only Locations:   [place] (18 years and older): [address] [facility], 2nd floor   [place] (18 years and older): [address] [facility]; 4th floor  After hours line: [phone]  [facility]  "(18 years and older) at [place]: [address] [place] [address]  After hours line: [phone]          CONTACTING YOUR SLEEP MEDICINE PROVIDER     Send a message directly to your provider through \"My Chart\", which is the email service through your  Records Account: [email or web address]   Call [phone] and leave a message. One of the administrative assistants will forward the message to your sleep medicine provider through \"My Chart\" and/or email.     Your sleep medicine provider for this visit was: Magdiel Villegas DO        "

## 2024-04-11 ENCOUNTER — OFFICE VISIT (OUTPATIENT)
Dept: PAIN MEDICINE | Facility: HOSPITAL | Age: 49
End: 2024-04-11
Payer: MEDICARE

## 2024-04-11 DIAGNOSIS — M54.12 CERVICAL RADICULOPATHY: ICD-10-CM

## 2024-04-11 PROCEDURE — 99214 OFFICE O/P EST MOD 30 MIN: CPT | Performed by: ANESTHESIOLOGY

## 2024-04-11 ASSESSMENT — PAIN SCALES - GENERAL: PAINLEVEL: 7

## 2024-04-11 NOTE — PROGRESS NOTES
Chief Complaint   Patient presents with    Shoulder Pain    Neck Pain     History Of Present Illness  Tomasz Maxwell is a 48 y.o. male with a past medical history of postlaminectomy syndrome status post spinal cord stimulator implantation and revision in 2020 presents to pain clinic for continued management of neck and shoulder pain.  Patient was usually followed for his low back pain and most recently underwent a bilateral L4 transforaminal epidural steroid injection 10/30/2023 which he states provided great relief and is still ongoing.  He states his spinal cord stimulator has been working well and uses it a lot less than he needed per prior but definitely helps when he has flareups of his low back and radicular leg pain.  Patient presents to pain clinic for further management of his right shoulder pain.  Patient most recently underwent a right shoulder replacement in fall of 2023.  Since the surgery is described stabbing aching pain that is worse with any type of arm range of motion.  He has seen orthopedic surgery and wanted to determine if his pain is coming from his neck at all as he is having some radicular like components of his pain.  He has pain which he describes as some from his neck concentrate at his shoulder and then go down to his entirety of his hands. Patient does have an EMG that did not show evidence of radiculopathy.  However it did show evidence of carpal tunnel.  MRI demonstrated some mild foraminal narrowing at C5-6 as well as C6-7.  Dr. Lebron at this time felt like it was mechanical right shoulder pain after shoulder surgery with some radicular leg symptoms in the upper extremity and was possibly wanted to consider a diagnostic cervical nerve root injection to see if it has radicular pain coming from the neck.  For medications patient is taking Percocet 7.5 mg as needed which she gets from his primary care doctor. The pain causes significant stress in the patient's life, specifically  interferes with general activity, mood, walking ability, ability to perform tasks at home and/or work.  Patient participates in physical therapy and continues to perform physician directed exercises at home. Denies any bowel or bladder incontinence, saddle anesthesia, worsening pain, weakness or falls.     Past Medical History  He has a past medical history of Contusion of left hand, initial encounter (04/24/2017), Crushing injury of unspecified finger(s), initial encounter (04/24/2017), Essential (primary) hypertension (05/16/2022), Hyperlipidemia, unspecified (10/03/2022), Hypokalemia (04/24/2017), Idiopathic gout, right knee (04/24/2017), Latent syphilis, unspecified as early or late (04/13/2018), Local infection of the skin and subcutaneous tissue, unspecified (12/05/2017), Long term (current) use of opiate analgesic (04/24/2017), Mastitis without abscess (04/24/2017), Mastodynia (04/24/2017), Muscle spasm of back (04/24/2017), Nocturia (04/24/2017), Other microscopic hematuria (04/24/2017), Other specified postprocedural states (04/24/2017), Other specified postprocedural states (04/24/2017), Other specified symptoms and signs involving the digestive system and abdomen (09/08/2017), Pain in left wrist (04/24/2017), Personal history of other diseases of the musculoskeletal system and connective tissue, Personal history of other diseases of the nervous system and sense organs (03/07/2016), Personal history of other endocrine, nutritional and metabolic disease, Personal history of other infectious and parasitic diseases (09/10/2017), Personal history of other infectious and parasitic diseases (09/08/2017), Personal history of other infectious and parasitic diseases (04/13/2018), Personal history of other specified conditions (04/30/2018), Personal history of other specified conditions (01/23/2018), Personal history of other specified conditions (07/28/2017), Personal history of other specified conditions  (10/08/2017), Proteinuria, unspecified (04/24/2017), Radiculopathy, lumbar region (04/24/2017), Spinal stenosis, site unspecified (08/19/2015), Unspecified abnormal finding in specimens from other organs, systems and tissues (04/24/2017), Unspecified asthma, uncomplicated, and Vertigo.    Surgical History  He has a past surgical history that includes Hernia repair (08/26/2015); Lumbar laminectomy (08/26/2015); Other surgical history (05/20/2021); Knee surgery (Right, 03/29/2017); Other surgical history (Bilateral, 02/24/2021); Other surgical history (09/26/2016); Back surgery (09/26/2016); MR angio head wo IV contrast (05/21/2022); MR angio neck wo IV contrast (05/21/2022); MR angio head wo IV contrast (03/12/2017); MR angio neck wo IV contrast (03/12/2017); and Shoulder surgery (Left).     Social History  He reports that he has quit smoking. His smoking use included cigarettes. He has never used smokeless tobacco. He reports that he does not drink alcohol and does not use drugs.    Family History  Family History   Problem Relation Name Age of Onset    Hypertension Mother      Diabetes Mother      Asthma Mother      Deep vein thrombosis Mother      No Known Problems Father      No Known Problems Sister      Asthma Child          Allergies  Patient has no known allergies.    Review of Symptoms:   Constitutional: Negative for chills, diaphoresis or fever  HENT: Negative for neck swelling  Eyes:.  Negative for eye pain  Respiratory:.  Negative for cough, shortness of breath or wheezing    Cardiovascular:.  Negative for chest pain or palpitations  Gastrointestinal:.  Negative for abdominal pain, nausea and vomiting  Genitourinary:.  Negative for urgency  Musculoskeletal:  Positive for back pain. Positive for joint pain. Denies falls within the past 3 months.  Skin: Negative for wounds or itching   Neurological: Negative for dizziness, seizures, loss of consciousness and weakness  Endo/Heme/Allergies: Does not  bruise/bleed easily  Psychiatric/Behavioral: Negative for depression. The patient does not appear anxious.       PHYSICAL EXAM  Vitals signs reviewed  Constitutional:       General: Not in acute distress     Appearance: Normal appearance. Not ill-appearing.  HENT:     Head: Normocephalic and atraumatic  Eyes:     Conjunctiva/sclera: Conjunctivae normal  Cardiovascular:     Rate and Rhythm: Normal rate and regular rhythm  Pulmonary:     Effort: No respiratory distress  Abdominal:     Palpations: Abdomen is soft  Musculoskeletal: PHILLIPS  Skin:     General: Skin is warm and dry  Neurological:     General: No focal deficit present  Psychiatric:         Mood and Affect: Mood normal         Behavior: Behavior normal    Advanced Exam   Inspection: Surgical scar well-healed  Palpation: No tenderness of patient of lumbar midline, lumbar paraspinals, bilateral SI joints  ROM: Normal range of motion of the lumbar flexion extension  Motor: 5-5 strength upper and lower extremities  Sensory: Negative for sensory abnormalities in upper extremities  Limited range of motion of right shoulder.  Passive movement causes severe pain in his right shoulder  Reflexes: 2+ reflexes bilateral upper and lower extremities  Positive Spurling bilaterally.  Negative Britni bilaterally     Last Recorded Vitals  There were no vitals taken for this visit.    Relevant Results  Current Outpatient Medications   Medication Instructions    albuterol 2.5 mg /3 mL (0.083 %) nebulizer solution USE 1 VIAL IN NEBULIZER EVERY 4 HOURS - and as needed    amLODIPine (NORVASC) 10 mg, oral, Daily    atorvastatin (Lipitor) 80 mg tablet TAKE 1 TABLET AT BEDTIME.    cholecalciferol (VITAMIN D-3) 50 mcg, oral, Daily    loratadine (CLARITIN) 10 mg, oral, Daily    metFORMIN (GLUCOPHAGE) 1,000 mg, oral, Daily with breakfast    montelukast (SINGULAIR) 10 mg, oral, Nightly    naloxone (NARCAN) 4 mg, nasal, As needed, May repeat every 2-3 minutes if needed, alternating  nostrils, until medical assistance becomes available.    oxyCODONE-acetaminophen (Percocet) 7.5-325 mg tablet 1 tablet, oral, Every 6 hours PRN    [START ON 4/17/2024] oxyCODONE-acetaminophen (Percocet) 7.5-325 mg tablet 1 tablet, oral, Every 6 hours PRN    sennosides-docusate sodium (Maribell-Colace) 8.6-50 mg tablet 1 tablet, oral, Daily RT    sildenafil (Viagra) 100 mg tablet TAKE 1 TABLET DAILY 1 HOUR BEFORE NEEDED    Symbicort 160-4.5 mcg/actuation inhaler 2 puffs, inhalation, 2 times daily, Rinse mouth after use.    tamsulosin (FLOMAX) 0.8 mg, oral, Nightly    Ventolin HFA 90 mcg/actuation inhaler INHALE 1 OR 2 PUFFS EVERY 4 TO 6 HOURS AS NEEDED         MR cervical spine wo IV contrast 08/12/2023    Narrative  Interpreted By:  SARAHY SHERIDAN MD  MRN: [id number]  Patient Name: ASHLEIGH MURGUIA    STUDY:  MRI CERVICAL WO;  8/12/2023 12:22 pm    INDICATION:  pain  M54.2: Cervical pain.    COMPARISON:  None.    ACCESSION NUMBER(S):  [id number]    ORDERING CLINICIAN:  OLIVIA VELEZ    TECHNIQUE:  The cervical spine was studied in the sagittal and axial planes  utilizing T1 and T2 weighted images.    FINDINGS:  The craniovertebral junction is normal. The cord is normal in size  and signal. The marrow signal is normal.  Serial axial images reveal the following:  C2/C3  There is normal alignment and vertebral body height. The disc space  is normal. There is no evidence of canal or foraminal narrowing.  There is no evidence of bulging or herniated disc.  C3/C4  Asymmetrical bulging disc or uncovertebral joint hypertrophy to the  left with focal left-sided foraminal narrowing. No measurable canal  stenosis.  C4/C5  Asymmetrical bulging disc or uncovertebral joint hypertrophy toward  the left with left-sided foraminal narrowing. No measurable canal  stenosis.  C5/C6  Asymmetrical bulging intervertebral disc or uncovertebral joint  hypertrophy toward the left with mild left-sided foraminal narrowing.  No measurable canal  stenosis.  C6/C7  Mild circumferential bulging intervertebral disc to the left without  canal stenosis. Mild left-sided foraminal narrowing.  C7/T1  There is normal alignment and vertebral body height. The disc space  is normal. There is no evidence of canal or foraminal narrowing.  There is no evidence of bulging or herniated disc.    Impression  * Cervical spondylosis as described with left-sided foraminal  narrowing at several levels  *No measurable canal stenosis or cord compression.    THIS EXAMINATION WAS INTERPRETED AT [facility] [place]      MR lumbar spine wo IV contrast 08/12/2023    Narrative  Interpreted By:  SARAHY SHERIDAN MD  MRN: [id number]  Patient Name: ASHLEIGH MURGUIA    STUDY:  MRI L-SPINE WO;  8/12/2023 12:21 pm    INDICATION:  pain  M96.1: Lumbar post-laminectomy syndrome.    COMPARISON:  August 2019.    ACCESSION NUMBER(S):  [id number]    ORDERING CLINICIAN:  OLIVIA VELEZ    TECHNIQUE:  The lumbar spine was studied in the sagital, axial and coronal planes  utiliing T1 and T2 weighted images.    FINDINGS:  The marrow signal and vertebral body height are normal. The conus and  sacrum are normal.  Images at each interspace reveal the following:  L1/L2  Bilateral facet hypertrophy without canal or foraminal narrowing  L2/L3  Bilateral facet hypertrophy without canal or foraminal narrowing  L3/L4  Previous laminectomy without canal stenosis. Circumferential bulging  of the intervertebral disc slightly asymmetrical to the left. No  measurable canal stenosis. Minor left-sided foraminal narrowing  L4/L5  Previous laminectomy without canal stenosis. Bilateral facet  hypertrophy. Interval development of herniated nucleus polyposis to  the left measuring approximately 1 cm in size on T2 weighted axial  image 44/62. Focal narrowing of the left lateral recess and neural  foramen.  L5/S1  Previous laminectomy. Bilateral facet hypertrophy. No measurable  canal or foraminal narrowing.    Impression  * Interval  development of herniated nucleus polyposis or osteophyte  formation at L4/L5 on the left.    The examination was interpreted [facility]     No image results found.       1. Cervical radiculopathy  Referral to Pain Management           ASSESSMENT/PLAN  Tomasz Maxwell is a 48 y.o. male with a past medical history of postlaminectomy syndrome status post spinal cord stimulator implantation and revision in 2020 presents to pain clinic for continued management of neck and shoulder pain.  In regards to his low back pain patient most recent underwent a bilateral L4 transforaminal epidural steroid injection that is provided great lasting relief.  His injections alongside his spinal cord stimulator has helped him greatly.   Patient presents to pain clinic for further management of his right shoulder pain.  Patient most recently underwent a right shoulder replacement in fall of 2023.  Since the surgery he is described stabbing, aching, pain that is worse with any type of arm range of motion.  This is confounded by the fact that he also has neck pain and shooting pain that goes to his hands which she describes as numbness and tingling.  This was further investigated with an EMG that was largely unremarkable however did have components of carpal tunnel. MRI demonstrated some mild foraminal narrowing at C5-6 as well as C6-7 but no major canal or foraminal stenosis seen.  Dr. Lebron with spine surgery as well as Dr. Claros with orthopedic joint surgery is recommending diagnostic as well as possible therapeutic cervical epidural steroid injection to see if patient has significant component of his pain coming from his cervical radiculopathy prior to further surgical intervention on his shoulder or neck.    Our plan is as follows:  - Cervical epidural steroid injection, C6-7, right-sided bias.  Procedure, risk, benefits, alternatives reviewed with the patient.  - Continue to participate in physical therapy as well  as physician directed home exercises  - Continue pain medications as prescribed.  Consider adding neuromodulating medications if patient desires, patient deferred at this time     Rodriguez Cutler MD

## 2024-04-16 DIAGNOSIS — N52.9 ERECTILE DYSFUNCTION, UNSPECIFIED ERECTILE DYSFUNCTION TYPE: ICD-10-CM

## 2024-04-17 RX ORDER — SILDENAFIL 100 MG/1
TABLET, FILM COATED ORAL
Qty: 10 TABLET | Refills: 3 | Status: SHIPPED | OUTPATIENT
Start: 2024-04-17 | End: 2024-05-26 | Stop reason: SINTOL

## 2024-04-22 ENCOUNTER — APPOINTMENT (OUTPATIENT)
Dept: PULMONOLOGY | Facility: HOSPITAL | Age: 49
End: 2024-04-22
Payer: MEDICARE

## 2024-04-22 LAB
FUNGUS SPEC CULT: NORMAL
FUNGUS SPEC FUNGUS STN: NORMAL

## 2024-04-29 ENCOUNTER — HOSPITAL ENCOUNTER (OUTPATIENT)
Dept: RADIOLOGY | Facility: HOSPITAL | Age: 49
Discharge: HOME | End: 2024-04-29
Payer: MEDICARE

## 2024-04-29 VITALS
SYSTOLIC BLOOD PRESSURE: 148 MMHG | OXYGEN SATURATION: 98 % | TEMPERATURE: 97.1 F | HEIGHT: 65 IN | WEIGHT: 265 LBS | HEART RATE: 65 BPM | BODY MASS INDEX: 44.15 KG/M2 | RESPIRATION RATE: 16 BRPM | DIASTOLIC BLOOD PRESSURE: 83 MMHG

## 2024-04-29 DIAGNOSIS — M54.12 CERVICAL RADICULOPATHY: ICD-10-CM

## 2024-04-29 LAB — GLUCOSE BLD MANUAL STRIP-MCNC: 97 MG/DL (ref 74–99)

## 2024-04-29 PROCEDURE — 62321 NJX INTERLAMINAR CRV/THRC: CPT | Performed by: ANESTHESIOLOGY

## 2024-04-29 PROCEDURE — 2500000004 HC RX 250 GENERAL PHARMACY W/ HCPCS (ALT 636 FOR OP/ED): Performed by: ANESTHESIOLOGY

## 2024-04-29 PROCEDURE — 82947 ASSAY GLUCOSE BLOOD QUANT: CPT

## 2024-04-29 RX ORDER — MIDAZOLAM HYDROCHLORIDE 1 MG/ML
INJECTION INTRAMUSCULAR; INTRAVENOUS
Status: COMPLETED | OUTPATIENT
Start: 2024-04-29 | End: 2024-04-29

## 2024-04-29 RX ADMIN — MIDAZOLAM HYDROCHLORIDE 2 MG: 1 INJECTION INTRAMUSCULAR; INTRAVENOUS at 10:08

## 2024-04-29 ASSESSMENT — PAIN SCALES - GENERAL
PAINLEVEL_OUTOF10: 4
PAINLEVEL_OUTOF10: 0 - NO PAIN
PAINLEVEL_OUTOF10: 0 - NO PAIN
PAINLEVEL_OUTOF10: 4

## 2024-04-29 ASSESSMENT — PAIN - FUNCTIONAL ASSESSMENT: PAIN_FUNCTIONAL_ASSESSMENT: 0-10

## 2024-04-29 NOTE — H&P
History Of Present Illness  Tomasz Maxwell is a 48 y.o. male presents for procedure state below. Endorses no changes in past medical history or medical health since last seen in clinic.     Past Medical History  He has a past medical history of Contusion of left hand, initial encounter (04/24/2017), Crushing injury of unspecified finger(s), initial encounter (04/24/2017), Essential (primary) hypertension (05/16/2022), Hyperlipidemia, unspecified (10/03/2022), Hypokalemia (04/24/2017), Idiopathic gout, right knee (04/24/2017), Latent syphilis, unspecified as early or late (04/13/2018), Local infection of the skin and subcutaneous tissue, unspecified (12/05/2017), Long term (current) use of opiate analgesic (04/24/2017), Mastitis without abscess (04/24/2017), Mastodynia (04/24/2017), Muscle spasm of back (04/24/2017), Nocturia (04/24/2017), Other microscopic hematuria (04/24/2017), Other specified postprocedural states (04/24/2017), Other specified postprocedural states (04/24/2017), Other specified symptoms and signs involving the digestive system and abdomen (09/08/2017), Pain in left wrist (04/24/2017), Personal history of other diseases of the musculoskeletal system and connective tissue, Personal history of other diseases of the nervous system and sense organs (03/07/2016), Personal history of other endocrine, nutritional and metabolic disease, Personal history of other infectious and parasitic diseases (09/10/2017), Personal history of other infectious and parasitic diseases (09/08/2017), Personal history of other infectious and parasitic diseases (04/13/2018), Personal history of other specified conditions (04/30/2018), Personal history of other specified conditions (01/23/2018), Personal history of other specified conditions (07/28/2017), Personal history of other specified conditions (10/08/2017), Proteinuria, unspecified (04/24/2017), Radiculopathy, lumbar region (04/24/2017), Spinal stenosis, site  unspecified (08/19/2015), Unspecified abnormal finding in specimens from other organs, systems and tissues (04/24/2017), Unspecified asthma, uncomplicated ([facility]), and Vertigo.    Surgical History  He has a past surgical history that includes Hernia repair (08/26/2015); Lumbar laminectomy (08/26/2015); Other surgical history (05/20/2021); Knee surgery (Right, 03/29/2017); Other surgical history (Bilateral, 02/24/2021); Other surgical history (09/26/2016); Back surgery (09/26/2016); MR angio head wo IV contrast (05/21/2022); MR angio neck wo IV contrast (05/21/2022); MR angio head wo IV contrast (03/12/2017); MR angio neck wo IV contrast (03/12/2017); and Shoulder surgery (Left).     Social History  He reports that he has quit smoking. His smoking use included cigarettes. He has never used smokeless tobacco. He reports that he does not drink alcohol and does not use drugs.    Family History  Family History   Problem Relation Name Age of Onset    Hypertension Mother      Diabetes Mother      Asthma Mother      Deep vein thrombosis Mother      No Known Problems Father      No Known Problems Sister      Asthma Child          Allergies  Patient has no known allergies.    Review of Symptoms:   Constitutional: Negative for chills, diaphoresis or fever  HENT: Negative for neck swelling  Eyes:.  Negative for eye pain  Respiratory:.  Negative for cough, shortness of breath or wheezing    Cardiovascular:.  Negative for chest pain or palpitations  Gastrointestinal:.  Negative for abdominal pain, nausea and vomiting  Genitourinary:.  Negative for urgency  Musculoskeletal:  Positive for neck pain. Positive for joint pain. Denies falls within the past 3 months.  Skin: Negative for wounds or itching   Neurological: Negative for dizziness, seizures, loss of consciousness and weakness  Endo/Heme/Allergies: Does not bruise/bleed easily  Psychiatric/Behavioral: Negative for depression. The patient does not appear anxious.        PHYSICAL EXAM  Vitals signs reviewed  Constitutional:       General: Not in acute distress     Appearance: Normal appearance. Not ill-appearing.  HENT:     Head: Normocephalic and atraumatic  Eyes:     Conjunctiva/sclera: Conjunctivae normal  Cardiovascular:     Rate and Rhythm: Normal rate and regular rhythm  Pulmonary:     Effort: No respiratory distress  Abdominal:     Palpations: Abdomen is soft  Musculoskeletal: PHILLIPS  Skin:     General: Skin is warm and dry  Neurological:     General: No focal deficit present  Psychiatric:         Mood and Affect: Mood normal         Behavior: Behavior normal     Last Recorded Vitals  There were no vitals taken for this visit.    Relevant Results  Current Outpatient Medications   Medication Instructions    albuterol 2.5 mg /3 mL (0.083 %) nebulizer solution USE 1 VIAL IN NEBULIZER EVERY 4 HOURS - and as needed    amLODIPine (NORVASC) 10 mg, oral, Daily    atorvastatin (Lipitor) 80 mg tablet TAKE 1 TABLET AT BEDTIME.    cholecalciferol (VITAMIN D-3) 50 mcg, oral, Daily    loratadine (CLARITIN) 10 mg, oral, Daily    metFORMIN (GLUCOPHAGE) 1,000 mg, oral, Daily with breakfast    montelukast (SINGULAIR) 10 mg, oral, Nightly    naloxone (NARCAN) 4 mg, nasal, As needed, May repeat every 2-3 minutes if needed, alternating nostrils, until medical assistance becomes available.    oxyCODONE-acetaminophen (Percocet) 7.5-325 mg tablet 1 tablet, oral, Every 6 hours PRN    oxyCODONE-acetaminophen (Percocet) 7.5-325 mg tablet 1 tablet, oral, Every 6 hours PRN    sennosides-docusate sodium (Maribell-Colace) 8.6-50 mg tablet 1 tablet, oral, Daily RT    sildenafil (Viagra) 100 mg tablet TAKE 1 TABLET DAILY 1 HOUR BEFORE NEEDED    Symbicort 160-4.5 mcg/actuation inhaler 2 puffs, inhalation, 2 times daily, Rinse mouth after use.    tamsulosin (FLOMAX) 0.8 mg, oral, Nightly    Ventolin HFA 90 mcg/actuation inhaler INHALE 1 OR 2 PUFFS EVERY 4 TO 6 HOURS AS NEEDED         MR cervical spine wo IV  contrast 08/12/2023    Narrative  Interpreted By:  SARAHY SHERIDAN MD  MRN: [id number]  Patient Name: ASHLEIGH MURGUIA    STUDY:  MRI CERVICAL WO;  8/12/2023 12:22 pm    INDICATION:  pain  M54.2: Cervical pain.    COMPARISON:  None.    ACCESSION NUMBER(S):  [id number]    ORDERING CLINICIAN:  OLIVIA VELEZ    TECHNIQUE:  The cervical spine was studied in the sagittal and axial planes  utilizing T1 and T2 weighted images.    FINDINGS:  The craniovertebral junction is normal. The cord is normal in size  and signal. The marrow signal is normal.  Serial axial images reveal the following:  C2/C3  There is normal alignment and vertebral body height. The disc space  is normal. There is no evidence of canal or foraminal narrowing.  There is no evidence of bulging or herniated disc.  C3/C4  Asymmetrical bulging disc or uncovertebral joint hypertrophy to the  left with focal left-sided foraminal narrowing. No measurable canal  stenosis.  C4/C5  Asymmetrical bulging disc or uncovertebral joint hypertrophy toward  the left with left-sided foraminal narrowing. No measurable canal  stenosis.  C5/C6  Asymmetrical bulging intervertebral disc or uncovertebral joint  hypertrophy toward the left with mild left-sided foraminal narrowing.  No measurable canal stenosis.  C6/C7  Mild circumferential bulging intervertebral disc to the left without  canal stenosis. Mild left-sided foraminal narrowing.  C7/T1  There is normal alignment and vertebral body height. The disc space  is normal. There is no evidence of canal or foraminal narrowing.  There is no evidence of bulging or herniated disc.    Impression  * Cervical spondylosis as described with left-sided foraminal  narrowing at several levels  *No measurable canal stenosis or cord compression.    THIS EXAMINATION WAS INTERPRETED AT [facility] [place]      MR lumbar spine wo IV contrast 08/12/2023    Narrative  Interpreted By:  SARAHY SHERIDAN MD  MRN: [id number]  Patient Name: SHANTAL  ASHLEIGH    STUDY:  MRI L-SPINE WO;  8/12/2023 12:21 pm    INDICATION:  pain  M96.1: Lumbar post-laminectomy syndrome.    COMPARISON:  August 2019.    ACCESSION NUMBER(S):  [id number]    ORDERING CLINICIAN:  OLIVIA VELEZ    TECHNIQUE:  The lumbar spine was studied in the sagital, axial and coronal planes  utiliing T1 and T2 weighted images.    FINDINGS:  The marrow signal and vertebral body height are normal. The conus and  sacrum are normal.  Images at each interspace reveal the following:  L1/L2  Bilateral facet hypertrophy without canal or foraminal narrowing  L2/L3  Bilateral facet hypertrophy without canal or foraminal narrowing  L3/L4  Previous laminectomy without canal stenosis. Circumferential bulging  of the intervertebral disc slightly asymmetrical to the left. No  measurable canal stenosis. Minor left-sided foraminal narrowing  L4/L5  Previous laminectomy without canal stenosis. Bilateral facet  hypertrophy. Interval development of herniated nucleus polyposis to  the left measuring approximately 1 cm in size on T2 weighted axial  image 44/62. Focal narrowing of the left lateral recess and neural  foramen.  L5/S1  Previous laminectomy. Bilateral facet hypertrophy. No measurable  canal or foraminal narrowing.    Impression  * Interval development of herniated nucleus polyposis or osteophyte  formation at L4/L5 on the left.    The examination was interpreted [facility]     No image results found.       1. Cervical radiculopathy  Epidural Steroid Injection    Epidural Steroid Injection    [place] pain management    [place] pain management           ASSESSMENT/PLAN  Ashleigh Maxwell is a 48 y.o. male presents for C6-7 interlaminar epidural steroid injection     Our plan is as follows:  - Follow In pain clinic  - Continue to participate in physical therapy as well as physician directed home exercises  - Continue pain medications as prescribed       Conrado Simpson MD

## 2024-04-29 NOTE — PROCEDURES
Preoperative diagnosis:  Cervical radiculitis  Postoperative diagnosis:  Cervical radiculitis  Procedure: Right biased C6-7 cervical epidural steroid injection under fluoroscopic guidance  Surgeon: Avelina Covington  Assistant:  Fellow, Conrado Simpson  Anesthesia: Local, IV sedation  Complications: Apparently none    Clinical note: Tomasz Maxwell is a 48-year-old male with a history of neck pain and right-sided radicular symptoms.  There is a question as to where exactly his pain comes from and if there both a neck issue and his shoulder issue.  Both spine surgery and orthopedic shoulder surgery requested for potential epidural injection in the cervical area as both a diagnostic and potentially therapeutic injection.  He presents today for the aforementioned procedure.    Procedure note: The patient was met in the preoperative holding area after risks benefits and alternatives to procedure were discussed with the patient, informed consent was obtained. Patient brought back to the procedure room and placed in the prone position on the fluoroscopy table. Area over the neck was exposed, prepped, draped, in the usual sterile fashion.  Skin and subcutaneous tissues to the cervical intralaminar space was anesthetized using 0.5% lidocaine.  Initially a 3-1/2 inch needle was utilized but it was not quite long enough to access the space so a 4-1/2 inch needle was utilized 17-gauge Tuohy needle was inserted in the skin and advanced into the interlaminar space. A glass syringe was used to achieve the epidural space using the loss resistance technique. Contrast was injected which showed appropriate epidural spread, no intravascular or intrathecal uptake.  Placement confirmed in the AP and contralateral oblique views.  A total of 2 mL's of 0.5% lidocaine mixed with 40 mg methylprednisolone was injected. Needle removed, bandage applied, patient tolerated the procedure well with no immediate complications.

## 2024-05-02 DIAGNOSIS — M96.1 LUMBAR POST-LAMINECTOMY SYNDROME: ICD-10-CM

## 2024-05-02 DIAGNOSIS — Z79.891 CHRONIC PRESCRIPTION OPIATE USE: ICD-10-CM

## 2024-05-06 DIAGNOSIS — M96.1 LUMBAR POST-LAMINECTOMY SYNDROME: ICD-10-CM

## 2024-05-06 DIAGNOSIS — F11.90 CHRONIC, CONTINUOUS USE OF OPIOIDS: ICD-10-CM

## 2024-05-06 RX ORDER — OXYCODONE AND ACETAMINOPHEN 7.5; 325 MG/1; MG/1
1 TABLET ORAL EVERY 6 HOURS PRN
Qty: 90 TABLET | Refills: 0 | OUTPATIENT
Start: 2024-05-06 | End: 2024-06-05

## 2024-05-06 RX ORDER — OXYCODONE AND ACETAMINOPHEN 7.5; 325 MG/1; MG/1
1 TABLET ORAL EVERY 6 HOURS PRN
Qty: 90 TABLET | Refills: 0 | Status: SHIPPED | OUTPATIENT
Start: 2024-05-17 | End: 2024-06-16

## 2024-05-24 ENCOUNTER — OFFICE VISIT (OUTPATIENT)
Dept: PRIMARY CARE | Facility: CLINIC | Age: 49
End: 2024-05-24
Payer: MEDICARE

## 2024-05-24 VITALS
HEART RATE: 70 BPM | SYSTOLIC BLOOD PRESSURE: 118 MMHG | DIASTOLIC BLOOD PRESSURE: 76 MMHG | WEIGHT: 270.6 LBS | BODY MASS INDEX: 45.08 KG/M2 | OXYGEN SATURATION: 91 % | TEMPERATURE: 95.9 F | HEIGHT: 65 IN

## 2024-05-24 DIAGNOSIS — N52.9 ERECTILE DYSFUNCTION, UNSPECIFIED ERECTILE DYSFUNCTION TYPE: ICD-10-CM

## 2024-05-24 DIAGNOSIS — Z79.891 CHRONIC PRESCRIPTION OPIATE USE: ICD-10-CM

## 2024-05-24 DIAGNOSIS — R42 DIZZINESS: ICD-10-CM

## 2024-05-24 DIAGNOSIS — M96.1 LUMBAR POST-LAMINECTOMY SYNDROME: Primary | ICD-10-CM

## 2024-05-24 DIAGNOSIS — E11.9 TYPE 2 DIABETES MELLITUS WITHOUT COMPLICATION, WITHOUT LONG-TERM CURRENT USE OF INSULIN (MULTI): ICD-10-CM

## 2024-05-24 PROCEDURE — 99214 OFFICE O/P EST MOD 30 MIN: CPT | Performed by: STUDENT IN AN ORGANIZED HEALTH CARE EDUCATION/TRAINING PROGRAM

## 2024-05-24 PROCEDURE — 3078F DIAST BP <80 MM HG: CPT | Performed by: STUDENT IN AN ORGANIZED HEALTH CARE EDUCATION/TRAINING PROGRAM

## 2024-05-24 PROCEDURE — 3074F SYST BP LT 130 MM HG: CPT | Performed by: STUDENT IN AN ORGANIZED HEALTH CARE EDUCATION/TRAINING PROGRAM

## 2024-05-24 RX ORDER — TADALAFIL 20 MG/1
20 TABLET ORAL DAILY PRN
Qty: 10 TABLET | Refills: 11 | Status: SHIPPED | OUTPATIENT
Start: 2024-05-24 | End: 2024-06-23

## 2024-05-24 RX ORDER — OXYCODONE AND ACETAMINOPHEN 7.5; 325 MG/1; MG/1
1 TABLET ORAL EVERY 6 HOURS PRN
Qty: 90 TABLET | Refills: 0 | Status: SHIPPED | OUTPATIENT
Start: 2024-06-15 | End: 2024-07-15

## 2024-05-24 ASSESSMENT — PATIENT HEALTH QUESTIONNAIRE - PHQ9
1. LITTLE INTEREST OR PLEASURE IN DOING THINGS: SEVERAL DAYS
10. IF YOU CHECKED OFF ANY PROBLEMS, HOW DIFFICULT HAVE THESE PROBLEMS MADE IT FOR YOU TO DO YOUR WORK, TAKE CARE OF THINGS AT HOME, OR GET ALONG WITH OTHER PEOPLE: SOMEWHAT DIFFICULT
2. FEELING DOWN, DEPRESSED OR HOPELESS: NOT AT ALL
SUM OF ALL RESPONSES TO PHQ9 QUESTIONS 1 AND 2: 1

## 2024-05-24 ASSESSMENT — COLUMBIA-SUICIDE SEVERITY RATING SCALE - C-SSRS
6. HAVE YOU EVER DONE ANYTHING, STARTED TO DO ANYTHING, OR PREPARED TO DO ANYTHING TO END YOUR LIFE?: NO
2. HAVE YOU ACTUALLY HAD ANY THOUGHTS OF KILLING YOURSELF?: NO
1. IN THE PAST MONTH, HAVE YOU WISHED YOU WERE DEAD OR WISHED YOU COULD GO TO SLEEP AND NOT WAKE UP?: NO

## 2024-05-24 ASSESSMENT — PAIN SCALES - GENERAL: PAINLEVEL: 7

## 2024-05-24 ASSESSMENT — ENCOUNTER SYMPTOMS
LOSS OF SENSATION IN FEET: 0
OCCASIONAL FEELINGS OF UNSTEADINESS: 0
DEPRESSION: 0

## 2024-05-24 NOTE — PROGRESS NOTES
Subjective   Patient ID: Tomasz Maxwell is a 48 y.o. male who presents for Follow-up and Med Refill.    HPI    #s/p R shoulder replacement (2023)  #post laminectomy syndrome  #chronic pain  - pain blockier in shoulder;  - pain improved for 2 weeks, but still has limited ROM of shoulder  - states pain stimulator is no longer helpful  - no red flag sx    #Dizzines  - pt states h/o BPPV, used to be on meds, resolved overtime  - catching himeself more dizziness, jagjit when standing  - but dizziness is not associated with head movements like when he had BPPV  - mouth dry  - drinks multiple bottles of water a day    #Erectile Dysfunction  - viagra not working  - on highest dose 100mg  - last PSA WNL    #Transfer of care  - wants Dr. Mcnair as his PCP as he is her prior patient    [facility]:  Bella Fernando MD on 5/26/2024  5:43 PM  I have personally reviewed the [facility] report for Tomasz Maxwell. I have considered the risks of abuse, dependence, addiction and diversion    Is the patient prescribed a combination of a benzodiazepine and opioid?  Yes, I feel it is clincially indicated to continue the medication and have discussed with the patient risks/benefits/alternatives.    Last Urine Drug Screen / ordered today: No  Recent Results (from the past 8760 hour(s))   Opiate Confirmation, Urine    Collection Time: 01/23/24 11:41 AM   Result Value Ref Range    6-Acetylmorphine <25 <25 ng/mL    Codeine <50 <50 ng/mL    Hydrocodone <25 <25 ng/mL    Hydromorphone <25 <25 ng/mL    Morphine  <50 <50 ng/mL    Norhydrocodone <25 <25 ng/mL    Noroxycodone 166 (H) <25 ng/mL    Oxycodone 957 (H) <25 ng/mL    Oxymorphone 155 (H) <25 ng/mL   Drug Screen, Urine With Reflex to Confirmation    Collection Time: 01/23/24 11:41 AM   Result Value Ref Range    Amphetamine Screen, Urine Presumptive Negative Presumptive Negative    Barbiturate Screen, Urine Presumptive Negative Presumptive Negative    Benzodiazepines Screen, Urine Presumptive Negative  "Presumptive Negative    Cannabinoid Screen, Urine Presumptive Negative Presumptive Negative    Cocaine Metabolite Screen, Urine Presumptive Negative Presumptive Negative    Fentanyl Screen, Urine Presumptive Negative Presumptive Negative    Opiate Screen, Urine Presumptive Negative Presumptive Negative    Oxycodone Screen, Urine Presumptive Positive (A) Presumptive Negative    PCP Screen, Urine Presumptive Negative Presumptive Negative     Results are as expected.         Controlled Substance Agreement:  Date of the Last Agreement: 8/2023  Reviewed Controlled Substance Agreement including but not limited to the benefits, risks, and alternatives to treatment with a Controlled Substance medication(s).    Opioids:  What is the patient's goal of therapy? Improved mood and activities of daily living (pain so bad he states that he cries himself to sleep)  Is this being achieved with current treatment? yes    I have calculated the patient's Morphine Dose Equivalent (MED):   I have considered referral to Pain Management and/or a specialist, and do not feel it is necessary at this time.    I feel that it is clinically indicated to continue this current medication regimen after consideration of alternative therapies, and other non-opioid treatment.    Opioid Risk Screening:  No data recorded    Pain Assessment:  No data recorded    Review of Systems   All other systems reviewed and are negative.      Objective   /76 (BP Location: Left arm, Patient Position: Sitting, BP Cuff Size: Large adult)   Pulse 70   Temp 35.5 °C (95.9 °F)   Ht 1.651 m (5' 5\")   Wt 123 kg (270 lb 9.6 oz)   SpO2 91%   BMI 45.03 kg/m²      Physical Exam  Constitutional:       General: He is not in acute distress.  HENT:      Head: Normocephalic and atraumatic.   Eyes:      Conjunctiva/sclera: Conjunctivae normal.   Cardiovascular:      Heart sounds: Normal heart sounds. No murmur heard.  Pulmonary:      Effort: Pulmonary effort is normal.      " Breath sounds: Normal breath sounds.   Abdominal:      Palpations: Abdomen is soft.   Musculoskeletal:      Comments: Limited ROM of RUE. Cannot lift past 90 deg due to pain   Neurological:      Mental Status: He is alert and oriented to person, place, and time.      Comments: CN 2-12 grossly intact   Psychiatric:         Mood and Affect: Mood normal.       Assessment/Plan   Diagnoses and all orders for this visit:  Lumbar post-laminectomy syndrome  Comments:  Chronic, worsening at shouldern despite recent R shoulder arthoplasty. Improvevd on 7.5-325mg percocet post-op. Refilled future order today.  Orders:  -     Follow Up In Primary Care - Established  -     oxyCODONE-acetaminophen (Percocet) 7.5-325 mg tablet; Take 1 tablet by mouth every 6 hours if needed for severe pain (7 - 10). Do not fill before Felisa 15, 2024.  -     oxyCODONE-acetaminophen (Percocet) 7.5-325 mg tablet; Take 1 tablet by mouth every 6 hours if needed for severe pain (7 - 10). Do not fill before July 17, 2024.  Chronic prescription opiate use  Comments:  Stable, still requiring pain meds as pain stimulator has stopped being helpful. CSA 8/2023. UDS 1/2024. Refilled at 7.5-325mg dose due to improved pain control.  Orders:  -     Follow Up In Primary Care - Established  -     oxyCODONE-acetaminophen (Percocet) 7.5-325 mg tablet; Take 1 tablet by mouth every 6 hours if needed for severe pain (7 - 10). Do not fill before Felisa 15, 2024.  Erectile dysfunction, unspecified erectile dysfunction type  Comments:  2/2 related to multiple back surgeries. Viagra no longer helpful. Will switch to cialsis. Last PSA WNL. Referred to Urology for other options.  Orders:  -     Follow Up In Primary Care - Established  -     tadalafil (Cialis) 20 mg tablet; Take 1 tablet (20 mg) by mouth once daily as needed for erectile dysfunction.  -     Referral to Urology; Future  Dizziness  Type 2 diabetes mellitus without complication, without long-term current use of  insulin (Multi)  Comments:  Symptomatic w/ increased thirst and dizziness, likely related to T2DM vs h/o BBPV. Last A1c 6.8. C/w metformin 1000mg QD. Will repeat A1c & RFP for electrolytes  Orders:  -     Follow Up In Primary Care - Established  -     Renal function panel; Future  -     Hemoglobin A1c; Future      Follow up/Return to the clinic in 3 months    Attending Supervision: seen and discussed with attending physician, Dr. Tawny Fernando MD  Family Medicine, PGY-3

## 2024-05-26 PROBLEM — Z79.891 CHRONIC PRESCRIPTION OPIATE USE: Status: ACTIVE | Noted: 2024-05-26

## 2024-05-26 PROBLEM — R42 VERTIGO: Status: ACTIVE | Noted: 2024-05-26

## 2024-05-26 RX ORDER — OXYCODONE AND ACETAMINOPHEN 7.5; 325 MG/1; MG/1
1 TABLET ORAL EVERY 6 HOURS PRN
Qty: 90 TABLET | Refills: 0 | Status: SHIPPED | OUTPATIENT
Start: 2024-07-17 | End: 2024-08-16

## 2024-05-28 NOTE — PROGRESS NOTES
I saw and evaluated the patient. I personally obtained the key and critical portions of the history and physical exam or was physically present for key and critical portions performed by the resident/fellow. I reviewed the resident/fellow's documentation and discussed the patient with the resident/fellow. I agree with the resident/fellow's medical decision making as documented in the note.    Chuy Hector MD

## 2024-06-10 RX ORDER — LIDOCAINE HYDROCHLORIDE 10 MG/ML
4 INJECTION, SOLUTION EPIDURAL; INFILTRATION; INTRACAUDAL; PERINEURAL ONCE
Status: SHIPPED | OUTPATIENT
Start: 2024-06-10

## 2024-07-01 ENCOUNTER — APPOINTMENT (OUTPATIENT)
Dept: UROLOGY | Facility: HOSPITAL | Age: 49
End: 2024-07-01
Payer: MEDICARE

## 2024-07-05 ENCOUNTER — APPOINTMENT (OUTPATIENT)
Dept: CARDIOLOGY | Facility: HOSPITAL | Age: 49
End: 2024-07-05
Payer: MEDICARE

## 2024-07-05 ENCOUNTER — HOSPITAL ENCOUNTER (OUTPATIENT)
Facility: HOSPITAL | Age: 49
Setting detail: OBSERVATION
Discharge: HOME | End: 2024-07-06
Attending: EMERGENCY MEDICINE | Admitting: INTERNAL MEDICINE
Payer: MEDICARE

## 2024-07-05 ENCOUNTER — APPOINTMENT (OUTPATIENT)
Dept: RADIOLOGY | Facility: HOSPITAL | Age: 49
End: 2024-07-05
Payer: MEDICARE

## 2024-07-05 DIAGNOSIS — R55 SYNCOPE AND COLLAPSE: Primary | ICD-10-CM

## 2024-07-05 DIAGNOSIS — R74.8 ELEVATED CK-MB LEVEL: ICD-10-CM

## 2024-07-05 DIAGNOSIS — S16.1XXA ACUTE STRAIN OF NECK MUSCLE, INITIAL ENCOUNTER: ICD-10-CM

## 2024-07-05 DIAGNOSIS — R42 DIZZINESS: ICD-10-CM

## 2024-07-05 LAB
ALBUMIN SERPL BCP-MCNC: 4 G/DL (ref 3.4–5)
ALP SERPL-CCNC: 54 U/L (ref 33–120)
ALT SERPL W P-5'-P-CCNC: 18 U/L (ref 10–52)
ANION GAP SERPL CALC-SCNC: 13 MMOL/L (ref 10–20)
AST SERPL W P-5'-P-CCNC: 24 U/L (ref 9–39)
BASOPHILS # BLD AUTO: 0.06 X10*3/UL (ref 0–0.1)
BASOPHILS NFR BLD AUTO: 0.8 %
BILIRUB SERPL-MCNC: 0.7 MG/DL (ref 0–1.2)
BUN SERPL-MCNC: 14 MG/DL (ref 6–23)
CALCIUM SERPL-MCNC: 8.7 MG/DL (ref 8.6–10.3)
CARDIAC TROPONIN I PNL SERPL HS: 4 NG/L (ref 0–20)
CHLORIDE SERPL-SCNC: 99 MMOL/L (ref 98–107)
CO2 SERPL-SCNC: 28 MMOL/L (ref 21–32)
CREAT SERPL-MCNC: 0.93 MG/DL (ref 0.5–1.3)
EGFRCR SERPLBLD CKD-EPI 2021: >90 ML/MIN/1.73M*2
EOSINOPHIL # BLD AUTO: 0.16 X10*3/UL (ref 0–0.7)
EOSINOPHIL NFR BLD AUTO: 2.3 %
ERYTHROCYTE [DISTWIDTH] IN BLOOD BY AUTOMATED COUNT: 15.9 % (ref 11.5–14.5)
GLUCOSE SERPL-MCNC: 114 MG/DL (ref 74–99)
HCT VFR BLD AUTO: 42 % (ref 41–52)
HGB BLD-MCNC: 13.9 G/DL (ref 13.5–17.5)
IMM GRANULOCYTES # BLD AUTO: 0.02 X10*3/UL (ref 0–0.7)
IMM GRANULOCYTES NFR BLD AUTO: 0.3 % (ref 0–0.9)
INR PPP: 1 (ref 0.9–1.1)
LYMPHOCYTES # BLD AUTO: 2.76 X10*3/UL (ref 1.2–4.8)
LYMPHOCYTES NFR BLD AUTO: 39.1 %
MCH RBC QN AUTO: 28.5 PG (ref 26–34)
MCHC RBC AUTO-ENTMCNC: 33.1 G/DL (ref 32–36)
MCV RBC AUTO: 86 FL (ref 80–100)
MONOCYTES # BLD AUTO: 0.86 X10*3/UL (ref 0.1–1)
MONOCYTES NFR BLD AUTO: 12.2 %
NEUTROPHILS # BLD AUTO: 3.2 X10*3/UL (ref 1.2–7.7)
NEUTROPHILS NFR BLD AUTO: 45.3 %
NRBC BLD-RTO: 0 /100 WBCS (ref 0–0)
PLATELET # BLD AUTO: 271 X10*3/UL (ref 150–450)
POTASSIUM SERPL-SCNC: 3.5 MMOL/L (ref 3.5–5.3)
PROT SERPL-MCNC: 7.1 G/DL (ref 6.4–8.2)
PROTHROMBIN TIME: 11.7 SECONDS (ref 9.8–12.8)
RBC # BLD AUTO: 4.88 X10*6/UL (ref 4.5–5.9)
SODIUM SERPL-SCNC: 136 MMOL/L (ref 136–145)
WBC # BLD AUTO: 7.1 X10*3/UL (ref 4.4–11.3)

## 2024-07-05 PROCEDURE — 74177 CT ABD & PELVIS W/CONTRAST: CPT | Mod: RCN | Performed by: RADIOLOGY

## 2024-07-05 PROCEDURE — 72128 CT CHEST SPINE W/O DYE: CPT | Mod: RCN

## 2024-07-05 PROCEDURE — 70450 CT HEAD/BRAIN W/O DYE: CPT | Performed by: STUDENT IN AN ORGANIZED HEALTH CARE EDUCATION/TRAINING PROGRAM

## 2024-07-05 PROCEDURE — 80053 COMPREHEN METABOLIC PANEL: CPT | Performed by: EMERGENCY MEDICINE

## 2024-07-05 PROCEDURE — 93005 ELECTROCARDIOGRAM TRACING: CPT

## 2024-07-05 PROCEDURE — 82550 ASSAY OF CK (CPK): CPT | Performed by: EMERGENCY MEDICINE

## 2024-07-05 PROCEDURE — 70450 CT HEAD/BRAIN W/O DYE: CPT

## 2024-07-05 PROCEDURE — 36415 COLL VENOUS BLD VENIPUNCTURE: CPT | Performed by: EMERGENCY MEDICINE

## 2024-07-05 PROCEDURE — 71260 CT THORAX DX C+: CPT | Mod: RCN | Performed by: RADIOLOGY

## 2024-07-05 PROCEDURE — 72125 CT NECK SPINE W/O DYE: CPT | Performed by: STUDENT IN AN ORGANIZED HEALTH CARE EDUCATION/TRAINING PROGRAM

## 2024-07-05 PROCEDURE — 72131 CT LUMBAR SPINE W/O DYE: CPT | Mod: RCN | Performed by: RADIOLOGY

## 2024-07-05 PROCEDURE — 96374 THER/PROPH/DIAG INJ IV PUSH: CPT | Mod: 59 | Performed by: INTERNAL MEDICINE

## 2024-07-05 PROCEDURE — 36415 COLL VENOUS BLD VENIPUNCTURE: CPT | Performed by: PHYSICIAN ASSISTANT

## 2024-07-05 PROCEDURE — 2550000001 HC RX 255 CONTRASTS: Performed by: PHYSICIAN ASSISTANT

## 2024-07-05 PROCEDURE — 2500000004 HC RX 250 GENERAL PHARMACY W/ HCPCS (ALT 636 FOR OP/ED): Performed by: PHYSICIAN ASSISTANT

## 2024-07-05 PROCEDURE — 2500000001 HC RX 250 WO HCPCS SELF ADMINISTERED DRUGS (ALT 637 FOR MEDICARE OP): Performed by: PHYSICIAN ASSISTANT

## 2024-07-05 PROCEDURE — 72131 CT LUMBAR SPINE W/O DYE: CPT | Mod: RCN

## 2024-07-05 PROCEDURE — 99285 EMERGENCY DEPT VISIT HI MDM: CPT

## 2024-07-05 PROCEDURE — 72128 CT CHEST SPINE W/O DYE: CPT | Mod: RCN | Performed by: RADIOLOGY

## 2024-07-05 PROCEDURE — 72125 CT NECK SPINE W/O DYE: CPT

## 2024-07-05 PROCEDURE — 85610 PROTHROMBIN TIME: CPT | Performed by: PHYSICIAN ASSISTANT

## 2024-07-05 PROCEDURE — 84484 ASSAY OF TROPONIN QUANT: CPT | Performed by: EMERGENCY MEDICINE

## 2024-07-05 PROCEDURE — 74177 CT ABD & PELVIS W/CONTRAST: CPT

## 2024-07-05 PROCEDURE — 85025 COMPLETE CBC W/AUTO DIFF WBC: CPT | Performed by: EMERGENCY MEDICINE

## 2024-07-05 RX ORDER — MORPHINE SULFATE 4 MG/ML
4 INJECTION, SOLUTION INTRAMUSCULAR; INTRAVENOUS ONCE
Status: COMPLETED | OUTPATIENT
Start: 2024-07-05 | End: 2024-07-05

## 2024-07-05 RX ORDER — MECLIZINE HYDROCHLORIDE 25 MG/1
25 TABLET ORAL ONCE
Status: COMPLETED | OUTPATIENT
Start: 2024-07-05 | End: 2024-07-05

## 2024-07-05 ASSESSMENT — PAIN - FUNCTIONAL ASSESSMENT: PAIN_FUNCTIONAL_ASSESSMENT: 0-10

## 2024-07-05 ASSESSMENT — PAIN DESCRIPTION - PAIN TYPE: TYPE: ACUTE PAIN

## 2024-07-05 ASSESSMENT — PAIN DESCRIPTION - LOCATION: LOCATION: NECK

## 2024-07-05 ASSESSMENT — PAIN SCALES - GENERAL
PAINLEVEL_OUTOF10: 7
PAINLEVEL_OUTOF10: 7

## 2024-07-06 VITALS
WEIGHT: 245 LBS | BODY MASS INDEX: 40.82 KG/M2 | HEART RATE: 55 BPM | TEMPERATURE: 97.9 F | SYSTOLIC BLOOD PRESSURE: 114 MMHG | OXYGEN SATURATION: 95 % | DIASTOLIC BLOOD PRESSURE: 63 MMHG | HEIGHT: 65 IN | RESPIRATION RATE: 18 BRPM

## 2024-07-06 PROBLEM — S83.519A RUPTURE OF ANTERIOR CRUCIATE LIGAMENT OF KNEE: Status: RESOLVED | Noted: 2023-02-02 | Resolved: 2024-07-06

## 2024-07-06 PROBLEM — M75.00 ADHESIVE CAPSULITIS OF SHOULDER: Status: RESOLVED | Noted: 2024-01-29 | Resolved: 2024-07-06

## 2024-07-06 PROBLEM — R55 SYNCOPE AND COLLAPSE: Status: ACTIVE | Noted: 2024-07-06

## 2024-07-06 LAB
AMPHETAMINES UR QL SCN: ABNORMAL
ANION GAP SERPL CALC-SCNC: 12 MMOL/L (ref 10–20)
APPEARANCE UR: CLEAR
APPEARANCE UR: CLEAR
BARBITURATES UR QL SCN: ABNORMAL
BENZODIAZ UR QL SCN: ABNORMAL
BILIRUB UR STRIP.AUTO-MCNC: NEGATIVE MG/DL
BILIRUB UR STRIP.AUTO-MCNC: NEGATIVE MG/DL
BUN SERPL-MCNC: 11 MG/DL (ref 6–23)
BZE UR QL SCN: ABNORMAL
CALCIUM SERPL-MCNC: 7.9 MG/DL (ref 8.6–10.3)
CANNABINOIDS UR QL SCN: ABNORMAL
CHLORIDE SERPL-SCNC: 100 MMOL/L (ref 98–107)
CK SERPL-CCNC: 488 U/L (ref 0–325)
CO2 SERPL-SCNC: 27 MMOL/L (ref 21–32)
COLOR UR: ABNORMAL
COLOR UR: ABNORMAL
CREAT SERPL-MCNC: 0.88 MG/DL (ref 0.5–1.3)
D DIMER PPP FEU-MCNC: 277 NG/ML FEU
EGFRCR SERPLBLD CKD-EPI 2021: >90 ML/MIN/1.73M*2
ERYTHROCYTE [DISTWIDTH] IN BLOOD BY AUTOMATED COUNT: 15.9 % (ref 11.5–14.5)
FENTANYL+NORFENTANYL UR QL SCN: ABNORMAL
GLUCOSE BLD MANUAL STRIP-MCNC: 118 MG/DL (ref 74–99)
GLUCOSE BLD MANUAL STRIP-MCNC: 135 MG/DL (ref 74–99)
GLUCOSE BLD MANUAL STRIP-MCNC: 147 MG/DL (ref 74–99)
GLUCOSE SERPL-MCNC: 114 MG/DL (ref 74–99)
GLUCOSE UR STRIP.AUTO-MCNC: NORMAL MG/DL
GLUCOSE UR STRIP.AUTO-MCNC: NORMAL MG/DL
HCT VFR BLD AUTO: 40.5 % (ref 41–52)
HGB BLD-MCNC: 13.3 G/DL (ref 13.5–17.5)
KETONES UR STRIP.AUTO-MCNC: NEGATIVE MG/DL
KETONES UR STRIP.AUTO-MCNC: NEGATIVE MG/DL
LEUKOCYTE ESTERASE UR QL STRIP.AUTO: NEGATIVE
LEUKOCYTE ESTERASE UR QL STRIP.AUTO: NEGATIVE
MCH RBC QN AUTO: 28.4 PG (ref 26–34)
MCHC RBC AUTO-ENTMCNC: 32.8 G/DL (ref 32–36)
MCV RBC AUTO: 86 FL (ref 80–100)
METHADONE UR QL SCN: ABNORMAL
NITRITE UR QL STRIP.AUTO: NEGATIVE
NITRITE UR QL STRIP.AUTO: NEGATIVE
NRBC BLD-RTO: 0 /100 WBCS (ref 0–0)
OPIATES UR QL SCN: ABNORMAL
OXYCODONE+OXYMORPHONE UR QL SCN: ABNORMAL
PCP UR QL SCN: ABNORMAL
PH UR STRIP.AUTO: 6.5 [PH]
PH UR STRIP.AUTO: 6.5 [PH]
PLATELET # BLD AUTO: 244 X10*3/UL (ref 150–450)
POTASSIUM SERPL-SCNC: 3.4 MMOL/L (ref 3.5–5.3)
PROT UR STRIP.AUTO-MCNC: ABNORMAL MG/DL
PROT UR STRIP.AUTO-MCNC: ABNORMAL MG/DL
RBC # BLD AUTO: 4.69 X10*6/UL (ref 4.5–5.9)
RBC # UR STRIP.AUTO: NEGATIVE /UL
RBC # UR STRIP.AUTO: NEGATIVE /UL
RBC #/AREA URNS AUTO: NORMAL /HPF
RBC #/AREA URNS AUTO: NORMAL /HPF
SODIUM SERPL-SCNC: 136 MMOL/L (ref 136–145)
SP GR UR STRIP.AUTO: >1.05
SP GR UR STRIP.AUTO: >1.05
TSH SERPL-ACNC: 2.09 MIU/L (ref 0.44–3.98)
UROBILINOGEN UR STRIP.AUTO-MCNC: NORMAL MG/DL
UROBILINOGEN UR STRIP.AUTO-MCNC: NORMAL MG/DL
WBC # BLD AUTO: 4.5 X10*3/UL (ref 4.4–11.3)
WBC #/AREA URNS AUTO: NORMAL /HPF
WBC #/AREA URNS AUTO: NORMAL /HPF

## 2024-07-06 PROCEDURE — G0378 HOSPITAL OBSERVATION PER HR: HCPCS

## 2024-07-06 PROCEDURE — 2500000001 HC RX 250 WO HCPCS SELF ADMINISTERED DRUGS (ALT 637 FOR MEDICARE OP): Performed by: NURSE PRACTITIONER

## 2024-07-06 PROCEDURE — 36415 COLL VENOUS BLD VENIPUNCTURE: CPT | Performed by: NURSE PRACTITIONER

## 2024-07-06 PROCEDURE — 81001 URINALYSIS AUTO W/SCOPE: CPT | Mod: 59 | Performed by: NURSE PRACTITIONER

## 2024-07-06 PROCEDURE — 82947 ASSAY GLUCOSE BLOOD QUANT: CPT

## 2024-07-06 PROCEDURE — 84443 ASSAY THYROID STIM HORMONE: CPT | Performed by: NURSE PRACTITIONER

## 2024-07-06 PROCEDURE — 96375 TX/PRO/DX INJ NEW DRUG ADDON: CPT | Performed by: INTERNAL MEDICINE

## 2024-07-06 PROCEDURE — 96361 HYDRATE IV INFUSION ADD-ON: CPT | Performed by: INTERNAL MEDICINE

## 2024-07-06 PROCEDURE — 2500000004 HC RX 250 GENERAL PHARMACY W/ HCPCS (ALT 636 FOR OP/ED): Performed by: PHYSICIAN ASSISTANT

## 2024-07-06 PROCEDURE — 82374 ASSAY BLOOD CARBON DIOXIDE: CPT | Performed by: NURSE PRACTITIONER

## 2024-07-06 PROCEDURE — 81001 URINALYSIS AUTO W/SCOPE: CPT | Mod: 91 | Performed by: NURSE PRACTITIONER

## 2024-07-06 PROCEDURE — 85027 COMPLETE CBC AUTOMATED: CPT | Performed by: NURSE PRACTITIONER

## 2024-07-06 PROCEDURE — 2500000002 HC RX 250 W HCPCS SELF ADMINISTERED DRUGS (ALT 637 FOR MEDICARE OP, ALT 636 FOR OP/ED): Performed by: PHARMACIST

## 2024-07-06 PROCEDURE — 80307 DRUG TEST PRSMV CHEM ANLYZR: CPT | Performed by: PHYSICIAN ASSISTANT

## 2024-07-06 PROCEDURE — 2500000001 HC RX 250 WO HCPCS SELF ADMINISTERED DRUGS (ALT 637 FOR MEDICARE OP): Performed by: PHYSICIAN ASSISTANT

## 2024-07-06 PROCEDURE — 2500000001 HC RX 250 WO HCPCS SELF ADMINISTERED DRUGS (ALT 637 FOR MEDICARE OP): Performed by: HOSPITALIST

## 2024-07-06 PROCEDURE — 2500000002 HC RX 250 W HCPCS SELF ADMINISTERED DRUGS (ALT 637 FOR MEDICARE OP, ALT 636 FOR OP/ED): Performed by: NURSE PRACTITIONER

## 2024-07-06 PROCEDURE — 85379 FIBRIN DEGRADATION QUANT: CPT | Performed by: NURSE PRACTITIONER

## 2024-07-06 RX ORDER — BUDESONIDE 0.5 MG/2ML
0.5 INHALANT ORAL
Status: DISCONTINUED | OUTPATIENT
Start: 2024-07-06 | End: 2024-07-06 | Stop reason: HOSPADM

## 2024-07-06 RX ORDER — AMLODIPINE BESYLATE 10 MG/1
10 TABLET ORAL DAILY
Status: DISCONTINUED | OUTPATIENT
Start: 2024-07-06 | End: 2024-07-06 | Stop reason: HOSPADM

## 2024-07-06 RX ORDER — MECLIZINE HYDROCHLORIDE 25 MG/1
12.5 TABLET ORAL EVERY 8 HOURS
Status: DISCONTINUED | OUTPATIENT
Start: 2024-07-06 | End: 2024-07-06 | Stop reason: HOSPADM

## 2024-07-06 RX ORDER — FLUTICASONE FUROATE AND VILANTEROL 100; 25 UG/1; UG/1
1 POWDER RESPIRATORY (INHALATION)
Status: DISCONTINUED | OUTPATIENT
Start: 2024-07-06 | End: 2024-07-06

## 2024-07-06 RX ORDER — ACETAMINOPHEN 160 MG/5ML
650 SOLUTION ORAL EVERY 4 HOURS PRN
Status: DISCONTINUED | OUTPATIENT
Start: 2024-07-06 | End: 2024-07-06 | Stop reason: HOSPADM

## 2024-07-06 RX ORDER — ATORVASTATIN CALCIUM 80 MG/1
80 TABLET, FILM COATED ORAL NIGHTLY
Status: DISCONTINUED | OUTPATIENT
Start: 2024-07-06 | End: 2024-07-06 | Stop reason: HOSPADM

## 2024-07-06 RX ORDER — ACETAMINOPHEN 325 MG/1
650 TABLET ORAL EVERY 4 HOURS PRN
Status: DISCONTINUED | OUTPATIENT
Start: 2024-07-06 | End: 2024-07-06 | Stop reason: HOSPADM

## 2024-07-06 RX ORDER — MECLIZINE HCL 12.5 MG 12.5 MG/1
12.5 TABLET ORAL 3 TIMES DAILY PRN
Qty: 15 TABLET | Refills: 0 | Status: SHIPPED | OUTPATIENT
Start: 2024-07-06

## 2024-07-06 RX ORDER — OXYCODONE AND ACETAMINOPHEN 5; 325 MG/1; MG/1
1.5 TABLET ORAL EVERY 6 HOURS PRN
Status: DISCONTINUED | OUTPATIENT
Start: 2024-07-06 | End: 2024-07-06 | Stop reason: HOSPADM

## 2024-07-06 RX ORDER — CYCLOBENZAPRINE HCL 10 MG
10 TABLET ORAL ONCE
Status: COMPLETED | OUTPATIENT
Start: 2024-07-06 | End: 2024-07-06

## 2024-07-06 RX ORDER — INSULIN LISPRO 100 [IU]/ML
0-5 INJECTION, SOLUTION INTRAVENOUS; SUBCUTANEOUS
Status: DISCONTINUED | OUTPATIENT
Start: 2024-07-06 | End: 2024-07-06 | Stop reason: HOSPADM

## 2024-07-06 RX ORDER — PANTOPRAZOLE SODIUM 40 MG/1
40 TABLET, DELAYED RELEASE ORAL
Status: DISCONTINUED | OUTPATIENT
Start: 2024-07-07 | End: 2024-07-06 | Stop reason: HOSPADM

## 2024-07-06 RX ORDER — ACETAMINOPHEN 650 MG/1
650 SUPPOSITORY RECTAL EVERY 4 HOURS PRN
Status: DISCONTINUED | OUTPATIENT
Start: 2024-07-06 | End: 2024-07-06 | Stop reason: HOSPADM

## 2024-07-06 RX ORDER — POTASSIUM CHLORIDE 20 MEQ/1
40 TABLET, EXTENDED RELEASE ORAL ONCE
Status: COMPLETED | OUTPATIENT
Start: 2024-07-06 | End: 2024-07-06

## 2024-07-06 RX ORDER — ALBUTEROL SULFATE 0.83 MG/ML
2.5 SOLUTION RESPIRATORY (INHALATION) EVERY 6 HOURS PRN
Status: DISCONTINUED | OUTPATIENT
Start: 2024-07-06 | End: 2024-07-06 | Stop reason: HOSPADM

## 2024-07-06 RX ORDER — KETOROLAC TROMETHAMINE 30 MG/ML
15 INJECTION, SOLUTION INTRAMUSCULAR; INTRAVENOUS ONCE
Status: COMPLETED | OUTPATIENT
Start: 2024-07-06 | End: 2024-07-06

## 2024-07-06 RX ORDER — DEXTROSE 50 % IN WATER (D50W) INTRAVENOUS SYRINGE
25
Status: DISCONTINUED | OUTPATIENT
Start: 2024-07-06 | End: 2024-07-06 | Stop reason: HOSPADM

## 2024-07-06 RX ORDER — FORMOTEROL FUMARATE DIHYDRATE 20 UG/2ML
20 SOLUTION RESPIRATORY (INHALATION)
Status: DISCONTINUED | OUTPATIENT
Start: 2024-07-06 | End: 2024-07-06 | Stop reason: HOSPADM

## 2024-07-06 RX ORDER — ONDANSETRON 4 MG/1
4 TABLET, FILM COATED ORAL EVERY 8 HOURS PRN
Status: DISCONTINUED | OUTPATIENT
Start: 2024-07-06 | End: 2024-07-06 | Stop reason: HOSPADM

## 2024-07-06 RX ORDER — ONDANSETRON HYDROCHLORIDE 2 MG/ML
4 INJECTION, SOLUTION INTRAVENOUS EVERY 8 HOURS PRN
Status: DISCONTINUED | OUTPATIENT
Start: 2024-07-06 | End: 2024-07-06 | Stop reason: HOSPADM

## 2024-07-06 RX ORDER — ENOXAPARIN SODIUM 100 MG/ML
40 INJECTION SUBCUTANEOUS EVERY 12 HOURS SCHEDULED
Status: DISCONTINUED | OUTPATIENT
Start: 2024-07-06 | End: 2024-07-06 | Stop reason: HOSPADM

## 2024-07-06 RX ORDER — AMOXICILLIN 250 MG
1 CAPSULE ORAL DAILY
Status: DISCONTINUED | OUTPATIENT
Start: 2024-07-06 | End: 2024-07-06 | Stop reason: HOSPADM

## 2024-07-06 RX ORDER — POLYETHYLENE GLYCOL 3350 17 G/17G
17 POWDER, FOR SOLUTION ORAL DAILY PRN
Status: DISCONTINUED | OUTPATIENT
Start: 2024-07-06 | End: 2024-07-06 | Stop reason: HOSPADM

## 2024-07-06 RX ORDER — DOCUSATE SODIUM 100 MG/1
100 CAPSULE, LIQUID FILLED ORAL 2 TIMES DAILY
Status: DISCONTINUED | OUTPATIENT
Start: 2024-07-06 | End: 2024-07-06 | Stop reason: HOSPADM

## 2024-07-06 RX ORDER — TAMSULOSIN HYDROCHLORIDE 0.4 MG/1
0.8 CAPSULE ORAL NIGHTLY
Status: DISCONTINUED | OUTPATIENT
Start: 2024-07-06 | End: 2024-07-06 | Stop reason: HOSPADM

## 2024-07-06 RX ORDER — DEXTROSE 50 % IN WATER (D50W) INTRAVENOUS SYRINGE
12.5
Status: DISCONTINUED | OUTPATIENT
Start: 2024-07-06 | End: 2024-07-06 | Stop reason: HOSPADM

## 2024-07-06 SDOH — SOCIAL STABILITY: SOCIAL INSECURITY: DO YOU FEEL UNSAFE GOING BACK TO THE PLACE WHERE YOU ARE LIVING?: NO

## 2024-07-06 SDOH — SOCIAL STABILITY: SOCIAL INSECURITY: HAS ANYONE EVER THREATENED TO HURT YOUR FAMILY OR YOUR PETS?: NO

## 2024-07-06 SDOH — SOCIAL STABILITY: SOCIAL INSECURITY: DOES ANYONE TRY TO KEEP YOU FROM HAVING/CONTACTING OTHER FRIENDS OR DOING THINGS OUTSIDE YOUR HOME?: NO

## 2024-07-06 SDOH — SOCIAL STABILITY: SOCIAL INSECURITY: DO YOU FEEL ANYONE HAS EXPLOITED OR TAKEN ADVANTAGE OF YOU FINANCIALLY OR OF YOUR PERSONAL PROPERTY?: NO

## 2024-07-06 SDOH — SOCIAL STABILITY: SOCIAL INSECURITY: ARE THERE ANY APPARENT SIGNS OF INJURIES/BEHAVIORS THAT COULD BE RELATED TO ABUSE/NEGLECT?: NO

## 2024-07-06 SDOH — SOCIAL STABILITY: SOCIAL INSECURITY: HAVE YOU HAD THOUGHTS OF HARMING ANYONE ELSE?: NO

## 2024-07-06 SDOH — SOCIAL STABILITY: SOCIAL INSECURITY: HAVE YOU HAD ANY THOUGHTS OF HARMING ANYONE ELSE?: NO

## 2024-07-06 SDOH — SOCIAL STABILITY: SOCIAL INSECURITY: WERE YOU ABLE TO COMPLETE ALL THE BEHAVIORAL HEALTH SCREENINGS?: YES

## 2024-07-06 SDOH — SOCIAL STABILITY: SOCIAL INSECURITY: ABUSE: ADULT

## 2024-07-06 SDOH — SOCIAL STABILITY: SOCIAL INSECURITY: ARE YOU OR HAVE YOU BEEN THREATENED OR ABUSED PHYSICALLY, EMOTIONALLY, OR SEXUALLY BY ANYONE?: NO

## 2024-07-06 ASSESSMENT — COGNITIVE AND FUNCTIONAL STATUS - GENERAL
TOILETING: A LITTLE
TURNING FROM BACK TO SIDE WHILE IN FLAT BAD: A LITTLE
STANDING UP FROM CHAIR USING ARMS: A LITTLE
CLIMB 3 TO 5 STEPS WITH RAILING: A LITTLE
PERSONAL GROOMING: A LITTLE
EATING MEALS: A LITTLE
MOVING TO AND FROM BED TO CHAIR: A LITTLE
DRESSING REGULAR UPPER BODY CLOTHING: A LITTLE
MOVING FROM LYING ON BACK TO SITTING ON SIDE OF FLAT BED WITH BEDRAILS: A LITTLE
MOBILITY SCORE: 18
DAILY ACTIVITIY SCORE: 18
HELP NEEDED FOR BATHING: A LITTLE
DRESSING REGULAR LOWER BODY CLOTHING: A LITTLE
PATIENT BASELINE BEDBOUND: NO
WALKING IN HOSPITAL ROOM: A LITTLE

## 2024-07-06 ASSESSMENT — ACTIVITIES OF DAILY LIVING (ADL)
DRESSING YOURSELF: INDEPENDENT
WALKS IN HOME: INDEPENDENT
HEARING - LEFT EAR: FUNCTIONAL
PATIENT'S MEMORY ADEQUATE TO SAFELY COMPLETE DAILY ACTIVITIES?: YES
JUDGMENT_ADEQUATE_SAFELY_COMPLETE_DAILY_ACTIVITIES: YES
BATHING: INDEPENDENT
HEARING - RIGHT EAR: FUNCTIONAL
GROOMING: INDEPENDENT
LACK_OF_TRANSPORTATION: NO

## 2024-07-06 ASSESSMENT — PAIN SCALES - GENERAL
PAINLEVEL_OUTOF10: 4
PAINLEVEL_OUTOF10: 7
PAINLEVEL_OUTOF10: 4
PAINLEVEL_OUTOF10: 0 - NO PAIN
PAINLEVEL_OUTOF10: 7
PAINLEVEL_OUTOF10: 6
PAINLEVEL_OUTOF10: 6

## 2024-07-06 ASSESSMENT — LIFESTYLE VARIABLES
AUDIT-C TOTAL SCORE: 1
HOW MANY STANDARD DRINKS CONTAINING ALCOHOL DO YOU HAVE ON A TYPICAL DAY: 1 OR 2
AUDIT-C TOTAL SCORE: 1
HOW OFTEN DO YOU HAVE 6 OR MORE DRINKS ON ONE OCCASION: NEVER
HOW OFTEN DO YOU HAVE A DRINK CONTAINING ALCOHOL: MONTHLY OR LESS
SKIP TO QUESTIONS 9-10: 1

## 2024-07-06 ASSESSMENT — HEART SCORE
HISTORY: SLIGHTLY SUSPICIOUS
TROPONIN: LESS THAN OR EQUAL TO NORMAL LIMIT
ECG: NORMAL
HEART SCORE: 2
RISK FACTORS: 1-2 RISK FACTORS
AGE: 45-64

## 2024-07-06 ASSESSMENT — ENCOUNTER SYMPTOMS
DIZZINESS: 1
BACK PAIN: 1
NECK PAIN: 1

## 2024-07-06 ASSESSMENT — PAIN DESCRIPTION - LOCATION: LOCATION: BACK

## 2024-07-06 NOTE — H&P
History Of Present Illness  Tomasz Maxwell is a 48 y.o. male presenting with neck pain. On 7/4/24 afternoon patient has episode of dizziness and later on he was on the floor unconscious. Patient does not recall this event. Reportedly when he came to he was repeating the same questions.  Yesterday he woke up to neck pain and dizziness. Due to pain he presented to ED. He reports intermittent dizziness throughout the day, different than the dizziness he had with his vertigo. But endorses drinking water through the day and denied any alcohol intake when dizziness began        PMHX: DM, HTN, HLD, vertigo, depression, gout, asthma, multiple back surgeries    Past Medical History  Past Medical History:   Diagnosis Date    Contusion of left hand, initial encounter 04/24/2017    Contusion of left hand, initial encounter    Crushing injury of unspecified finger(s), initial encounter 04/24/2017    Injury, crush, finger    Essential (primary) hypertension 05/16/2022    Hypertension    Hyperlipidemia, unspecified 10/03/2022    Hyperlipidemia    Hypokalemia 04/24/2017    Hypokalemia, k= 3.8 on 1/26/23    Idiopathic gout, right knee 04/24/2017    Acute idiopathic gout of right knee    Latent syphilis, unspecified as early or late 04/13/2018    Latent syphilis with positive serology    Local infection of the skin and subcutaneous tissue, unspecified 12/05/2017    Skin infection    Long term (current) use of opiate analgesic 04/24/2017    Opioid contract exists    Mastitis without abscess 04/24/2017    Mastitis, acute    Mastodynia 04/24/2017    Breast pain, right    Muscle spasm of back 04/24/2017    Paraspinal muscle spasm    Nocturia 04/24/2017    Nocturia    Other microscopic hematuria 04/24/2017    Hematuria, microscopic    Other specified postprocedural states 04/24/2017    H/O arthroscopic knee surgery    Other specified postprocedural states 04/24/2017    Status post lumbar laminectomy    Other specified symptoms and signs  involving the digestive system and abdomen 09/08/2017    Umbilical discharge    Pain in left wrist 04/24/2017    Left wrist pain    Personal history of other diseases of the musculoskeletal system and connective tissue     History of arthritis    Personal history of other diseases of the nervous system and sense organs 03/07/2016    History of acute otitis media    Personal history of other endocrine, nutritional and metabolic disease     History of diabetes mellitus    Personal history of other infectious and parasitic diseases 09/10/2017    History of tinea corporis    Personal history of other infectious and parasitic diseases 09/08/2017    History of trichomoniasis    Personal history of other infectious and parasitic diseases 04/13/2018    History of trichomoniasis    Personal history of other specified conditions 04/30/2018    History of dyspnea    Personal history of other specified conditions 01/23/2018    History of epigastric pain    Personal history of other specified conditions 07/28/2017    History of urinary frequency    Personal history of other specified conditions 10/08/2017    History of chest pain    Proteinuria, unspecified 04/24/2017    Proteinuria    Radiculopathy, lumbar region 04/24/2017    Lumbar radiculopathy    Spinal stenosis, site unspecified 08/19/2015    Spinal stenosis, multiple sites in spine    Unspecified abnormal finding in specimens from other organs, systems and tissues 04/24/2017    Abnormal laboratory test result    Unspecified asthma, uncomplicated ([facility]-HCC)     Active asthma    Vertigo     ECHO 5/21/22       Surgical History  Past Surgical History:   Procedure Laterality Date    BACK SURGERY  09/26/2016    Back Surgeryx5, has spinal stimulator    HERNIA REPAIR  08/26/2015    Hernia Repairx2    KNEE SURGERY Right 03/29/2017    Knee Surgery, right TKR    LUMBAR LAMINECTOMY  08/26/2015    Laminectomy Lumbar    MR HEAD ANGIO WO IV CONTRAST  05/21/2022    MR HEAD ANGIO WO IV  CONTRAST 5/21/2022 [facility] [place] [facility]    MR HEAD ANGIO WO IV CONTRAST  03/12/2017    MR HEAD ANGIO WO IV CONTRAST 3/12/2017 [facility] [place] [facility]    MR NECK ANGIO WO IV CONTRAST  05/21/2022    MR NECK ANGIO WO IV CONTRAST 5/21/2022 [facility] [place] [facility]    MR NECK ANGIO WO IV CONTRAST  03/12/2017    MR NECK ANGIO WO IV CONTRAST 3/12/2017 [facility] [place] [facility]    OTHER SURGICAL HISTORY  05/20/2021    Abdominal surgery    OTHER SURGICAL HISTORY Bilateral 02/24/2021    Hip replacement    OTHER SURGICAL HISTORY  09/26/2016    Gastrectomy    SHOULDER SURGERY Left     left TSR        Social History  He reports that he has quit smoking. His smoking use included cigarettes. He has never used smokeless tobacco. He reports that he does not drink alcohol and does not use drugs.    Family History  Family History   Problem Relation Name Age of Onset    Hypertension Mother      Diabetes Mother      Asthma Mother      Deep vein thrombosis Mother      No Known Problems Father      No Known Problems Sister      Asthma Child          Allergies  Patient has no known allergies.    Review of Systems   Musculoskeletal:  Positive for back pain and neck pain.   Neurological:  Positive for dizziness and syncope.   All other systems reviewed and are negative.       Physical Exam  Constitutional:       Appearance: He is obese.   Cardiovascular:      Rate and Rhythm: Normal rate and regular rhythm.      Pulses: Normal pulses.      Heart sounds: Normal heart sounds. No murmur heard.     No gallop.   Pulmonary:      Effort: Pulmonary effort is normal. No respiratory distress.      Breath sounds: Normal breath sounds. No wheezing or rhonchi.   Abdominal:      General: Abdomen is flat. Bowel sounds are normal. There is no distension.      Palpations: Abdomen is soft.      Tenderness: There is no abdominal tenderness. There is no guarding.   Musculoskeletal:         General: Normal range of motion.   Skin:     General: Skin is warm.      Capillary  "Refill: Capillary refill takes less than 2 seconds.   Neurological:      Mental Status: He is alert and oriented to person, place, and time.   Psychiatric:         Mood and Affect: Mood normal.         Thought Content: Thought content normal.         Judgment: Judgment normal.          Last Recorded Vitals  Blood pressure 127/73, pulse 69, temperature 36.3 °C (97.4 °F), temperature source Temporal, resp. rate 18, height 1.651 m (5' 5\"), weight 111 kg (245 lb), SpO2 97%.    Relevant Results  Scheduled medications  meclizine, 12.5 mg, oral, q8h      Continuous medications     PRN medications      Results for orders placed or performed during the hospital encounter of 07/05/24 (from the past 24 hour(s))   CBC and Auto Differential   Result Value Ref Range    WBC 7.1 4.4 - 11.3 x10*3/uL    nRBC 0.0 0.0 - 0.0 /100 WBCs    RBC 4.88 4.50 - 5.90 x10*6/uL    Hemoglobin 13.9 13.5 - 17.5 g/dL    Hematocrit 42.0 41.0 - 52.0 %    MCV 86 80 - 100 fL    MCH 28.5 26.0 - 34.0 pg    MCHC 33.1 32.0 - 36.0 g/dL    RDW 15.9 (H) 11.5 - 14.5 %    Platelets 271 150 - 450 x10*3/uL    Neutrophils % 45.3 40.0 - 80.0 %    Immature Granulocytes %, Automated 0.3 0.0 - 0.9 %    Lymphocytes % 39.1 13.0 - 44.0 %    Monocytes % 12.2 2.0 - 10.0 %    Eosinophils % 2.3 0.0 - 6.0 %    Basophils % 0.8 0.0 - 2.0 %    Neutrophils Absolute 3.20 1.20 - 7.70 x10*3/uL    Immature Granulocytes Absolute, Automated 0.02 0.00 - 0.70 x10*3/uL    Lymphocytes Absolute 2.76 1.20 - 4.80 x10*3/uL    Monocytes Absolute 0.86 0.10 - 1.00 x10*3/uL    Eosinophils Absolute 0.16 0.00 - 0.70 x10*3/uL    Basophils Absolute 0.06 0.00 - 0.10 x10*3/uL   Comprehensive metabolic panel   Result Value Ref Range    Glucose 114 (H) 74 - 99 mg/dL    Sodium 136 136 - 145 mmol/L    Potassium 3.5 3.5 - 5.3 mmol/L    Chloride 99 98 - 107 mmol/L    Bicarbonate 28 21 - 32 mmol/L    Anion Gap 13 10 - 20 mmol/L    Urea Nitrogen 14 6 - 23 mg/dL    Creatinine 0.93 0.50 - 1.30 mg/dL    eGFR >90 >60 " mL/min/1.73m*2    Calcium 8.7 8.6 - 10.3 mg/dL    Albumin 4.0 3.4 - 5.0 g/dL    Alkaline Phosphatase 54 33 - 120 U/L    Total Protein 7.1 6.4 - 8.2 g/dL    AST 24 9 - 39 U/L    Bilirubin, Total 0.7 0.0 - 1.2 mg/dL    ALT 18 10 - 52 U/L   Troponin I, High Sensitivity   Result Value Ref Range    Troponin I, High Sensitivity 4 0 - 20 ng/L   Creatine Kinase   Result Value Ref Range    Creatine Kinase 488 (H) 0 - 325 U/L   Protime-INR   Result Value Ref Range    Protime 11.7 9.8 - 12.8 seconds    INR 1.0 0.9 - 1.1       CT chest abdomen pelvis w IV contrast    Result Date: 7/6/2024  STUDY: CT Chest, Abdomen, and Pelvis with IV Contrast, CT Thoracic Spine and Lumbar Spine without IV Contrast; 07/05/2024 at 11:29 PM INDICATION: Evaluation status post trauma. COMPARISON: CT chest 03/08/24. CTA chest 12/06/23. CT abdomen and pelvis 10/23/18.  MR lumbar spine 08/12/23. XR thoracic spine 07/07/21, 12/02/20. ACCESSION NUMBER(S): [id number], [id number], [id number] ORDERING CLINICIAN: CRISTAL GOODWIN TECHNIQUE: CT of the chest, abdomen, and pelvis was performed.  Contiguous axial images were obtained at 3 mm slice thickness through the chest, abdomen, and pelvis.  Coronal and sagittal reconstructions at 3 mm slice thickness were performed.  Omnipaque-350 100 mL was administered intravenously.  Please note that spinal images were generated from the original CT abdomen and pelvis imaging. FINDINGS: CHEST: MEDIASTINUM: The heart is normal in size without pericardial effusion.  Central vascular structures opacify normally.  LUNGS/PLEURA: There is no pleural effusion, pleural thickening, or pneumothorax. The airways are patent. There is a nodule right upper lobe 1 cm in size suggest clinical and PET/CT correlation. LYMPH NODES: Thoracic lymph nodes are not enlarged. ABDOMEN:  LIVER: No hepatomegaly.  Smooth surface contour.  Normal attenuation.  BILE DUCTS: No intrahepatic or extrahepatic biliary ductal dilatation.  GALLBLADDER:  The gallbladder is . STOMACH: No abnormalities identified.  PANCREAS: No masses or ductal dilatation.  SPLEEN: No splenomegaly or focal splenic lesion.  ADRENAL GLANDS: No thickening or nodules.  KIDNEYS AND URETERS: Kidneys are normal in size and location.  No renal or ureteral calculi.  PELVIS:  BLADDER: No abnormalities identified.  REPRODUCTIVE ORGANS: No abnormalities identified.  BOWEL: No abnormalities identified.  Appendix is normal.  VESSELS: No abnormalities identified.  Abdominal aorta is normal in caliber.  PERITONEUM/RETROPERITONEUM/LYMPH NODES: No free fluid.  No pneumoperitoneum. No lymphadenopathy.  ABDOMINAL WALL: No abnormalities identified. SOFT TISSUES: No abnormalities identified.  BONES: No acute fracture or aggressive osseous lesion.  Bilateral hip arthroplasties present.  THORACIC SPINE: The alignment is anatomic.  There is no fracture or traumatic subluxation. The vertebral body heights are well maintained.  Disc spaces are preserved.  No significant central canal stenosis is demonstrated. The neural foramina are patent throughout.  The paravertebral soft tissues are within normal limits. LUMBAR SPINE: The alignment is anatomic.  There is no fracture or traumatic subluxation. The vertebral body heights are well maintained.  Disc spaces are preserved.  No significant central canal stenosis is demonstrated. The neural foramina are patent throughout.  The paravertebral soft tissues are within normal limits.    1. No acute traumatic injury to the chest, abdomen, or pelvis. 2. No fracture of the thoracic spine. 3. No fracture of the lumbar spine. 4. 1 cm nodule right upper lobe suggest clinical and PET/CT correlation. Signed by Teddy Trotter MD    CT thoracic spine wo IV contrast    Result Date: 7/6/2024  STUDY: CT Chest, Abdomen, and Pelvis with IV Contrast, CT Thoracic Spine and Lumbar Spine without IV Contrast; 07/05/2024 at 11:29 PM INDICATION: Evaluation status post trauma. COMPARISON: CT  chest 03/08/24. CTA chest 12/06/23. CT abdomen and pelvis 10/23/18.  MR lumbar spine 08/12/23. XR thoracic spine 07/07/21, 12/02/20. ACCESSION NUMBER(S): [id number], [id number], [id number] ORDERING CLINICIAN: CRISTAL GOODWIN TECHNIQUE: CT of the chest, abdomen, and pelvis was performed.  Contiguous axial images were obtained at 3 mm slice thickness through the chest, abdomen, and pelvis.  Coronal and sagittal reconstructions at 3 mm slice thickness were performed.  Omnipaque-350 100 mL was administered intravenously.  Please note that spinal images were generated from the original CT abdomen and pelvis imaging. FINDINGS: CHEST: MEDIASTINUM: The heart is normal in size without pericardial effusion.  Central vascular structures opacify normally.  LUNGS/PLEURA: There is no pleural effusion, pleural thickening, or pneumothorax. The airways are patent. There is a nodule right upper lobe 1 cm in size suggest clinical and PET/CT correlation. LYMPH NODES: Thoracic lymph nodes are not enlarged. ABDOMEN:  LIVER: No hepatomegaly.  Smooth surface contour.  Normal attenuation.  BILE DUCTS: No intrahepatic or extrahepatic biliary ductal dilatation.  GALLBLADDER: The gallbladder is . STOMACH: No abnormalities identified.  PANCREAS: No masses or ductal dilatation.  SPLEEN: No splenomegaly or focal splenic lesion.  ADRENAL GLANDS: No thickening or nodules.  KIDNEYS AND URETERS: Kidneys are normal in size and location.  No renal or ureteral calculi.  PELVIS:  BLADDER: No abnormalities identified.  REPRODUCTIVE ORGANS: No abnormalities identified.  BOWEL: No abnormalities identified.  Appendix is normal.  VESSELS: No abnormalities identified.  Abdominal aorta is normal in caliber.  PERITONEUM/RETROPERITONEUM/LYMPH NODES: No free fluid.  No pneumoperitoneum. No lymphadenopathy.  ABDOMINAL WALL: No abnormalities identified. SOFT TISSUES: No abnormalities identified.  BONES: No acute fracture or aggressive osseous lesion.  Bilateral  hip arthroplasties present.  THORACIC SPINE: The alignment is anatomic.  There is no fracture or traumatic subluxation. The vertebral body heights are well maintained.  Disc spaces are preserved.  No significant central canal stenosis is demonstrated. The neural foramina are patent throughout.  The paravertebral soft tissues are within normal limits. LUMBAR SPINE: The alignment is anatomic.  There is no fracture or traumatic subluxation. The vertebral body heights are well maintained.  Disc spaces are preserved.  No significant central canal stenosis is demonstrated. The neural foramina are patent throughout.  The paravertebral soft tissues are within normal limits.    1. No acute traumatic injury to the chest, abdomen, or pelvis. 2. No fracture of the thoracic spine. 3. No fracture of the lumbar spine. 4. 1 cm nodule right upper lobe suggest clinical and PET/CT correlation. Signed by Teddy Trotter MD    CT lumbar spine wo IV contrast    Result Date: 7/6/2024  STUDY: CT Chest, Abdomen, and Pelvis with IV Contrast, CT Thoracic Spine and Lumbar Spine without IV Contrast; 07/05/2024 at 11:29 PM INDICATION: Evaluation status post trauma. COMPARISON: CT chest 03/08/24. CTA chest 12/06/23. CT abdomen and pelvis 10/23/18.  MR lumbar spine 08/12/23. XR thoracic spine 07/07/21, 12/02/20. ACCESSION NUMBER(S): [id number], [id number], [id number] ORDERING CLINICIAN: CRISTAL GOODWIN TECHNIQUE: CT of the chest, abdomen, and pelvis was performed.  Contiguous axial images were obtained at 3 mm slice thickness through the chest, abdomen, and pelvis.  Coronal and sagittal reconstructions at 3 mm slice thickness were performed.  Omnipaque-350 100 mL was administered intravenously.  Please note that spinal images were generated from the original CT abdomen and pelvis imaging. FINDINGS: CHEST: MEDIASTINUM: The heart is normal in size without pericardial effusion.  Central vascular structures opacify normally.  LUNGS/PLEURA: There is  no pleural effusion, pleural thickening, or pneumothorax. The airways are patent. There is a nodule right upper lobe 1 cm in size suggest clinical and PET/CT correlation. LYMPH NODES: Thoracic lymph nodes are not enlarged. ABDOMEN:  LIVER: No hepatomegaly.  Smooth surface contour.  Normal attenuation.  BILE DUCTS: No intrahepatic or extrahepatic biliary ductal dilatation.  GALLBLADDER: The gallbladder is . STOMACH: No abnormalities identified.  PANCREAS: No masses or ductal dilatation.  SPLEEN: No splenomegaly or focal splenic lesion.  ADRENAL GLANDS: No thickening or nodules.  KIDNEYS AND URETERS: Kidneys are normal in size and location.  No renal or ureteral calculi.  PELVIS:  BLADDER: No abnormalities identified.  REPRODUCTIVE ORGANS: No abnormalities identified.  BOWEL: No abnormalities identified.  Appendix is normal.  VESSELS: No abnormalities identified.  Abdominal aorta is normal in caliber.  PERITONEUM/RETROPERITONEUM/LYMPH NODES: No free fluid.  No pneumoperitoneum. No lymphadenopathy.  ABDOMINAL WALL: No abnormalities identified. SOFT TISSUES: No abnormalities identified.  BONES: No acute fracture or aggressive osseous lesion.  Bilateral hip arthroplasties present.  THORACIC SPINE: The alignment is anatomic.  There is no fracture or traumatic subluxation. The vertebral body heights are well maintained.  Disc spaces are preserved.  No significant central canal stenosis is demonstrated. The neural foramina are patent throughout.  The paravertebral soft tissues are within normal limits. LUMBAR SPINE: The alignment is anatomic.  There is no fracture or traumatic subluxation. The vertebral body heights are well maintained.  Disc spaces are preserved.  No significant central canal stenosis is demonstrated. The neural foramina are patent throughout.  The paravertebral soft tissues are within normal limits.    1. No acute traumatic injury to the chest, abdomen, or pelvis. 2. No fracture of the thoracic spine. 3.  No fracture of the lumbar spine. 4. 1 cm nodule right upper lobe suggest clinical and PET/CT correlation. Signed by Teddy Trotter MD    CT head wo IV contrast    Result Date: 7/6/2024  Interpreted By:  Elva Garcia, STUDY: CT HEAD WO IV CONTRAST; CT CERVICAL SPINE WO IV CONTRAST;  7/5/2024 11:29 pm   INDICATION: Signs/Symptoms:Trauma; Signs/Symptoms:trauma.   COMPARISON: CT head dated 05/20/2022; CT of the cervical spine dated 08/31/2021; MRI of the cervical spine dated 08/12/2023.   ACCESSION NUMBER(S): [id number]; [id number]   ORDERING CLINICIAN: CRISTAL GOODWIN   TECHNIQUE: Noncontrast axial CT scan of head was performed, with coronal and sagittal reformats provided. The images were reviewed in bone, brain, blood and soft tissue windows.   Axial CT images of the cervical spine are obtained. Axial, coronal and sagittal reconstructions are provided for review.   FINDINGS: CT HEAD:   No hyperdense intracranial hemorrhage is identified. No mass effect or midline shift is present. No   Gray-white differentiation is intact, without evidence of CT apparent transcortical infarct.   No ventricular dilatation is evident. Basal cisterns are patent. No extra-axial collections are identified.   Scalp soft tissues do not demonstrate any acute abnormality. Calvarium is unremarkable in appearance. Mastoid air cells and middle ear cavities are clear.   Visualized paranasal sinuses do not demonstrate any acute abnormality.   CT C-SPINE:   Cervical vertebral alignment is maintained, without evidence of significant spondylolisthesis.   Cervical vertebral body heights are preserved without evidence of compression fractures. Posterior elements of the cervical spine do not demonstrate any evidence of acute trauma.   Craniocervical junction is intact. Facet joints are preserved without evidence of traumatic subluxation or perching.   Mild intervertebral disc height loss is present in the midcervical spine C4-C5 and C5-C6.    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 No high-grade stenosis is identified.   Multilevel neural foraminal narrowing is present at several levels, most pronounced at the level of C3-C4 on the left with likely at least moderate narrowing.   Prevertebral and paraspinal soft tissues do not demonstrate any acute abnormality.       CT HEAD:   No evidence of hemorrhage, skull fracture, or other acute intracranial trauma/abnormality.   CT C-SPINE:   No evidence of acute trauma to the cervical spine.   MACRO: None   Signed by: Elva Garcia 7/6/2024 12:22 AM Dictation workstation:   [id number]    CT cervical spine wo IV contrast    Result Date: 7/6/2024  Interpreted By:  Elva Garcia, STUDY: CT HEAD WO IV CONTRAST; CT CERVICAL SPINE WO IV CONTRAST;  7/5/2024 11:29 pm   INDICATION: Signs/Symptoms:Trauma; Signs/Symptoms:trauma.   COMPARISON: CT head dated 05/20/2022; CT of the cervical spine dated 08/31/2021; MRI of the cervical spine dated 08/12/2023.   ACCESSION NUMBER(S): [id number]; [id number]   ORDERING CLINICIAN: CRISTAL GOODWIN   TECHNIQUE: Noncontrast axial CT scan of head was performed, with coronal and sagittal reformats provided. The images were reviewed in bone, brain, blood and soft tissue windows.   Axial CT images of the cervical spine are obtained. Axial, coronal and sagittal reconstructions are provided for review.   FINDINGS: CT HEAD:   No hyperdense intracranial hemorrhage is identified. No mass effect or midline shift is present. No   Gray-white differentiation is intact, without evidence of CT apparent transcortical infarct.   No ventricular dilatation is evident. Basal cisterns are patent. No extra-axial collections are identified.   Scalp soft tissues do not demonstrate any acute abnormality. Calvarium is unremarkable in appearance. Mastoid air cells and middle ear cavities are clear.   Visualized  paranasal sinuses do not demonstrate any acute abnormality.   CT C-SPINE:   Cervical vertebral alignment is maintained, without evidence of significant spondylolisthesis.   Cervical vertebral body heights are preserved without evidence of compression fractures. Posterior elements of the cervical spine do not demonstrate any evidence of acute trauma.   Craniocervical junction is intact. Facet joints are preserved without evidence of traumatic subluxation or perching.   Mild intervertebral disc height loss is present in the midcervical spine C4-C5 and C5-C6.   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 No high-grade stenosis is identified.   Multilevel neural foraminal narrowing is present at several levels, most pronounced at the level of C3-C4 on the left with likely at least moderate narrowing.   Prevertebral and paraspinal soft tissues do not demonstrate any acute abnormality.       CT HEAD:   No evidence of hemorrhage, skull fracture, or other acute intracranial trauma/abnormality.   CT C-SPINE:   No evidence of acute trauma to the cervical spine.   MACRO: None   Signed by: Elva Garcia 7/6/2024 12:22 AM Dictation workstation:   [id number]        Assessment/Plan   Principal Problem:    Syncope and collapse    Tomasz Maxwell is a 48 y.o. male presenting with neck pain. On 7/4/24 afternoon patient has episode of dizziness and later on he was on the floor unconscious. Patient does not recall this event. Reportedly when he came to he was repeating the same questions.  Yesterday he woke up to neck pain and dizziness. Due to pain he presented to ED. He reports intermittent dizziness throughout the day, different than the dizziness he had with his vertigo. But endorses drinking water through the day and denied any alcohol intake when dizziness began        PMHX: DM, HTN, HLD, vertigo, depression, gout, asthma, multiple back  surgeries    Syncope  -CT head neg  -telemetry  -orthostatics negm  - IVF  -echocardiogram  -UA and UDS neg   -TSH WNL  -D dimer  neg   -holter at DC     Neck pain s/p fall  -CT c spine neg   -CT chest abdomen pelvis neg   -CT thoracic and lumbar spine neg   -continue home pain meds       DM  -Accu-Cheks ACHS  -Humalog correctional sliding scale    DVT prophylaxis:  Lovenox, SCD    DC plan:  DC home when medically stable    Labs/Testing reviewed    Interdisciplinary team rounding completed with hospitalist, nurse, TCC    NP discussed plan and lab/testing results with Dr. Wooten. DC pending echo and MRI    1750 MRI cannot be done until Monday at earliest, no guarantee . Overall he does have intermittent dizziness but no focal neuro deficits. Orthos negative. He is requesting to be discharged and have these tests ordered outpt given they aren't even able to be done tomorrow. Given neuro exam is benign, ok to DC. Discussed with Dr. Wooten     I spent 50 minutes in the professional and overall care of this patient.      Kusum Paiz, APRN-CNP

## 2024-07-06 NOTE — DISCHARGE SUMMARY
Discharge Diagnosis  Syncope and collapse    Issues Requiring Follow-Up  PCP    Discharge Meds     Your medication list        START taking these medications        Instructions Last Dose Given Next Dose Due   meclizine 12.5 mg tablet  Commonly known as: Antivert      Take 1 tablet (12.5 mg) by mouth 3 times a day as needed for dizziness.              CHANGE how you take these medications        Instructions Last Dose Given Next Dose Due   Ventolin HFA 90 mcg/actuation inhaler  Generic drug: albuterol  What changed:   how much to take  how to take this  when to take this  reasons to take this      INHALE 1 OR 2 PUFFS EVERY 4 TO 6 HOURS AS NEEDED       albuterol 2.5 mg /3 mL (0.083 %) nebulizer solution  What changed: See the new instructions.      USE 1 VIAL IN NEBULIZER EVERY 4 HOURS - and as needed              CONTINUE taking these medications        Instructions Last Dose Given Next Dose Due   amLODIPine 10 mg tablet  Commonly known as: Norvasc      Take 1 tablet (10 mg) by mouth once daily.       atorvastatin 80 mg tablet  Commonly known as: Lipitor      TAKE 1 TABLET AT BEDTIME.       cholecalciferol 50 MCG (2000 UT) tablet  Commonly known as: Vitamin D-3      Take 1 tablet (50 mcg) by mouth once daily.       loratadine 10 mg tablet  Commonly known as: Claritin           metFORMIN 1,000 mg tablet  Commonly known as: Glucophage      Take 1 tablet (1,000 mg) by mouth once daily with breakfast.       montelukast 10 mg tablet  Commonly known as: Singulair      Take 1 tablet (10 mg) by mouth once daily at bedtime.       naloxone 4 mg/0.1 mL nasal spray  Commonly known as: Narcan      Administer 1 spray (4 mg) into affected nostril(s) if needed for opioid reversal. May repeat every 2-3 minutes if needed, alternating nostrils, until medical assistance becomes available.       oxyCODONE-acetaminophen 7.5-325 mg tablet  Commonly known as: Percocet      Take 1 tablet by mouth every 6 hours if needed for severe pain (7 -  10). Do not fill before May 17, 2024.       oxyCODONE-acetaminophen 7.5-325 mg tablet  Commonly known as: Percocet      Take 1 tablet by mouth every 6 hours if needed for severe pain (7 - 10). Do not fill before Felisa 15, 2024.       oxyCODONE-acetaminophen 7.5-325 mg tablet  Commonly known as: Percocet  Start taking on: July 17, 2024      Take 1 tablet by mouth every 6 hours if needed for severe pain (7 - 10). Do not fill before July 17, 2024.       sennosides-docusate sodium 8.6-50 mg tablet  Commonly known as: Maribell-Colace           Symbicort 160-4.5 mcg/actuation inhaler  Generic drug: budesonide-formoteroL           tadalafil 20 mg tablet  Commonly known as: Cialis      Take 1 tablet (20 mg) by mouth once daily as needed for erectile dysfunction.       tamsulosin 0.4 mg 24 hr capsule  Commonly known as: Flomax      Take 2 capsules (0.8 mg) by mouth once daily at bedtime.                 Where to Get Your Medications        These medications were sent to [facility] [place] [id number]      Phone: [phone]   meclizine 12.5 mg tablet         Test Results Pending At Discharge  Pending Labs       Order Current Status    Extra Urine Gray Tube Collected (07/06/24 1036)    Urinalysis with Reflex Culture and Microscopic In process            Hospital Course   Tomasz Maxwell is a 48 y.o. male presenting with neck pain. On 7/4/24 afternoon patient has episode of dizziness and later on he was on the floor unconscious. Patient does not recall this event. Reportedly when he came to he was repeating the same questions.  Yesterday he woke up to neck pain and dizziness. Due to pain he presented to ED. He reports intermittent dizziness throughout the day, different than the dizziness he had with his vertigo. But endorses drinking water through the day and denied any alcohol intake when dizziness began         PMHX: DM, HTN, HLD, vertigo, depression, gout,  asthma, multiple back surgeries     Syncope  -CT head neg  -telemetry  -orthostatics negm  - IVF  -echocardiogram  -UA and UDS neg   -TSH WNL  -D dimer  neg   -holter at DC      Neck pain s/p fall  -CT c spine neg   -CT chest abdomen pelvis neg   -CT thoracic and lumbar spine neg   -continue home pain meds           DM  -Accu-Cheks ACHS  -Humalog correctional sliding scale     DVT prophylaxis:  Lovenox, SCD     DC plan:  DC home when medically stable     Labs/Testing reviewed     Interdisciplinary team rounding completed with hospitalist, nurse, TCC     NP discussed plan and lab/testing results with Dr. Wooten. DC pending echo and MRI     1750 MRI cannot be done until Monday at earliest, no guarantee . Overall he does have intermittent dizziness but no focal neuro deficits. Orthos negative. He is requesting to be discharged and have these tests ordered outpt given they aren't even able to be done tomorrow. Given neuro exam is benign, ok to DC. Discussed with Dr. Wooten        Pertinent Physical Exam At Time of Discharge  Physical Exam    Outpatient Follow-Up  Future Appointments   Date Time Provider Department Center   7/25/2024  8:00 AM Victor Manuel Jose MD [id number] [facility]   7/30/2024 10:40 AM Avelino Lebron MD [id number] [facility]   8/2/2024 10:00 AM Mono Hastings MD [id number] [facility]   8/12/2024 10:30 AM Zi Haney MD [facility]   10/8/2024  9:30 AM aMgdiel Villegas DO [id number] [facility]         Kusum Paiz, APRN-CNP

## 2024-07-06 NOTE — PROGRESS NOTES
Pharmacy Medication History Review    Per patient.     Tomasz Maxwell is a 48 y.o. male admitted for Syncope and collapse. Pharmacy reviewed the patient's [id number] medications and allergies for accuracy.    The list below reflectives the updated PTA list. Please review each medication in order reconciliation for additional clarification and justification.       The list below reflectives the updated allergy list. Please review each documented allergy for additional clarification and justification.  Allergies  Reviewed by Haritha Oliva PA-C on 7/5/2024   No Known Allergies         Below are additional concerns with the patient's PTA list.  Prior to Admission Medications   Prescriptions Last Dose Informant   Symbicort 160-4.5 mcg/actuation inhaler     Sig: Inhale 2 puffs 2 times a day as needed (shortness of breath). Rinse mouth after use.   Ventolin HFA 90 mcg/actuation inhaler     Sig: INHALE 1 OR 2 PUFFS EVERY 4 TO 6 HOURS AS NEEDED   Patient taking differently: Inhale 1-2 puffs every 4 hours if needed for shortness of breath or wheezing. INHALE 1 OR 2 PUFFS EVERY 4 TO 6 HOURS AS NEEDED   albuterol 2.5 mg /3 mL (0.083 %) nebulizer solution     Sig: USE 1 VIAL IN NEBULIZER EVERY 4 HOURS - and as needed   Patient taking differently: Take 3 mL (2.5 mg) by nebulization every 4 hours if needed for wheezing or shortness of breath.   amLODIPine (Norvasc) 10 mg tablet 7/5/2024    Sig: Take 1 tablet (10 mg) by mouth once daily.   atorvastatin (Lipitor) 80 mg tablet 7/5/2024    Sig: TAKE 1 TABLET AT BEDTIME.   Patient taking differently: Take 1 tablet (80 mg) by mouth once daily at bedtime.   cholecalciferol (Vitamin D-3) 50 MCG (2000 UT) tablet 7/5/2024    Sig: Take 1 tablet (50 mcg) by mouth once daily.   loratadine (Claritin) 10 mg tablet     Sig: Take 1 tablet (10 mg) by mouth once daily as needed for allergies.   metFORMIN (Glucophage) 1,000 mg tablet 7/5/2024    Sig: Take 1 tablet (1,000 mg) by mouth once  daily with breakfast.   montelukast (Singulair) 10 mg tablet 7/5/2024    Sig: Take 1 tablet (10 mg) by mouth once daily at bedtime.   naloxone (Narcan) 4 mg/0.1 mL nasal spray     Sig: Administer 1 spray (4 mg) into affected nostril(s) if needed for opioid reversal. May repeat every 2-3 minutes if needed, alternating nostrils, until medical assistance becomes available.              oxyCODONE-acetaminophen (Percocet) 7.5-325 mg tablet 7/5/2024    Sig: Take 1 tablet by mouth every 6 hours if needed for severe pain (7 - 10). Do not fill before Felisa 15, 2024.           sennosides-docusate sodium (Maribell-Colace) 8.6-50 mg tablet     Sig: Take 1 tablet by mouth once daily as needed for constipation.   tadalafil (Cialis) 20 mg tablet     Sig: Take 1 tablet (20 mg) by mouth once daily as needed for erectile dysfunction.   tamsulosin (Flomax) 0.4 mg 24 hr capsule 7/5/2024    Sig: Take 2 capsules (0.8 mg) by mouth once daily at bedtime.      Facility-Administered Medications: None        Lexis Maynard

## 2024-07-06 NOTE — ED PROVIDER NOTES
HPI     CC: Headache and Neck Pain     HPI: Tomasz Maxwell is a 48 y.o. male with past medical history of vertigo, asthma, hypertension, hyperlipidemia, diabetes, BPH, cervical spinal stenosis, multiple lower spine surgeries, and right total shoulder replacement presents with his significant other with concern for headache and neck pain.  Timeline is as follows.  Yesterday around 3 PM, the patient had a sudden onset of dizziness.  He states he was sitting in a chair when he suddenly felt like he was on the ground due to the severe dizziness.  He went about the rest of his day and at some point later that evening, his kids found him laying on his back on the ground in his room passed out.  Patient is amnestic to any of these events.  Presumed fall with loss of consciousness.  No anticoagulation use.  When he came to, his wife states that he was asking the same questions over and over again and then proceeded to have an asthma attack.  He took his medications and felt better.  He went to sleep but woke up this morning with a severe headache and neck pain.  The dizziness has slightly subsided but was still slightly present.  Is described as room spinning sensation.  He did not elicit that he has a history of vertigo but upon chart review, he does have this history and has been complaining of increasing episodes of dizziness since May.  His headache this evening is different than he has had in the past.  He does not report any vision changes, continued confusion, new numbness or tingling of the extremities, nausea, vomiting, or gait disturbance.  He has not taken any medications for the pain.  He is concerned about his severe neck pain because he does have a history of spinal stenosis and is seeing Dr. Lebron for this condition.  He has some baseline right upper extremity tingling from a right total shoulder replacement in the past that is unchanged at present.    ROS: 10-point review of systems was performed and is  otherwise negative except as noted in HPI.      Past Medical History: Noncontributory except per HPI     Past Surgical History: Noncontributory except per HPI     Family History: Reviewed and noncontributory     Social History:  Noncontributory except per HPI       No Known Allergies    Home Meds:   Current Outpatient Medications   Medication Instructions    albuterol 2.5 mg /3 mL (0.083 %) nebulizer solution USE 1 VIAL IN NEBULIZER EVERY 4 HOURS - and as needed    amLODIPine (NORVASC) 10 mg, oral, Daily    atorvastatin (Lipitor) 80 mg tablet TAKE 1 TABLET AT BEDTIME.    cholecalciferol (VITAMIN D-3) 50 mcg, oral, Daily    loratadine (CLARITIN) 10 mg, oral, Daily    metFORMIN (GLUCOPHAGE) 1,000 mg, oral, Daily with breakfast    montelukast (SINGULAIR) 10 mg, oral, Nightly    naloxone (NARCAN) 4 mg, nasal, As needed, May repeat every 2-3 minutes if needed, alternating nostrils, until medical assistance becomes available.    oxyCODONE-acetaminophen (Percocet) 7.5-325 mg tablet 1 tablet, oral, Every 6 hours PRN    oxyCODONE-acetaminophen (Percocet) 7.5-325 mg tablet 1 tablet, oral, Every 6 hours PRN    [START ON 7/17/2024] oxyCODONE-acetaminophen (Percocet) 7.5-325 mg tablet 1 tablet, oral, Every 6 hours PRN    sennosides-docusate sodium (Maribell-Colace) 8.6-50 mg tablet 1 tablet, oral, Daily RT    Symbicort 160-4.5 mcg/actuation inhaler 2 puffs, inhalation, 2 times daily, Rinse mouth after use.    tadalafil (CIALIS) 20 mg, oral, Daily PRN    tamsulosin (FLOMAX) 0.8 mg, oral, Nightly    Ventolin HFA 90 mcg/actuation inhaler INHALE 1 OR 2 PUFFS EVERY 4 TO 6 HOURS AS NEEDED        ED Triage Vitals [07/05/24 2054]   Temperature Heart Rate Respirations BP   36.3 °C (97.4 °F) 72 18 139/87      Pulse Ox Temp Source Heart Rate Source Patient Position   97 % Temporal Monitor Sitting      BP Location FiO2 (%)     Left arm --         Heart Rate:  [70-72]   Temperature:  [36.3 °C (97.4 °F)]   Respirations:  [18]   BP:  "(139-144)/(84-87)   Height:  [165.1 cm (5' 5\")]   Weight:  [111 kg (245 lb)]   Pulse Ox:  [95 %-97 %]      Physical Exam:  Physical Exam  Vitals and nursing note reviewed.   Constitutional:       General: He is not in acute distress.     Appearance: Normal appearance. He is not ill-appearing.   HENT:      Head: Normocephalic and atraumatic.      Right Ear: External ear normal.      Left Ear: External ear normal.      Nose: Nose normal.      Mouth/Throat:      Mouth: Mucous membranes are moist.   Eyes:      General: No visual field deficit.     Extraocular Movements: Extraocular movements intact.      Conjunctiva/sclera: Conjunctivae normal.      Pupils: Pupils are equal, round, and reactive to light.   Neck:      Comments: Diffuse midline cervical spine tenderness to palpation.  Cervical collar immediately placed.  Range of motion not attempted.  Bilateral upper extremity strength and sensation is normal but causes significant pain to the neck.  Cardiovascular:      Rate and Rhythm: Normal rate and regular rhythm.      Pulses: Normal pulses.   Pulmonary:      Effort: Pulmonary effort is normal. No respiratory distress.      Breath sounds: Normal breath sounds.      Comments: Bilateral anterior chest wall tenderness over the pectoralis muscles.  No overlying skin changes.  Chest:      Chest wall: Tenderness present.   Abdominal:      General: Abdomen is flat. There is no distension.      Palpations: Abdomen is soft. There is no mass.      Tenderness: There is no abdominal tenderness.   Musculoskeletal:      Cervical back: Neck supple. Tenderness present.      Comments: No bony tenderness to the bilateral upper or lower extremities.  Gait is normal.  Baseline midline bony thoracic and lumbar spine tenderness with well-healed scars.  Unchanged from baseline per patient.  Lower extremity strength and sensation normal.  No loss of bowel or bladder control.   Skin:     General: Skin is warm and dry.      Capillary Refill: " Capillary refill takes less than 2 seconds.   Neurological:      General: No focal deficit present.      Mental Status: He is alert and oriented to person, place, and time.      GCS: GCS eye subscore is 4. GCS verbal subscore is 5. GCS motor subscore is 6.      Cranial Nerves: No cranial nerve deficit, dysarthria or facial asymmetry.      Sensory: No sensory deficit.      Motor: No weakness.      Coordination: Romberg sign negative. Coordination normal.      Gait: Gait normal.   Psychiatric:         Mood and Affect: Mood normal.          Diagnostic Results        Labs Reviewed   CBC WITH AUTO DIFFERENTIAL - Abnormal       Result Value    WBC 7.1      nRBC 0.0      RBC 4.88      Hemoglobin 13.9      Hematocrit 42.0      MCV 86      MCH 28.5      MCHC 33.1      RDW 15.9 (*)     Platelets 271      Neutrophils % 45.3      Immature Granulocytes %, Automated 0.3      Lymphocytes % 39.1      Monocytes % 12.2      Eosinophils % 2.3      Basophils % 0.8      Neutrophils Absolute 3.20      Immature Granulocytes Absolute, Automated 0.02      Lymphocytes Absolute 2.76      Monocytes Absolute 0.86      Eosinophils Absolute 0.16      Basophils Absolute 0.06     COMPREHENSIVE METABOLIC PANEL - Abnormal    Glucose 114 (*)     Sodium 136      Potassium 3.5      Chloride 99      Bicarbonate 28      Anion Gap 13      Urea Nitrogen 14      Creatinine 0.93      eGFR >90      Calcium 8.7      Albumin 4.0      Alkaline Phosphatase 54      Total Protein 7.1      AST 24      Bilirubin, Total 0.7      ALT 18     CREATINE KINASE - Abnormal    Creatine Kinase 488 (*)    TROPONIN I, HIGH SENSITIVITY - Normal    Troponin I, High Sensitivity 4      Narrative:     Less than 99th percentile of normal range cutoff-  Female and children under 18 years old <14 ng/L; Male <21 ng/L: Negative  Repeat testing should be performed if clinically indicated.     Female and children under 18 years old 14-50 ng/L; Male 21-50 ng/L:  Consistent with possible  cardiac damage and possible increased clinical   risk. Serial measurements may help to assess extent of myocardial damage.     >50 ng/L: Consistent with cardiac damage, increased clinical risk and  myocardial infarction. Serial measurements may help assess extent of   myocardial damage.      NOTE: Children less than 1 year old may have higher baseline troponin   levels and results should be interpreted in conjunction with the overall   clinical context.     NOTE: Troponin I testing is performed using a different   testing methodology at [facility] than at other   [facility]. Direct result comparisons should only   be made within the same method.   PROTIME-INR - Normal    Protime 11.7      INR 1.0           CT head wo IV contrast   Final Result   CT HEAD:        No evidence of hemorrhage, skull fracture, or other acute   intracranial trauma/abnormality.        CT C-SPINE:        No evidence of acute trauma to the cervical spine.        MACRO:   None        Signed by: lEva Garcia 7/6/2024 12:22 AM   Dictation workstation:   [id number]      CT cervical spine wo IV contrast   Final Result   CT HEAD:        No evidence of hemorrhage, skull fracture, or other acute   intracranial trauma/abnormality.        CT C-SPINE:        No evidence of acute trauma to the cervical spine.        MACRO:   None        Signed by: Elva Garcia 7/6/2024 12:22 AM   Dictation workstation:   [id number]      CT chest abdomen pelvis w IV contrast   Final Result   1. No acute traumatic injury to the chest, abdomen, or pelvis.   2. No fracture of the thoracic spine.   3. No fracture of the lumbar spine.   4. 1 cm nodule right upper lobe suggest clinical and PET/CT   correlation.   Signed by Teddy Trotter MD      CT thoracic spine wo IV contrast   Final Result   1. No acute traumatic injury to the chest, abdomen, or pelvis.   2. No fracture of the thoracic spine.   3. No fracture of the lumbar spine.   4.  1 cm nodule right upper lobe suggest clinical and PET/CT   correlation.   Signed by Teddy Trotter MD      CT lumbar spine wo IV contrast   Final Result   1. No acute traumatic injury to the chest, abdomen, or pelvis.   2. No fracture of the thoracic spine.   3. No fracture of the lumbar spine.   4. 1 cm nodule right upper lobe suggest clinical and PET/CT   correlation.   Signed by Teddy Trotter MD      MR brain wo IV contrast    (Results Pending)                 [place] Coma Scale Score: 15   HEART Score: 2                Procedure  Procedures    ED Course & MDM   Assessment/Plan:     Medications   sodium chloride 0.9 % bolus 1,000 mL (has no administration in time range)   cyclobenzaprine (Flexeril) tablet 10 mg (has no administration in time range)   ketorolac (Toradol) injection 15 mg (has no administration in time range)   meclizine (Antivert) tablet 25 mg (25 mg oral Given 7/5/24 2348)   iohexol (OMNIPaque) 350 mg iodine/mL solution 100 mL (100 mL intravenous Given 7/5/24 2320)   morphine injection 4 mg (4 mg intravenous Given 7/5/24 2356)        ED Course as of 07/06/24 0228 Fri Jul 05, 2024   2304 EKG on my interpretation shows sinus bradycardia at a rate of 58 bpm, no acute ST elevations, QTc 402. [EP]      ED Course User Index  [EP] Haritha Oliva PA-C         Diagnoses as of 07/06/24 0228   Syncope and collapse   Dizziness   Elevated CK-MB level       Medical Decision Making    Tomasz Maxwell is a 48 y.o. male with past medical history of vertigo, asthma, hypertension, hyperlipidemia, diabetes, BPH, cervical spinal stenosis, multiple lower spine surgeries, and right total shoulder replacement presents with his significant other with concern for headache and neck pain.  Patient is nontoxic-appearing and his vital signs are normal.  During my examination, I did place a cervical collar on the patient's neck.  He was ill fitting as it was a street collar and he has a wide neck.  [place] collar pending.   Based on his symptoms and presentation, differential diagnosis includes syncopal fall, subacute CVA, subacute subarachnoid hemorrhage (although headache started after the fall), ACS, cardiac arrhythmia, electrolyte abnormality, anemia, dehydration, or traumatic injury of head, C-spine, chest abdomen and pelvis, T-spine, or L-spine.  Will obtain EKG, CK level, troponin, PT/INR, CMP, CBC with differential, and trauma pan scan.  Troponin was normal.  INR normal.  CMP normal.  CBC normal.  Patient was given a dose of meclizine to see if this alleviates his dizziness and part of his headache.     Patient's dizziness did improve.  Troponin level 4.  CK level slightly elevated at 488.  Patient ordered for 1 L of normal saline.  INR normal.  CMP normal.  CBC normal.  CT scans all negative for acute traumatic injuries.  Did show evidence of 1 cm nodule in the right upper lobe of the lung which was reviewed with the patient.  He was already aware of this finding.  Recommended repeat CT scan in 3 to 6 months with outpatient provider.  C-collar was cleared.  Patient still has tenderness around his neck, likely due to muscle strain since he has no other acute neurologic findings indicating cord compression.  Patient will be given Flexeril and Toradol now that scans are negative.  Cleared to eat.  Given that the patient had an unwitnessed potential syncopal fall with unknown amount of downtime, would recommend admission for further workup.  MRI brain also ordered with prior to discharge due to patient's change in his vertigo type symptoms over the past few months.  Excepted to observation medicine.    Disposition: Likely admission    ED Prescriptions    None         Social Determinants Affecting Care: none     Haritha Oliva PA-C    This note was dictated by speech recognition. Minor errors in transcription may be present.     Haritha Oliva PA-C  07/06/24 0228

## 2024-07-06 NOTE — ED TRIAGE NOTES
Pt c/o neck pain and headache that started this morning. Per wife pt fell last night. Wife states she found him laying on the floor. Pt doesn't remember falling. Denies blood thinners.

## 2024-07-06 NOTE — NURSING NOTE
All discharge teaching completed with both pt. And wife. Discussed vertigo and meclizine. Emphasized no heavy machinery operation , and sitting before standing until MRI. Both verbalized an understanding. Discharged home. Preferred to have MRI as OP since it can't be done over the weekend. Aware of need for follow up.

## 2024-07-06 NOTE — DISCHARGE INSTRUCTIONS
[facility] Observation Unit Discharge Instructions  You came to the hospital with syncope and were admitted for observation and further care.   You were treated with monitoring on telemetry and orthostatic vitals which were negative. Attempted to get an MRI and echocardiogram, unfortunately unable to be done due to weekend. These were ordered as outpt    Your discharge plan is to go home to recover.    Please see your primary care doctor in 1 week for follow-up.   An appointment has been requested for you but may you need to call your doctors office to schedule.      For any issues or concerns with appointments, call the  scheduling line at [phone] or the providers office directly.       See the attached information for education about any new medications and the condition(s) you were treated for.  Your medications may have changed so pay close attention to the list given to you at discharge and ask any questions you have before leaving the hospital.

## 2024-07-10 ENCOUNTER — PATIENT OUTREACH (OUTPATIENT)
Dept: CARE COORDINATION | Facility: CLINIC | Age: 49
End: 2024-07-10
Payer: MEDICARE

## 2024-07-10 NOTE — PROGRESS NOTES
Outreach call to patient to support a smooth transition of care from recent admission.  Spoke with patient, reviewed discharge medications, discharge instructions, assessed social needs, and discussed follow-up appointment with provider.  Will continue to monitor through transition period.      Engagement  Call Start Time: 1540 (7/10/2024  3:46 PM)    Medications  Medications reviewed with patient/caregiver?: Yes (7/10/2024  3:46 PM)  Is the patient having any side effects they believe may be caused by any medication additions or changes?: No (7/10/2024  3:46 PM)  Does the patient have all medications ordered at discharge?: Yes (7/10/2024  3:46 PM)  Care Management Interventions: No intervention needed (7/10/2024  3:46 PM)  Is the patient taking all medications as directed (includes completed medication regime)?: Yes (7/10/2024  3:46 PM)    Appointments  Does the patient have a primary care provider?: Yes (7/10/2024  3:46 PM)  Care Management Interventions: Verified appointment date/time/provider (7/10/2024  3:46 PM)  Has the patient kept scheduled appointments due by today?: Yes (7/10/2024  3:46 PM)    Self Management  What is the home health agency?: None (7/10/2024  3:46 PM)    Patient Teaching  Does the patient have access to their discharge instructions?: Yes (7/10/2024  3:46 PM)  Care Management Interventions: Reviewed instructions with patient (7/10/2024  3:46 PM)  What is the patient's perception of their health status since discharge?: Improving (7/10/2024  3:46 PM)  Is the patient/caregiver able to teach back the hierarchy of who to call/visit for symptoms/problems? PCP, Specialist, Home Health nurse, Urgent Care, ED, 911: Yes (7/10/2024  3:46 PM)    Wrap Up  Wrap Up Additional Comments: This CM spoke with patient who states he has not had any episode of fainting or syncope since returning home, patient states he is taking new discharge medicine as prescribed.  Patient states his PCP no longer is with [facility]  and he is still under Dr. Mcnair at this time with an appointment with a provider that works with her on 8/2 for hospital follow-up.  Reviewed upcoming appointments.  Discussed upcoming colonscopy. Pt. had no needs or questions.  CM will continue transitions of care follow up. ADRIANA Vela, RN JANAK (7/10/2024  3:46 PM)  Call End Time: 1545 (7/10/2024  3:46 PM)     ADRIANA Vela, RN   [phone]   [facility] Department

## 2024-07-13 LAB
ATRIAL RATE: 58 BPM
P AXIS: 62 DEGREES
P OFFSET: 193 MS
P ONSET: 136 MS
PR INTERVAL: 168 MS
Q ONSET: 220 MS
QRS COUNT: 10 BEATS
QRS DURATION: 82 MS
QT INTERVAL: 410 MS
QTC CALCULATION(BAZETT): 402 MS
QTC FREDERICIA: 405 MS
R AXIS: 75 DEGREES
T AXIS: 47 DEGREES
T OFFSET: 425 MS
VENTRICULAR RATE: 58 BPM

## 2024-07-19 ENCOUNTER — TELEPHONE (OUTPATIENT)
Dept: GASTROENTEROLOGY | Facility: CLINIC | Age: 49
End: 2024-07-19
Payer: MEDICARE

## 2024-07-25 ENCOUNTER — ANESTHESIA EVENT (OUTPATIENT)
Dept: GASTROENTEROLOGY | Facility: HOSPITAL | Age: 49
End: 2024-07-25
Payer: MEDICARE

## 2024-07-25 ENCOUNTER — HOSPITAL ENCOUNTER (OUTPATIENT)
Dept: GASTROENTEROLOGY | Facility: HOSPITAL | Age: 49
Discharge: HOME | End: 2024-07-25
Payer: MEDICARE

## 2024-07-25 ENCOUNTER — ANESTHESIA (OUTPATIENT)
Dept: GASTROENTEROLOGY | Facility: HOSPITAL | Age: 49
End: 2024-07-25
Payer: MEDICARE

## 2024-07-25 VITALS
OXYGEN SATURATION: 98 % | SYSTOLIC BLOOD PRESSURE: 120 MMHG | TEMPERATURE: 97.5 F | HEIGHT: 65 IN | RESPIRATION RATE: 16 BRPM | HEART RATE: 54 BPM | BODY MASS INDEX: 40.82 KG/M2 | DIASTOLIC BLOOD PRESSURE: 77 MMHG | WEIGHT: 245 LBS

## 2024-07-25 DIAGNOSIS — Z12.11 COLON CANCER SCREENING: ICD-10-CM

## 2024-07-25 DIAGNOSIS — K63.5 POLYP OF COLON, UNSPECIFIED PART OF COLON, UNSPECIFIED TYPE: Primary | ICD-10-CM

## 2024-07-25 LAB — GLUCOSE BLD MANUAL STRIP-MCNC: 111 MG/DL (ref 74–99)

## 2024-07-25 PROCEDURE — G0121 COLON CA SCRN NOT HI RSK IND: HCPCS | Performed by: INTERNAL MEDICINE

## 2024-07-25 PROCEDURE — 7100000009 HC PHASE TWO TIME - INITIAL BASE CHARGE

## 2024-07-25 PROCEDURE — 7100000010 HC PHASE TWO TIME - EACH INCREMENTAL 1 MINUTE

## 2024-07-25 PROCEDURE — 3700000002 HC GENERAL ANESTHESIA TIME - EACH INCREMENTAL 1 MINUTE

## 2024-07-25 PROCEDURE — 2500000005 HC RX 250 GENERAL PHARMACY W/O HCPCS: Mod: SE | Performed by: ANESTHESIOLOGIST ASSISTANT

## 2024-07-25 PROCEDURE — 3700000001 HC GENERAL ANESTHESIA TIME - INITIAL BASE CHARGE

## 2024-07-25 PROCEDURE — 2500000004 HC RX 250 GENERAL PHARMACY W/ HCPCS (ALT 636 FOR OP/ED): Mod: SE | Performed by: ANESTHESIOLOGIST ASSISTANT

## 2024-07-25 PROCEDURE — 82947 ASSAY GLUCOSE BLOOD QUANT: CPT

## 2024-07-25 PROCEDURE — 45378 DIAGNOSTIC COLONOSCOPY: CPT | Performed by: INTERNAL MEDICINE

## 2024-07-25 RX ORDER — LIDOCAINE HYDROCHLORIDE 20 MG/ML
INJECTION, SOLUTION INFILTRATION; PERINEURAL AS NEEDED
Status: DISCONTINUED | OUTPATIENT
Start: 2024-07-25 | End: 2024-07-25

## 2024-07-25 RX ORDER — FENTANYL CITRATE 50 UG/ML
INJECTION, SOLUTION INTRAMUSCULAR; INTRAVENOUS AS NEEDED
Status: DISCONTINUED | OUTPATIENT
Start: 2024-07-25 | End: 2024-07-25

## 2024-07-25 RX ORDER — PROPOFOL 10 MG/ML
INJECTION, EMULSION INTRAVENOUS CONTINUOUS PRN
Status: DISCONTINUED | OUTPATIENT
Start: 2024-07-25 | End: 2024-07-25

## 2024-07-25 RX ORDER — MIDAZOLAM HYDROCHLORIDE 1 MG/ML
INJECTION INTRAMUSCULAR; INTRAVENOUS AS NEEDED
Status: DISCONTINUED | OUTPATIENT
Start: 2024-07-25 | End: 2024-07-25

## 2024-07-25 ASSESSMENT — PAIN SCALES - GENERAL
PAINLEVEL_OUTOF10: 0 - NO PAIN

## 2024-07-25 ASSESSMENT — PAIN - FUNCTIONAL ASSESSMENT
PAIN_FUNCTIONAL_ASSESSMENT: 0-10

## 2024-07-25 NOTE — ANESTHESIA PREPROCEDURE EVALUATION
Patient: Tomasz Maxwell    Procedure Information       Date/Time: 07/25/24 0800    Scheduled providers: Victor Manuel Jose MD; Melisa Escudero RN    Procedure: COLONOSCOPY    Location: [facility]        ALLERGIES:  No Known Allergies     MEDICAL HISTORY:  Past Medical History:   Diagnosis Date    Adhesive capsulitis of shoulder 01/29/2024    Contusion of left hand, initial encounter 04/24/2017    Contusion of left hand, initial encounter    Crushing injury of unspecified finger(s), initial encounter 04/24/2017    Injury, crush, finger    Essential (primary) hypertension 05/16/2022    Hypertension    Hyperlipidemia, unspecified 10/03/2022    Hyperlipidemia    Hypokalemia 04/24/2017    Hypokalemia, k= 3.8 on 1/26/23    Idiopathic gout, right knee 04/24/2017    Acute idiopathic gout of right knee    Latent syphilis, unspecified as early or late 04/13/2018    Latent syphilis with positive serology    Local infection of the skin and subcutaneous tissue, unspecified 12/05/2017    Skin infection    Long term (current) use of opiate analgesic 04/24/2017    Opioid contract exists    Mastitis without abscess 04/24/2017    Mastitis, acute    Mastodynia 04/24/2017    Breast pain, right    Muscle spasm of back 04/24/2017    Paraspinal muscle spasm    Nocturia 04/24/2017    Nocturia    Other microscopic hematuria 04/24/2017    Hematuria, microscopic    Other specified postprocedural states 04/24/2017    H/O arthroscopic knee surgery    Other specified postprocedural states 04/24/2017    Status post lumbar laminectomy    Other specified symptoms and signs involving the digestive system and abdomen 09/08/2017    Umbilical discharge    Pain in left wrist 04/24/2017    Left wrist pain    Personal history of other diseases of the musculoskeletal system and connective tissue     History of arthritis    Personal history of other diseases of the nervous system and sense organs 03/07/2016    History of acute otitis  media    Personal history of other endocrine, nutritional and metabolic disease     History of diabetes mellitus    Personal history of other infectious and parasitic diseases 09/10/2017    History of tinea corporis    Personal history of other infectious and parasitic diseases 09/08/2017    History of trichomoniasis    Personal history of other infectious and parasitic diseases 04/13/2018    History of trichomoniasis    Personal history of other specified conditions 04/30/2018    History of dyspnea    Personal history of other specified conditions 01/23/2018    History of epigastric pain    Personal history of other specified conditions 07/28/2017    History of urinary frequency    Personal history of other specified conditions 10/08/2017    History of chest pain    Proteinuria, unspecified 04/24/2017    Proteinuria    Radiculopathy, lumbar region 04/24/2017    Lumbar radiculopathy    Rupture of anterior cruciate ligament of knee 02/02/2023    Spinal stenosis, site unspecified 08/19/2015    Spinal stenosis, multiple sites in spine    Unspecified abnormal finding in specimens from other organs, systems and tissues 04/24/2017    Abnormal laboratory test result    Unspecified asthma, uncomplicated (HHS-HCC)     Active asthma    Vertigo     ECHO 5/21/22        Relevant Problems   Cardiac   (+) Hyperlipidemia   (+) Hypertension      Pulmonary   (+) Moderate asthma (HHS-HCC)   (+) ES (obstructive sleep apnea)      Neuro   (+) Depression   (+) Tarsal tunnel syndrome      Endocrine   (+) Obesity      Musculoskeletal   (+) Chronic low back pain   (+) Osteoarthritis   (+) Primary osteoarthritis of left hip   (+) Primary osteoarthritis of right hip   (+) Spinal stenosis      HEENT   (+) Hearing loss, functional      ID   (+) Latent syphilis with positive serology        SURGICAL HISTORY:  Past Surgical History:   Procedure Laterality Date    BACK SURGERY  09/26/2016    Back Surgeryx5, has spinal stimulator    HERNIA REPAIR   08/26/2015    Hernia Repairx2    KNEE SURGERY Right 03/29/2017    Knee Surgery, right TKR    LUMBAR LAMINECTOMY  08/26/2015    Laminectomy Lumbar    MR HEAD ANGIO WO IV CONTRAST  05/21/2022    MR HEAD ANGIO WO IV CONTRAST 5/21/2022 [facility]    MR HEAD ANGIO WO IV CONTRAST  03/12/2017    MR HEAD ANGIO WO IV CONTRAST 3/12/2017 [facility]    MR NECK ANGIO WO IV CONTRAST  05/21/2022    MR NECK ANGIO WO IV CONTRAST 5/21/2022 [facility]    MR NECK ANGIO WO IV CONTRAST  03/12/2017    MR NECK ANGIO WO IV CONTRAST 3/12/2017 [facility]    OTHER SURGICAL HISTORY  05/20/2021    Abdominal surgery    OTHER SURGICAL HISTORY Bilateral 02/24/2021    Hip replacement    OTHER SURGICAL HISTORY  09/26/2016    Gastrectomy    SHOULDER SURGERY Left     left TSR        MEDICATIONS:  Current Outpatient Medications   Medication Instructions    albuterol 2.5 mg /3 mL (0.083 %) nebulizer solution USE 1 VIAL IN NEBULIZER EVERY 4 HOURS - and as needed    amLODIPine (NORVASC) 10 mg, oral, Daily    atorvastatin (Lipitor) 80 mg tablet TAKE 1 TABLET AT BEDTIME.    cholecalciferol (VITAMIN D-3) 50 mcg, oral, Daily    loratadine (CLARITIN) 10 mg, oral, Daily PRN    meclizine (ANTIVERT) 12.5 mg, oral, 3 times daily PRN    metFORMIN (GLUCOPHAGE) 1,000 mg, oral, Daily with breakfast    montelukast (SINGULAIR) 10 mg, oral, Nightly    naloxone (NARCAN) 4 mg, nasal, As needed, May repeat every 2-3 minutes if needed, alternating nostrils, until medical assistance becomes available.    oxyCODONE-acetaminophen (Percocet) 7.5-325 mg tablet 1 tablet, oral, Every 6 hours PRN    oxyCODONE-acetaminophen (Percocet) 7.5-325 mg tablet 1 tablet, oral, Every 6 hours PRN    oxyCODONE-acetaminophen (Percocet) 7.5-325 mg tablet 1 tablet, oral, Every 6 hours PRN    sennosides-docusate sodium (Maribell-Colace) 8.6-50 mg tablet 1 tablet, oral, Daily PRN    Symbicort 160-4.5 mcg/actuation inhaler 2 puffs, inhalation, 2 times daily PRN,  "Rinse mouth after use.    tadalafil (CIALIS) 20 mg, oral, Daily PRN    tamsulosin (FLOMAX) 0.8 mg, oral, Nightly    Ventolin HFA 90 mcg/actuation inhaler INHALE 1 OR 2 PUFFS EVERY 4 TO 6 HOURS AS NEEDED        VITALS:      7/25/2024     7:13 AM 7/6/2024     4:47 PM 7/6/2024    12:56 PM   Vitals   Systolic 143 114 127   Diastolic 98 63 79   Heart Rate 67 55 55   Temp 36.3 °C (97.3 °F) 36.6 °C (97.9 °F) 36.4 °C (97.5 °F)   Resp 18 18 18   Height (in) 1.651 m (5' 5\")     Weight (lb) 245     BMI 40.77 kg/m2     BSA (m2) 2.26 m2         LABS:   BMP   Lab Results   Component Value Date    GLUCOSE 114 (H) 07/06/2024    CALCIUM 7.9 (L) 07/06/2024     07/06/2024    K 3.4 (L) 07/06/2024    CO2 27 07/06/2024     07/06/2024    BUN 11 07/06/2024    CREATININE 0.88 07/06/2024   , LFT   Lab Results   Component Value Date    ALT 18 07/05/2024    AST 24 07/05/2024    ALKPHOS 54 07/05/2024    BILITOT 0.7 07/05/2024   , CBC  Lab Results   Component Value Date    WBC 4.5 07/06/2024    HGB 13.3 (L) 07/06/2024    HCT 40.5 (L) 07/06/2024    MCV 86 07/06/2024     07/06/2024          , Coags   Lab Results   Component Value Date/Time    PROTIME 11.7 07/05/2024 2225    INR 1.0 07/05/2024 2225    APTT 29 02/22/2022 0859      , A1C   Lab Results   Component Value Date    HGBA1C 6.8 (H) 11/27/2023       IMAGES:  EKG          Encounter Date: 07/05/24   ECG 12 lead   Result Value    Ventricular Rate 58    Atrial Rate 58    WY Interval 168    QRS Duration 82    QT Interval 410    QTC Calculation(Bazett) 402    P Axis 62    R Axis 75    T Axis 47    QRS Count 10    Q Onset 220    P Onset 136    P Offset 193    T Offset 425    QTC Fredericia 405    Narrative    Sinus bradycardia  Nonspecific T wave abnormality  Abnormal ECG  When compared with ECG of 06-DEC-2023 18:25,  No significant change was found  See ED provider note for full interpretation and clinical correlation  Confirmed by Porsha Kaminski ([id number]) on 7/13/2024 " 7:01:32 PM      , ECHO         Transthoracic Echo (TTE) Complete With Contrast 12/07/2023    [facility], [address] [place] [address]  Tel [phone] and Fax [phone]    TRANSTHORACIC ECHOCARDIOGRAM REPORT      Patient Name:      ASHLEIGH MURGUIA     [place] Physician:    [id number] Savage Banda MD  Study Date:        12/7/2023            Ordering Provider:    [id number] TE TENORIO  MRN/PID:           [id number]             Fellow:  Accession#:        [id number]         Nurse:                Juani Hargrove RN  Date of Birth/Age: 1975 / 47      Sonographer:          Alicia Ronquillo RDCS  years  Gender:            M                    Additional Staff:     Suzi Vallejo  Cardiac  Sonographer Intern  Height:            165.10 cm            Admit Date:           12/6/2023  Weight:            126.55 kg            Admission Status:     Inpatient -  Routine  BSA:               2.28 m2              Encounter#:           [id number]  Department Location:  [facility] Non  Invasive  Blood Pressure: 120 /71 mmHg    Study Type:    TRANSTHORACIC ECHO (TTE) COMPLETE  Diagnosis/ICD: Shortness of breath-R06.02; Other specified symptoms and signs  involving the circulatory and respiratory systems (bruit)-R09.89  Indication:    Shortness of breath; Pulmonary vascular congestion  CPT Code:      Echo Complete w Full Doppler-[id number]    Patient History:  Pertinent History: HTN, Hyperlipidemia and Dyspnea. ES, asthma, DMII, obesity.    Study Detail: The following Echo studies were performed: 2D, M-Mode, Doppler and  color flow. Technically challenging study due to body habitus,  prominent lung artifact and poor acoustic windows. Definity used  as a contrast agent for endocardial border definition. Total  contrast used for this procedure was 2.0 mL via IV push.      PHYSICIAN INTERPRETATION:  Left Ventricle: The left ventricular systolic function is normal, with an estimated ejection  fraction of 60-65%. There are no regional wall motion abnormalities. The left ventricular cavity size is normal. Spectral Doppler shows a normal pattern of left ventricular diastolic filling.  Left Atrium: The left atrium is normal in size.  Right Ventricle: The right ventricle is normal in size. There is normal right ventricular global systolic function.  Right Atrium: The right atrium is normal in size.  Aortic Valve: The aortic valve is trileaflet. There is no evidence of aortic valve regurgitation. The peak instantaneous gradient of the aortic valve is 10.2 mmHg.  Mitral Valve: The mitral valve is normal in structure. There is trace mitral valve regurgitation.  Tricuspid Valve: The tricuspid valve is structurally normal. No evidence of tricuspid regurgitation. Right ventricular systolic pressure could not be accurately assessed in this patient.  Pulmonic Valve: The pulmonic valve is structurally normal. There is trace pulmonic valve regurgitation.  Pericardium: There is no pericardial effusion noted.  Aorta: The aortic root is normal.      CONCLUSIONS:  1. Left ventricular systolic function is normal with a 60-65% estimated ejection fraction.    QUANTITATIVE DATA SUMMARY:  2D MEASUREMENTS:  Normal Ranges:  Ao Root d: 2.80 cm (2.0-3.7cm)    LA VOLUME:  Normal Ranges:  LA Vol A4C:        64.7 ml    (22+/-6mL/m2)  LA Vol A2C:        65.8 ml  LA Vol BP:         66.6 ml  LA Vol Index A4C:  28.4ml/m2  LA Vol Index A2C:  28.9 ml/m2  LA Vol Index BP:   29.2 ml/m2  LA Area A4C:       20.2 cm2  LA Area A2C:       20.8 cm2  LA Major Axis A4C: 5.4 cm  LA Major Axis A2C: 5.6 cm    AORTA MEASUREMENTS:  Normal Ranges:  Asc Ao, d: 2.70 cm (2.1-3.4cm)    LV SYSTOLIC FUNCTION BY 2D PLANIMETRY (MOD):  Normal Ranges:  EF-A4C View: 68.3 % (>=55%)  EF-A2C View: 63.7 %  EF-Biplane:  65.8 %    LV DIASTOLIC FUNCTION:  Normal Ranges:  MV Peak E:    0.90 m/s (0.7-1.2 m/s)  MV Peak A:    0.51 m/s (0.42-0.7 m/s)  E/A Ratio:    1.75      (1.0-2.2)  MV e'         0.15 m/s (>8.0)  MV lateral e' 0.16 m/s  MV medial e'  0.14 m/s  E/e' Ratio:   6.02     (<8.0)  a'            0.08 m/s  MV DT:        185 msec (150-240 msec)    MITRAL VALVE:  Normal Ranges:  MV DT: 185 msec (150-240msec)    AORTIC VALVE:  Normal Ranges:  AoV Vmax:      1.60 m/s  (<=1.7m/s)  AoV Peak PG:   10.2 mmHg (<20mmHg)  LVOT Max Leopoldo:  1.51 m/s  (<=1.1m/s)  LVOT VTI:      29.80 cm  LVOT Diameter: 2.00 cm   (1.8-2.4cm)  AoV Area,Vmax: 2.96 cm2  (2.5-4.5cm2)      RIGHT VENTRICLE:  RV s' 0.14 m/s    PULMONIC VALVE:  Normal Ranges:  PV Accel Time: 116 msec (>120ms)  PV Max Leopoldo:    1.0 m/s  (0.6-0.9m/s)  PV Max PG:     3.9 mmHg      [id number] Savage Banda MD  Electronically signed on 12/7/2023 at 9:35:49 AM        ** Final **    , CARDIAC CATH      No results found for this or any previous visit from the past 730 days.   , CXR       XR chest 2 views 12/06/2023    Narrative  Interpreted By:  Alvaro Rivas and Liller Gregory  STUDY:  XR CHEST 2 VIEWS;  12/6/2023 9:20 pm    INDICATION:  Signs/Symptoms:sob.    COMPARISON:  04/01/2022    ACCESSION NUMBER(S):  [id number]    ORDERING CLINICIAN:  GEORGINA BURT    FINDINGS:  PA and lateral radiographs of the chest were provided.    Severely limited by soft tissue attenuation factors.    Spinal cord stimulator leads overlie the T9 vertebral body.    CARDIOMEDIASTINAL SILHOUETTE:  Cardiomediastinal silhouette is normal in size and configuration.    LUNGS:  Pulmonary vascular congestion suggests volume overload, with possible  mild acute pulmonary interstitial edema. No focal consolidation.  Asymmetric hazy opacity about the left lateral costophrenic angle  probably relates to chest wall soft tissue although airspace disease  such as atelectasis or pneumonia with or without small effusion  cannot be excluded. No sizable effusion on the right. No pneumothorax.    ABDOMEN:  No remarkable upper abdominal findings.    BONES:  No osseous  injury.    Impression  Severely limited exam.    Pulmonary vascular congestion suggests volume overload, with possible  mild acute pulmonary interstitial edema. No focal consolidation.  Asymmetric hazy opacity about the left lateral costophrenic angle  probably relates to chest wall soft tissue although airspace disease  such as atelectasis or pneumonia with or without small effusion  cannot be excluded. No sizable effusion on the right. No pneumothorax.    I personally reviewed the images/study and I agree with the findings  as stated above by resident physician, Dr. Eduin Ponce. The  study was interpreted at [facility].    MACRO:  none.    Signed by: Alvaro Rivas 12/6/2023 10:59 PM  Dictation workstation:   [id number]    , CT Head/Neck       CT head wo IV contrast 07/05/2024  CT cervical spine wo IV contrast 07/05/2024    Narrative  Interpreted By:  Elva Garcia,  STUDY:  CT HEAD WO IV CONTRAST; CT CERVICAL SPINE WO IV CONTRAST;  7/5/2024  11:29 pm    INDICATION:  Signs/Symptoms:Trauma; Signs/Symptoms:trauma.    COMPARISON:  CT head dated 05/20/2022; CT of the cervical spine dated 08/31/2021;  MRI of the cervical spine dated 08/12/2023.    ACCESSION NUMBER(S):  [id number]; [id number]    ORDERING CLINICIAN:  CRISTAL GOODWIN    TECHNIQUE:  Noncontrast axial CT scan of head was performed, with coronal and  sagittal reformats provided. The images were reviewed in bone, brain,  blood and soft tissue windows.    Axial CT images of the cervical spine are obtained. Axial, coronal  and sagittal reconstructions are provided for review.    FINDINGS:  CT HEAD:    No hyperdense intracranial hemorrhage is identified. No mass effect  or midline shift is present. No    Gray-white differentiation is intact, without evidence of CT apparent  transcortical infarct.    No ventricular dilatation is evident. Basal cisterns are patent. No  extra-axial collections are  identified.    Scalp soft tissues do not demonstrate any acute abnormality.  Calvarium is unremarkable in appearance. Mastoid air cells and middle  ear cavities are clear.    Visualized paranasal sinuses do not demonstrate any acute abnormality.    CT C-SPINE:    Cervical vertebral alignment is maintained, without evidence of  significant spondylolisthesis.    Cervical vertebral body heights are preserved without evidence of  compression fractures. Posterior elements of the cervical spine do  not demonstrate any evidence of acute trauma.    Craniocervical junction is intact. Facet joints are preserved without  evidence of traumatic subluxation or perching.    Mild intervertebral disc height loss is present in the midcervical  spine C4-C5 and C5-C6.    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 No high-grade  stenosis is identified.    Multilevel neural foraminal narrowing is present at several levels,  most pronounced at the level of C3-C4 on the left with likely at  least moderate narrowing.    Prevertebral and paraspinal soft tissues do not demonstrate any acute  abnormality.    Impression  CT HEAD:    No evidence of hemorrhage, skull fracture, or other acute  intracranial trauma/abnormality.    CT C-SPINE:    No evidence of acute trauma to the cervical spine.    MACRO:  None    Signed by: Elva Garcia 7/6/2024 12:22 AM  Dictation workstation:   [id number]    , CT Chest        CT chest abdomen pelvis w IV contrast 07/05/2024    Narrative  STUDY:  CT Chest, Abdomen, and Pelvis with IV Contrast, CT Thoracic Spine and  Lumbar Spine without IV Contrast; 07/05/2024 at 11:29 PM  INDICATION:  Evaluation status post trauma.  COMPARISON:  CT chest 03/08/24. CTA chest 12/06/23. CT abdomen and pelvis 10/23/18.  MR lumbar spine 08/12/23.  XR thoracic spine 07/07/21, 12/02/20.  ACCESSION NUMBER(S):  [id number], [id number],  [id number]  ORDERING CLINICIAN:  CRISTAL GOODWIN  TECHNIQUE:  CT of the chest, abdomen, and pelvis was performed.  Contiguous axial  images were obtained at 3 mm slice thickness through the chest,  abdomen, and pelvis.  Coronal and sagittal reconstructions at 3 mm  slice thickness were performed.  Omnipaque-350 100 mL was administered  intravenously.  Please note that spinal images were generated from the  original CT abdomen and pelvis imaging.  FINDINGS:  CHEST:  MEDIASTINUM:  The heart is normal in size without pericardial effusion.  Central  vascular structures opacify normally.  LUNGS/PLEURA:  There is no pleural effusion, pleural thickening, or pneumothorax.  The airways are patent.  There is a nodule right upper lobe 1 cm in size suggest clinical and  PET/CT correlation.  LYMPH NODES:  Thoracic lymph nodes are not enlarged.  ABDOMEN:    LIVER:  No hepatomegaly.  Smooth surface contour.  Normal attenuation.    BILE DUCTS:  No intrahepatic or extrahepatic biliary ductal dilatation.    GALLBLADDER:  The gallbladder is .  STOMACH:  No abnormalities identified.    PANCREAS:  No masses or ductal dilatation.    SPLEEN:  No splenomegaly or focal splenic lesion.    ADRENAL GLANDS:  No thickening or nodules.    KIDNEYS AND URETERS:  Kidneys are normal in size and location.  No renal or ureteral  calculi.    PELVIS:    BLADDER:  No abnormalities identified.    REPRODUCTIVE ORGANS:  No abnormalities identified.    BOWEL:  No abnormalities identified.  Appendix is normal.    VESSELS:  No abnormalities identified.  Abdominal aorta is normal in caliber.    PERITONEUM/RETROPERITONEUM/LYMPH NODES:  No free fluid.  No pneumoperitoneum.  No lymphadenopathy.    ABDOMINAL WALL:  No abnormalities identified.  SOFT TISSUES:  No abnormalities identified.    BONES:  No acute fracture or aggressive osseous lesion.  Bilateral hip  arthroplasties present.  THORACIC SPINE:  The alignment is anatomic.  There is no fracture or  traumatic  subluxation.  The vertebral body heights are well maintained.  Disc spaces are  preserved.  No significant central canal stenosis is demonstrated.  The neural foramina are patent throughout.  The paravertebral soft tissues are within normal limits.  LUMBAR SPINE:  The alignment is anatomic.  There is no fracture or traumatic  subluxation.  The vertebral body heights are well maintained.  Disc spaces are  preserved.  No significant central canal stenosis is demonstrated.  The neural foramina are patent throughout.  The paravertebral soft tissues are within normal limits.    Impression  1. No acute traumatic injury to the chest, abdomen, or pelvis.  2. No fracture of the thoracic spine.  3. No fracture of the lumbar spine.  4. 1 cm nodule right upper lobe suggest clinical and PET/CT  correlation.  Signed by Teddy Trotter MD   , CT Abdomin     No results found for this or any previous visit from the past 730 days.    SOCIAL:  Social History     Tobacco Use   Smoking Status Former    Types: Cigarettes   Smokeless Tobacco Never      Social History     Substance and Sexual Activity   Alcohol Use Never      Social History     Substance and Sexual Activity   Drug Use Never        NPO STATUS:  NPO/Void Status  Date of Last Liquid: 07/24/24  Time of Last Liquid: 1800  Date of Last Solid: 07/23/24  Time of Last Solid: 1900  Last Intake Type: Clear fluids        Clinical Areas Reviewed:   Tobacco  Allergies  Meds   Med Hx  Surg Hx   Fam Hx  Soc Hx        Anesthesia Assessment:    Physical Exam    Airway  TM distance: >3 FB  Neck ROM: full     Cardiovascular - normal exam     Dental    Pulmonary - normal exam     Abdominal - normal exam             Anesthesia Plan    History of general anesthesia?: yes  History of complications of general anesthesia?: no    ASA 3     MAC     intravenous induction   Postoperative administration of opioids is intended.  Anesthetic plan and risks discussed with patient.  Use of  blood products discussed with patient who.    Plan discussed with CAA and attending.

## 2024-07-25 NOTE — H&P
"History Of Present Illness  Tomasz Maxwell is a 48 y.o. male presenting here for \"colon cancer screening\" here for open-access colonoscopy. The patient had a prior colonoscopy done 10/11/18 (Dr. Castellanos) which revealed a rectal hyperplastic polyp and external hemorrhoids. A repeat average risk screening colonoscopy was recommended in 10 years (October 2028) as polyp was not adenomatous. However, referring physicians wanted sooner one done for unknown reasons. After discussion, the patient still wishes to proceed.     Past Medical History  Past Medical History:   Diagnosis Date    Adhesive capsulitis of shoulder 01/29/2024    Contusion of left hand, initial encounter 04/24/2017    Contusion of left hand, initial encounter    Crushing injury of unspecified finger(s), initial encounter 04/24/2017    Injury, crush, finger    Essential (primary) hypertension 05/16/2022    Hypertension    Hyperlipidemia, unspecified 10/03/2022    Hyperlipidemia    Hypokalemia 04/24/2017    Hypokalemia, k= 3.8 on 1/26/23    Idiopathic gout, right knee 04/24/2017    Acute idiopathic gout of right knee    Latent syphilis, unspecified as early or late 04/13/2018    Latent syphilis with positive serology    Local infection of the skin and subcutaneous tissue, unspecified 12/05/2017    Skin infection    Long term (current) use of opiate analgesic 04/24/2017    Opioid contract exists    Mastitis without abscess 04/24/2017    Mastitis, acute    Mastodynia 04/24/2017    Breast pain, right    Muscle spasm of back 04/24/2017    Paraspinal muscle spasm    Nocturia 04/24/2017    Nocturia    Other microscopic hematuria 04/24/2017    Hematuria, microscopic    Other specified postprocedural states 04/24/2017    H/O arthroscopic knee surgery    Other specified postprocedural states 04/24/2017    Status post lumbar laminectomy    Other specified symptoms and signs involving the digestive system and abdomen 09/08/2017    Umbilical discharge    Pain in left " wrist 04/24/2017    Left wrist pain    Personal history of other diseases of the musculoskeletal system and connective tissue     History of arthritis    Personal history of other diseases of the nervous system and sense organs 03/07/2016    History of acute otitis media    Personal history of other endocrine, nutritional and metabolic disease     History of diabetes mellitus    Personal history of other infectious and parasitic diseases 09/10/2017    History of tinea corporis    Personal history of other infectious and parasitic diseases 09/08/2017    History of trichomoniasis    Personal history of other infectious and parasitic diseases 04/13/2018    History of trichomoniasis    Personal history of other specified conditions 04/30/2018    History of dyspnea    Personal history of other specified conditions 01/23/2018    History of epigastric pain    Personal history of other specified conditions 07/28/2017    History of urinary frequency    Personal history of other specified conditions 10/08/2017    History of chest pain    Proteinuria, unspecified 04/24/2017    Proteinuria    Radiculopathy, lumbar region 04/24/2017    Lumbar radiculopathy    Rupture of anterior cruciate ligament of knee 02/02/2023    Spinal stenosis, site unspecified 08/19/2015    Spinal stenosis, multiple sites in spine    Unspecified abnormal finding in specimens from other organs, systems and tissues 04/24/2017    Abnormal laboratory test result    Unspecified asthma, uncomplicated ([facility]HS-HCC)     Active asthma    Vertigo     ECHO 5/21/22     Surgical History  Past Surgical History:   Procedure Laterality Date    BACK SURGERY  09/26/2016    Back Surgeryx5, has spinal stimulator    HERNIA REPAIR  08/26/2015    Hernia Repairx2    KNEE SURGERY Right 03/29/2017    Knee Surgery, right TKR    LUMBAR LAMINECTOMY  08/26/2015    Laminectomy Lumbar    MR HEAD ANGIO WO IV CONTRAST  05/21/2022    [place] HEAD ANGIO [place]AST [place] [facility] [place]   MR HEAD ANGIO WO IV CONTRAST  03/12/2017    MR HEAD ANGIO WO IV CONTRAST [place] [facility] [place]    MR NECK ANGIO WO IV CONTRAST  05/21/2022    MR NECK ANGIO WO IV CONTRAST [place]/2022 [facility] [place] [facility]    MR NECK ANGIO WO IV CONTRAST  03/12/2017    MR NECK ANGIO WO IV CONTRAST [place]12/2017 [facility] [place] [facility]    OTHER SURGICAL HISTORY  05/20/2021    Abdominal surgery    OTHER SURGICAL HISTORY Bilateral 02/24/2021    Hip replacement    OTHER SURGICAL HISTORY  09/26/2016    Gastrectomy    SHOULDER SURGERY Left     left TSR     Social History  He reports that he has quit smoking. His smoking use included cigarettes. He has never used smokeless tobacco. He reports that he does not drink alcohol and does not use drugs.    Family History  Family History   Problem Relation Name Age of Onset    Hypertension Mother      Diabetes Mother      Asthma Mother      Deep vein thrombosis Mother      No Known Problems Father      No Known Problems Sister      Asthma Child          Allergies  No Known Allergies       Physical Exam  Constitutional:       Appearance: He is obese.   HENT:      Mouth/Throat:      Mouth: Mucous membranes are moist.   Eyes:      Conjunctiva/sclera: Conjunctivae normal.   Cardiovascular:      Rate and Rhythm: Normal rate.   Pulmonary:      Effort: Pulmonary effort is normal.   Abdominal:      Palpations: Abdomen is soft.   Skin:     General: Skin is warm.   Neurological:      Mental Status: He is oriented to person, place, and time.   Psychiatric:         Mood and Affect: Mood normal.          Last Recorded Vitals  Blood pressure (!) 143/98, pulse 67, temperature 36.3 °C (97.3 °F), temperature source Temporal, resp. rate 18, height 1.651 m (5' 5\"), weight 111 kg (245 lb), SpO2 99%.    Assessment/Plan   \"colon cancer screening\" here for open-access colonoscopy. The patient had a prior colonoscopy done 10/11/18 (Dr. Castellanos) which revealed a rectal hyperplastic polyp and external hemorrhoids. A repeat " average risk screening colonoscopy was recommended in 10 years (October 2028) as polyp was not adenomatous. However, referring physicians wanted sooner one done for unknown reasons. After discussion, the patient still wishes to proceed.     Victor Manuel Jose MD

## 2024-07-25 NOTE — DISCHARGE INSTRUCTIONS

## 2024-07-25 NOTE — ANESTHESIA POSTPROCEDURE EVALUATION
Patient: Tomasz Maxwell    Procedure Summary       Date: 07/25/24 Room / Location: [facility]    Anesthesia Start: 0947 Anesthesia Stop: 1017    Procedure: COLONOSCOPY Diagnosis: Polyp of colon, unspecified part of colon, unspecified type    Scheduled Providers: Victor Manuel Jose MD; Melisa Escudero RN Responsible Provider: Theodore Page MD    Anesthesia Type: MAC ASA Status: 3            Anesthesia Type: MAC    Vitals Value Taken Time   /86 07/25/24 1021   Temp 36.4 °C (97.5 °F) 07/25/24 1013   Pulse 63 07/25/24 1021   Resp 20 07/25/24 1021   SpO2 96 % 07/25/24 1021       Anesthesia Post Evaluation    Patient participation: complete - patient participated  Level of consciousness: awake  Pain management: adequate  Airway patency: patent  Cardiovascular status: acceptable  Respiratory status: acceptable  Hydration status: acceptable  Postoperative Nausea and Vomiting: none    No notable events documented.

## 2024-07-30 ENCOUNTER — APPOINTMENT (OUTPATIENT)
Dept: ORTHOPEDIC SURGERY | Facility: CLINIC | Age: 49
End: 2024-07-30
Payer: MEDICARE

## 2024-08-02 ENCOUNTER — APPOINTMENT (OUTPATIENT)
Dept: PRIMARY CARE | Facility: CLINIC | Age: 49
End: 2024-08-02
Payer: MEDICARE

## 2024-08-02 VITALS
HEIGHT: 65 IN | WEIGHT: 248 LBS | HEART RATE: 80 BPM | TEMPERATURE: 98.3 F | SYSTOLIC BLOOD PRESSURE: 133 MMHG | BODY MASS INDEX: 41.32 KG/M2 | DIASTOLIC BLOOD PRESSURE: 81 MMHG | OXYGEN SATURATION: 95 %

## 2024-08-02 DIAGNOSIS — E13.9 DIABETES 1.5, MANAGED AS TYPE 2 (MULTI): Primary | ICD-10-CM

## 2024-08-02 DIAGNOSIS — F11.90 OPIOID USE: ICD-10-CM

## 2024-08-02 DIAGNOSIS — N52.9 ERECTILE DYSFUNCTION, UNSPECIFIED ERECTILE DYSFUNCTION TYPE: ICD-10-CM

## 2024-08-02 DIAGNOSIS — R91.1 LUNG NODULE: ICD-10-CM

## 2024-08-02 DIAGNOSIS — R55 SYNCOPE, UNSPECIFIED SYNCOPE TYPE: ICD-10-CM

## 2024-08-02 DIAGNOSIS — M96.1 LUMBAR POST-LAMINECTOMY SYNDROME: ICD-10-CM

## 2024-08-02 DIAGNOSIS — F11.29 OPIOID DEPENDENCE WITH OPIOID-INDUCED DISORDER (MULTI): ICD-10-CM

## 2024-08-02 LAB — POC HEMOGLOBIN A1C: 6.7 % (ref 4.2–6.5)

## 2024-08-02 PROCEDURE — 83036 HEMOGLOBIN GLYCOSYLATED A1C: CPT

## 2024-08-02 PROCEDURE — 3075F SYST BP GE 130 - 139MM HG: CPT

## 2024-08-02 PROCEDURE — 3008F BODY MASS INDEX DOCD: CPT

## 2024-08-02 PROCEDURE — 99214 OFFICE O/P EST MOD 30 MIN: CPT

## 2024-08-02 PROCEDURE — 3079F DIAST BP 80-89 MM HG: CPT

## 2024-08-02 PROCEDURE — 1036F TOBACCO NON-USER: CPT

## 2024-08-02 RX ORDER — OXYCODONE AND ACETAMINOPHEN 7.5; 325 MG/1; MG/1
1 TABLET ORAL EVERY 6 HOURS PRN
Qty: 90 TABLET | Refills: 0 | Status: SHIPPED | OUTPATIENT
Start: 2024-08-17 | End: 2024-09-16

## 2024-08-02 RX ORDER — TADALAFIL 20 MG/1
20 TABLET ORAL DAILY PRN
Qty: 10 TABLET | Refills: 11 | Status: SHIPPED | OUTPATIENT
Start: 2024-08-02 | End: 2024-11-30

## 2024-08-02 ASSESSMENT — PAIN SCALES - GENERAL: PAINLEVEL: 6

## 2024-08-02 NOTE — PROGRESS NOTES
I saw and evaluated the patient. I personally obtained the key and critical portions of the history and physical exam or was physically present for key and critical portions performed by the resident/fellow. I reviewed the resident/fellow's documentation and discussed the patient with the resident/fellow. I agree with the resident/fellow's medical decision making as documented in the note.    Verónica Edward MD

## 2024-08-02 NOTE — PROGRESS NOTES
Subjective   Patient ID: Tomasz Maxwell is a 48 y.o. male with PMH of Lumbar post-laminectomy syndrome s/p multiple back surgeries, R. Shoulder replacement, knee replacement, asthma with COPD, HTN, DMII, and vertigo on meclizine who presents for Follow-up, Back Pain, and Shoulder Pain.    HPI  CHARLES Maxwell is a 49 yo M w/ chronic pain on long-term percocet due to Lumbar post-laminectomy syndrome s/p multiple back surgeries, R. Shoulder replacement, knee replacement, asthma with COPD, HTN, DMII, and vertigo on meclizine who presents to the clinic to establish care and get medication refills.     # right shoulder pain   # lumbar back pain   - has appointment with  on 8/5   - when he gets  a steroid shot it lasts about 2 months.   - stimulator at the back 7- 8 years ago. Helps bring down pain by 20%. With medicine and stimulator together it brings the pain down by 80%.   - percocet 7.5- 325 mg every 6 hours. PDMP reviewed and pt taking medication appropriately. Last filled on 7/17.   - able to walk 1 flight of stairs before stopping.     # asthma with COPD   - albuterol q3h as needed.  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   - CTH negative   - MRI and ECHO ordered in hospital but not obtained.     OARRS:  Mono Hastings MD on 8/2/2024 10:52 AM  I have personally reviewed the OARRS report for Tomasz Maxwell. I have considered the risks of abuse, dependence, addiction and diversion    Is the patient prescribed a combination of a benzodiazepine and opioid?  Yes, I feel it is clincially indicated to continue the medication and have discussed with the patient risks/benefits/alternatives.    Last Urine Drug Screen / ordered today: Yes  Recent Results (from the past 8760 hour(s))   Drug Screen, Urine    Collection Time: 07/06/24  3:50 AM    Result Value Ref Range    Amphetamine Screen, Urine Presumptive Negative Presumptive Negative    Barbiturate Screen, Urine Presumptive Negative Presumptive Negative    Benzodiazepines Screen, Urine Presumptive Negative Presumptive Negative    Cannabinoid Screen, Urine Presumptive Negative Presumptive Negative    Cocaine Metabolite Screen, Urine Presumptive Negative Presumptive Negative    Fentanyl Screen, Urine Presumptive Negative Presumptive Negative    Opiate Screen, Urine Presumptive Positive (A) Presumptive Negative    Oxycodone Screen, Urine Presumptive Negative Presumptive Negative    PCP Screen, Urine Presumptive Negative Presumptive Negative    Methadone Screen, Urine Presumptive Negative Presumptive Negative   Opiate Confirmation, Urine    Collection Time: 01/23/24 11:41 AM   Result Value Ref Range    6-Acetylmorphine <25 <25 ng/mL    Codeine <50 <50 ng/mL    Hydrocodone <25 <25 ng/mL    Hydromorphone <25 <25 ng/mL    Morphine  <50 <50 ng/mL    Norhydrocodone <25 <25 ng/mL    Noroxycodone 166 (H) <25 ng/mL    Oxycodone 957 (H) <25 ng/mL    Oxymorphone 155 (H) <25 ng/mL   Drug Screen, Urine With Reflex to Confirmation    Collection Time: 01/23/24 11:41 AM   Result Value Ref Range    Amphetamine Screen, Urine Presumptive Negative Presumptive Negative    Barbiturate Screen, Urine Presumptive Negative Presumptive Negative    Benzodiazepines Screen, Urine Presumptive Negative Presumptive Negative    Cannabinoid Screen, Urine Presumptive Negative Presumptive Negative    Cocaine Metabolite Screen, Urine Presumptive Negative Presumptive Negative    Fentanyl Screen, Urine Presumptive Negative Presumptive Negative    Opiate Screen, Urine Presumptive Negative Presumptive Negative    Oxycodone Screen, Urine Presumptive Positive (A) Presumptive Negative    PCP Screen, Urine Presumptive Negative Presumptive Negative     Results are as expected.     Controlled Substance Agreement:  Date of the Last Agreement: 8/20/23  "  Reviewed Controlled Substance Agreement including but not limited to the benefits, risks, and alternatives to treatment with a Controlled Substance medication(s).    Opioids:  What is the patient's goal of therapy? Ensure pt able to complete ADLs and IADLs  Is this being achieved with current treatment? yes    I have calculated the patient's Morphine Dose Equivalent (MED):   Pt is currently seeing a pain      I feel that it is clinically indicated to continue this current medication regimen after consideration of alternative therapies, and other non-opioid treatment.    Opioid Risk Screening:  No data recorded    Pain Assessment:  No data recorded    Review of Systems    Objective   Vitals: /81 (BP Location: Right arm, Patient Position: Sitting, BP Cuff Size: Adult long)   Pulse 80   Temp 36.8 °C (98.3 °F) (Temporal)   Ht 1.651 m (5' 5\")   Wt 112 kg (248 lb)   SpO2 95%   BMI 41.27 kg/m²    Physical Exam  Gen: NAD, nontox, walks with cane, atalgic gait   HEENT: NCAT, MMM   RESP: no resp distress, talks in full sentences, CTABL  CVS: RRR, no mgr   ABD: firm, distended, some tenderness to palpation.   Psych: appropriately answers questions. normal mood and affect  MSK: trace pititng edema bilaterally     Assessment/Plan   CHARLES Maxwell is a 47 yo M w/ chronic pain on long-term percocet due to Lumbar post-laminectomy syndrome s/p multiple back surgeries, R. Shoulder replacement, knee replacement, asthma with COPD, HTN, DMII, and vertigo on meclizine who presents to the clinic to establish care and get medication refills.  POCT HbA1c of 6.7 increased from previous. Added jardiance 10 to medication regimen. Pt also needs to complete workup for syncopal episode with LOC which required ED admission on 7/5. Pt requires ECHO and MRI brain which was ordered for him in clinic. Also pt has here is a nodule right upper lobe 1 cm in size and per radiologist suggested PET CT. Ordered today in clinic. Pain " appears to be controlled on chronic percocet and spine stimulation. Will continue with refill today with drug test/ compliance to opioid test obtained today. However will consider suboxone therapy in the future.     Problem List Items Addressed This Visit       Lumbar post-laminectomy syndrome    Relevant Medications    oxyCODONE-acetaminophen (Percocet) 7.5-325 mg tablet (Start on 8/17/2024)    Erectile dysfunction    Relevant Medications    tadalafil (Cialis) 20 mg tablet     Other Visit Diagnoses       Diabetes 1.5, managed as type 2 (Multi)    -  Primary    Relevant Medications    empagliflozin (Jardiance) 10 mg    Other Relevant Orders    POCT glycosylated hemoglobin (Hb A1C) manually resulted (Completed)    Follow Up In Primary Care    Syncope, unspecified syncope type        Relevant Orders    Transthoracic Echo (TTE) Complete    MR brain w and wo IV contrast    Lung nodule        Relevant Orders    PET - OnBase Scan    Opioid use        Relevant Orders    Drug Screen, Urine With Reflex to Confirmation    Opioid dependence with opioid-induced disorder (Multi)        Relevant Orders    Opiate/Opioid/Benzo Prescription Compliance    Drug Screen, Urine With Reflex to Confirmation            RTC in 3 months  for medicare annual visit    Seen and discussed with Clark Coleman MD, MPH   Family Medicine and Preventive Health, PGY-3    Reviewed and approved by CLARK THOMPSON on 8/2/24 at 1:07 PM.

## 2024-08-02 NOTE — PATIENT INSTRUCTIONS
Start Jardiance 10 mg for DMII. Your HbA1c in office ws 6.7    Scheduling Phone Numbers:  - Specialty Provider: [phone]  - Heart/Vascular Specialist: [phone]  - [phone]  - PT/OT: [phone]  - Colonoscopy: [phone]

## 2024-08-05 ENCOUNTER — OFFICE VISIT (OUTPATIENT)
Dept: ORTHOPEDIC SURGERY | Facility: HOSPITAL | Age: 49
End: 2024-08-05
Payer: MEDICARE

## 2024-08-05 VITALS — BODY MASS INDEX: 40.82 KG/M2 | WEIGHT: 245 LBS | HEIGHT: 65 IN

## 2024-08-05 DIAGNOSIS — G89.29 CHRONIC RIGHT SHOULDER PAIN: Primary | ICD-10-CM

## 2024-08-05 DIAGNOSIS — M25.511 CHRONIC RIGHT SHOULDER PAIN: Primary | ICD-10-CM

## 2024-08-05 PROCEDURE — 2500000004 HC RX 250 GENERAL PHARMACY W/ HCPCS (ALT 636 FOR OP/ED): Performed by: ORTHOPAEDIC SURGERY

## 2024-08-05 PROCEDURE — 20610 DRAIN/INJ JOINT/BURSA W/O US: CPT | Mod: RT | Performed by: ORTHOPAEDIC SURGERY

## 2024-08-05 PROCEDURE — 2500000005 HC RX 250 GENERAL PHARMACY W/O HCPCS: Performed by: ORTHOPAEDIC SURGERY

## 2024-08-05 PROCEDURE — 99212 OFFICE O/P EST SF 10 MIN: CPT | Performed by: ORTHOPAEDIC SURGERY

## 2024-08-05 PROCEDURE — 3008F BODY MASS INDEX DOCD: CPT | Performed by: ORTHOPAEDIC SURGERY

## 2024-08-05 RX ORDER — LIDOCAINE HYDROCHLORIDE 10 MG/ML
4 INJECTION INFILTRATION; PERINEURAL
Status: COMPLETED | OUTPATIENT
Start: 2024-08-05 | End: 2024-08-05

## 2024-08-05 RX ORDER — TRIAMCINOLONE ACETONIDE 40 MG/ML
40 INJECTION, SUSPENSION INTRA-ARTICULAR; INTRAMUSCULAR
Status: COMPLETED | OUTPATIENT
Start: 2024-08-05 | End: 2024-08-05

## 2024-08-05 NOTE — PROGRESS NOTES
Patient here for reevaluation of his right shoulder he does continue to have pain in the shoulder and a sense of locking or catching.  This primarily happens in abduction.  The pain is also with abduction above about 90 degrees.  He did have a cervical C6 injection which helped the shoulder pain significantly but he still has some pain over the deltoid no pain down the arm.    Exam today confirms well-healed scar active elevation to 120 external rotation and abduction of 80 degrees.  He does have pain on elevation above 90 degrees his motor power is well-preserved in abduction internal/external rotation.    Right shoulder subacromial pain    Options for treatment were discussed and I recommended subacromial injection.  Injection was performed by me and tolerated well.  I have asked patient to call next week to report the results.    This was dictated using voice recognition software and not corrected for grammatical or spelling errors.    L Inj/Asp: R subacromial bursa on 8/5/2024 1:40 PM  Indications: pain  Details: 22 G needle, posterior approach  Medications: 40 mg triamcinolone acetonide 40 mg/mL; 4 mL lidocaine 10 mg/mL (1 %)  Outcome: tolerated well, no immediate complications  Procedure, treatment alternatives, risks and benefits explained, specific risks discussed. Consent was given by the patient. Immediately prior to procedure a time out was called to verify the correct patient, procedure, equipment, support staff and site/side marked as required.

## 2024-08-09 ENCOUNTER — PATIENT OUTREACH (OUTPATIENT)
Dept: CARE COORDINATION | Facility: CLINIC | Age: 49
End: 2024-08-09
Payer: MEDICARE

## 2024-08-09 NOTE — PROGRESS NOTES
Outreach call to patient to check in 30 days after hospital discharge to support smooth transition of care.  Patient with no additional needs noted. No additional outreach needed at this time.     JOHN VelaN, RN   [phone]   AC[facility] Department

## 2024-08-14 ENCOUNTER — TELEPHONE (OUTPATIENT)
Dept: ORTHOPEDIC SURGERY | Facility: HOSPITAL | Age: 49
End: 2024-08-14
Payer: MEDICARE

## 2024-08-14 NOTE — TELEPHONE ENCOUNTER
Patient called and left message with injection update right shoulder subacromial space, performed 8/5/2024, no relief.  DOS 11/28/2023 Right TSA.     Reviewed with Dr Claros and he recommend follow up with pain management for repeat exam and possible second cervical spine injection..  I left message for patient.

## 2024-08-22 DIAGNOSIS — E55.9 VITAMIN D DEFICIENCY: ICD-10-CM

## 2024-08-22 RX ORDER — CHOLECALCIFEROL (VITAMIN D3) 50 MCG
50 TABLET ORAL DAILY
Qty: 90 TABLET | Refills: 3 | Status: SHIPPED | OUTPATIENT
Start: 2024-08-22 | End: 2025-08-22

## 2024-08-28 ENCOUNTER — HOSPITAL ENCOUNTER (OUTPATIENT)
Dept: RADIOLOGY | Facility: HOSPITAL | Age: 49
Discharge: HOME | End: 2024-08-28
Payer: MEDICARE

## 2024-08-28 DIAGNOSIS — R55 SYNCOPE, UNSPECIFIED SYNCOPE TYPE: ICD-10-CM

## 2024-08-28 DIAGNOSIS — R55 SYNCOPE AND COLLAPSE: ICD-10-CM

## 2024-08-28 PROCEDURE — 70551 MRI BRAIN STEM W/O DYE: CPT

## 2024-08-28 PROCEDURE — 70551 MRI BRAIN STEM W/O DYE: CPT | Performed by: RADIOLOGY

## 2024-09-03 DIAGNOSIS — M96.1 LUMBAR POST-LAMINECTOMY SYNDROME: ICD-10-CM

## 2024-09-05 ENCOUNTER — OFFICE VISIT (OUTPATIENT)
Dept: CARDIOLOGY | Facility: HOSPITAL | Age: 49
End: 2024-09-05
Payer: MEDICARE

## 2024-09-05 VITALS
HEIGHT: 65 IN | BODY MASS INDEX: 46.23 KG/M2 | SYSTOLIC BLOOD PRESSURE: 135 MMHG | WEIGHT: 277.5 LBS | OXYGEN SATURATION: 97 % | DIASTOLIC BLOOD PRESSURE: 84 MMHG | HEART RATE: 59 BPM

## 2024-09-05 DIAGNOSIS — R55 SYNCOPE AND COLLAPSE: Primary | ICD-10-CM

## 2024-09-05 DIAGNOSIS — R06.09 DOE (DYSPNEA ON EXERTION): ICD-10-CM

## 2024-09-05 DIAGNOSIS — E66.9 OBESITY: ICD-10-CM

## 2024-09-05 PROCEDURE — 99215 OFFICE O/P EST HI 40 MIN: CPT | Performed by: HOSPITALIST

## 2024-09-05 PROCEDURE — 3008F BODY MASS INDEX DOCD: CPT | Performed by: HOSPITALIST

## 2024-09-05 PROCEDURE — 99205 OFFICE O/P NEW HI 60 MIN: CPT | Performed by: HOSPITALIST

## 2024-09-05 PROCEDURE — 3079F DIAST BP 80-89 MM HG: CPT | Performed by: HOSPITALIST

## 2024-09-05 PROCEDURE — 3075F SYST BP GE 130 - 139MM HG: CPT | Performed by: HOSPITALIST

## 2024-09-05 PROCEDURE — 1036F TOBACCO NON-USER: CPT | Performed by: HOSPITALIST

## 2024-09-05 ASSESSMENT — COLUMBIA-SUICIDE SEVERITY RATING SCALE - C-SSRS
6. HAVE YOU EVER DONE ANYTHING, STARTED TO DO ANYTHING, OR PREPARED TO DO ANYTHING TO END YOUR LIFE?: NO
1. IN THE PAST MONTH, HAVE YOU WISHED YOU WERE DEAD OR WISHED YOU COULD GO TO SLEEP AND NOT WAKE UP?: NO
2. HAVE YOU ACTUALLY HAD ANY THOUGHTS OF KILLING YOURSELF?: NO

## 2024-09-05 NOTE — PATIENT INSTRUCTIONS
For your episodes of lightheadedness, these seem to be most consistent with vasovagal presyncope which are very benign.  Please see below for instructions.    We are going to follow-up on your echocardiogram, in order 14-day heart monitor.  Will see back in the clinic in 2 months, please call us at [phone] if you have any questions.      Please do your best to exercise a regular basis, please add aerobic cardio exercises, start at 5 to 10 minutes a day, ideally 30 minutes 5 times a week.    Please do your best to lose weight through diet and exercise.  Look up Mediterranean diet.  Skip breakfast if you can,  a window of 10 hours to eat a day [10 AM to 6 PM for example], do not eat after 6 PM at night.  Avoid food rich with sugar and carbohydrates like bread, pasta, rice, and potatoes.  Count your calories and try to be 500-calorie deficient a day, this way would lose 1 pound a week [3500 rajeev equals to 1 pound].          ---Trigger avoidance; do your best to take steps to avoid known trigger events. Preventive steps in your case include assuming a protected posture (eg, sitting to avoid prolonged standing). Other steps that may be helpful include consuming small meals and reducing carbohydrates in the diet.      ---Recognizing symptoms and taking action; If the symptoms are mild, then you may perform a physical counterpressure maneuver while moving safely to a seated or supine position, which should terminate the episode. If the symptoms are severe, you should move directly to a supine position. In either case, you should remain in a safe, gravitationally neutral position long enough to be sure that all of the warning symptoms have subsided. Arising too soon may trigger a symptom recurrence.     Counterpressure maneuvers: The rationale for these maneuvers is to reduce venous pooling and thus improve cardiac output. The patient should undertake these maneuvers upon first recognition of premonitory  symptoms and then assume a supine position. These maneuvers may abort a syncope episode or at least delay it long enough for the patient to move to a safe protected position. While isometric activity may offset a syncopal response, release of this activity may be associated with precipitous decline in heart rate and blood pressure.     Examples of counterpressure maneuvers include:     Leg-crossing with simultaneous tensing of leg, abdominal, and buttock muscles (very effective).     Handgrip, which consists of maximum  on a rubber ball or similar object (effectiveness is limited by hand strength).     Arm tensing, which involves gripping one hand with the other while simultaneously abducting both arms (effectiveness is limited by arm strength).     Supine position: Patients should be advised to assume the supine position with legs raised at the onset of severe symptoms or immediately after performing a counterpressure maneuver, whenever feasible. Assuming a seated or squatting position may also prove effective.     ---Volume support:      Increase fluid intake: A reasonable regimen is ingesting 500 mL of fluid upon waking with a daily target of 1.5 to 3 L. The fluid may be a low-carbohydrate electrolyte-containing drink or water. Patients can conserve resources by adding electrolyte powder to water.     Liberalizing salt intake, be careful with your blood pressures though.     Compression stockings and binders; Compression undergarments may help support intravascular volume.      Compression vests or abdominal binders that compress the splanchnic may be helpful as the splanchnic bed is a large, highly compliant vascular bed that can sequester large volumes of blood, but use of these garments may be limited by discomfort. They are only infrequently needed.

## 2024-09-05 NOTE — PROGRESS NOTES
Subjective   Tomasz Maxwell is a 48 y.o. male with PMH of HTN, HLD, disease, BPPV, obesity, diabetes, gout, spinal stenosis, moderate asthma, ES, and other comorbidities, who is here for evaluation of syncope.  Patient stated that he was feeling severely lightheaded throughout the day on 7/4/2024 especially with standing or walking, this was accompanied by feeling nauseous, sweating, and blurry vision.  This got severe towards the end of the day before he passed out while closing his bedroom door.  Patient had prior episode of loss of conscious after severe bout of coughing about a year ago.  Patient still feels dizzy at times not as bad as his first time, this can happen twice a day or maybe once a week.  Patient is not very physically active due to body habitus.  He reports chronic stable SKY due to his asthma, improves with inhalers.  He denies any exertional chest pressure.  He denies any orthopnea, PND, dizziness or palpitation.  He never smoked.  Mother had a heart attack but at late age. Patient is on amlodipine 10 mg, atorvastatin 80 mg, Jardiance milligrams, and Cialis 10 mg daily. Blood pressure is 135/84 with a heart of 59.    Most recent blood work from 7/6/2024 with a creatinine of 0.88, hemoglobin 13.3, MCV 86.  No lipid panel.    Echocardiogram is pending.    EKG on 7/5/2024 with sinus bradycardia, nonspecific T wave abnormalities.    TTE on 12/7/2023:   1. Left ventricular systolic function is normal with a 60-65% estimated ejection fraction.     Review of Systems  ROS is negative other than in HPI.      Objective   Physical Exam  General: Obese, NAD  HEENT: IEOM, PERRL   Neck: No JVD or carotid bruit  Lungs: CTAB  Heart: RRR, normal S1 and S2, no loud murmurs  Abdomen: Soft, nontender, positive bowel sounds  Extremities: No edema  Neurologic: No FND  Psychiatric: Normal mood and affect    Assessment/Plan   1-syncope:  -See HPI for details, both episodes are consistent with vasovagal  syncope.  -Patient was reassured about the benign nature of his syncope.  -Patient was advised to avoid triggers if identified, patient was also educated about counterpressure maneuvers and the need to assume supine position or at least sitting position at the earliest of symptoms onset.  Patient was also advised for wearing compression stocking, keeping himself well-hydrated, and liberalizing salt as long as blood pressure is controlled.  -Will follow-up on ordered echocardiogram, will also order a 14-day heart monitor to rule out arrhythmia as a cause of his lightheadedness.    2-SKY:  -Chronic and stable, he attributes to his asthma, SKY improved with inhalers.    3-obesity:  -Patient was counseled about risk of obesity and the need to lose weight through healthy diet and exercise.    RTC in 2 months.    Helio Cruz MD

## 2024-09-06 NOTE — TELEPHONE ENCOUNTER
Pt called for F/U of refill request submitted Sept 3rd 2024. He has been calling all week in attempt to get refill. Urgent task sent to provider.

## 2024-09-08 RX ORDER — OXYCODONE AND ACETAMINOPHEN 7.5; 325 MG/1; MG/1
1 TABLET ORAL EVERY 6 HOURS PRN
Qty: 90 TABLET | Refills: 0 | Status: SHIPPED | OUTPATIENT
Start: 2024-09-08 | End: 2024-10-08

## 2024-09-09 RX ORDER — LORATADINE 10 MG/1
10 TABLET ORAL DAILY PRN
Status: CANCELLED | OUTPATIENT
Start: 2024-09-09

## 2024-09-18 NOTE — TELEPHONE ENCOUNTER
Communication received pt has questions around metformin and jardiance. Call placed to pt for F/U. Pt inquiring if he should take metformin along with Jardiance prescribed Aug 2nd 2024. No specific notes noted r/t discontinuing med, nor is it listed as a therapy for DM1.5 dx. Pt also inquiring if percocet refill will be delayed d/t refill need Oct 10th 2024, and appt scheduled Oct 15th 2024. Pt confirms if lab needed will get lab completed and show for appt; as well. Urgent message sent to provider.

## 2024-09-18 NOTE — TELEPHONE ENCOUNTER
Communication received pt current therpay is to take both medications together for DM 1.5 dx; as well as, pt has active A1C and drug screen orders that can be completed prior to his appt, and a short script can be prescribed to cover days in between refill due date and next appt. Call placed to pt to make aware. Pt confirms understanding, and confirms intention to complete labs at main campus prior to next appt. Provider made aware.

## 2024-09-19 ENCOUNTER — LAB (OUTPATIENT)
Dept: LAB | Facility: LAB | Age: 49
End: 2024-09-19
Payer: MEDICARE

## 2024-09-19 DIAGNOSIS — E11.9 TYPE 2 DIABETES MELLITUS WITHOUT COMPLICATION, WITHOUT LONG-TERM CURRENT USE OF INSULIN (MULTI): ICD-10-CM

## 2024-09-19 DIAGNOSIS — F11.29 OPIOID DEPENDENCE WITH OPIOID-INDUCED DISORDER (MULTI): ICD-10-CM

## 2024-09-19 DIAGNOSIS — F11.90 OPIOID USE: ICD-10-CM

## 2024-09-19 LAB
ALBUMIN SERPL BCP-MCNC: 4.3 G/DL (ref 3.4–5)
AMPHETAMINES UR QL SCN: NORMAL
ANION GAP SERPL CALC-SCNC: 13 MMOL/L (ref 10–20)
BARBITURATES UR QL SCN: NORMAL
BUN SERPL-MCNC: 11 MG/DL (ref 6–23)
BZE UR QL SCN: NORMAL
CALCIUM SERPL-MCNC: 9.8 MG/DL (ref 8.6–10.6)
CANNABINOIDS UR QL SCN: NORMAL
CHLORIDE SERPL-SCNC: 98 MMOL/L (ref 98–107)
CO2 SERPL-SCNC: 31 MMOL/L (ref 21–32)
CREAT SERPL-MCNC: 1.15 MG/DL (ref 0.5–1.3)
CREAT UR-MCNC: 71.4 MG/DL (ref 20–370)
EGFRCR SERPLBLD CKD-EPI 2021: 78 ML/MIN/1.73M*2
EST. AVERAGE GLUCOSE BLD GHB EST-MCNC: 143 MG/DL
GLUCOSE SERPL-MCNC: 103 MG/DL (ref 74–99)
HBA1C MFR BLD: 6.6 %
PCP UR QL SCN: NORMAL
PHOSPHATE SERPL-MCNC: 4 MG/DL (ref 2.5–4.9)
POTASSIUM SERPL-SCNC: 4 MMOL/L (ref 3.5–5.3)
SODIUM SERPL-SCNC: 138 MMOL/L (ref 136–145)

## 2024-09-19 PROCEDURE — 80346 BENZODIAZEPINES1-12: CPT

## 2024-09-19 PROCEDURE — 80354 DRUG SCREENING FENTANYL: CPT

## 2024-09-19 PROCEDURE — 82570 ASSAY OF URINE CREATININE: CPT

## 2024-09-19 PROCEDURE — 80069 RENAL FUNCTION PANEL: CPT

## 2024-09-19 PROCEDURE — 80365 DRUG SCREENING OXYCODONE: CPT

## 2024-09-19 PROCEDURE — 80373 DRUG SCREENING TRAMADOL: CPT

## 2024-09-19 PROCEDURE — 36415 COLL VENOUS BLD VENIPUNCTURE: CPT

## 2024-09-19 PROCEDURE — 80307 DRUG TEST PRSMV CHEM ANLYZR: CPT

## 2024-09-19 PROCEDURE — 80361 OPIATES 1 OR MORE: CPT

## 2024-09-19 PROCEDURE — 83036 HEMOGLOBIN GLYCOSYLATED A1C: CPT

## 2024-09-19 PROCEDURE — 80368 SEDATIVE HYPNOTICS: CPT

## 2024-09-19 PROCEDURE — 80358 DRUG SCREENING METHADONE: CPT

## 2024-09-24 ENCOUNTER — APPOINTMENT (OUTPATIENT)
Dept: CARDIOLOGY | Facility: HOSPITAL | Age: 49
End: 2024-09-24
Payer: MEDICARE

## 2024-09-24 LAB
1OH-MIDAZOLAM UR CFM-MCNC: <25 NG/ML
6MAM UR CFM-MCNC: <25 NG/ML
7AMINOCLONAZEPAM UR CFM-MCNC: <25 NG/ML
A-OH ALPRAZ UR CFM-MCNC: <25 NG/ML
ALPRAZ UR CFM-MCNC: <25 NG/ML
CHLORDIAZEP UR CFM-MCNC: <25 NG/ML
CLONAZEPAM UR CFM-MCNC: <25 NG/ML
CODEINE UR CFM-MCNC: <50 NG/ML
DIAZEPAM UR CFM-MCNC: <25 NG/ML
EDDP UR CFM-MCNC: <25 NG/ML
FENTANYL UR CFM-MCNC: <2.5 NG/ML
HYDROCODONE CTO UR CFM-MCNC: <25 NG/ML
HYDROMORPHONE UR CFM-MCNC: <25 NG/ML
LORAZEPAM UR CFM-MCNC: <25 NG/ML
METHADONE UR CFM-MCNC: <25 NG/ML
MIDAZOLAM UR CFM-MCNC: <25 NG/ML
MORPHINE UR CFM-MCNC: <50 NG/ML
NORDIAZEPAM UR CFM-MCNC: <25 NG/ML
NORFENTANYL UR CFM-MCNC: <2.5 NG/ML
NORHYDROCODONE UR CFM-MCNC: <25 NG/ML
NOROXYCODONE UR CFM-MCNC: 90 NG/ML
NORTRAMADOL UR-MCNC: <50 NG/ML
OXAZEPAM UR CFM-MCNC: <25 NG/ML
OXYCODONE UR CFM-MCNC: 183 NG/ML
OXYMORPHONE UR CFM-MCNC: 26 NG/ML
TEMAZEPAM UR CFM-MCNC: <25 NG/ML
TRAMADOL UR CFM-MCNC: <50 NG/ML
ZOLPIDEM UR CFM-MCNC: <25 NG/ML
ZOLPIDEM UR-MCNC: <25 NG/ML

## 2024-09-26 ENCOUNTER — HOSPITAL ENCOUNTER (OUTPATIENT)
Dept: RADIOLOGY | Facility: CLINIC | Age: 49
Discharge: HOME | End: 2024-09-26
Payer: MEDICARE

## 2024-09-26 ENCOUNTER — OFFICE VISIT (OUTPATIENT)
Dept: ORTHOPEDIC SURGERY | Facility: CLINIC | Age: 49
End: 2024-09-26
Payer: MEDICARE

## 2024-09-26 DIAGNOSIS — M72.2 PLANTAR FASCIITIS OF RIGHT FOOT: ICD-10-CM

## 2024-09-26 DIAGNOSIS — M67.88 ACHILLES TENDINOSIS: Primary | ICD-10-CM

## 2024-09-26 DIAGNOSIS — M76.829 POSTERIOR TIBIAL TENDON DYSFUNCTION: ICD-10-CM

## 2024-09-26 DIAGNOSIS — M79.671 RIGHT FOOT PAIN: ICD-10-CM

## 2024-09-26 PROCEDURE — 99214 OFFICE O/P EST MOD 30 MIN: CPT | Performed by: PHYSICIAN ASSISTANT

## 2024-09-26 PROCEDURE — L4361 PNEUMA/VAC WALK BOOT PRE OTS: HCPCS | Performed by: PHYSICIAN ASSISTANT

## 2024-09-26 PROCEDURE — 73630 X-RAY EXAM OF FOOT: CPT | Mod: RIGHT SIDE | Performed by: RADIOLOGY

## 2024-09-26 PROCEDURE — 3044F HG A1C LEVEL LT 7.0%: CPT | Performed by: PHYSICIAN ASSISTANT

## 2024-09-26 PROCEDURE — 73630 X-RAY EXAM OF FOOT: CPT | Mod: RT

## 2024-09-26 PROCEDURE — 3060F POS MICROALBUMINURIA REV: CPT | Performed by: PHYSICIAN ASSISTANT

## 2024-09-26 NOTE — PATIENT INSTRUCTIONS
YOUR BOOT INSTRUCTIONS:  You can put weight on your foot and ankle while in the boot.    You can use crutches or walker for support as needed.   You should wear boot when walking or standing for 3 weeks.  You can take off your boot while bathing, sitting, stretching, icing or doing therapy exercises.  You can take your boot off at nighttime while sleeping.    BOOT WEANING:  DAY 1-- come out of boot for 1 hour (wear supportive athletic shoes and orthotic inserts), rest of the day in boot  DAY 2-- come out of boot for 2 hours (wear supportive athletic shoes and orthotic inserts), rest of the day in boot  DAY 3-- come out of boot for 3 hours (wear supportive athletic shoes and orthotic inserts), rest of the day in boot  DAY 4-- come out of boot for 4 hours (wear supportive athletic shoes and orthotic inserts), rest of the day in boot  DAY 5-- come out of boot and wear supportive shoes and orthotic inserts  * if you have pain while weaning out of boot then go back one day in the protocol (i.e. If really sore on the morning of Day 3 from coming out of boot for 2 hours the previous day, go back to Day 1 and start again through the protocol)    You can also use OTC Voltaren gel or  aspercreme ointment and apply it to the injured area.      Ice and elevate supported at the calf to reduce swelling    The stretching program (see handout) should be done for about 10 minutes, three times a day.  You should stretch for 3 times a day for 6 weeks and then daily afterwards to maintain your flexibility.    [ ] If you received a plantar fascial night splint, then it should be worn for 6 weeks nightly and as needed after that period of time.  This is to minimize your stiffness and pain with the first few steps in the morning.    The patient was given a prescription for physical therapy.  Physical therapy is medically necessary to improve strength, balance, range of motion and functional outcomes after injury and/or surgery.    Please  call and schedule an appointment to get fitted or molded for your custom made orthotics (see your prescription for phone number).  You need to bring your prescription with you to your appointment.  Insurance coverage for orthotics varies widely and you can contact your insurance company to find out whether orthotics are covered or not with your plan.  The orthotics company also can give you a cost estimate.     Once your custom made orthotics are made and ready to use, then take out your shoe's insert that it came with and put the orthotic in your shoe.  Orthotics generally work better in lace-up types of shoes (athletic shoes, clarks, keans, merrells, sketchers, etc.).  Sometimes, to accommodate your orthotic, you may have to wear a ½ to one size bigger shoe.  Your orthotics can be adjusted for comfort.  Your pedorthotist can help with adjustments after you wear your orthotics for an initial period of time.    Follow up in 6 weeks or as needed

## 2024-10-01 ENCOUNTER — HOSPITAL ENCOUNTER (OUTPATIENT)
Dept: CARDIOLOGY | Facility: HOSPITAL | Age: 49
Discharge: HOME | End: 2024-10-01
Payer: MEDICARE

## 2024-10-01 DIAGNOSIS — R55 SYNCOPE, UNSPECIFIED SYNCOPE TYPE: Primary | ICD-10-CM

## 2024-10-01 DIAGNOSIS — R55 SYNCOPE AND COLLAPSE: ICD-10-CM

## 2024-10-01 DIAGNOSIS — R55 SYNCOPE, UNSPECIFIED SYNCOPE TYPE: ICD-10-CM

## 2024-10-01 LAB
AORTIC VALVE MEAN GRADIENT: 7.1 MMHG
AORTIC VALVE PEAK VELOCITY: 1.77 M/S
AV PEAK GRADIENT: 12.6 MMHG
AVA (PEAK VEL): 2.34 CM2
AVA (VTI): 2.38 CM2
EJECTION FRACTION APICAL 4 CHAMBER: 63.7
EJECTION FRACTION: 66 %
LEFT ATRIUM VOLUME AREA LENGTH INDEX BSA: 25.5 ML/M2
LEFT VENTRICLE INTERNAL DIMENSION DIASTOLE: 5.19 CM (ref 3.5–6)
LEFT VENTRICULAR OUTFLOW TRACT DIAMETER: 2 CM
MITRAL VALVE E/A RATIO: 1.57
RIGHT VENTRICLE FREE WALL PEAK S': 14 CM/S
RIGHT VENTRICLE PEAK SYSTOLIC PRESSURE: 28.1 MMHG
TRICUSPID ANNULAR PLANE SYSTOLIC EXCURSION: 3.2 CM

## 2024-10-01 PROCEDURE — 2500000004 HC RX 250 GENERAL PHARMACY W/ HCPCS (ALT 636 FOR OP/ED): Performed by: STUDENT IN AN ORGANIZED HEALTH CARE EDUCATION/TRAINING PROGRAM

## 2024-10-01 PROCEDURE — 93306 TTE W/DOPPLER COMPLETE: CPT | Performed by: INTERNAL MEDICINE

## 2024-10-01 PROCEDURE — 93270 REMOTE 30 DAY ECG REV/REPORT: CPT

## 2024-10-01 PROCEDURE — 93306 TTE W/DOPPLER COMPLETE: CPT

## 2024-10-01 NOTE — PROGRESS NOTES
"NPV-   History of Present Illness  48 y.o.male here for right heel pain  1. Achilles tendinosis  Walking Boot Tall    Referral to Physical Therapy      2. Right foot pain  XR foot right 3+ views      3. Posterior tibial tendon dysfunction  Walking Boot Tall    Referral to Physical Therapy      4. Plantar fasciitis of right foot  Walking Boot Tall    Referral to Physical Therapy        Mechanism of injury:  none  Date of Injury/Pain: ~ 3 weeks  Location of pain: posterior heel/ankle  Frequency of Pain: worse with walking, stairs  Associated symptoms? Swelling.  Previous treatment? Rest,     27 point review of systems negative except what is stated in HPI    On examination of the right ankle/foot:  Slight antalgic gait  Pes planus alignment  Minimal swelling; No ecchymosis or erythema. Skin intact; no ulcers or lesions.   Normal ROM in  ankle plantarflexion, dorsiflexion, inversion and eversion; normal ROM in 2-5th toe flexion/extension and 1st MTP flexion/extension  Normal strength in ankle plantarflexion, dorsiflexion, inversion and eversion; normal strength with great toe flexion/extension  Tenderness to palpation: Achilles tendon  No tenderness to palpation over the medial and lateral malleolus, posterior tibial tendon, peroneal tendon, ATFL, deltoid ligament, talus or navicular.  no tenderness to palpation over heel, plantar arch, base of 1st metatarsal/2nd metatarsal, sesamoids, 5th metatarsal, medial cuneiform, navicular, 1st MTP joint or 2nd or 3rd intermetarsal space.  Neurovascularly intact. Good capillary refill. No sensory deficit to light touch. Normal proprioception. Dorsalis pedis and posterior tibial pulses 2+ bilaterally.    - Dietz's test                              - Dorsiflexion-eversion test  - Anterior Drawer test                      - resisted dorsiflexion-eversion test  - Talar tilt test                                    - shuck testing  - Squeeze test  + \"too many toes\" sign     I " personally reviewed the patient's x-ray images and reports of the right ankle and foot. The xrays show no fractures or dislocation.  Normal joint spacing. Achilles enthesophyte       ASSESSMENT: right Achilles tendinosis, plantar fasciitis, B/L PTTD grade 2    PLAN: Treatment options were discussed with the patient. Patient was placed in a tall CAM walker boot to be WBAT with crutches.  They were given boot instructions. The patient was given a prescription for physical therapy.  Physical therapy is medically necessary to improve strength, balance, range of motion and functional outcomes after injury and/or surgery. Patient was given a handout and instructed on an at home stretching program.  They should do these exercises 3 times per day for 6 weeks and then daily. Patient can use OTC aspercream for pain and continue to ice and elevate supported at the calf to reduce swelling. All the patient's questions were answered. The patient agrees with the above plan.  Follow up in 4-6 weeks or as needed    Patient was prescribed a CAM walker boot for Achilles tendonitis.The patient is ambulatory with or without aid; but, has weakness, instability and/or deformity of their right ankle/foot which requires stabilization from this orthosis to improve their function.      Verbal and written instructions for the use, wear schedule, cleaning and application of this item were given.  Patient was instructed that should the brace result in increased pain, decreased sensation, increased swelling, or an overall worsening of their medical condition, to please contact our office immediately.     Orthotic management and training was provided for skin care, modifications due to healing tissues, edema changes, interruption in skin integrity, and safety precautions with the orthosis.

## 2024-10-03 DIAGNOSIS — T78.40XS ALLERGY, SEQUELA: Primary | ICD-10-CM

## 2024-10-03 DIAGNOSIS — R35.1 NOCTURIA: ICD-10-CM

## 2024-10-03 DIAGNOSIS — M96.1 LUMBAR POST-LAMINECTOMY SYNDROME: ICD-10-CM

## 2024-10-03 DIAGNOSIS — E13.9 DIABETES 1.5, MANAGED AS TYPE 2 (MULTI): ICD-10-CM

## 2024-10-03 DIAGNOSIS — I10 HYPERTENSION, UNSPECIFIED TYPE: ICD-10-CM

## 2024-10-03 NOTE — TELEPHONE ENCOUNTER
Pt called requesting refill of percocet, amlodipine, loratadine, and tamsulosin to [facility]. Orders pended and routed to provider. Call placed to pt to make aware of appt this upcoming Monday Oct 7th 2024. Pt confirms he is aware and will be in attendance.

## 2024-10-04 RX ORDER — TAMSULOSIN HYDROCHLORIDE 0.4 MG/1
0.8 CAPSULE ORAL NIGHTLY
Qty: 60 CAPSULE | Refills: 11 | Status: SHIPPED | OUTPATIENT
Start: 2024-10-04 | End: 2025-10-04

## 2024-10-04 RX ORDER — AMLODIPINE BESYLATE 10 MG/1
10 TABLET ORAL DAILY
Qty: 90 TABLET | Refills: 3 | Status: SHIPPED | OUTPATIENT
Start: 2024-10-04 | End: 2025-10-04

## 2024-10-04 RX ORDER — LORATADINE 10 MG/1
10 TABLET ORAL DAILY PRN
Qty: 30 TABLET | Refills: 2 | Status: SHIPPED | OUTPATIENT
Start: 2024-10-04

## 2024-10-04 RX ORDER — OXYCODONE AND ACETAMINOPHEN 7.5; 325 MG/1; MG/1
1 TABLET ORAL EVERY 6 HOURS PRN
Qty: 90 TABLET | Refills: 0 | Status: SHIPPED | OUTPATIENT
Start: 2024-10-08 | End: 2024-11-07

## 2024-10-07 ENCOUNTER — APPOINTMENT (OUTPATIENT)
Dept: PRIMARY CARE | Facility: CLINIC | Age: 49
End: 2024-10-07
Payer: MEDICARE

## 2024-10-08 ENCOUNTER — OFFICE VISIT (OUTPATIENT)
Dept: SLEEP MEDICINE | Facility: HOSPITAL | Age: 49
End: 2024-10-08
Payer: MEDICARE

## 2024-10-08 VITALS
HEART RATE: 62 BPM | SYSTOLIC BLOOD PRESSURE: 122 MMHG | OXYGEN SATURATION: 95 % | TEMPERATURE: 98.2 F | DIASTOLIC BLOOD PRESSURE: 68 MMHG

## 2024-10-08 DIAGNOSIS — I10 HYPERTENSION, UNSPECIFIED TYPE: ICD-10-CM

## 2024-10-08 DIAGNOSIS — J45.909 MODERATE ASTHMA, UNSPECIFIED WHETHER COMPLICATED, UNSPECIFIED WHETHER PERSISTENT (HHS-HCC): ICD-10-CM

## 2024-10-08 DIAGNOSIS — E66.01 OBESITY, CLASS III, BMI 40-49.9 (MORBID OBESITY) (MULTI): ICD-10-CM

## 2024-10-08 DIAGNOSIS — G47.33 OSA (OBSTRUCTIVE SLEEP APNEA): Primary | ICD-10-CM

## 2024-10-08 PROCEDURE — G2211 COMPLEX E/M VISIT ADD ON: HCPCS | Performed by: GENERAL PRACTICE

## 2024-10-08 PROCEDURE — 99214 OFFICE O/P EST MOD 30 MIN: CPT | Performed by: GENERAL PRACTICE

## 2024-10-08 PROCEDURE — 3060F POS MICROALBUMINURIA REV: CPT | Performed by: GENERAL PRACTICE

## 2024-10-08 PROCEDURE — 1036F TOBACCO NON-USER: CPT | Performed by: GENERAL PRACTICE

## 2024-10-08 PROCEDURE — 3074F SYST BP LT 130 MM HG: CPT | Performed by: GENERAL PRACTICE

## 2024-10-08 PROCEDURE — 3078F DIAST BP <80 MM HG: CPT | Performed by: GENERAL PRACTICE

## 2024-10-08 PROCEDURE — 3044F HG A1C LEVEL LT 7.0%: CPT | Performed by: GENERAL PRACTICE

## 2024-10-08 ASSESSMENT — PAIN SCALES - GENERAL: PAINLEVEL: 6

## 2024-10-08 NOTE — PROGRESS NOTES
Patient: Tomasz Maxwell    [id number]  : 1975 -- AGE 48 y.o.    Provider: Magdiel Villegas DO     [facility]   Service Date: 10/8/2024              [facility]  Follow up Visit Note        HISTORY OF PRESENT ILLNESS       HISTORY OF PRESENT ILLNESS   Tomasz Maxwell is a 48 y.o. male who presents to a [facility] for a sleep medicine evaluation with concerns of Pt. here today for FUV and Sleep Apnea.     The patient  has a past medical history of Adhesive capsulitis of shoulder (2024), Contusion of left hand, initial encounter (2017), Crushing injury of unspecified finger(s), initial encounter (2017), Essential (primary) hypertension (2022), Hyperlipidemia, unspecified (10/03/2022), Hypokalemia (2017), Idiopathic gout, right knee (2017), Latent syphilis, unspecified as early or late (2018), Local infection of the skin and subcutaneous tissue, unspecified (2017), Long term (current) use of opiate analgesic (2017), Mastitis without abscess (2017), Mastodynia (2017), Muscle spasm of back (2017), Nocturia (2017), Other microscopic hematuria (2017), Other specified postprocedural states (2017), Other specified postprocedural states (2017), Other specified symptoms and signs involving the digestive system and abdomen (2017), Pain in left wrist (2017), Personal history of other diseases of the musculoskeletal system and connective tissue, Personal history of other diseases of the nervous system and sense organs (2016), Personal history of other endocrine, nutritional and metabolic disease, Personal history of other infectious and parasitic diseases (09/10/2017), Personal history of other infectious and parasitic diseases (2017), Personal history of other infectious and parasitic diseases  (04/13/2018), Personal history of other specified conditions (04/30/2018), Personal history of other specified conditions (01/23/2018), Personal history of other specified conditions (07/28/2017), Personal history of other specified conditions (10/08/2017), Proteinuria, unspecified (04/24/2017), Radiculopathy, lumbar region (04/24/2017), Rupture of anterior cruciate ligament of knee (02/02/2023), Spinal stenosis, site unspecified (08/19/2015), Unspecified abnormal finding in specimens from other organs, systems and tissues (04/24/2017), Unspecified asthma, uncomplicated ([facility]-HCC), and Vertigo..    PAST SLEEP HISTORY    M with pmhx including asthma, DM2,HTN, back pain on narcotics.      patient was tested for sleep apnea due to witnessed apneas and snoring. He was started on pap therapy at that time and is using it regularly.   split 3/17/2016--> very severe ES, .3, with SpO2 colten 73%. consider Bilevel 17/13cwp.   He is establishing care for sleep apnea, used to follow with [facility].     CURRENT HISTORY    4/9/24 he patient reports that he received his new pap machine through [facility] but has been getting supplies through [facility]?    10/8/24  Patient is comfortable with current settings and mask. Sometimes he has a large air mask leak.     PAP Related Problems: admits to dry mouth, but~no skin irritation from mask~and~no snoring with PAP.   Perceived Benefits of PAP Therapy: refreshing sleep,~decreased nocturia,~decreased dry mouth or sore throat,~decreased daytime sleepiness,~decreased nocturnal awakenings~and~decreased snoring/ choking/ gasping.     Sleep schedule  on weekdays / work days:  Usual Bedtime: 9:00-10pm  Sleep latency: few min, but watches TV in bed, turns it off before sleeping.   Wake time : 6am   Total sleep time average/day: 8 hours/day  Awakenings: nocturia, 3x per night, short.   Naps: no      Sleep schedule  on weekends/non work days :  Same as above.     Sleep aids: unsure  Stimulants:  no    Occupation: disability for his medical conditions.     Preferred sleeping position: SLEEP POSITION: sidelying    Sleep-related ROS:    Snoring:  n  Witnessed apneas: n       Gasping/ choking: n      Am Dry mouth:   y         Nasal congestion:  n       am headaches: n     Sleep is described as refreshing.    Drowsy driving: n  Hx of car accident: n  Near-miss Car accident: n      RLS screen:  RLSSCREEN: - Sensations: Patient does not have unusual sensations in their extremities that cause an urge to move them     Sleep-related behaviors: DENIES      ESS: 4        REVIEW OF SYSTEMS     REVIEW OF SYSTEMS  Review of Systems   All other systems reviewed and are negative.        ALLERGIES AND MEDICATIONS     ALLERGIES  No Known Allergies    MEDICATIONS  Current Outpatient Medications   Medication Sig Dispense Refill    albuterol 2.5 mg /3 mL (0.083 %) nebulizer solution USE 1 VIAL IN NEBULIZER EVERY 4 HOURS - and as needed (Patient taking differently: Take 3 mL (2.5 mg) by nebulization every 4 hours if needed for wheezing or shortness of breath.) 60 mL 11    amLODIPine (Norvasc) 10 mg tablet Take 1 tablet (10 mg) by mouth once daily. 90 tablet 3    atorvastatin (Lipitor) 80 mg tablet TAKE 1 TABLET AT BEDTIME. (Patient taking differently: Take 1 tablet (80 mg) by mouth once daily at bedtime.) 90 tablet 3    cholecalciferol (Vitamin D-3) 50 MCG (2000 UT) tablet Take 1 tablet (50 mcg) by mouth once daily. 90 tablet 3    empagliflozin (Jardiance) 10 mg Take 1 tablet (10 mg) by mouth once daily. 90 tablet 3    loratadine (Claritin) 10 mg tablet Take 1 tablet (10 mg) by mouth once daily as needed for allergies. 30 tablet 2    meclizine (Antivert) 12.5 mg tablet Take 1 tablet (12.5 mg) by mouth 3 times a day as needed for dizziness. 15 tablet 0    metFORMIN (Glucophage) 1,000 mg tablet Take 1 tablet (1,000 mg) by mouth once daily with breakfast. 90 tablet 3    montelukast (Singulair) 10 mg tablet Take 1 tablet (10 mg) by  mouth once daily at bedtime. 90 tablet 3    naloxone (Narcan) 4 mg/0.1 mL nasal spray Administer 1 spray (4 mg) into affected nostril(s) if needed for opioid reversal. May repeat every 2-3 minutes if needed, alternating nostrils, until medical assistance becomes available. (Patient not taking: Reported on 9/5/2024) 2 each 0    oxyCODONE-acetaminophen (Percocet) 7.5-325 mg tablet Take 1 tablet by mouth every 6 hours if needed for severe pain (7 - 10). Do not fill before October 8, 2024. 90 tablet 0    Symbicort 160-4.5 mcg/actuation inhaler Inhale 2 puffs 2 times a day. Rinse mouth after use.      tadalafil (Cialis) 20 mg tablet Take 1 tablet (20 mg) by mouth once daily as needed for erectile dysfunction. 10 tablet 11    tamsulosin (Flomax) 0.4 mg 24 hr capsule Take 2 capsules (0.8 mg) by mouth once daily at bedtime. 60 capsule 11    Ventolin HFA 90 mcg/actuation inhaler INHALE 1 OR 2 PUFFS EVERY 4 TO 6 HOURS AS NEEDED (Patient taking differently: Inhale 1-2 puffs every 4 hours if needed for shortness of breath or wheezing. INHALE 1 OR 2 PUFFS EVERY 4 TO 6 HOURS AS NEEDED) 18 g 11     No current facility-administered medications for this visit.     Facility-Administered Medications Ordered in Other Visits   Medication Dose Route Frequency Provider Last Rate Last Admin    lidocaine PF (Xylocaine) 10 mg/mL (1 %) injection 40 mg  4 mL subcutaneous Once Devon Claros MD             PAST HISTORY     PAST MEDICAL HISTORY  He  has a past medical history of Adhesive capsulitis of shoulder (01/29/2024), Contusion of left hand, initial encounter (04/24/2017), Crushing injury of unspecified finger(s), initial encounter (04/24/2017), Essential (primary) hypertension (05/16/2022), Hyperlipidemia, unspecified (10/03/2022), Hypokalemia (04/24/2017), Idiopathic gout, right knee (04/24/2017), Latent syphilis, unspecified as early or late (04/13/2018), Local infection of the skin and subcutaneous tissue, unspecified (12/05/2017),  Long term (current) use of opiate analgesic (04/24/2017), Mastitis without abscess (04/24/2017), Mastodynia (04/24/2017), Muscle spasm of back (04/24/2017), Nocturia (04/24/2017), Other microscopic hematuria (04/24/2017), Other specified postprocedural states (04/24/2017), Other specified postprocedural states (04/24/2017), Other specified symptoms and signs involving the digestive system and abdomen (09/08/2017), Pain in left wrist (04/24/2017), Personal history of other diseases of the musculoskeletal system and connective tissue, Personal history of other diseases of the nervous system and sense organs (03/07/2016), Personal history of other endocrine, nutritional and metabolic disease, Personal history of other infectious and parasitic diseases (09/10/2017), Personal history of other infectious and parasitic diseases (09/08/2017), Personal history of other infectious and parasitic diseases (04/13/2018), Personal history of other specified conditions (04/30/2018), Personal history of other specified conditions (01/23/2018), Personal history of other specified conditions (07/28/2017), Personal history of other specified conditions (10/08/2017), Proteinuria, unspecified (04/24/2017), Radiculopathy, lumbar region (04/24/2017), Rupture of anterior cruciate ligament of knee (02/02/2023), Spinal stenosis, site unspecified (08/19/2015), Unspecified abnormal finding in specimens from other organs, systems and tissues (04/24/2017), Unspecified asthma, uncomplicated ([facility]), and Vertigo.      PAST SURGICAL HISTORY:  Past Surgical History:   Procedure Laterality Date    BACK SURGERY  09/26/2016    Back Surgeryx5, has spinal stimulator    HERNIA REPAIR  08/26/2015    Hernia Repairx2    KNEE SURGERY Right 03/29/2017    Knee Surgery, right TKR    LUMBAR LAMINECTOMY  08/26/2015    Laminectomy Lumbar    MR HEAD ANGIO WO IV CONTRAST  05/21/2022    MR HEAD ANGIO WO IV CONTRAST 5/21/2022 [facility]GENCY [facility]GAC[facility]    MR HEAD ANGIO WO  IV CONTRAST  03/12/2017    MR HEAD ANGIO WO IV CONTRAST 3/12/2017 [facility]    MR NECK ANGIO WO IV CONTRAST  05/21/2022    MR NECK ANGIO WO IV CONTRAST 5/21/2022 [facility]    MR NECK ANGIO WO IV CONTRAST  03/12/2017    MR NECK ANGIO WO IV CONTRAST 3/12/2017 [facility]    OTHER SURGICAL HISTORY  05/20/2021    Abdominal surgery    OTHER SURGICAL HISTORY Bilateral 02/24/2021    Hip replacement    OTHER SURGICAL HISTORY  09/26/2016    Gastrectomy    SHOULDER SURGERY Left     left TSR       FAMILY HISTORY  Family History   Problem Relation Name Age of Onset    Hypertension Mother      Diabetes Mother      Asthma Mother      Deep vein thrombosis Mother      No Known Problems Father      No Known Problems Sister      Asthma Child         SOCIAL HISTORY  He  reports that he has quit smoking. His smoking use included cigarettes. He has never used smokeless tobacco. He reports that he does not drink alcohol and does not use drugs.     Caffeine consumption: no      PHYSICAL EXAM     VITAL SIGNS: /68   Pulse 62   Temp 36.8 °C (98.2 °F)   SpO2 95% Comment: RA     PREVIOUS WEIGHTS:  Wt Readings from Last 3 Encounters:   09/05/24 126 kg (277 lb 8 oz)   08/05/24 111 kg (245 lb)   08/02/24 112 kg (248 lb)       Physical Exam  Constitutional: Alert and oriented, cooperative, no obvious distress.   HENT: normocephalic.   Neurologic: AOx3.   psychiatric: appropriate mood and affect.  Integumentary: no significant rashes observed over pap mask area.         RESULTS/DATA     Iron (ug/dL)   Date Value   01/25/2022 77   10/15/2019 88     Iron Saturation (%)   Date Value   01/25/2022 20 (L)   10/15/2019 24 (L)     TIBC (ug/dL)   Date Value   01/25/2022 384   10/15/2019 368     Ferritin (ug/L)   Date Value   01/25/2022 121   10/15/2019 342 (H)         ASSESSMENT/PLAN     Mr. Maxwell is a 48 y.o. male and  has a past medical history of Adhesive capsulitis of shoulder (01/29/2024), Contusion of left  hand, initial encounter (04/24/2017), Crushing injury of unspecified finger(s), initial encounter (04/24/2017), Essential (primary) hypertension (05/16/2022), Hyperlipidemia, unspecified (10/03/2022), Hypokalemia (04/24/2017), Idiopathic gout, right knee (04/24/2017), Latent syphilis, unspecified as early or late (04/13/2018), Local infection of the skin and subcutaneous tissue, unspecified (12/05/2017), Long term (current) use of opiate analgesic (04/24/2017), Mastitis without abscess (04/24/2017), Mastodynia (04/24/2017), Muscle spasm of back (04/24/2017), Nocturia (04/24/2017), Other microscopic hematuria (04/24/2017), Other specified postprocedural states (04/24/2017), Other specified postprocedural states (04/24/2017), Other specified symptoms and signs involving the digestive system and abdomen (09/08/2017), Pain in left wrist (04/24/2017), Personal history of other diseases of the musculoskeletal system and connective tissue, Personal history of other diseases of the nervous system and sense organs (03/07/2016), Personal history of other endocrine, nutritional and metabolic disease, Personal history of other infectious and parasitic diseases (09/10/2017), Personal history of other infectious and parasitic diseases (09/08/2017), Personal history of other infectious and parasitic diseases (04/13/2018), Personal history of other specified conditions (04/30/2018), Personal history of other specified conditions (01/23/2018), Personal history of other specified conditions (07/28/2017), Personal history of other specified conditions (10/08/2017), Proteinuria, unspecified (04/24/2017), Radiculopathy, lumbar region (04/24/2017), Rupture of anterior cruciate ligament of knee (02/02/2023), Spinal stenosis, site unspecified (08/19/2015), Unspecified abnormal finding in specimens from other organs, systems and tissues (04/24/2017), Unspecified asthma, uncomplicated ([facility]-HCC), and Vertigo. He is following up on ES.      Problem List Items Addressed This Visit       Moderate asthma ([facility]HCC)    Hypertension    ES (obstructive sleep apnea) - Primary    Relevant Orders    Positive Airway Pressure (PAP) Therapy     Other Visit Diagnoses       Obesity, Class III, BMI 40-49.9 (morbid obesity) (Multi)                Problem List and Orders    M with pmhx including asthma, DM2,HTN, back pain on narcotics.      1- ES   split 3/17/2016--> very severe ES, .3, with SpO2 colten 73%. consider Bilevel 17/13cwp.      Reviewed and discussed the above sleep study results as well as download data and management options in details. All questions answered, patient verbalizes understanding.      -cont. Bilevel 17/13 cwp, rAHI 0.6 but has a large leak on some days, acceptable compliance.   -ordered supplies, [facility]  -ordered mask refitting, [facility]; also provided sample mask in clinic today.   -his new pap is through [facility]   -patient would like to use [facility] for his supplies.     -he felt that the pressure was too low when he first turned on his machine--> changed ramp from 6cwp to 12cwp for 15min--> he is comfortable now with this setting.     You can use a chin strap to help with your dry mouth, you can also adjust your humidity level to your comfort. You can use a nasal spray to help you breathe better from your nose.    -Please work with your DME to find a mask that fits you well, and controls your leak. May try top attaching tubing and chin strap.       -do not drive or operate heavy machinery if drowsy.  -avoid sleeping on your back.   -avoid sedating substances/ medication, alcohol, illicit drugs and tobacco.     2- Obesity  counseled on eating a healthy diet and exercising as tolerated.     3- asthma   Cont. Current regimen,   Follow up with pulmonology    4-HTN  Asymptomatic today  Follow up with pcp      f/u  in 12 months or sooner as needed.

## 2024-10-15 ENCOUNTER — APPOINTMENT (OUTPATIENT)
Dept: PRIMARY CARE | Facility: CLINIC | Age: 49
End: 2024-10-15
Payer: MEDICARE

## 2024-10-15 VITALS
HEIGHT: 65 IN | WEIGHT: 277 LBS | BODY MASS INDEX: 46.15 KG/M2 | TEMPERATURE: 97.9 F | DIASTOLIC BLOOD PRESSURE: 78 MMHG | OXYGEN SATURATION: 97 % | RESPIRATION RATE: 18 BRPM | SYSTOLIC BLOOD PRESSURE: 134 MMHG | HEART RATE: 51 BPM

## 2024-10-15 DIAGNOSIS — E78.2 MIXED HYPERLIPIDEMIA: ICD-10-CM

## 2024-10-15 DIAGNOSIS — R55 SYNCOPE AND COLLAPSE: ICD-10-CM

## 2024-10-15 DIAGNOSIS — M25.561 PAIN IN BOTH KNEES, UNSPECIFIED CHRONICITY: ICD-10-CM

## 2024-10-15 DIAGNOSIS — Z23 NEED FOR VACCINE FOR TD (TETANUS-DIPHTHERIA): ICD-10-CM

## 2024-10-15 DIAGNOSIS — J30.2 SEASONAL ALLERGIC RHINITIS, UNSPECIFIED TRIGGER: ICD-10-CM

## 2024-10-15 DIAGNOSIS — E11.9 TYPE 2 DIABETES MELLITUS WITHOUT COMPLICATION, WITHOUT LONG-TERM CURRENT USE OF INSULIN (MULTI): ICD-10-CM

## 2024-10-15 DIAGNOSIS — I10 HYPERTENSION, UNSPECIFIED TYPE: ICD-10-CM

## 2024-10-15 DIAGNOSIS — Z00.00 HEALTHCARE MAINTENANCE: ICD-10-CM

## 2024-10-15 DIAGNOSIS — M96.1 LUMBAR POST-LAMINECTOMY SYNDROME: ICD-10-CM

## 2024-10-15 DIAGNOSIS — Z23 IMMUNIZATION DUE: Primary | ICD-10-CM

## 2024-10-15 DIAGNOSIS — E13.9 DIABETES 1.5, MANAGED AS TYPE 2 (MULTI): ICD-10-CM

## 2024-10-15 DIAGNOSIS — M25.562 PAIN IN BOTH KNEES, UNSPECIFIED CHRONICITY: ICD-10-CM

## 2024-10-15 LAB
CREAT UR-MCNC: 76.3 MG/DL (ref 20–370)
MICROALBUMIN UR-MCNC: 9 MG/L
MICROALBUMIN/CREAT UR: 11.8 UG/MG CREAT

## 2024-10-15 PROCEDURE — 82570 ASSAY OF URINE CREATININE: CPT

## 2024-10-15 PROCEDURE — 82043 UR ALBUMIN QUANTITATIVE: CPT

## 2024-10-15 RX ORDER — METFORMIN HYDROCHLORIDE 1000 MG/1
1000 TABLET ORAL
Qty: 90 TABLET | Refills: 3 | Status: SHIPPED | OUTPATIENT
Start: 2024-10-15 | End: 2025-10-15

## 2024-10-15 RX ORDER — ATORVASTATIN CALCIUM 80 MG/1
80 TABLET, FILM COATED ORAL NIGHTLY
Qty: 90 TABLET | Refills: 3 | Status: SHIPPED | OUTPATIENT
Start: 2024-10-15

## 2024-10-15 RX ORDER — AMLODIPINE BESYLATE 10 MG/1
10 TABLET ORAL DAILY
Qty: 90 TABLET | Refills: 3 | Status: SHIPPED | OUTPATIENT
Start: 2024-10-15 | End: 2025-10-15

## 2024-10-15 RX ORDER — MONTELUKAST SODIUM 10 MG/1
10 TABLET ORAL NIGHTLY
Qty: 90 TABLET | Refills: 3 | Status: SHIPPED | OUTPATIENT
Start: 2024-10-15 | End: 2025-10-15

## 2024-10-15 RX ORDER — DULOXETIN HYDROCHLORIDE 30 MG/1
30 CAPSULE, DELAYED RELEASE ORAL 2 TIMES DAILY
Qty: 60 CAPSULE | Refills: 11 | Status: SHIPPED | OUTPATIENT
Start: 2024-10-15 | End: 2025-10-15

## 2024-10-15 ASSESSMENT — COLUMBIA-SUICIDE SEVERITY RATING SCALE - C-SSRS
1. IN THE PAST MONTH, HAVE YOU WISHED YOU WERE DEAD OR WISHED YOU COULD GO TO SLEEP AND NOT WAKE UP?: NO
6. HAVE YOU EVER DONE ANYTHING, STARTED TO DO ANYTHING, OR PREPARED TO DO ANYTHING TO END YOUR LIFE?: NO
2. HAVE YOU ACTUALLY HAD ANY THOUGHTS OF KILLING YOURSELF?: NO

## 2024-10-15 ASSESSMENT — PATIENT HEALTH QUESTIONNAIRE - PHQ9
SUM OF ALL RESPONSES TO PHQ9 QUESTIONS 1 AND 2: 0
1. LITTLE INTEREST OR PLEASURE IN DOING THINGS: NOT AT ALL
2. FEELING DOWN, DEPRESSED OR HOPELESS: NOT AT ALL

## 2024-10-15 ASSESSMENT — PAIN SCALES - GENERAL: PAINLEVEL: 6

## 2024-10-15 ASSESSMENT — ENCOUNTER SYMPTOMS
DEPRESSION: 0
OCCASIONAL FEELINGS OF UNSTEADINESS: 0
LOSS OF SENSATION IN FEET: 0
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
## 2024-10-15 NOTE — PATIENT INSTRUCTIONS
Last A1c: 6.6    Thank you for coming in to see us today, Mr. Tomasz Maxwell! It was a pleasure managing your health together.    Today in clinic, we discussed your overall health.    Please make an appointment for your PET scan at the following number:     [phone]    If we ordered bloodwork today, you can get this done at ANY  location and the results will come to me directly. The [facility] here at [facility] are walk-in (no appointment needed); [facility]'s hours are M-F 6:30a-6p and Sat 8a-12p.    If you have any questions/concerns, you can always use [facility] to message me directly. Or if you prefer, you can call the office at [phone]; if you leave a message, the office staff should translate this to a message in my inbox.    I'm looking forward to seeing you back in clinic in 3 months to discuss your pain.    Best,  Dr. Hastings

## 2024-10-16 ENCOUNTER — LAB (OUTPATIENT)
Dept: LAB | Facility: LAB | Age: 49
End: 2024-10-16
Payer: MEDICARE

## 2024-10-16 ENCOUNTER — TELEPHONE (OUTPATIENT)
Dept: PRIMARY CARE | Facility: CLINIC | Age: 49
End: 2024-10-16

## 2024-10-16 DIAGNOSIS — Z00.00 HEALTHCARE MAINTENANCE: ICD-10-CM

## 2024-10-16 DIAGNOSIS — E11.9 TYPE 2 DIABETES MELLITUS WITHOUT COMPLICATION, WITHOUT LONG-TERM CURRENT USE OF INSULIN (MULTI): ICD-10-CM

## 2024-10-16 LAB
CHOLEST SERPL-MCNC: 144 MG/DL (ref 0–199)
CHOLESTEROL/HDL RATIO: 2.5
HCV AB SER QL: NONREACTIVE
HDLC SERPL-MCNC: 58.6 MG/DL
LDLC SERPL CALC-MCNC: 59 MG/DL
NON HDL CHOLESTEROL: 85 MG/DL (ref 0–149)
TRIGL SERPL-MCNC: 134 MG/DL (ref 0–149)
VLDL: 27 MG/DL (ref 0–40)

## 2024-10-16 PROCEDURE — 86803 HEPATITIS C AB TEST: CPT

## 2024-10-16 PROCEDURE — 36415 COLL VENOUS BLD VENIPUNCTURE: CPT

## 2024-10-16 PROCEDURE — 80061 LIPID PANEL: CPT

## 2024-10-21 RX ORDER — OXYCODONE AND ACETAMINOPHEN 7.5; 325 MG/1; MG/1
1 TABLET ORAL EVERY 6 HOURS PRN
Qty: 90 TABLET | Refills: 0 | Status: SHIPPED | OUTPATIENT
Start: 2024-11-08 | End: 2024-12-08

## 2024-10-22 NOTE — TELEPHONE ENCOUNTER
Pt called this nurse to inform of adverse reaction to duloxetine order. Pt started med Wed evening once picked up from pharmacy. Pt took doses Wed and Thurs night but experienced sx of nausea/vomitting, weakness, stomach agitation/pain, diarrhea, and loss of appetite. Pt did not take anymore doses from Friday last week to present. Pt is refusing medication d/t his experience over the weekend. Pt stated he is just being able to get out of bed, eat and move around today without issue. Message sent to provider.

## 2024-10-24 ENCOUNTER — OFFICE VISIT (OUTPATIENT)
Dept: ORTHOPEDIC SURGERY | Facility: CLINIC | Age: 49
End: 2024-10-24
Payer: MEDICARE

## 2024-10-24 DIAGNOSIS — M67.88 ACHILLES TENDINOSIS: Primary | ICD-10-CM

## 2024-10-24 DIAGNOSIS — M76.829 POSTERIOR TIBIAL TENDON DYSFUNCTION: ICD-10-CM

## 2024-10-24 DIAGNOSIS — M72.2 PLANTAR FASCIITIS OF RIGHT FOOT: ICD-10-CM

## 2024-10-24 PROCEDURE — 99213 OFFICE O/P EST LOW 20 MIN: CPT | Performed by: PHYSICIAN ASSISTANT

## 2024-10-24 PROCEDURE — 3044F HG A1C LEVEL LT 7.0%: CPT | Performed by: PHYSICIAN ASSISTANT

## 2024-10-24 PROCEDURE — 3061F NEG MICROALBUMINURIA REV: CPT | Performed by: PHYSICIAN ASSISTANT

## 2024-10-24 PROCEDURE — 3048F LDL-C <100 MG/DL: CPT | Performed by: PHYSICIAN ASSISTANT

## 2024-10-24 NOTE — PROGRESS NOTES
"History of Present Illness  48 y.o.male here for right heel pain  Mechanism of injury:  none  Date of Injury/Pain: ~ 3 weeks  Location of pain: posterior heel/ankle  Frequency of Pain: worse with walking, stairs  Associated symptoms? Swelling.  Previous treatment? Rest,     On 10/24/24; patient presents for fuv right ankle pain. Patient reports that his pain is improving. Wearing boots and walking boot.  Doing some HEP. He did get fitted for orthotics. He does start PT next week.     27 point review of systems negative except what is stated in HPI    On examination of the right ankle/foot:  Normal gait  Pes planus alignment  Minimal swelling; No ecchymosis or erythema. Skin intact; no ulcers or lesions.   Normal ROM in  ankle plantarflexion, dorsiflexion, inversion and eversion; normal ROM in 2-5th toe flexion/extension and 1st MTP flexion/extension  Normal strength in ankle plantarflexion, dorsiflexion, inversion and eversion; normal strength with great toe flexion/extension  Tenderness to palpation: minimal over Achilles tendon  No tenderness to palpation over the medial and lateral malleolus, posterior tibial tendon, peroneal tendon, ATFL, deltoid ligament, talus or navicular.  no tenderness to palpation over heel, plantar arch, base of 1st metatarsal/2nd metatarsal, sesamoids, 5th metatarsal, medial cuneiform, navicular, 1st MTP joint or 2nd or 3rd intermetarsal space.  Neurovascularly intact. Good capillary refill. No sensory deficit to light touch. Normal proprioception. Dorsalis pedis and posterior tibial pulses 2+ bilaterally.    - Dietz's test                              - Dorsiflexion-eversion test  - Anterior Drawer test                      - resisted dorsiflexion-eversion test  - Talar tilt test                                    - shuck testing  - Squeeze test  + \"too many toes\" sign     I personally reviewed the patient's x-ray images and reports of the right ankle and foot. The xrays show no " fractures or dislocation.  Normal joint spacing. Achilles enthesophyte       ASSESSMENT: right Achilles tendinosis, plantar fasciitis, B/L PTTD grade 2    PLAN: Treatment options were discussed with the patient. The patient was given a prescription for physical therapy.  Physical therapy is medically necessary to improve strength, balance, range of motion and functional outcomes after injury and/or surgery. Patient was given a handout and instructed on an at home stretching program.  They should do these exercises 3 times per day for 6 weeks and then daily. Patient can use OTC aspercream for pain and continue to ice and elevate supported at the calf to reduce swelling. All the patient's questions were answered. The patient agrees with the above plan.  Follow up in 6 weeks or as needed

## 2024-10-29 ENCOUNTER — HOSPITAL ENCOUNTER (OUTPATIENT)
Dept: RADIOLOGY | Facility: HOSPITAL | Age: 49
Discharge: HOME | End: 2024-10-29
Payer: MEDICARE

## 2024-10-29 DIAGNOSIS — R91.1 SOLITARY PULMONARY NODULE: ICD-10-CM

## 2024-10-29 LAB — GLUCOSE BLD MANUAL STRIP-MCNC: 118 MG/DL (ref 74–99)

## 2024-10-29 PROCEDURE — A9552 F18 FDG: HCPCS | Mod: SE

## 2024-10-29 PROCEDURE — 78815 PET IMAGE W/CT SKULL-THIGH: CPT | Mod: PI

## 2024-10-29 PROCEDURE — 78815 PET IMAGE W/CT SKULL-THIGH: CPT | Mod: PET TUMOR INIT TX STRAT | Performed by: STUDENT IN AN ORGANIZED HEALTH CARE EDUCATION/TRAINING PROGRAM

## 2024-10-29 PROCEDURE — 3430000001 HC RX 343 DIAGNOSTIC RADIOPHARMACEUTICALS: Mod: SE

## 2024-10-29 PROCEDURE — 82947 ASSAY GLUCOSE BLOOD QUANT: CPT

## 2024-10-29 RX ORDER — FLUDEOXYGLUCOSE F 18 200 MCI/ML
12.52 INJECTION, SOLUTION INTRAVENOUS
Status: COMPLETED | OUTPATIENT
Start: 2024-10-29 | End: 2024-10-29

## 2024-11-07 ENCOUNTER — APPOINTMENT (OUTPATIENT)
Dept: CARDIOLOGY | Facility: HOSPITAL | Age: 49
End: 2024-11-07
Payer: MEDICARE

## 2024-11-26 DIAGNOSIS — M96.1 LUMBAR POST-LAMINECTOMY SYNDROME: ICD-10-CM

## 2024-11-26 RX ORDER — OXYCODONE AND ACETAMINOPHEN 7.5; 325 MG/1; MG/1
1 TABLET ORAL EVERY 6 HOURS PRN
Qty: 90 TABLET | Refills: 0 | Status: SHIPPED | OUTPATIENT
Start: 2024-12-08 | End: 2025-01-07

## 2024-11-26 NOTE — TELEPHONE ENCOUNTER
Pt called requesting refill of percocet for next month dispense. Order pended and routed to provider.

## 2024-12-05 ENCOUNTER — TELEMEDICINE (OUTPATIENT)
Dept: CARDIOLOGY | Facility: HOSPITAL | Age: 49
End: 2024-12-05
Payer: MEDICARE

## 2024-12-05 ENCOUNTER — HOSPITAL ENCOUNTER (OUTPATIENT)
Facility: HOSPITAL | Age: 49
Setting detail: OUTPATIENT SURGERY
End: 2024-12-05
Payer: MEDICARE

## 2024-12-05 DIAGNOSIS — I37.1 PULMONARY REGURGITATION: Primary | ICD-10-CM

## 2024-12-05 DIAGNOSIS — I27.20 PULMONARY HYPERTENSION (MULTI): ICD-10-CM

## 2024-12-05 DIAGNOSIS — I27.20 PULMONARY HYPERTENSION (MULTI): Primary | ICD-10-CM

## 2024-12-05 DIAGNOSIS — R06.09 DOE (DYSPNEA ON EXERTION): ICD-10-CM

## 2024-12-05 PROCEDURE — 3061F NEG MICROALBUMINURIA REV: CPT | Performed by: HOSPITALIST

## 2024-12-05 PROCEDURE — 99214 OFFICE O/P EST MOD 30 MIN: CPT | Performed by: HOSPITALIST

## 2024-12-05 PROCEDURE — 3048F LDL-C <100 MG/DL: CPT | Performed by: HOSPITALIST

## 2024-12-05 PROCEDURE — 3044F HG A1C LEVEL LT 7.0%: CPT | Performed by: HOSPITALIST

## 2024-12-05 NOTE — PROGRESS NOTES
This is a virtual audiovisual visit.    Subjective   Tomasz Maxwell is a 48 y.o. male with PMH of HTN, HLD, disease, BPPV, obesity, diabetes, gout, spinal stenosis, moderate asthma, ES, and other comorbidities, who saw me initially for evaluation of syncope but felt to be vasovagal [see initial visit HPI for details].  Patient has felt dizzy only 1-2 times since last visit to 3 months ago, previously used to feel dizzy 1 to twice a week.  He is keeping himself well-hydrated and following other instructions.  Patient still reports chronic stable dyspnea exertion which she attributes to his asthma, he states that he wheezes all the time and his breathing improves with inhalers or breathing treatment.  He denies any orthopnea, PND, dizziness or palpitation.  He never smoked.  Mother had a heart attack but at late age. Patient is on amlodipine 10 mg, atorvastatin 80 mg, Jardiance milligrams, and Cialis 10 mg daily.      Most recent blood work from 7/6/2024 with a creatinine of 0.88, hemoglobin 13.3, MCV 86.  No lipid panel.    Heart monitor between 10/1 and 10/14/2024 with sinus rhythm, heart rate between 45 and 151, average 73 bpm.  Rare PACs and PVCs.  3 manually detected events, all corresponding to sinus rhythm     Echocardiogram on 10/1/2024:  1. Left ventricular ejection fraction is normal, calculated by Lundberg's biplane at 66%.   2. Right ventricular function is probably normal in limited views.   3. Right ventricular systolic pressure is within normal limits.   4. There is mild to moderate pulmonic valve regurgitation.   5. Normal aortic root.   6. Compared with study dated 12/7/2023, there is more pulmonic regurgitation on the current study (previously trace, now mild-moderate).       EKG on 7/5/2024 with sinus bradycardia, nonspecific T wave abnormalities.     TTE on 12/7/2023:   1. Left ventricular systolic function is normal with a 60-65% estimated ejection fraction.       Review of Systems  ROS is  negative other than in HPI.      Objective   Physical Exam  This is a virtual audiovisual visit.  Patient appears comfortable, no distress, breathing unlabored, normal mood and affect.    Assessment/Plan   1-syncope:  -See initial visit for details.  Appears to be consistent with vasovagal.  No more syncope, and lightheadedness episodes have been much less frequent and severe.  -Heart monitor is unremarkable as above.  -Echocardiogram with normal LVEF and no severe valvular disease.    2-mild to moderate LA:  -Will obtain right heart cath to rule out pulmonary hypertension.     3-SKY:  -Chronic and stable, he attributes to his asthma, SKY improved with inhalers.  -Patient has an appointment scheduled with pulmonary, will follow-up.  If his asthma does not explain his SKY, then may consider other cardiac workup.    4-obesity:  -Patient was counseled about risk of obesity and the need to lose weight through healthy diet and exercise.       Helio Cruz MD

## 2024-12-13 ENCOUNTER — APPOINTMENT (OUTPATIENT)
Dept: OPHTHALMOLOGY | Facility: CLINIC | Age: 49
End: 2024-12-13
Payer: MEDICARE

## 2024-12-18 ENCOUNTER — HOSPITAL ENCOUNTER (OUTPATIENT)
Facility: HOSPITAL | Age: 49
Setting detail: OUTPATIENT SURGERY
Discharge: HOME | End: 2024-12-18
Attending: HOSPITALIST | Admitting: HOSPITALIST
Payer: MEDICARE

## 2024-12-18 VITALS
DIASTOLIC BLOOD PRESSURE: 71 MMHG | SYSTOLIC BLOOD PRESSURE: 158 MMHG | OXYGEN SATURATION: 96 % | RESPIRATION RATE: 16 BRPM | TEMPERATURE: 97.7 F | HEART RATE: 55 BPM

## 2024-12-18 DIAGNOSIS — I27.20 PULMONARY HYPERTENSION (MULTI): Primary | ICD-10-CM

## 2024-12-18 DIAGNOSIS — I27.0 PRIMARY PULMONARY HYPERTENSION (MULTI): ICD-10-CM

## 2024-12-18 LAB
ANION GAP SERPL CALC-SCNC: 11 MMOL/L (ref 10–20)
BUN SERPL-MCNC: 12 MG/DL (ref 6–23)
CALCIUM SERPL-MCNC: 9.6 MG/DL (ref 8.6–10.3)
CHLORIDE SERPL-SCNC: 101 MMOL/L (ref 98–107)
CO2 SERPL-SCNC: 31 MMOL/L (ref 21–32)
CREAT SERPL-MCNC: 1.05 MG/DL (ref 0.5–1.3)
EGFRCR SERPLBLD CKD-EPI 2021: 86 ML/MIN/1.73M*2
ERYTHROCYTE [DISTWIDTH] IN BLOOD BY AUTOMATED COUNT: 15.2 % (ref 11.5–14.5)
GLUCOSE SERPL-MCNC: 96 MG/DL (ref 74–99)
HCT VFR BLD AUTO: 46.7 % (ref 41–52)
HGB BLD-MCNC: 15.2 G/DL (ref 13.5–17.5)
MCH RBC QN AUTO: 28.8 PG (ref 26–34)
MCHC RBC AUTO-ENTMCNC: 32.5 G/DL (ref 32–36)
MCV RBC AUTO: 89 FL (ref 80–100)
NRBC BLD-RTO: 0 /100 WBCS (ref 0–0)
PLATELET # BLD AUTO: 269 X10*3/UL (ref 150–450)
POTASSIUM SERPL-SCNC: 3.8 MMOL/L (ref 3.5–5.3)
RBC # BLD AUTO: 5.27 X10*6/UL (ref 4.5–5.9)
SODIUM SERPL-SCNC: 139 MMOL/L (ref 136–145)
WBC # BLD AUTO: 5.6 X10*3/UL (ref 4.4–11.3)

## 2024-12-18 PROCEDURE — 7100000009 HC PHASE TWO TIME - INITIAL BASE CHARGE: Performed by: HOSPITALIST

## 2024-12-18 PROCEDURE — 2500000004 HC RX 250 GENERAL PHARMACY W/ HCPCS (ALT 636 FOR OP/ED): Performed by: HOSPITALIST

## 2024-12-18 PROCEDURE — 93451 RIGHT HEART CATH: CPT | Performed by: HOSPITALIST

## 2024-12-18 PROCEDURE — C1894 INTRO/SHEATH, NON-LASER: HCPCS | Performed by: HOSPITALIST

## 2024-12-18 PROCEDURE — 7100000010 HC PHASE TWO TIME - EACH INCREMENTAL 1 MINUTE: Performed by: HOSPITALIST

## 2024-12-18 PROCEDURE — 85027 COMPLETE CBC AUTOMATED: CPT | Performed by: NURSE PRACTITIONER

## 2024-12-18 PROCEDURE — 99153 MOD SED SAME PHYS/QHP EA: CPT | Performed by: HOSPITALIST

## 2024-12-18 PROCEDURE — 80048 BASIC METABOLIC PNL TOTAL CA: CPT | Performed by: NURSE PRACTITIONER

## 2024-12-18 PROCEDURE — C1887 CATHETER, GUIDING: HCPCS | Performed by: HOSPITALIST

## 2024-12-18 PROCEDURE — 2720000007 HC OR 272 NO HCPCS: Performed by: HOSPITALIST

## 2024-12-18 PROCEDURE — 99223 1ST HOSP IP/OBS HIGH 75: CPT | Performed by: NURSE PRACTITIONER

## 2024-12-18 PROCEDURE — 99152 MOD SED SAME PHYS/QHP 5/>YRS: CPT | Performed by: HOSPITALIST

## 2024-12-18 PROCEDURE — 36415 COLL VENOUS BLD VENIPUNCTURE: CPT | Performed by: NURSE PRACTITIONER

## 2024-12-18 RX ORDER — MIDAZOLAM HYDROCHLORIDE 1 MG/ML
INJECTION, SOLUTION INTRAMUSCULAR; INTRAVENOUS AS NEEDED
Status: DISCONTINUED | OUTPATIENT
Start: 2024-12-18 | End: 2024-12-18 | Stop reason: HOSPADM

## 2024-12-18 RX ORDER — ACETAMINOPHEN 650 MG/1
650 SUPPOSITORY RECTAL EVERY 6 HOURS PRN
Status: DISCONTINUED | OUTPATIENT
Start: 2024-12-18 | End: 2024-12-19 | Stop reason: HOSPADM

## 2024-12-18 RX ORDER — ACETAMINOPHEN 160 MG/5ML
650 SOLUTION ORAL EVERY 6 HOURS PRN
Status: DISCONTINUED | OUTPATIENT
Start: 2024-12-18 | End: 2024-12-19 | Stop reason: HOSPADM

## 2024-12-18 RX ORDER — FENTANYL CITRATE 50 UG/ML
INJECTION, SOLUTION INTRAMUSCULAR; INTRAVENOUS AS NEEDED
Status: DISCONTINUED | OUTPATIENT
Start: 2024-12-18 | End: 2024-12-18 | Stop reason: HOSPADM

## 2024-12-18 RX ORDER — ACETAMINOPHEN 325 MG/1
650 TABLET ORAL EVERY 6 HOURS PRN
Status: DISCONTINUED | OUTPATIENT
Start: 2024-12-18 | End: 2024-12-19 | Stop reason: HOSPADM

## 2024-12-18 RX ORDER — LIDOCAINE HYDROCHLORIDE 20 MG/ML
INJECTION, SOLUTION INFILTRATION; PERINEURAL AS NEEDED
Status: DISCONTINUED | OUTPATIENT
Start: 2024-12-18 | End: 2024-12-18 | Stop reason: HOSPADM

## 2024-12-18 ASSESSMENT — ENCOUNTER SYMPTOMS
EYES NEGATIVE: 1
MUSCULOSKELETAL NEGATIVE: 1
HEMATOLOGIC/LYMPHATIC NEGATIVE: 1
SHORTNESS OF BREATH: 1
GASTROINTESTINAL NEGATIVE: 1
ALLERGIC/IMMUNOLOGIC NEGATIVE: 1
PSYCHIATRIC NEGATIVE: 1
CONSTITUTIONAL NEGATIVE: 1
ENDOCRINE NEGATIVE: 1
NEUROLOGICAL NEGATIVE: 1

## 2024-12-18 ASSESSMENT — PAIN SCALES - GENERAL
PAINLEVEL_OUTOF10: 0 - NO PAIN
PAINLEVEL_OUTOF10: 0 - NO PAIN

## 2024-12-18 ASSESSMENT — PAIN - FUNCTIONAL ASSESSMENT
PAIN_FUNCTIONAL_ASSESSMENT: 0-10
PAIN_FUNCTIONAL_ASSESSMENT: 0-10

## 2024-12-18 NOTE — H&P
History Of Present Illness  Tomasz Maxwell is a 48 y.o. male presenting with SKY, here for RHC. PMH includes syncope, mild to moderate MT, HTN, HLD, disease, BPPV, obesity, diabetes, gout, spinal stenosis, moderate asthma, ES. Patient reports he has developed chest pain this past week that comes and goes regardless of activity.      Past Medical History:  Past Medical History:   Diagnosis Date    Adhesive capsulitis of shoulder 01/29/2024    Contusion of left hand, initial encounter 04/24/2017    Contusion of left hand, initial encounter    Crushing injury of unspecified finger(s), initial encounter 04/24/2017    Injury, crush, finger    Essential (primary) hypertension 05/16/2022    Hypertension    Hyperlipidemia, unspecified 10/03/2022    Hyperlipidemia    Hypokalemia 04/24/2017    Hypokalemia, k= 3.8 on 1/26/23    Idiopathic gout, right knee 04/24/2017    Acute idiopathic gout of right knee    Latent syphilis, unspecified as early or late 04/13/2018    Latent syphilis with positive serology    Local infection of the skin and subcutaneous tissue, unspecified 12/05/2017    Skin infection    Long term (current) use of opiate analgesic 04/24/2017    Opioid contract exists    Mastitis without abscess 04/24/2017    Mastitis, acute    Mastodynia 04/24/2017    Breast pain, right    Muscle spasm of back 04/24/2017    Paraspinal muscle spasm    Nocturia 04/24/2017    Nocturia    Other microscopic hematuria 04/24/2017    Hematuria, microscopic    Other specified postprocedural states 04/24/2017    H/O arthroscopic knee surgery    Other specified postprocedural states 04/24/2017    Status post lumbar laminectomy    Other specified symptoms and signs involving the digestive system and abdomen 09/08/2017    Umbilical discharge    Pain in left wrist 04/24/2017    Left wrist pain    Personal history of other diseases of the musculoskeletal system and connective tissue     History of arthritis    Personal history of other  diseases of the nervous system and sense organs 03/07/2016    History of acute otitis media    Personal history of other endocrine, nutritional and metabolic disease     History of diabetes mellitus    Personal history of other infectious and parasitic diseases 09/10/2017    History of tinea corporis    Personal history of other infectious and parasitic diseases 09/08/2017    History of trichomoniasis    Personal history of other infectious and parasitic diseases 04/13/2018    History of trichomoniasis    Personal history of other specified conditions 04/30/2018    History of dyspnea    Personal history of other specified conditions 01/23/2018    History of epigastric pain    Personal history of other specified conditions 07/28/2017    History of urinary frequency    Personal history of other specified conditions 10/08/2017    History of chest pain    Proteinuria, unspecified 04/24/2017    Proteinuria    Radiculopathy, lumbar region 04/24/2017    Lumbar radiculopathy    Rupture of anterior cruciate ligament of knee 02/02/2023    Spinal stenosis, site unspecified 08/19/2015    Spinal stenosis, multiple sites in spine    Unspecified abnormal finding in specimens from other organs, systems and tissues 04/24/2017    Abnormal laboratory test result    Unspecified asthma, uncomplicated ([facility])     Active asthma    Vertigo     ECHO 5/21/22        Past Surgical History:  Past Surgical History:   Procedure Laterality Date    BACK SURGERY  09/26/2016    Back Surgeryx5, has spinal stimulator    HERNIA REPAIR  08/26/2015    Hernia Repairx2    KNEE SURGERY Right 03/29/2017    Knee Surgery, right TKR    LUMBAR LAMINECTOMY  08/26/2015    Laminectomy Lumbar    MR HEAD ANGIO WO IV CONTRAST  05/21/2022    MR HEAD ANGIO WO IV CONTRAST 5/21/2022 [facility]GENCY [facility]GAC[facility]    MR HEAD ANGIO WO IV CONTRAST  03/12/2017    MR HEAD ANGIO WO IV CONTRAST 3/12/2017 [facility] EM[facility]GENCY LEGACY    MR NECK ANGIO WO IV CONTRAST  05/21/2022    MR NECK ANGIO WO  IV CONTRAST 5/21/2022 [place] [facility]    MR NECK ANGIO WO IV CONTRAST  03/12/2017    MR NECK ANGIO WO IV CONTRAST 3/12/2017 [place] [facility]    OTHER SURGICAL HISTORY  05/20/2021    Abdominal surgery    OTHER SURGICAL HISTORY Bilateral 02/24/2021    Hip replacement    OTHER SURGICAL HISTORY  09/26/2016    Gastrectomy    SHOULDER SURGERY Left     left TSR          Social History:   reports that he has never smoked. He has never used smokeless tobacco. He reports that he does not drink alcohol and does not use drugs.     Family History:  Family History   Problem Relation Name Age of Onset    Hypertension Mother      Diabetes Mother      Asthma Mother      Deep vein thrombosis Mother      No Known Problems Father      No Known Problems Sister      Asthma Child          Allergies:  No Known Allergies     Home Medications:  Current Outpatient Medications   Medication Instructions    albuterol 2.5 mg /3 mL (0.083 %) nebulizer solution USE 1 VIAL IN NEBULIZER EVERY 4 HOURS - and as needed    amLODIPine (NORVASC) 10 mg, oral, Daily    atorvastatin (LIPITOR) 80 mg, oral, Nightly    cholecalciferol (VITAMIN D-3) 50 mcg, oral, Daily    DULoxetine (CYMBALTA) 30 mg, oral, 2 times daily, Do not crush or chew.    empagliflozin (JARDIANCE) 10 mg, oral, Daily    loratadine (CLARITIN) 10 mg, oral, Daily PRN    meclizine (ANTIVERT) 12.5 mg, oral, 3 times daily PRN    metFORMIN (GLUCOPHAGE) 1,000 mg, oral, Daily with breakfast    montelukast (SINGULAIR) 10 mg, oral, Nightly    naloxone (NARCAN) 4 mg, nasal, As needed, May repeat every 2-3 minutes if needed, alternating nostrils, until medical assistance becomes available.    oxyCODONE-acetaminophen (Percocet) 7.5-325 mg tablet 1 tablet, oral, Every 6 hours PRN    Symbicort 160-4.5 mcg/actuation inhaler 2 puffs, 2 times daily    tadalafil (CIALIS) 20 mg, oral, Daily PRN    tamsulosin (FLOMAX) 0.8 mg, oral, Nightly    Ventolin HFA 90 mcg/actuation inhaler INHALE 1 OR 2 PUFFS  EVERY 4 TO 6 HOURS AS NEEDED       Inpatient Medications:            Review of Systems   Constitutional: Negative.    HENT: Negative.     Eyes: Negative.    Respiratory:  Positive for shortness of breath (on exertion).    Cardiovascular:  Positive for chest pain (for the past week, intermittent, at rest).   Gastrointestinal: Negative.    Endocrine: Negative.    Genitourinary: Negative.    Musculoskeletal: Negative.    Skin: Negative.    Allergic/Immunologic: Negative.    Neurological: Negative.    Hematological: Negative.    Psychiatric/Behavioral: Negative.            Physical Exam  Constitutional:       General: He is awake. He is not in acute distress.     Appearance: He is not ill-appearing.   Cardiovascular:      Rate and Rhythm: Normal rate and regular rhythm.      Pulses: Normal pulses.           Radial pulses are 2+ on the right side and 2+ on the left side.        Dorsalis pedis pulses are 2+ on the right side and 2+ on the left side.      Heart sounds: Normal heart sounds. No murmur heard.  Pulmonary:      Effort: Pulmonary effort is normal.      Breath sounds: Normal breath sounds and air entry.   Abdominal:      General: Bowel sounds are normal.      Palpations: Abdomen is soft.      Tenderness: There is no abdominal tenderness.   Musculoskeletal:      Right lower leg: No edema.      Left lower leg: No edema.   Skin:     General: Skin is warm and dry.   Neurological:      General: No focal deficit present.      Mental Status: He is alert and oriented to person, place, and time.      GCS: GCS eye subscore is 4. GCS verbal subscore is 5. GCS motor subscore is 6.   Psychiatric:         Mood and Affect: Mood normal.         Behavior: Behavior is cooperative.        Sedation Plan    ASA 3     Mallampati class: III.           NPO since 0500    Last Recorded Vitals  Blood pressure 130/85, pulse 62, temperature 36.5 °C (97.7 °F), resp. rate 16, SpO2 97%.         Vitals from the Past 24 Hours  Heart Rate:  [62]  "  Temp:  [36.5 °C (97.7 °F)]   Resp:  [16]   BP: (130)/(85)   SpO2:  [97 %]          Relevant Results    Labs    CBC:   Recent Labs     12/18/24  1348 07/06/24  0835 07/05/24 2210 02/23/24  0913 01/10/24  1355 12/06/23  1830   WBC 5.6 4.5 7.1 6.1 6.7 8.4   HGB 15.2 13.3* 13.9 13.3* 13.0* 12.0*   HCT 46.7 40.5* 42.0 42.7 41.7 36.9*    244 271 323 325 346   MCV 89 86 86 88 89 87     BMP/CMP:   Recent Labs     09/19/24  0740 07/06/24  0835 07/05/24  2210 12/06/23  1830 11/27/23  0954 01/26/23  1303 05/20/22  2013 04/20/22  1430 04/18/22  1530 04/11/22  2030    136 136 138 137 141 136  --   --  135*   K 4.0 3.4* 3.5 3.9 4.3 3.8 4.0  --   --  4.1   CL 98 100 99 99 101 100 98  --   --  98   BUN 11 11 14 13 15 12 16   < >  --  16   CREATININE 1.15 0.88 0.93 1.12 1.12 1.2 1.06   < >  --  1.01   CO2 31 27 28 30 26 26 27  --   --  27   CALCIUM 9.8 7.9* 8.7 9.1 8.6 9.5 9.3  --   --  9.1   PROT  --   --  7.1 6.9 6.7  --  6.8  --  7.5 6.4   BILITOT  --   --  0.7 0.6 0.8  --  0.3  --  0.6 0.5   ALKPHOS  --   --  54 64 61  --  57  --  51 41   ALT  --   --  18 24 26  --  18  --  5* 4*   AST  --   --  24 19 20  --  16  --  16 15   GLUCOSE 103* 114* 114* 140* 99 108* 110*  --   --  93    < > = values in this interval not displayed.      Magnesium: No results for input(s): \"MG\" in the last 55189 hours.  Lipid Panel:   Recent Labs     10/16/24  0824 05/20/22 2013 01/25/22  1108 01/28/20  1407 04/02/19  1033   CHOL 144 216* 249* 159 133   HDL 58.6 57.0 56.6 56.9 46.8   CHHDL 2.5 3.8 4.4 2.8 2.8   VLDL 27 27 21 15 11   TRIG 134 135 105 75 57   NHDL 85  --   --   --   --      Cardiac       No lab exists for component: \"CK\", \"CKMBP\"   Hemoglobin A1C:   Recent Labs     09/19/24  0740 08/02/24  1053 11/27/23  0954 01/26/23  1303 05/20/22 2013 01/25/22  1108 09/07/21  1039 04/09/21  1606 06/02/20  1604 11/26/19  1101 04/02/19  1033 10/23/18  1626   HGBA1C 6.6* 6.7* 6.8* 6.5* 5.9* 6.3* 6.5* 6.4 6.5 6.4 6.5 6.5     TSH/ Free T4: " "  Recent Labs     07/06/24  1036 05/20/22 2013 01/25/22  1108 10/15/19  1121   TSH 2.09 1.79 2.78 1.67   FREET4  --   --  1.31 1.27     Iron:   Recent Labs     12/06/23  1830 05/20/22 2013 01/25/22  1108 10/15/19  1121   FERRITIN  --   --  121 342*   TIBC  --   --  384 368   IRONSAT  --   --  20* 24*   BNP 14 4  --   --      Coag:     ABO: No results found for: \"ABO\"    Past Cardiology Tests (Last 3 Years):    EKG:  Recent Labs     07/05/24  2212 12/07/23  0230 02/22/22  0838   ATRRATE 58 76 71   VENTRATE 58 76 71   PRINT 168 162 166   QRSDUR 82 84 80   QTCFRED 405 404 421   QTCCALCB 402 420 432     Encounter Date: 07/05/24   ECG 12 lead   Result Value    Ventricular Rate 58    Atrial Rate 58    NM Interval 168    QRS Duration 82    QT Interval 410    QTC Calculation(Bazett) 402    P Axis 62    R Axis 75    T Axis 47    QRS Count 10    Q Onset 220    P Onset 136    P Offset 193    T Offset 425    QTC Fredericia 405    Narrative    Sinus bradycardia  Nonspecific T wave abnormality  Abnormal ECG  When compared with ECG of 06-DEC-2023 18:25,  No significant change was found  See ED provider note for full interpretation and clinical correlation  Confirmed by Porsha Kaminski ([id number]) on 7/13/2024 7:01:32 PM     Echo:  Echocardiogram:   Transthoracic Echo (TTE) Complete With Contrast 10/01/2024    PHYSICIAN INTERPRETATION:  Left Ventricle: Left ventricular ejection fraction is normal, calculated by Lundberg's biplane at 66%. There are no regional left ventricular wall motion abnormalities. The left ventricular cavity size is normal. Spectral Doppler shows a normal pattern of left ventricular diastolic filling.  Left Atrium: The left atrium is normal in size. A bubble study using agitated saline was performed. Bubble study is negative.  Right Ventricle: The right ventricle was not well visualized. Right ventricular function is probably normal in limited views.  Right Atrium: The right atrium was not well " visualized.  Aortic Valve: The aortic valve is trileaflet. The aortic valve dimensionless index is 0.76. There is no evidence of aortic valve regurgitation. The peak instantaneous gradient of the aortic valve is 12.6 mmHg. The mean gradient of the aortic valve is 7.1 mmHg.  Mitral Valve: The mitral valve is normal in structure. There is trace mitral valve regurgitation.  Tricuspid Valve: The tricuspid valve is structurally normal. There is mild tricuspid regurgitation. The Doppler estimated RVSP is within normal limits at 28.1 mmHg.  Pulmonic Valve: The pulmonic valve is structurally normal. There is mild to moderate pulmonic valve regurgitation.  Pericardium: There is no pericardial effusion noted.  Aorta: The aortic root is normal. The aortic root appears normal in size and measures 2.70 cm. The Asc Ao is 2.50 cm. There is no dilatation of the ascending aorta.  Systemic Veins: The inferior vena cava appears normal in size, with IVC inspiratory collapse greater than 50%.  In comparison to the previous echocardiogram(s): Compared with study dated 12/7/2023, there is more pulmonic regurgitation on the current study (previously trace, now mild-moderate).      CONCLUSIONS:  1. Left ventricular ejection fraction is normal, calculated by Lundberg's biplane at 66%.  2. Right ventricular function is probably normal in limited views.  3. Right ventricular systolic pressure is within normal limits.  4. There is mild to moderate pulmonic valve regurgitation.  5. Normal aortic root.  6. Compared with study dated 12/7/2023, there is more pulmonic regurgitation on the current study (previously trace, now mild-moderate).    Ejection Fractions:  LV EF   Date/Time Value Ref Range Status   10/01/2024 10:04 AM 66 %    12/07/2023 08:36 AM 66       Cath:  Coronary Angiography: No results found for this or any previous visit from the past 1800 days.    Right Heart Cath: No results found for this or any previous visit from the past 1800  days.    Stress Test:  Nuclear:No results found for this or any previous visit from the past 1800 days.    Metabolic Stress: No results found for this or any previous visit from the past 1800 days.    Cardiac Imaging:  Cardiac Scoring: No results found for this or any previous visit from the past 1800 days.    Cardiac MRI: No results found for this or any previous visit from the past 1800 days.         Assessment/Plan  Assessment/Plan   Assessment & Plan        SKY  -C with Dr. Cruz on 12/18/24       NP discussed with Dr. Cruz regarding plan of care/ discharge plan      I spent 30 minutes in the professional and overall care of this patient.      Octaviano Abbott, APRN-CNP, DNP

## 2024-12-18 NOTE — POST-PROCEDURE NOTE
Physician Transition of Care Summary  Invasive Cardiovascular Lab    Procedure Date: 12/18/2024  Attending:    Yudelka Cruz - Primary  Resident/Fellow/Other Assistant: Surgeons and Role:  * No surgeons found with a matching role *    Indications:   Pre-op Diagnosis      * Pulmonary hypertension (Multi) [I27.20]    Post-procedure diagnosis:   Post-op Diagnosis     * Pulmonary hypertension (Multi) [I27.20]    Procedure(s):   Right Heart Cath  37761 - [place] RIGHT HEART CATH O2 SATURATION & CARDIAC OUTPUT    Procedure Findings:   RHC: Patient with large body habitus and thus large respiratory variation in his RHC numbers.  Patient with evidence of precapillary pulmonary hypertension [58/20, mean PA 33 mmHg, PCWP between 5 and 20 with a mean of 13 mmHg] with RV failure by hemodynamics [end expiratory RVEDP of 20 and RA of 10 mmHg], and normal assumed Benigno cardiac output at 5.2 L/min [cardiac index of 2.3 L/min/m²]    Access of the Procedure:   5 Slovak right brachial vein, closed with manual pressure.    Complications:   None.    Stents/Implants:   None.    Anticoagulation/Antiplatelet Plan:   None.    Estimated Blood Loss:   10 mL    Anesthesia: Moderate Sedation Anesthesia Staff: No anesthesia staff entered.    Any Specimen(s) Removed:   Order Name Source Comment Collection Info Order Time   CBC Blood, Venous  Collected By: Jenna Alvarado RN 12/18/2024  1:48 PM     Release result to [facility]   Immediate        BASIC METABOLIC PANEL Blood, Venous  Collected By: Jenna Alvarado RN 12/18/2024  1:48 PM     Release result to [facility]   Immediate            Disposition:   Will refer to Dr. Interiano for further workup and management of pulmonary hypertension.      Electronically signed by: Helio Cruz MD, 12/18/2024 4:14 PM

## 2024-12-20 ENCOUNTER — APPOINTMENT (OUTPATIENT)
Dept: OPHTHALMOLOGY | Facility: CLINIC | Age: 49
End: 2024-12-20
Payer: MEDICARE

## 2024-12-20 DIAGNOSIS — E11.9 CONTROLLED TYPE 2 DIABETES MELLITUS WITHOUT COMPLICATION, UNSPECIFIED WHETHER LONG TERM INSULIN USE (MULTI): ICD-10-CM

## 2024-12-20 DIAGNOSIS — H52.4 PRESBYOPIA: ICD-10-CM

## 2024-12-20 DIAGNOSIS — H02.59 FLOPPY LID SYNDROME: Primary | ICD-10-CM

## 2024-12-20 DIAGNOSIS — H52.223 REGULAR ASTIGMATISM, BILATERAL: ICD-10-CM

## 2024-12-20 PROCEDURE — 92134 CPTRZ OPH DX IMG PST SGM RTA: CPT | Performed by: OPHTHALMOLOGY

## 2024-12-20 PROCEDURE — 92004 COMPRE OPH EXAM NEW PT 1/>: CPT | Performed by: OPHTHALMOLOGY

## 2024-12-20 PROCEDURE — 92015 DETERMINE REFRACTIVE STATE: CPT | Performed by: OPHTHALMOLOGY

## 2024-12-20 ASSESSMENT — REFRACTION_MANIFEST
OS_SPHERE: +0.25
OS_CYLINDER: -1.00
OD_SPHERE: +0.25
OS_SPHERE: PLANO
OS_AXIS: 170
OS_ADD: +2.50
OD_SPHERE: -0.25
OS_AXIS: 165
OS_CYLINDER: -1.25
METHOD_AUTOREFRACTION: 1
OD_CYLINDER: SPHERE
OD_ADD: +2.50

## 2024-12-20 ASSESSMENT — ENCOUNTER SYMPTOMS
HEMATOLOGIC/LYMPHATIC NEGATIVE: 0
GASTROINTESTINAL NEGATIVE: 0
PSYCHIATRIC NEGATIVE: 0
CARDIOVASCULAR NEGATIVE: 0
CONSTITUTIONAL NEGATIVE: 0
MUSCULOSKELETAL NEGATIVE: 0
EYES NEGATIVE: 1
ALLERGIC/IMMUNOLOGIC NEGATIVE: 0
RESPIRATORY NEGATIVE: 0
NEUROLOGICAL NEGATIVE: 0
ENDOCRINE NEGATIVE: 0

## 2024-12-20 ASSESSMENT — VISUAL ACUITY
CORRECTION_TYPE: GLASSES
OD_CC: 20/25
OS_CC: 20/30
OD_CC+: -2
OS_CC+: -1
METHOD: SNELLEN - LINEAR

## 2024-12-20 ASSESSMENT — CONF VISUAL FIELD
OD_SUPERIOR_NASAL_RESTRICTION: 0
OD_INFERIOR_TEMPORAL_RESTRICTION: 0
OS_INFERIOR_NASAL_RESTRICTION: 0
OS_SUPERIOR_TEMPORAL_RESTRICTION: 0
OS_NORMAL: 1
OD_SUPERIOR_TEMPORAL_RESTRICTION: 0
OD_NORMAL: 1
OD_INFERIOR_NASAL_RESTRICTION: 0
OS_SUPERIOR_NASAL_RESTRICTION: 0
OS_INFERIOR_TEMPORAL_RESTRICTION: 0

## 2024-12-20 ASSESSMENT — TONOMETRY
IOP_METHOD: GOLDMANN APPLANATION
OS_IOP_MMHG: 15
OD_IOP_MMHG: 15

## 2024-12-20 ASSESSMENT — SLIT LAMP EXAM - LIDS
COMMENTS: FLOPPY LID
COMMENTS: FLOPPY LID

## 2024-12-20 NOTE — PROGRESS NOTES
Assessment/Plan   Diagnoses and all orders for this visit:  Floppy lid syndrome  -recommend use of artificial tears 4 times a day, may use gel or ointment at night   Consult oculoplastics    Controlled type 2 diabetes mellitus without complication, unspecified whether long term insulin use (Multi)  no retinopathy observed on exam today od/os, pt ed to continue good BGlc, blood pressure and lipid control, rtc with any changes in vision, otherwise monitor 1 year   Regular astigmatism, bilateral  Presbyopia  Glasses prescription given to patient today

## 2024-12-30 DIAGNOSIS — I27.20 PULMONARY HYPERTENSION (MULTI): Primary | ICD-10-CM

## 2025-01-06 ENCOUNTER — APPOINTMENT (OUTPATIENT)
Dept: PRIMARY CARE | Facility: CLINIC | Age: 50
End: 2025-01-06
Payer: MEDICARE

## 2025-01-06 ENCOUNTER — HOSPITAL ENCOUNTER (OUTPATIENT)
Dept: RADIOLOGY | Facility: HOSPITAL | Age: 50
Discharge: HOME | End: 2025-01-06
Payer: MEDICARE

## 2025-01-06 VITALS
DIASTOLIC BLOOD PRESSURE: 78 MMHG | WEIGHT: 282.8 LBS | HEIGHT: 65 IN | HEART RATE: 63 BPM | SYSTOLIC BLOOD PRESSURE: 128 MMHG | OXYGEN SATURATION: 93 % | TEMPERATURE: 98.6 F | BODY MASS INDEX: 47.12 KG/M2

## 2025-01-06 DIAGNOSIS — M96.1 LUMBAR POST-LAMINECTOMY SYNDROME: ICD-10-CM

## 2025-01-06 DIAGNOSIS — R91.1 PULMONARY NODULE: ICD-10-CM
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
## 2025-01-06 DIAGNOSIS — N52.9 ERECTILE DYSFUNCTION, UNSPECIFIED ERECTILE DYSFUNCTION TYPE: ICD-10-CM
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
## 2025-01-06 DIAGNOSIS — J45.909 MODERATE ASTHMA, UNSPECIFIED WHETHER COMPLICATED, UNSPECIFIED WHETHER PERSISTENT (HHS-HCC): ICD-10-CM

## 2025-01-06 DIAGNOSIS — T78.40XS ALLERGY, SEQUELA: ICD-10-CM

## 2025-01-06 PROCEDURE — 73030 X-RAY EXAM OF SHOULDER: CPT | Mod: RIGHT SIDE | Performed by: STUDENT IN AN ORGANIZED HEALTH CARE EDUCATION/TRAINING PROGRAM

## 2025-01-06 PROCEDURE — 72080 X-RAY EXAM THORACOLMB 2/> VW: CPT

## 2025-01-06 PROCEDURE — 99214 OFFICE O/P EST MOD 30 MIN: CPT | Mod: GC

## 2025-01-06 PROCEDURE — 73030 X-RAY EXAM OF SHOULDER: CPT | Mod: RT

## 2025-01-06 RX ORDER — OXYCODONE AND ACETAMINOPHEN 7.5; 325 MG/1; MG/1
1 TABLET ORAL EVERY 6 HOURS PRN
Qty: 90 TABLET | Refills: 0 | Status: SHIPPED | OUTPATIENT
Start: 2025-01-07 | End: 2025-01-07 | Stop reason: SDUPTHER

## 2025-01-06 RX ORDER — DICLOFENAC SODIUM 10 MG/G
4 GEL TOPICAL 4 TIMES DAILY
Qty: 450 G | Refills: 1 | Status: SHIPPED | OUTPATIENT
Start: 2025-01-06

## 2025-01-06 RX ORDER — ALBUTEROL SULFATE 90 UG/1
AEROSOL, METERED RESPIRATORY (INHALATION)
Qty: 18 G | Refills: 11 | Status: SHIPPED | OUTPATIENT
Start: 2025-01-06

## 2025-01-06 RX ORDER — TADALAFIL 20 MG/1
20 TABLET ORAL DAILY PRN
Qty: 10 TABLET | Refills: 11 | Status: SHIPPED | OUTPATIENT
Start: 2025-01-06 | End: 2025-05-06

## 2025-01-06 RX ORDER — OXYCODONE AND ACETAMINOPHEN 5; 325 MG/1; MG/1
1 TABLET ORAL EVERY 6 HOURS PRN
Qty: 90 TABLET | Refills: 0 | Status: SHIPPED | OUTPATIENT
Start: 2025-02-07 | End: 2025-01-08 | Stop reason: DRUGHIGH

## 2025-01-06 RX ORDER — LORATADINE 10 MG/1
10 TABLET ORAL DAILY PRN
Qty: 30 TABLET | Refills: 2 | Status: SHIPPED | OUTPATIENT
Start: 2025-01-06

## 2025-01-06 RX ORDER — LIDOCAINE 50 MG/G
1 PATCH TOPICAL DAILY
Qty: 14 PATCH | Refills: 0 | Status: SHIPPED | OUTPATIENT
Start: 2025-01-06 | End: 2025-02-05

## 2025-01-06 ASSESSMENT — ENCOUNTER SYMPTOMS
DEPRESSION: 0
LOSS OF SENSATION IN FEET: 1
VOMITING: 0
APPETITE CHANGE: 0
OCCASIONAL FEELINGS OF UNSTEADINESS: 1
NAUSEA: 0
FATIGUE: 0
CHEST TIGHTNESS: 0
COUGH: 0
CONSTIPATION: 0
DIARRHEA: 0
PALPITATIONS: 0
SHORTNESS OF BREATH: 1

## 2025-01-06 ASSESSMENT — PATIENT HEALTH QUESTIONNAIRE - PHQ9
SUM OF ALL RESPONSES TO PHQ9 QUESTIONS 1 AND 2: 0
2. FEELING DOWN, DEPRESSED OR HOPELESS: NOT AT ALL
1. LITTLE INTEREST OR PLEASURE IN DOING THINGS: NOT AT ALL

## 2025-01-06 ASSESSMENT — PAIN SCALES - GENERAL: PAINLEVEL_OUTOF10: 8

## 2025-01-06 NOTE — PROGRESS NOTES
Subjective   Patient ID: Tomasz Maxwell is a 49 y.o. male who presents for Follow-up and Med Refill.    HPI    Interim hx last seen by Dr. Hastings 10/15:    Fall  - slipped this morning on snow  - hit low back and R shoulder  - concerned because he has a stimulator  - 7/10 pain; improving since the fall     Chronic pain  - Percocet 7.5-325 for 2-3 years  - Usually takes 2 tabs a day and a third one as necessary  - allows him to be functional   - No chills, diaphoresis,   - No rhinorrhea, lacrimation, sneezing,   - No nausea, GI cramping, diarrhea, abnormal back and leg pain, tremulousness,   - No  opioid side effects including marked constipation, sedation, or leg edema.  - No symptoms suggestive of intoxication or sedation.     Reviewed PET CT 10/30/24 about benign nodule, will plan repeat CT 3/2025    Floppy lid syndrome -> planned for surgery soon?    Cardiology -> CM, chart review for plan    Meds refilled and reviewed    Review of Systems   Constitutional:  Negative for appetite change and fatigue.   HENT:  Negative for congestion.    Respiratory:  Positive for shortness of breath. Negative for cough and chest tightness.    Cardiovascular:  Negative for palpitations and leg swelling.   Gastrointestinal:  Negative for constipation, diarrhea, nausea and vomiting.     All other systems reviewed were negative    Current Outpatient Medications on File Prior to Visit   Medication Sig Dispense Refill    albuterol 2.5 mg /3 mL (0.083 %) nebulizer solution USE 1 VIAL IN NEBULIZER EVERY 4 HOURS - and as needed 60 mL 11    amLODIPine (Norvasc) 10 mg tablet Take 1 tablet (10 mg) by mouth once daily. 90 tablet 3    atorvastatin (Lipitor) 80 mg tablet Take 1 tablet (80 mg) by mouth once daily at bedtime. 90 tablet 3    cholecalciferol (Vitamin D-3) 50 MCG (2000 UT) tablet Take 1 tablet (50 mcg) by mouth once daily. 90 tablet 3    DULoxetine (Cymbalta) 30 mg DR capsule Take 1 capsule (30 mg) by mouth 2 times a day. Do not  "crush or chew. 60 capsule 11    empagliflozin (Jardiance) 10 mg Take 1 tablet (10 mg) by mouth once daily. 90 tablet 3    loratadine (Claritin) 10 mg tablet Take 1 tablet (10 mg) by mouth once daily as needed for allergies. 30 tablet 2    meclizine (Antivert) 12.5 mg tablet Take 1 tablet (12.5 mg) by mouth 3 times a day as needed for dizziness. 15 tablet 0    metFORMIN (Glucophage) 1,000 mg tablet Take 1 tablet (1,000 mg) by mouth once daily with breakfast. 90 tablet 3    montelukast (Singulair) 10 mg tablet Take 1 tablet (10 mg) by mouth once daily at bedtime. 90 tablet 3    naloxone (Narcan) 4 mg/0.1 mL nasal spray Administer 1 spray (4 mg) into affected nostril(s) if needed for opioid reversal. May repeat every 2-3 minutes if needed, alternating nostrils, until medical assistance becomes available. 2 each 0    oxyCODONE-acetaminophen (Percocet) 7.5-325 mg tablet Take 1 tablet by mouth every 6 hours if needed for severe pain (7 - 10). Do not fill before December 8, 2024. 90 tablet 0    Symbicort 160-4.5 mcg/actuation inhaler Inhale 2 puffs 2 times a day. Rinse mouth after use.      tadalafil (Cialis) 20 mg tablet Take 1 tablet (20 mg) by mouth once daily as needed for erectile dysfunction. 10 tablet 11    tamsulosin (Flomax) 0.4 mg 24 hr capsule Take 2 capsules (0.8 mg) by mouth once daily at bedtime. 60 capsule 11    Ventolin HFA 90 mcg/actuation inhaler INHALE 1 OR 2 PUFFS EVERY 4 TO 6 HOURS AS NEEDED 18 g 11     Current Facility-Administered Medications on File Prior to Visit   Medication Dose Route Frequency Provider Last Rate Last Admin    lidocaine PF (Xylocaine) 10 mg/mL (1 %) injection 40 mg  4 mL subcutaneous Once Devon Claros MD            Objective   Vitals: /78 (BP Location: Right arm, Patient Position: Sitting, BP Cuff Size: Large adult)   Pulse 63   Temp 37 °C (98.6 °F) (Temporal)   Ht 1.651 m (5' 5\")   Wt 128 kg (282 lb 12.8 oz)   SpO2 93%   BMI 47.06 kg/m²      Physical " Exam  General: well-appearing, NAD  HEENT: NC/AT  Cardiac: rrr, no m/r/g  Lungs: normal work of breathing, CTAB  Abdomen: soft, non-tender, non-distended, BS present  Neuro: grossly intact  Right shoulder: Nontender to palpation, limited active and passive abduction and elevation of the shoulder  Back: Limited range of motion with tenderness to palpation of paraspinal thoracic muscles.  Intact device on left lower back.   MSK: No peripheral edema, cyanosis, or pallor  Psych: no depression, anxiety      Assessment/Plan     47 yo M w/ chronic pain on long-term percocet due to Lumbar post-laminectomy syndrome s/p multiple back surgeries, R. Shoulder replacement, knee replacement, asthma with COPD, HTN, DMII, and vertigo on meclizine who presents to the clinic for follow-up and establishing care.    #Chronic pain  :: Stable well-controlled on Percocet, no evidence of side effects  :: Reviewed OARRS  -Continue current prescription    #Fall  :: New, exam reassuring however will have to obtain x-rays  -X-ray thoracolumbar and shoulder ordered  -Diclofenac gel in addition to current pain regiment    #Pulmonary nodule  :: Seen on CT chest 7/2025 1 cm nodule in the right upper lobe and again on PET 10/2024 as known FDG avid pulmonary nodule in the right upper lobe favoring benign etiology however follow-up CT was recommended to ensure stability   -Repeat CT ordered today      Problem List Items Addressed This Visit             ICD-10-CM    Lumbar post-laminectomy syndrome M96.1    Erectile dysfunction N52.9    Relevant Medications    tadalafil (Cialis) 20 mg tablet    Moderate asthma (HHS-HCC) J45.909    Relevant Medications    Ventolin HFA 90 mcg/actuation inhaler     Other Visit Diagnoses         Codes    Fall, initial encounter    -  Primary W19.XXXA    Relevant Medications    diclofenac sodium (Voltaren) 1 % gel    lidocaine (Lidoderm) 5 % patch    Other Relevant Orders    XR thoracolumbar spine 2 views (Completed)    XR  shoulder right 2+ views (Completed)    Allergy, sequela     T78.40XS    Relevant Medications    loratadine (Claritin) 10 mg tablet    Pulmonary nodule     R91.1    Relevant Orders    CT chest wo IV contrast            Attending Supervision: seen and discussed with attending physician (cosigner listed on this note).    RTC in 2 months for opioid medication refill and chronic conditions including hypertension, COPD, diabetes, vertigo on meclizine, or earlier as needed.    Patient seen and discussed with attending physician Dr. Mchugh    Plan preliminary until cosigned by attending physician.    Corrina Blank M.D.  Family Medicine  PGY-2

## 2025-01-06 NOTE — PATIENT INSTRUCTIONS
Thank you for coming in today!   This is a summarized list of things we discussed today and next steps:  - Please go get back and shoulder Xrays done  - Please call to schedule an appointment for imaging ordered today  [phone]  - I will leave a comment on MyChart if results are normal and I will call if any changes in management is required.  - Please read any attached handouts and medication list  - Please schedule a follow-up apt with me in 2 months. Please call my office at [phone] with any questions.

## 2025-01-07 DIAGNOSIS — M96.1 LUMBAR POST-LAMINECTOMY SYNDROME: ICD-10-CM

## 2025-01-07 NOTE — TELEPHONE ENCOUNTER
Pt called stating provider sent incorrect dose of pain med. Noted rx ready fro dispense today at pt continued therapy of percocet 7.5mg, and noted future order for percocet 5mg. Call placed to pt to clarify if decrease was discussed. Pt denied decrease discussion. Nurse informed pt she will send request to provider in attempt to rectify prior to scheduled dispense of Feb 7th 2025. Order pended and routed to provider for correction, if appropriate.

## 2025-01-08 RX ORDER — OXYCODONE AND ACETAMINOPHEN 7.5; 325 MG/1; MG/1
1 TABLET ORAL EVERY 6 HOURS PRN
Qty: 90 TABLET | Refills: 0 | Status: SHIPPED | OUTPATIENT
Start: 2025-03-09

## 2025-01-08 RX ORDER — OXYCODONE AND ACETAMINOPHEN 7.5; 325 MG/1; MG/1
1 TABLET ORAL EVERY 6 HOURS PRN
Qty: 90 TABLET | Refills: 0 | Status: SHIPPED | OUTPATIENT
Start: 2025-02-07 | End: 2025-03-09
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
## 2025-01-22 ENCOUNTER — HOSPITAL ENCOUNTER (OUTPATIENT)
Dept: RADIOLOGY | Facility: HOSPITAL | Age: 50
Discharge: HOME | End: 2025-01-22
Payer: MEDICARE

## 2025-01-22 DIAGNOSIS — R91.1 PULMONARY NODULE: ICD-10-CM

## 2025-01-22 PROCEDURE — 71250 CT THORAX DX C-: CPT

## 2025-01-22 PROCEDURE — 71250 CT THORAX DX C-: CPT | Performed by: RADIOLOGY

## 2025-01-23 ENCOUNTER — TELEPHONE (OUTPATIENT)
Facility: HOSPITAL | Age: 50
End: 2025-01-23
Payer: MEDICARE

## 2025-01-23 NOTE — TELEPHONE ENCOUNTER
Patient called in and stated he slipped and fell on some ice recently and is having severe back pain. Patient is requesting a call from Dr. Blank. Please contact patient. Thanks!

## 2025-01-24 NOTE — TELEPHONE ENCOUNTER
Received message that the patient was still having severe back pain called and spoke to patient.  He reports that the pain is different from his usual chronic back pain.  Patient reports that he is in [place] severe 10/10 pain which is awakening him from sleep.  He reports that his Percocets and his back stimulator at maximal dose is not helping with the pain.  He reports numbness which is new in his bilateral lower extremities as well as around his buttocks.  Denies any urinary or fecal incontinence, denies any fevers, denies any weakness in his lower extremities and is able to walk.  Given the history of a recent trauma 1/6 in which patient slipped and fell and the severity of pain which should have improved by now and reported numbness I am concerned for compression fracture and advised patient To Go to the Emergency Room to get CT scans of his back given that the x-rays obtained after the fall were limited.

## 2025-01-27 ENCOUNTER — APPOINTMENT (OUTPATIENT)
Dept: RESPIRATORY THERAPY | Facility: HOSPITAL | Age: 50
End: 2025-01-27
Payer: MEDICARE

## 2025-01-27 ENCOUNTER — APPOINTMENT (OUTPATIENT)
Dept: PULMONOLOGY | Facility: HOSPITAL | Age: 50
End: 2025-01-27
Payer: MEDICARE

## 2025-01-29 NOTE — PROGRESS NOTES
Referred by Dr. Blank for No chief complaint on file.     History Of Present Illness:    Tomasz Mxawell is a 49 y.o. male with PMH of HTN, HLD, disease, BPPV, obesity, diabetes, gout, spinal stenosis, moderate asthma, ES, and other comorbidities, with initial evaluation for a syncopal spell.  The patient stated that he was feeling severely lightheaded throughout the day on 7/4/2024 especially with standing or walking, this was accompanied by feeling nauseous, sweating, and blurry vision.  This got severe towards the end of the day before he passed out while closing his bedroom door.  Patient had prior episode of loss of conscious after severe bout of coughing about a year ago.  An echocardiogram was obtained on 10/1/2024, showing mild to moderate pulmonic regurgitation.  He underwent right heart catheterization showing numbers as below, with a question of a precapillary pulm hypertension.  He was accordingly referred to us.  Besides the events above, the patient has had periods of significant lightheadedness at other times, going back several years.  These can happen when he exerts himself, or more typically when he is coughing.  With this, he gets lightheaded, and often has to sit down.  He has multiple joint and back issues, and finds it very difficult to move around.  He denies any abdominal or leg swelling, or any chest discomfort.  Past Medical History:  He has a past medical history of Adhesive capsulitis of shoulder (01/29/2024), Contusion of left hand, initial encounter (04/24/2017), Crushing injury of unspecified finger(s), initial encounter (04/24/2017), Essential (primary) hypertension (05/16/2022), Hyperlipidemia, unspecified (10/03/2022), Hypokalemia (04/24/2017), Idiopathic gout, right knee (04/24/2017), Latent syphilis, unspecified as early or late (04/13/2018), Local infection of the skin and subcutaneous tissue, unspecified (12/05/2017), Long term (current) use of opiate analgesic (04/24/2017),  Mastitis without abscess (04/24/2017), Mastodynia (04/24/2017), Muscle spasm of back (04/24/2017), Nocturia (04/24/2017), Other microscopic hematuria (04/24/2017), Other specified postprocedural states (04/24/2017), Other specified postprocedural states (04/24/2017), Other specified symptoms and signs involving the digestive system and abdomen (09/08/2017), Pain in left wrist (04/24/2017), Personal history of other diseases of the musculoskeletal system and connective tissue, Personal history of other diseases of the nervous system and sense organs (03/07/2016), Personal history of other endocrine, nutritional and metabolic disease, Personal history of other infectious and parasitic diseases (09/10/2017), Personal history of other infectious and parasitic diseases (09/08/2017), Personal history of other infectious and parasitic diseases (04/13/2018), Personal history of other specified conditions (04/30/2018), Personal history of other specified conditions (01/23/2018), Personal history of other specified conditions (07/28/2017), Personal history of other specified conditions (10/08/2017), Proteinuria, unspecified (04/24/2017), Radiculopathy, lumbar region (04/24/2017), Rupture of anterior cruciate ligament of knee (02/02/2023), Spinal stenosis, site unspecified (08/19/2015), Unspecified abnormal finding in specimens from other organs, systems and tissues (04/24/2017), Unspecified asthma, uncomplicated ([facility]), and Vertigo.    Past Surgical History:  He has a past surgical history that includes Hernia repair (08/26/2015); Lumbar laminectomy (08/26/2015); Other surgical history (05/20/2021); Knee surgery (Right, 03/29/2017); Other surgical history (Bilateral, 02/24/2021); Other surgical history (09/26/2016); Back surgery (09/26/2016); MR angio head wo IV contrast (05/21/2022); MR angio neck wo IV contrast (05/21/2022); MR angio head wo IV contrast (03/12/2017); MR angio neck wo IV contrast (03/12/2017); Shoulder  surgery (Left); and Cardiac catheterization (N/A, 12/18/2024).      Social History:  He reports that he has never smoked. He has never used smokeless tobacco. He reports that he does not drink alcohol and does not use drugs.    Family History:  Family History   Problem Relation Name Age of Onset    Hypertension Mother      Diabetes Mother      Asthma Mother      Deep vein thrombosis Mother      No Known Problems Father      No Known Problems Sister      Asthma Child          Allergies:  Patient has no known allergies.    Outpatient Medications:  Current Outpatient Medications   Medication Instructions    albuterol 2.5 mg /3 mL (0.083 %) nebulizer solution USE 1 VIAL IN NEBULIZER EVERY 4 HOURS - and as needed    amLODIPine (NORVASC) 10 mg, oral, Daily    atorvastatin (LIPITOR) 80 mg, oral, Nightly    cholecalciferol (VITAMIN D-3) 50 mcg, oral, Daily    diclofenac sodium (VOLTAREN) 4 g, Topical, 4 times daily    DULoxetine (CYMBALTA) 30 mg, oral, 2 times daily, Do not crush or chew.    empagliflozin (JARDIANCE) 10 mg, oral, Daily    lidocaine (Lidoderm) 5 % patch 1 patch, transdermal, Daily, Remove & discard patch within 12 hours or as directed by MD.    loratadine (CLARITIN) 10 mg, oral, Daily PRN    meclizine (ANTIVERT) 12.5 mg, oral, 3 times daily PRN    metFORMIN (GLUCOPHAGE) 1,000 mg, oral, Daily with breakfast    montelukast (SINGULAIR) 10 mg, oral, Nightly    naloxone (NARCAN) 4 mg, nasal, As needed, May repeat every 2-3 minutes if needed, alternating nostrils, until medical assistance becomes available.    [START ON 2/7/2025] oxyCODONE-acetaminophen (Percocet) 7.5-325 mg tablet 1 tablet, oral, Every 6 hours PRN    [START ON 3/9/2025] oxyCODONE-acetaminophen (Percocet) 7.5-325 mg tablet 1 tablet, oral, Every 6 hours PRN    Symbicort 160-4.5 mcg/actuation inhaler 2 puffs, 2 times daily    tadalafil (CIALIS) 20 mg, oral, Daily PRN    tamsulosin (FLOMAX) 0.8 mg, oral, Nightly    Ventolin HFA 90 mcg/actuation  "inhaler INHALE 1 OR 2 PUFFS EVERY 4 TO 6 HOURS AS NEEDED        Last Recorded Vitals:  Vitals:    01/30/25 1003   BP: 130/84   BP Location: Left arm   Patient Position: Sitting   BP Cuff Size: Large adult   Pulse: 74   SpO2: 95%   Weight: 131 kg (289 lb)   Height: 1.651 m (5' 5\")       Physical Exam:  Constitutional:       Appearance: Chronically ill-appearing.   Neck:      Vascular: No JVR. JVD elevated with 12 cm of water.   Pulmonary:      Effort: Pulmonary effort is normal.      Breath sounds: Normal breath sounds. No wheezing. No rhonchi. No rales.   Chest:      Chest wall: Not tender to palpatation.   Cardiovascular:      PMI at left midclavicular line. Normal rate. Regular rhythm. Normal S1. Normal S2.       Murmurs: There is no murmur.      No gallop.  No click. No rub.   Pulses:     Intact distal pulses.   Edema:     Thigh: bilateral trace edema of the thigh.     Pretibial: bilateral trace edema of the pretibial area.     Ankle: bilateral trace edema of the ankle.     Feet: bilateral trace edema of the feet.  Abdominal:      General: Bowel sounds are normal.      Palpations: Abdomen is soft.      Tenderness: There is no abdominal tenderness.   Musculoskeletal: Normal range of motion.         General: No tenderness. Skin:     General: Skin is warm and dry.   Neurological:      General: No focal deficit present.      Mental Status: Alert and oriented to person, place and time.            Last Labs:  CBC -  Lab Results   Component Value Date    WBC 5.6 12/18/2024    HGB 15.2 12/18/2024    HCT 46.7 12/18/2024    MCV 89 12/18/2024     12/18/2024       CMP -  Lab Results   Component Value Date    CALCIUM 9.6 12/18/2024    PHOS 4.0 09/19/2024    PROT 7.1 07/05/2024    ALBUMIN 4.3 09/19/2024    AST 24 07/05/2024    ALT 18 07/05/2024    ALKPHOS 54 07/05/2024    BILITOT 0.7 07/05/2024       LIPID PANEL -   Lab Results   Component Value Date    CHOL 144 10/16/2024    TRIG 134 10/16/2024    HDL 58.6 10/16/2024 "    CHHDL 2.5 10/16/2024    LDLF 132 (H) 05/20/2022    VLDL 27 10/16/2024    NHDL 85 10/16/2024       RENAL FUNCTION PANEL -   Lab Results   Component Value Date    GLUCOSE 96 12/18/2024     12/18/2024    K 3.8 12/18/2024     12/18/2024    CO2 31 12/18/2024    ANIONGAP 11 12/18/2024    BUN 12 12/18/2024    CREATININE 1.05 12/18/2024    GFRMALE 86 05/20/2022    CALCIUM 9.6 12/18/2024    PHOS 4.0 09/19/2024    ALBUMIN 4.3 09/19/2024        Lab Results   Component Value Date    BNP 14 12/06/2023    HGBA1C 6.6 (H) 09/19/2024    HGBA1C 6.7 (A) 08/02/2024       Last Cardiology Tests:  ECG:  ECG 12 lead 07/05/2024  Sinus bradycardia  Nonspecific T wave abnormality  Abnormal ECG  When compared with ECG of 06-DEC-2023 18:25,  No significant change was found  See ED provider note for full interpretation and clinical correlation  Confirmed by Porsha Kaminski ([id number]) on 7/13/2024 7:01:32 PM  Echo:  Transthoracic Echo (TTE) Complete 10/01/2024   1. Left ventricular ejection fraction is normal, calculated by Lundberg's biplane at 66%.   2. Right ventricular function is probably normal in limited views.   3. Right ventricular systolic pressure is within normal limits.   4. There is mild to moderate pulmonic valve regurgitation.   5. Normal aortic root.   6. Compared with study dated 12/7/2023, there is more pulmonic regurgitation on the current study (previously trace, now mild-moderate).  Ejection Fractions:  EF   Date/Time Value Ref Range Status   10/01/2024 10:04 AM 66 %      Cath:  Cardiac Catheterization Procedure 12/18/2024   1. RHC: Patient with large body habitus and thus large respiratory variation in his RHC numbers. Patient with evidence of precapillary pulmonary hypertension [58/20, mean PA 33 mmHg, PCWP between 5 and 20 with a mean of 13 mmHg] with RV failure by hemodynamics [end expiratory RVEDP of 20 and RA of 10 mmHg], and normal assumed Benigno cardiac output at 5.2 L/min [cardiac index of 2.3  L/min/mï¿½].   2. Will refer to Dr. Interiano for further workup and management of pulmonary hypertension.  Stress Test:  No results found for this or any previous visit from the past 1095 days.    Cardiac Imagin.  Unchanged 8 mm right upper lobe pulmonary nodule. Recommend  follow-up chest CT in six-month to confirm continued stability.  2. No CT evidence of acute process in the chest.      Assessment/Plan   Problem 1.  Pulmonary hypertension.  Review of his studies, including of his tracings suggest that this is largely obesity/body habitus related, with wide fluctuations in his PA means, and wedge pressures.  This does not meet criteria for pulmonary vasodilators.  Management would consist of treatment for sleep apnea, which he is already getting, but more importantly weight loss.  Given his significant mobility related limitations to functional capacity, weight loss will be extremely challenging.  We will request his PCP to consider aggressive GLP-1 agonist therapy.  He does have some degree of volume overload.  However, I am reluctant to diurese him, as this may make his lightheadedness syndrome worse.  Problem 2.  History of syncope.  I do not think this is related to RV failure, but is likely part of the overall syndrome with decreased venous return on heavy breathing or coughing, in turn related to his body habitus.  In addition, he is on tamsulosin at relatively high doses, which likely adds to the orthostatic component. At this time, no specific therapy is recommended, other than having him at least sit down if not lay down if he starts to feel lightheaded.  He does have very frequent nocturia, and I feel that he will need some form of prostate therapy.  Consideration can be given to putting him on a 5 alpha reductase inhibitor rather than a vasodilator.  Problem 3.  Hypertension.  His blood pressure is reasonably well-controlled.  I am hopeful that with weight loss, things get better.   I discussed the  case with him at length, we will see him back in his clinic in 3 months time.        Kwame Little MD

## 2025-01-30 ENCOUNTER — OFFICE VISIT (OUTPATIENT)
Dept: CARDIOLOGY | Facility: CLINIC | Age: 50
End: 2025-01-30
Payer: MEDICARE

## 2025-01-30 VITALS
DIASTOLIC BLOOD PRESSURE: 84 MMHG | HEART RATE: 74 BPM | BODY MASS INDEX: 48.15 KG/M2 | HEIGHT: 65 IN | OXYGEN SATURATION: 95 % | WEIGHT: 289 LBS | SYSTOLIC BLOOD PRESSURE: 130 MMHG

## 2025-01-30 DIAGNOSIS — R55 SYNCOPE AND COLLAPSE: ICD-10-CM

## 2025-01-30 DIAGNOSIS — I27.20 PULMONARY HYPERTENSION (MULTI): Primary | ICD-10-CM

## 2025-01-30 DIAGNOSIS — E66.01 MORBID OBESITY WITH BMI OF 45.0-49.9, ADULT (MULTI): ICD-10-CM

## 2025-01-30 PROCEDURE — 3075F SYST BP GE 130 - 139MM HG: CPT | Performed by: SPECIALIST

## 2025-01-30 PROCEDURE — 1036F TOBACCO NON-USER: CPT | Performed by: SPECIALIST

## 2025-01-30 PROCEDURE — G2211 COMPLEX E/M VISIT ADD ON: HCPCS | Performed by: SPECIALIST

## 2025-01-30 PROCEDURE — 3008F BODY MASS INDEX DOCD: CPT | Performed by: SPECIALIST

## 2025-01-30 PROCEDURE — 3079F DIAST BP 80-89 MM HG: CPT | Performed by: SPECIALIST

## 2025-01-30 PROCEDURE — 99214 OFFICE O/P EST MOD 30 MIN: CPT | Performed by: SPECIALIST

## 2025-01-30 PROCEDURE — 99204 OFFICE O/P NEW MOD 45 MIN: CPT | Performed by: SPECIALIST

## 2025-01-30 NOTE — PATIENT INSTRUCTIONS
It was nice to meet you today.  You do have high blood pressure inside the lungs.  Based on your testing and what I see on physical exam I feel that this is in a large part related to body pressure on the heart.  I feel that weight loss will help significantly.  It will be difficult for you to lose the weight without doing exercise, which in turn is very difficult for you because of your joint issues.  I will speak with your primary care about getting you on a drug like Ozempic or similar drug.  In the meantime, no changes were made to your medications today.  Please continue to watch out for dizziness or lightheadedness.  If this comes on, no matter what, at the minimum sit down.  Please wait a few minutes till symptoms resolve before try to get up.  If the symptoms do not resolve after 75 or 10 minutes, please seek urgent medical attention.  Call us at [phone] with any questions or concerns regarding your heart.  Come back and see us in 3 months time.

## 2025-02-04 ENCOUNTER — APPOINTMENT (OUTPATIENT)
Dept: OPHTHALMOLOGY | Facility: CLINIC | Age: 50
End: 2025-02-04
Payer: MEDICARE

## 2025-02-04 DIAGNOSIS — H02.831 DERMATOCHALASIS OF BOTH UPPER EYELIDS: Primary | ICD-10-CM

## 2025-02-04 DIAGNOSIS — H02.834 DERMATOCHALASIS OF BOTH UPPER EYELIDS: Primary | ICD-10-CM

## 2025-02-04 DIAGNOSIS — H02.59 FLOPPY LID SYNDROME: ICD-10-CM

## 2025-02-04 PROCEDURE — 99214 OFFICE O/P EST MOD 30 MIN: CPT

## 2025-02-04 PROCEDURE — 92081 LIMITED VISUAL FIELD XM: CPT

## 2025-02-04 RX ORDER — NEOMYCIN SULFATE, POLYMYXIN B SULFATE, AND DEXAMETHASONE 3.5; 10000; 1 MG/G; [USP'U]/G; MG/G
OINTMENT OPHTHALMIC 3 TIMES DAILY
Qty: 3.5 G | Refills: 0 | Status: SHIPPED | OUTPATIENT
Start: 2025-02-04

## 2025-02-04 ASSESSMENT — VISUAL ACUITY
CORRECTION_TYPE: GLASSES
OS_CC: 20/40
METHOD: SNELLEN - LINEAR
OD_CC: 20/30

## 2025-02-05 DIAGNOSIS — N52.9 ERECTILE DYSFUNCTION, UNSPECIFIED ERECTILE DYSFUNCTION TYPE: ICD-10-CM

## 2025-02-05 RX ORDER — TADALAFIL 20 MG/1
20 TABLET ORAL DAILY PRN
Qty: 10 TABLET | Refills: 11 | Status: SHIPPED | OUTPATIENT
Start: 2025-02-05 | End: 2025-06-05

## 2025-02-05 ASSESSMENT — EXTERNAL EXAM - LEFT EYE: OS_EXAM: NORMAL

## 2025-02-05 ASSESSMENT — LEVATOR FUNCTION
OD_LEVATOR: 15
OS_LEVATOR: 15

## 2025-02-05 ASSESSMENT — MARGIN REFLEX DISTANCE
OS_MRD1: 3
OD_MRD1: 3

## 2025-02-05 ASSESSMENT — EXTERNAL EXAM - RIGHT EYE: OD_EXAM: NORMAL

## 2025-02-05 NOTE — PROGRESS NOTES
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 However, no OR block availability until April/May, so will try conservative options first while waiting.  Will first trial rx of maxitrol mary BID OU x 2 weeks to hopefully provide symptomatic relief while awaiting surgery  F/U in 2 weeks for reassessment and consideration of signup for surgery  Exam c/w BUL dermatochalasis  Ptosis VF today show significant obstruction of superior VF OU which improves significantly with taping on both sides  Would not recommend surgical correction until floppy eyelid syndrome is optimized (i.e. may consider blepharoplasty after wedge resection)    Ledy Emerson MD

## 2025-02-07 ENCOUNTER — HOSPITAL ENCOUNTER (EMERGENCY)
Facility: HOSPITAL | Age: 50
Discharge: HOME | End: 2025-02-07
Payer: MEDICARE

## 2025-02-07 ENCOUNTER — CLINICAL SUPPORT (OUTPATIENT)
Dept: EMERGENCY MEDICINE | Facility: HOSPITAL | Age: 50
End: 2025-02-07
Payer: MEDICARE

## 2025-02-07 ENCOUNTER — APPOINTMENT (OUTPATIENT)
Dept: RADIOLOGY | Facility: HOSPITAL | Age: 50
End: 2025-02-07
Payer: MEDICARE

## 2025-02-07 VITALS
SYSTOLIC BLOOD PRESSURE: 144 MMHG | DIASTOLIC BLOOD PRESSURE: 83 MMHG | BODY MASS INDEX: 48.15 KG/M2 | WEIGHT: 289 LBS | RESPIRATION RATE: 17 BRPM | HEIGHT: 65 IN | HEART RATE: 83 BPM | OXYGEN SATURATION: 94 % | TEMPERATURE: 98.1 F

## 2025-02-07 LAB
ALBUMIN SERPL BCP-MCNC: 4.5 G/DL (ref 3.4–5)
ALP SERPL-CCNC: 56 U/L (ref 33–120)
ALT SERPL W P-5'-P-CCNC: 24 U/L (ref 10–52)
ANION GAP SERPL CALC-SCNC: 20 MMOL/L (ref 10–20)
AST SERPL W P-5'-P-CCNC: 26 U/L (ref 9–39)
ATRIAL RATE: 78 BPM
BASOPHILS # BLD AUTO: 0.1 X10*3/UL (ref 0–0.1)
BASOPHILS NFR BLD AUTO: 1.3 %
BILIRUB SERPL-MCNC: 0.7 MG/DL (ref 0–1.2)
BUN SERPL-MCNC: 15 MG/DL (ref 6–23)
CALCIUM SERPL-MCNC: 9.5 MG/DL (ref 8.6–10.6)
CARDIAC TROPONIN I PNL SERPL HS: 4 NG/L (ref 0–53)
CHLORIDE SERPL-SCNC: 100 MMOL/L (ref 98–107)
CO2 SERPL-SCNC: 21 MMOL/L (ref 21–32)
CREAT SERPL-MCNC: 1.11 MG/DL (ref 0.5–1.3)
EGFRCR SERPLBLD CKD-EPI 2021: 80 ML/MIN/1.73M*2
EOSINOPHIL # BLD AUTO: 0.54 X10*3/UL (ref 0–0.7)
EOSINOPHIL NFR BLD AUTO: 7 %
ERYTHROCYTE [DISTWIDTH] IN BLOOD BY AUTOMATED COUNT: 14.4 % (ref 11.5–14.5)
GLUCOSE SERPL-MCNC: 131 MG/DL (ref 74–99)
HCT VFR BLD AUTO: 44.8 % (ref 41–52)
HGB BLD-MCNC: 14.6 G/DL (ref 13.5–17.5)
IMM GRANULOCYTES # BLD AUTO: 0.02 X10*3/UL (ref 0–0.7)
IMM GRANULOCYTES NFR BLD AUTO: 0.3 % (ref 0–0.9)
LYMPHOCYTES # BLD AUTO: 2.76 X10*3/UL (ref 1.2–4.8)
LYMPHOCYTES NFR BLD AUTO: 36 %
MAGNESIUM SERPL-MCNC: 2.11 MG/DL (ref 1.6–2.4)
MCH RBC QN AUTO: 27.5 PG (ref 26–34)
MCHC RBC AUTO-ENTMCNC: 32.6 G/DL (ref 32–36)
MCV RBC AUTO: 85 FL (ref 80–100)
MONOCYTES # BLD AUTO: 0.74 X10*3/UL (ref 0.1–1)
MONOCYTES NFR BLD AUTO: 9.6 %
NEUTROPHILS # BLD AUTO: 3.51 X10*3/UL (ref 1.2–7.7)
NEUTROPHILS NFR BLD AUTO: 45.8 %
NRBC BLD-RTO: 0.5 /100 WBCS (ref 0–0)
P AXIS: 60 DEGREES
P OFFSET: 192 MS
P ONSET: 133 MS
PLATELET # BLD AUTO: 293 X10*3/UL (ref 150–450)
POTASSIUM SERPL-SCNC: 3.6 MMOL/L (ref 3.5–5.3)
PR INTERVAL: 176 MS
PROT SERPL-MCNC: 7.5 G/DL (ref 6.4–8.2)
Q ONSET: 221 MS
QRS COUNT: 13 BEATS
QRS DURATION: 80 MS
QT INTERVAL: 380 MS
QTC CALCULATION(BAZETT): 433 MS
QTC FREDERICIA: 414 MS
R AXIS: 86 DEGREES
RBC # BLD AUTO: 5.3 X10*6/UL (ref 4.5–5.9)
SODIUM SERPL-SCNC: 137 MMOL/L (ref 136–145)
T AXIS: 23 DEGREES
T OFFSET: 411 MS
VENTRICULAR RATE: 78 BPM
WBC # BLD AUTO: 7.7 X10*3/UL (ref 4.4–11.3)

## 2025-02-07 PROCEDURE — 99285 EMERGENCY DEPT VISIT HI MDM: CPT | Mod: 25

## 2025-02-07 PROCEDURE — 36415 COLL VENOUS BLD VENIPUNCTURE: CPT | Performed by: EMERGENCY MEDICINE

## 2025-02-07 PROCEDURE — 93005 ELECTROCARDIOGRAM TRACING: CPT

## 2025-02-07 PROCEDURE — 71046 X-RAY EXAM CHEST 2 VIEWS: CPT

## 2025-02-07 PROCEDURE — 84484 ASSAY OF TROPONIN QUANT: CPT | Performed by: EMERGENCY MEDICINE

## 2025-02-07 PROCEDURE — 80053 COMPREHEN METABOLIC PANEL: CPT | Performed by: EMERGENCY MEDICINE

## 2025-02-07 PROCEDURE — 85025 COMPLETE CBC W/AUTO DIFF WBC: CPT | Performed by: EMERGENCY MEDICINE

## 2025-02-07 PROCEDURE — 83735 ASSAY OF MAGNESIUM: CPT | Performed by: EMERGENCY MEDICINE

## 2025-02-07 PROCEDURE — 99281 EMR DPT VST MAYX REQ PHY/QHP: CPT | Mod: 25

## 2025-02-07 PROCEDURE — 71046 X-RAY EXAM CHEST 2 VIEWS: CPT | Performed by: RADIOLOGY

## 2025-02-07 ASSESSMENT — PAIN - FUNCTIONAL ASSESSMENT: PAIN_FUNCTIONAL_ASSESSMENT: 0-10

## 2025-02-07 ASSESSMENT — PAIN DESCRIPTION - PAIN TYPE: TYPE: ACUTE PAIN

## 2025-02-07 ASSESSMENT — PAIN SCALES - GENERAL: PAINLEVEL_OUTOF10: 0 - NO PAIN

## 2025-02-07 NOTE — ED TRIAGE NOTES
Pt presented to ED for c/o SOB for the past couple of days. States that his at home inhaler is not working. PMHx HTN, HLD, disease, BPPV, obesity, diabetes, gout, spinal stenosis, moderate asthma, ES, .

## 2025-02-11 NOTE — PROGRESS NOTES
Subjective   Patient ID: Tomasz Maxwell is a 49 y.o. male    HPI  49 y.o. male who presents to [facility] for nocturia. He denies any past prostate surgeries or radiation. He expresses that he wakes up so much throughout the night that he had to put a urinal by his bed. He reports incomplete emptying of the bladder. He is currently taking Flomax. He does not drink caffeine, he does drink plenty of water.     The most recent PSA, conducted on 08/01/2023, revealed:  <0.10 ng/mL       Review of Systems    All systems were reviewed. Anything negative was noted in the HPI.    Objective   Physical Exam    General: Well developed, well nourished, alert and cooperative, appears in no acute distress   Eyes: Non-injected conjunctiva, sclera clear, no proptosis   Cardiac: Extremities are warm and well perfused. No edema, cyanosis or pallor   Lungs: Breathing is easy, non-labored. Speaking in clear and complete sentences. Normal diaphragmatic movement   MSK: Ambulatory with steady gait, unassisted   Neuro: Alert and oriented to person, place, and time   Psych: Demonstrates good judgment and reason, without hallucinations, abnormal affect or abnormal behaviors   Skin: No obvious lesions, no rashes       No CVA tenderness bilaterally   No suprapubic pain or discomfort       Past Medical History:   Diagnosis Date    Adhesive capsulitis of shoulder 01/29/2024    Contusion of left hand, initial encounter 04/24/2017    Contusion of left hand, initial encounter    Crushing injury of unspecified finger(s), initial encounter 04/24/2017    Injury, crush, finger    Essential (primary) hypertension 05/16/2022    Hypertension    Hyperlipidemia, unspecified 10/03/2022    Hyperlipidemia    Hypokalemia 04/24/2017    Hypokalemia, k= 3.8 on 1/26/23    Idiopathic gout, right knee 04/24/2017    Acute idiopathic gout of right knee    Latent syphilis, unspecified as early or late 04/13/2018    Latent syphilis with positive serology    Local  infection of the skin and subcutaneous tissue, unspecified 12/05/2017    Skin infection    Long term (current) use of opiate analgesic 04/24/2017    Opioid contract exists    Mastitis without abscess 04/24/2017    Mastitis, acute    Mastodynia 04/24/2017    Breast pain, right    Muscle spasm of back 04/24/2017    Paraspinal muscle spasm    Nocturia 04/24/2017    Nocturia    Other microscopic hematuria 04/24/2017    Hematuria, microscopic    Other specified postprocedural states 04/24/2017    H/O arthroscopic knee surgery    Other specified postprocedural states 04/24/2017    Status post lumbar laminectomy    Other specified symptoms and signs involving the digestive system and abdomen 09/08/2017    Umbilical discharge    Pain in left wrist 04/24/2017    Left wrist pain    Personal history of other diseases of the musculoskeletal system and connective tissue     History of arthritis    Personal history of other diseases of the nervous system and sense organs 03/07/2016    History of acute otitis media    Personal history of other endocrine, nutritional and metabolic disease     History of diabetes mellitus    Personal history of other infectious and parasitic diseases 09/10/2017    History of tinea corporis    Personal history of other infectious and parasitic diseases 09/08/2017    History of trichomoniasis    Personal history of other infectious and parasitic diseases 04/13/2018    History of trichomoniasis    Personal history of other specified conditions 04/30/2018    History of dyspnea    Personal history of other specified conditions 01/23/2018    History of epigastric pain    Personal history of other specified conditions 07/28/2017    History of urinary frequency    Personal history of other specified conditions 10/08/2017    History of chest pain    Proteinuria, unspecified 04/24/2017    Proteinuria    Radiculopathy, lumbar region 04/24/2017    Lumbar radiculopathy    Rupture of anterior cruciate ligament of  knee 02/02/2023    Spinal stenosis, site unspecified 08/19/2015    Spinal stenosis, multiple sites in spine    Unspecified abnormal finding in specimens from other organs, systems and tissues 04/24/2017    Abnormal laboratory test result    Unspecified asthma, uncomplicated ([facility])     Active asthma    Vertigo     ECHO 5/21/22         Past Surgical History:   Procedure Laterality Date    BACK SURGERY  09/26/2016    Back Surgeryx5, has spinal stimulator    CARDIAC CATHETERIZATION N/A 12/18/2024    Procedure: Right Heart Cath;  Surgeon: Helio Cruz MD;  Location: [facility] Cardiac Cath Lab;  Service: Cardiovascular;  Laterality: N/A;    HERNIA REPAIR  08/26/2015    Hernia Repairx2    KNEE SURGERY Right 03/29/2017    Knee Surgery, right TKR    LUMBAR LAMINECTOMY  08/26/2015    Laminectomy Lumbar    MR HEAD ANGIO WO IV CONTRAST  05/21/2022    MR HEAD ANGIO WO IV CONTRAST 5/21/2022 [facility]    MR HEAD ANGIO WO IV CONTRAST  03/12/2017    MR HEAD ANGIO WO IV CONTRAST 3/12/2017 [facility]    MR NECK ANGIO WO IV CONTRAST  05/21/2022    MR NECK ANGIO WO IV CONTRAST 5/21/2022 [facility]    MR NECK ANGIO WO IV CONTRAST  03/12/2017    MR NECK ANGIO WO IV CONTRAST 3/12/2017 [facility] [place]    OTHER SURGICAL HISTORY  05/20/2021    Abdominal surgery    OTHER SURGICAL HISTORY Bilateral 02/24/2021    Hip replacement    OTHER SURGICAL HISTORY  09/26/2016    Gastrectomy    SHOULDER SURGERY Left     left TSR         Assessment/Plan   BPH with obstruction, LUTS    49 y.o. male who presents for the above condition, Today, we had a very long and extensive discussion with the patient regarding the pathophysiology, differential diagnosis, risk factor, associated condition, diagnostic work-up and management of BPH and lower urinary tract symptoms and acute urinary retention. I discussed with the patient the need to check his PSA to assess his prostate cancer risk. We discussed at length the mechanism of  action, risk, benefit, adverse events and side effect of alpha-blocker in the form of tamsulosin 0.4 mg p.o nightly. We discussed in particular the risk of hypotension, lightheadedness, dizziness, and the risk of fall and bone fracture. Also discussed retrograde ejaculation of the side effects of the medication. We had another discussion with the patient regarding lifestyle modifications including low fluid intake after 5 PM, timed voiding every 2 hours, and decrease caffeine intake. I discussed with the patient that in case he had an elevated PSA, we will proceed do an MRI of the prostate.        - Avoid caffeine entirely     - Advised pt to stop fluid intake at 4 pm daily.    - Implement a timed voiding schedule (every 2 to 2.5 hours).    - Discussed the importance of controlling fluid intake and avoiding caffeine.    - Advised on the potential need for cystoscopy if symptoms persist.    Plan:  - PSA  - Renal US  - Follow-up for Cystoscopy    E&M visit today is associated with current or anticipated ongoing medical care services related to a patient's single, serious condition or a complex condition.     2/12/2025    Scribe Attestation  By signing my name below, I, Vibha Ortiz attest that this documentation has been prepared under the direction and in the presence of Dr. Gurjit Moran.

## 2025-02-12 ENCOUNTER — OFFICE VISIT (OUTPATIENT)
Dept: UROLOGY | Facility: HOSPITAL | Age: 50
End: 2025-02-12
Payer: MEDICARE

## 2025-02-12 DIAGNOSIS — N40.1 BPH WITH OBSTRUCTION/LOWER URINARY TRACT SYMPTOMS: Primary | ICD-10-CM

## 2025-02-12 DIAGNOSIS — N13.8 BPH WITH OBSTRUCTION/LOWER URINARY TRACT SYMPTOMS: Primary | ICD-10-CM

## 2025-02-12 PROCEDURE — G2211 COMPLEX E/M VISIT ADD ON: HCPCS | Performed by: STUDENT IN AN ORGANIZED HEALTH CARE EDUCATION/TRAINING PROGRAM

## 2025-02-12 PROCEDURE — 99214 OFFICE O/P EST MOD 30 MIN: CPT | Performed by: STUDENT IN AN ORGANIZED HEALTH CARE EDUCATION/TRAINING PROGRAM

## 2025-02-12 PROCEDURE — 99204 OFFICE O/P NEW MOD 45 MIN: CPT | Performed by: STUDENT IN AN ORGANIZED HEALTH CARE EDUCATION/TRAINING PROGRAM

## 2025-02-17 ENCOUNTER — OFFICE VISIT (OUTPATIENT)
Dept: PULMONOLOGY | Facility: HOSPITAL | Age: 50
End: 2025-02-17
Payer: MEDICARE

## 2025-02-17 VITALS
BODY MASS INDEX: 46.93 KG/M2 | TEMPERATURE: 97.5 F | OXYGEN SATURATION: 95 % | DIASTOLIC BLOOD PRESSURE: 75 MMHG | HEART RATE: 80 BPM | WEIGHT: 282 LBS | SYSTOLIC BLOOD PRESSURE: 145 MMHG

## 2025-02-17 DIAGNOSIS — R91.1 LUNG NODULE: ICD-10-CM

## 2025-02-17 DIAGNOSIS — R49.0 HOARSENESS: ICD-10-CM

## 2025-02-17 DIAGNOSIS — R06.09 DOE (DYSPNEA ON EXERTION): ICD-10-CM

## 2025-02-17 DIAGNOSIS — J45.909 ASTHMA, UNSPECIFIED ASTHMA SEVERITY, UNSPECIFIED WHETHER COMPLICATED, UNSPECIFIED WHETHER PERSISTENT (HHS-HCC): Primary | ICD-10-CM

## 2025-02-17 PROCEDURE — 3077F SYST BP >= 140 MM HG: CPT | Performed by: NURSE PRACTITIONER

## 2025-02-17 PROCEDURE — 3078F DIAST BP <80 MM HG: CPT | Performed by: NURSE PRACTITIONER

## 2025-02-17 PROCEDURE — 99214 OFFICE O/P EST MOD 30 MIN: CPT | Performed by: NURSE PRACTITIONER

## 2025-02-17 PROCEDURE — 1036F TOBACCO NON-USER: CPT | Performed by: NURSE PRACTITIONER

## 2025-02-17 RX ORDER — PREDNISONE 20 MG/1
TABLET ORAL
COMMUNITY
Start: 2025-02-14

## 2025-02-17 RX ORDER — DOXYCYCLINE 100 MG/1
CAPSULE ORAL
COMMUNITY
Start: 2025-02-14

## 2025-02-17 ASSESSMENT — ENCOUNTER SYMPTOMS
NERVOUS/ANXIOUS: 0
MYALGIAS: 0
VOICE CHANGE: 0
ABDOMINAL PAIN: 0
JOINT SWELLING: 0
RHINORRHEA: 0
WEAKNESS: 0
PALPITATIONS: 0
AGITATION: 0
EYE PAIN: 0
ARTHRALGIAS: 0
VOMITING: 0
NUMBNESS: 0
NAUSEA: 0
BACK PAIN: 1
FEVER: 0
FATIGUE: 0
HEADACHES: 0
DIZZINESS: 0
SINUS PRESSURE: 0
DIARRHEA: 0

## 2025-02-17 ASSESSMENT — PAIN SCALES - GENERAL: PAINLEVEL_OUTOF10: 5

## 2025-02-17 NOTE — PROGRESS NOTES
Patient: Tomasz Maxwell    [id number]  : 1975 -- AGE 49 y.o.    Provider: SYLVAIN Velásquez-CNP     Location [facility]   Service Date: 2025              [facility]  Follow up visit note      HISTORY OF PRESENT ILLNESS       HISTORY OF PRESENT ILLNESS     Mr. Maxwell is a 48 year old AAM (former smoker) here for follow up for asthma. Last seen in clinic on 24.     Since last visit he had episode of being short of breath and productive cough (had some hemoptysis). He was in the ED here at , but was waiting over 9 hours. He states he went to [facility] ED. He was discharged on doxycycline and prednisone. He states his breathing improved some with these, but is still not back to his normal. He has been using his symbicort twice daily, albuterol HFA 4-6 x per day, and albuterol nebulizers 4-5 per day. He states the treatments do help. He states has gotten somewhat better - He states it is no longer productive. He has been having a lot of wheezing. He has had SKY and SOB at rest. He has some chest tightness - not a chest pain. He has been taking his montelukast and loratadine daily. He denies any GERD. He has been BiPAP at night - wears it every night.      ACT today 8      24: Since last visit he continues to feel his breathing is not doing well. He has been using his symbicort twice daily and albuterol  1-2 x per week. He denies any cough. He will notice wheezing intermittently. He has SKY with walking groceries in. He will notice some SOB at rest at times. He states he is not taking as deep of breaths related to pain. He states he has episodes where he feels he can't get air in suddenly - he states most of the time his rescue helps, but sometimes needs 3-4 puffs.  He has been wearing his BiPAP nightly. He feels his allergies are doing well with daily montelukast and loratadine. He has significant pain and has not been as  active. He completed the PT with his shoulder - he feels it did not help. Ortho thinks his shoulder may be frozen. He also has spinal stenosis in his neck which he feels may also be contributing. He will have intermittent chest pains - sharp pain right in the middle of his chest. He states it does not last long. He had it two days in a row a few weeks ago which is abnormal. He states he is getting it less than once a month. He denies any GERD. He has been losing weight - was 282 on his scale last week. No LE edema.   ACT today 17     12/8/23: He had his shoulder replaced recently - he said before surgery his pulse ox was 97%. He states they had trouble getting his oxygen up after surgery. He said he got up to 94% and they sent him home later that night. He has had worsening SKY. He states his inhalers are not working. He had significant worsening of his dyspnea a few nights ago -- he may have passed out. He states he was woken up by his family and went to the ED. He was on observation and held pulse ox was 86%. He had a CTPE which showed no PE and echo with normal EF. He has been using his symbicort twice daily, albuterol HFA 2-3 x per day, and albuterol nebulizers 3x a day. He states his inhalers are not helping. He denies any cough, but he does have some wheezing. He has SKY with walking a few minutes on level ground, but denies any SOB at rest. He has had a hoarse voice - `new since he had his surgery. He denies feeling sick or bad post nasal drip contributing. He has been taking his montelukast and loratadine daily. He denies any GERD or chest pain.  He has been wearing his BIPAP nightly. He does not have a pulse ox at home.   ACT today 16     10/14/22: Since last visit he has been doing well. He has been using his symbicort twice daily and albuterol twice a week. He has not needed his albuterol nebulizers recently. He had knee surgery and it ended up infected and he needed to be admitted and had an I&D. He  states since then he has been doing well. He denies cough or SOB at rest. He denies chest pain, but does have episodes of chest tightness that are alleviated with using his rescue/nebulizer. He will notice wheezing with exertion. He has SKY with walking long distances and going up stairs. He states he does not have any allergy symptoms - currently taking montelukast daily, but is not sure if he has loratadine. He denies any GERD symptoms. He has been wearing his CPAP nightly.   ACT 20      3/17/22: Since last visit he has been doing ok. He had his knee replaced, but has issues with pain. He has had pain, swelling, and warmth. He had sharp pains last night -- waking him up. He has an appt with ortho later today. He was in the ED recently -- US and xray normal. He uses his symbicort twice daily and albuterol nebulizers 3x a day. He missed his nebulizer yesterday and noticed he didn't feel as well. He uses his rescue more frequently related to knee pain/ issues. He has been using it 4x a day. He denies any cough. He denies wheezing, but his friend told him he heard him wheezing. He feels he has SKY, but his knee is more bothersome than his breathing currently. He has had pain and feels this makes him SOB. He denies any SOB at rest, CP, or GERD. For his allergies he takes montelukast and claritin with good control. He has been wearing his CPAP at night.   ACT today 15     10/11/21: Since last visit he unfortunately had a significant exacerbation recently. He was doing nebulizers 3-4x per day and rescue 6x a times a day. He had significant nighttime symptoms. He was prescribed a prednisone taper with significant improvement. He had significant SKY and had to go prison up the stairs and take a break. He was having difficulty wearing his CPAP -- he states he was waking up gasping for air and he had to use his rescue several times a night.   -He now is using his rescue 2x a day, nebulizers a few times a week, and symbicort  twice daily. He still has a dry cough, but it is much improved. He was having chest/headache he was coughing so much. His cough was productive with thick yellow sputum. He had significant wheezing, but his has also improved. His SKY is much improved. He is able to go up the stairs, but he has to sit once he is up and use his rescue. He has had SOB at rest - a few times with wheezing. He has some post nasal drip. He denies any GERD symptoms or CP. He had chest tightness with recent exacerbation, but this resolved with prednisone.   ACT today 15      5/7/21: Since last visit he has been using his symbicort twice daily, nebulizers twice a week, and ventolin when he is out and about. He recently had abdominal surgery on 4/30 and has residual soreness. He states when he coughs it is especially painful and he carries a blanket with him at all times as a brace. He has a dry cough. He has wheezing occasionally, but much less than previous. He has SKY that limits his mobility as well as pain. He is not able to walk related to back pain, knee pain, and abdominal pain. His next ketamine therapy next week. He states he had it at another hospital and it was not done well. He has his second round coming up here at . He states he never woke up before and the outside hospital he did. He is unable to do water PT related to his stimulator. Discussed increasing his activity however he is able to. He denies any SOB at rest, allergies, or GERD. He has had one episode of CP since we last saw each other, but it was brief and resolved quickly. CP had been intermittent, but more frequent prior to last visit.   ACT 15      3/12/21: Since last visit he has had multiple surgeries including both hips replaced. He had somewhat been lost to follow up and is no longer on a maintenance inhaler. He is currently just using his PRN ventolin 2-3 x per day on average. He has noticed worsening SKY over the last month. He is only able to walk 100m and  has to stop and take a break. He has significant back pain and he has a stimulator in place. Exertion is limited both on pain as well as breathing. He denies any cough, but states his girlfriend has told him he wheezes and makes him use his ventolin at night. He denies any SOB at rest. He denies any allergy symptoms, but has been using his montelukast daily at bedtime. He denies any acid reflux, but has had episodes of chest pain over the last few weeks. He states both episodes he had - one a few weeks ago and one yesterday occurred at rest. He states pain was on the right side of his chest and described it as a soreness. The episodes yesterday lasted about 10 minutes and the episode a few weeks ago lasted almost an hour. He did not notice any aggravating/alleviated factors and pain went away on its own. He states a few years ago he had chest pain that was more sharp/stabbing for which he went to the ED for evaluation. He states they did testing and sent him home at that time. He previously had a nebulizer machine, but it broke.   ACT today 16      3/14/18: 41 yo with asthma here after a 2 year hiatus.  Is on biPAP now, but doesn't think it's working. Has noted a lot of gasping and is tired during the day. He is more dyspneic before and has episodes of sob that come and go, not directly related to exertion.  His PCP has been refilling his inhalers  uses his rescue inhaler a lot and runs out before he's due for a refill.  Has seen ENT but they concentrated on his hearing rather than his sinuses.  Hasn't made a follow up appointment. Hearing normal currently.  pmhx:  Asthma with COPD per PFTs (needs repeat)  Dyspnea on exertion, MMRC 2  sleep disturbance: ES/CSA on bipap but it doesn't appear to be working  daytime sleepiness: yes (see above)  sinus congestion, denies allergies  inhalers: symbicort, ventolin, monteleukast  spirometry: severe, but suspect other issues     Previous pulmonary history: Diagnosed with  asthma as a child. He was smoking as a teenager and had a significant exacerbation in which he was hospitalized and intubated twice. He quit smoking after this hospitalization and has not been hospitalized since.        ALLERGIES AND MEDICATIONS     ALLERGIES  No Known Allergies    MEDICATIONS  Current Outpatient Medications   Medication Sig Dispense Refill    albuterol 2.5 mg /3 mL (0.083 %) nebulizer solution USE 1 VIAL IN NEBULIZER EVERY 4 HOURS - and as needed 60 mL 11    amLODIPine (Norvasc) 10 mg tablet Take 1 tablet (10 mg) by mouth once daily. 90 tablet 3    atorvastatin (Lipitor) 80 mg tablet Take 1 tablet (80 mg) by mouth once daily at bedtime. 90 tablet 3    cholecalciferol (Vitamin D-3) 50 MCG (2000 UT) tablet Take 1 tablet (50 mcg) by mouth once daily. 90 tablet 3    diclofenac sodium (Voltaren) 1 % gel Apply 4.5 inches (4 g) topically 4 times a day. 450 g 1    doxycycline (Monodox) 100 mg capsule TAKE 1 CAPSULE BY MOUTH TWO TIMES A DAY. MAY SUBSTITUTE DOXYCYLINE HYCLATE 100MG IF INSURANCE, COST, OR AVAILABILITY DEMANDS.      DULoxetine (Cymbalta) 30 mg DR capsule Take 1 capsule (30 mg) by mouth 2 times a day. Do not crush or chew. 60 capsule 11    loratadine (Claritin) 10 mg tablet Take 1 tablet (10 mg) by mouth once daily as needed for allergies. 30 tablet 2    meclizine (Antivert) 12.5 mg tablet Take 1 tablet (12.5 mg) by mouth 3 times a day as needed for dizziness. 15 tablet 0    metFORMIN (Glucophage) 1,000 mg tablet Take 1 tablet (1,000 mg) by mouth once daily with breakfast. 90 tablet 3    montelukast (Singulair) 10 mg tablet Take 1 tablet (10 mg) by mouth once daily at bedtime. 90 tablet 3    naloxone (Narcan) 4 mg/0.1 mL nasal spray Administer 1 spray (4 mg) into affected nostril(s) if needed for opioid reversal. May repeat every 2-3 minutes if needed, alternating nostrils, until medical assistance becomes available. 2 each 0    neomycin-polymyxin B-dexameth (Polydex) 3.5 mg/g-10,000 unit/g-0.1  % ointment ophthalmic ointment Apply to both eyes 3 times a day. Apply to both eyes 3 times daily 3.5 g 0    oxyCODONE-acetaminophen (Percocet) 7.5-325 mg tablet Take 1 tablet by mouth every 6 hours if needed for severe pain (7 - 10). Do not fill before February 7, 2025. 90 tablet 0    [START ON 3/9/2025] oxyCODONE-acetaminophen (Percocet) 7.5-325 mg tablet Take 1 tablet by mouth every 6 hours if needed for severe pain (7 - 10). Do not fill before March 9, 2025. 90 tablet 0    predniSONE (Deltasone) 20 mg tablet TAKE 2 TABLETS BY MOUTH TWO TIMES A DAY FOR 7 DAYS.      Symbicort 160-4.5 mcg/actuation inhaler Inhale 2 puffs 2 times a day. Rinse mouth after use.      tadalafil (Cialis) 20 mg tablet Take 1 tablet (20 mg) by mouth once daily as needed for erectile dysfunction. 10 tablet 11    tamsulosin (Flomax) 0.4 mg 24 hr capsule Take 2 capsules (0.8 mg) by mouth once daily at bedtime. 60 capsule 11    Ventolin HFA 90 mcg/actuation inhaler INHALE 1 OR 2 PUFFS EVERY 4 TO 6 HOURS AS NEEDED 18 g 11    empagliflozin (Jardiance) 10 mg Take 1 tablet (10 mg) by mouth once daily. 90 tablet 3     No current facility-administered medications for this visit.     Facility-Administered Medications Ordered in Other Visits   Medication Dose Route Frequency Provider Last Rate Last Admin    lidocaine PF (Xylocaine) 10 mg/mL (1 %) injection 40 mg  4 mL subcutaneous Once Devon lCaros MD             PAST HISTORY     PAST MEDICAL HISTORY  - HTN   - HLD   - Obesity -- recently wt went from 400 -> 259lbs (since 2019)  - bilateral hip replacement   - back surgery   - chronic back pain   - ES on BiPAP   - hernias - s/p repair -- 4/30/21 abdominal wall mass removed     PAST SURGICAL HISTORY  Past Surgical History:   Procedure Laterality Date    BACK SURGERY  09/26/2016    Back Surgeryx5, has spinal stimulator    CARDIAC CATHETERIZATION N/A 12/18/2024    Procedure: Right Heart Cath;  Surgeon: Helio Cruz MD;  Location: [facility]  Lab;  Service: Cardiovascular;  Laterality: N/A;    HERNIA REPAIR  08/26/2015    Hernia Repairx2    KNEE SURGERY Right 03/29/2017    Knee Surgery, right TKR    LUMBAR LAMINECTOMY  08/26/2015    Laminectomy Lumbar    MR HEAD ANGIO WO IV CONTRAST  05/21/2022    MR HEAD ANGIO WO IV CONTRAST 5/21/2022 [facility]ENCY [facility]GAC[facility]    MR HEAD ANGIO WO IV CONTRAST  03/12/2017    MR HEAD ANGIO WO IV CONTRAST 3/12/2017 [facility]GENCY [facility]GAC[facility]    MR NECK ANGIO WO IV CONTRAST  05/21/2022    MR NECK ANGIO WO IV CONTRAST 5/21/2022 [facility]GENCY [facility]GAC[facility]    MR NECK ANGIO WO IV CONTRAST  03/12/2017    MR NECK ANGIO WO IV CONTRAST 3/12/2017 [facility]GENC[facility]GAC[facility]    OTHER SURGICAL HISTORY  05/20/2021    Abdominal surgery    OTHER SURGICAL HISTORY Bilateral 02/24/2021    Hip replacement    OTHER SURGICAL HISTORY  09/26/2016    Gastrectomy    SHOULDER SURGERY Left     left TSR       IMMUNIZATION HISTORY  Immunization History   Administered Date(s) Administered    Flu vaccine (IIV4), preservative free *Check age/dose* 01/04/2019, 09/27/2019, 09/25/2020, 11/04/2020, 10/14/2022, 10/17/2023    Flu vaccine, trivalent, preservative free, age 6 months and greater (Fluarix/Fluzone/Flulaval) 10/15/2024    Influenza, Unspecified 09/25/2020    Influenza, seasonal, injectable 10/03/2015, 01/04/2019, 09/27/2019, 09/25/2020, 11/04/2020, 10/14/2022    Moderna SARS-CoV-2 Vaccination 05/14/2021, 06/09/2021, 01/26/2022    Pneumococcal conjugate vaccine, 13-valent (PREVNAR 13) 12/07/2022    Pneumococcal conjugate vaccine, 20-valent (PREVNAR 20) 12/07/2022    Pneumococcal polysaccharide vaccine, 23-valent, age 2 years and older (PNEUMOVAX 23) 07/25/2015, 10/06/2015, 03/30/2022    Tdap vaccine, age 7 year and older (BOOSTRIX, ADACEL) 10/15/2024       SOCIAL HISTORY  smoking: 13- 15   drinking: none  illicit drug use: none    OCCUPATIONAL/ENVIRONMENTAL HISTORY  Occupation: He is now on disability, but previously worked as a cook. No known toxic exposures.     FAMILY  HISTORY  FAMILY HISTORY: No family Hx of lung disease or lung cancer.    RESULTS/DATA     Pulmonary Function Test Results   PFTs:  - 3/7/16: FEV1/FVC 0.70/ FEV1 1.21 (42%)/ FVC 1.73 (49%)   - 12/4/19: FEV1/FVC 0.73/ FEV1 1.75 (62%- almost BD)/ FVC 2.21 (64%)/ TLC 3.42 (66%)/ DLCO 50%   - 5/7/21: FEV1/FVC 0.75/ FEV1 1.94 (69%)/ FVC 2.50 (72%)/ TLC 4.23 (81%)/ DLCO 79%   - 1/2/24 - FEV1/FVC 0.74/ FEV1 1.68 (52%)/ FVC 2.24 (56%)/ TLC 3.92 (64%)/ DLCO 81%      6 MWTs: 5/7/21 - 119m () 98 -> 96% on RA (FALGUNI 4)         Chest Radiograph     XR chest 2 views 12/06/2023  Impression - Severely limited exam.  Pulmonary vascular congestion suggests volume overload, with possible  mild acute pulmonary interstitial edema. No focal consolidation.  Asymmetric hazy opacity about the left lateral costophrenic angle  probably relates to chest wall soft tissue although airspace disease  such as atelectasis or pneumonia with or without small effusion  cannot be excluded. No sizable effusion on the right. No pneumothorax.        Chest CT Scan     CT angio chest for pulmonary embolism 12/06/2023  Impression  Examination is limited by suboptimal pulmonary artery contrast  opacification and respiratory motion. With these limitations:  No pulmonary embolism to the lobar level in the lower lobes or to the  proximal segmental level in the upper lobes or right middle lobe. No  evidence of acute pathology in the chest.  Incidentally noted 9 mm right upper lobe pulmonary nodule, concerning  for neoplasm. Further evaluation with PET-CT recommended. A yellow  alert was sent.  Additional findings as discussed above.    3/8/24 - A stable solid non-calcified pulmonary nodule measuring 9 mm x 7  mm in the right upper lobe, favoring benign etiology. Recommend no  new pulmonary follow up non contrast chest CT in 12-18 months to  confirm stability.    PET - 10/29/24 - Non FDG avid pulmonary nodule in the right upper lobe, favoring  benign etiology.  Follow-up chest CT is recommended to ensure  stability.    7/6/24 - IMPRESSION:  1. No acute traumatic injury to the chest, abdomen, or pelvis.  2. No fracture of the thoracic spine.  3. No fracture of the lumbar spine.  4. 1 cm nodule right upper lobe suggest clinical and PET/CT  correlation.    1/22/25 -  Unchanged 8 mm right upper lobe pulmonary nodule. Recommend  follow-up chest CT in six-month to confirm continued stability.  2. No CT evidence of acute process in the chest.        Echocardiogram     Echo:   - 4/1/21 - EF 60-65%, RA normal size, RV normal size and function.   -  12/7/23 Left ventricular systolic function is normal with a 60-65% estimated ejection fraction. RA normal size, RV normal size and function.   - 10/1/24 - Left ventricular ejection fraction is normal, calculated by Lundberg's biplane at 66%.   2. Right ventricular function is probably normal in limited views.   3. Right ventricular systolic pressure is within normal limits.   4. There is mild to moderate pulmonic valve regurgitation.   5. Normal aortic root.   6. Compared with study dated 12/7/2023, there is more pulmonic regurgitation on the current study (previously trace, now mild-moderate).  RA/ RV not well visualized     EKG   - 3/12/21 - SR with SA   - 12/7/23 - NSR- non specific T wave abnormality (when compared to 11/27/23- no significant change was found)    RHC:   12/18/24 - RHC: Patient with large body habitus and thus large respiratory variation in his RHC numbers. Patient with evidence of precapillary pulmonary hypertension [58/20, mean PA 33 mmHg, PCWP between 5 and 20 with a mean of 13 mmHg] with RV failure by hemodynamics [end expiratory RVEDP of 20 and RA of 10 mmHg], and normal assumed Benigno cardiac output at 5.2 L/min [cardiac index of 2.3 L/min/mï¿½].   2. Will refer to Dr. Interiano for further workup and management of pulmonary hypertension.    Labs/ Other testing        REVIEW OF SYSTEMS     REVIEW OF SYSTEMS  Review of  Systems   Constitutional:  Negative for fatigue and fever.   HENT:  Negative for congestion, postnasal drip, rhinorrhea, sinus pressure and voice change.    Eyes:  Negative for pain and visual disturbance.   Cardiovascular:  Positive for chest pain. Negative for palpitations and leg swelling.   Gastrointestinal:  Negative for abdominal pain, diarrhea, nausea and vomiting.   Endocrine: Negative for cold intolerance and heat intolerance.   Musculoskeletal:  Positive for back pain. Negative for arthralgias, joint swelling and myalgias.   Skin:  Negative for rash.   Neurological:  Negative for dizziness, weakness, numbness and headaches.   Psychiatric/Behavioral:  Negative for agitation. The patient is not nervous/anxious.          PHYSICAL EXAM     VITAL SIGNS: /75   Pulse 80   Temp 36.4 °C (97.5 °F)   Wt 128 kg (282 lb)   SpO2 95% Comment: RA  BMI 46.93 kg/m²      CURRENT WEIGHT: [unfilled]  BMI: [unfilled]  PREVIOUS WEIGHTS:  Wt Readings from Last 3 Encounters:   02/17/25 128 kg (282 lb)   01/30/25 131 kg (289 lb)   01/06/25 128 kg (282 lb 12.8 oz)       Physical Exam  Vitals reviewed.   Constitutional:       General: He is not in acute distress.     Appearance: Normal appearance. He is obese. He is not ill-appearing or toxic-appearing.   HENT:      Head: Normocephalic.      Nose: No rhinorrhea.   Cardiovascular:      Rate and Rhythm: Normal rate and regular rhythm.      Heart sounds: Normal heart sounds.   Pulmonary:      Effort: Pulmonary effort is normal. No respiratory distress.      Breath sounds: Normal breath sounds. No stridor. No wheezing, rhonchi or rales.   Abdominal:      General: Abdomen is flat.   Musculoskeletal:         General: Normal range of motion.      Right lower leg: No edema.      Left lower leg: No edema.   Skin:     General: Skin is warm and dry.      Nails: There is no clubbing.   Neurological:      General: No focal deficit present.      Mental Status: He is alert and oriented to  person, place, and time.   Psychiatric:         Mood and Affect: Mood normal.         Behavior: Behavior normal.         Judgment: Judgment normal.         ASSESSMENT/PLAN        1. Asthma: diagnosed in childhood - intubated twice as a teenager when he was smoking. PFTs without obstruction or restriction --> never diagnosed with COPD. Back surgery - stimulater adjusted. Recent worsening SKY since his shoulder surgery - CTPE without PE, Echo with normal EF, EKG NSR.  Repeat PFTs with reduction in all volumes -- likely restricted related to pain/weight.   - continue symbicort 160 - 2 puffs twice daily - rinse mouth out afterwards   - continue albuterol 2 puffs or albuterol nebulizer every 4-6 hours as needed for shortness of breath   - seen by ENT to evaluate for vocal cord injury -- MBS/ esophagram normal.   - upcoming appt with Cardiology for cardiac work up   - finish doxycycline course   - finish prednisone course - currently on 80mg daily x 7 days -- states breathing was at a 9/ 10 -> 6/10 - I told him to call if breathing is not down to 4/10 by Thursday and will do prednisone taper      2. Pulmonary hypertension: Patient with large body habitus and thus large respiratory variation in his RHC numbers. Patient with evidence of precapillary pulmonary hypertension [58/20, mean PA 33 mmHg, PCWP between 5 and 20 with a mean of 13 mmHg] with RV failure by hemodynamics [end expiratory RVEDP of 20 and RA of 10 mmHg].   - following with Dr. Little     3. Allergic rhinitis: unclear if this possible triggered recent exacerbation - no nasal congestion/runny nose, but does have post nasal drip   - continue montelukast (singulair) 10mg daily at bedtime   - continue loratadine (claritin) 10mg daily      4. Lung nodule: 9mm - seen on imaging - stable 12/2023 - 1/2025   - plan for CT chest 1 year 1/2026     4.  ES:  wearing BiPAP nightly   - continue to follow up with sleep      5. Weight loss: interested in bariatric surgery -  does not want to wait for so long. Comprehensive weight loss referral and nutrition referral last visit in the past.     Thank you for visiting the Pulmonary clinic today!   Return to clinic 3 months or sooner if needed   Vianney Gilliland CNP  My office -  [phone]- [facility] is my . Carline is my nurse [phone].   Radiology scheduling [phone]   Appointment scheduling [phone]   Pulmonary function testing - [phone]

## 2025-02-17 NOTE — PATIENT INSTRUCTIONS
1. Asthma: diagnosed in childhood - intubated twice as a teenager when he was smoking. PFTs without obstruction or restriction --> never diagnosed with COPD. Back surgery - stimulater adjusted. Recent worsening SKY since his shoulder surgery - CTPE without PE, Echo with normal EF, EKG NSR.  Repeat PFTs with reduction in all volumes -- likely restricted related to pain/weight.   - continue symbicort 160 - 2 puffs twice daily - rinse mouth out afterwards   - continue albuterol 2 puffs or albuterol nebulizer every 4-6 hours as needed for shortness of breath   - seen by ENT to evaluate for vocal cord injury -- MBS/ esophagram normal. Seen by Dr. Ramey   - upcoming appt with Cardiology for cardiac work up   - finish doxycycline course   - finish prednisone course - currently on 80mg daily x 7 days      2. Pulmonary hypertension: Patient with large body habitus and thus large respiratory variation in his RHC numbers. Patient with evidence of precapillary pulmonary hypertension [58/20, mean PA 33 mmHg, PCWP between 5 and 20 with a mean of 13 mmHg] with RV failure by hemodynamics [end expiratory RVEDP of 20 and RA of 10 mmHg].   - following with Dr. Little     3. Allergic rhinitis: unclear if this possible triggered recent exacerbation - no nasal congestion/runny nose, but does have post nasal drip   - continue montelukast (singulair) 10mg daily at bedtime   - continue loratadine (claritin) 10mg daily      4. Lung nodule: 9mm - seen on imaging - stable 12/2023 - 1/2025   - plan for CT chest 1 year 1/2026     4.  ES:  wearing BiPAP nightly   - continue to follow up with sleep      5. Weight loss: interested in bariatric surgery - does not want to wait for so long. Comprehensive weight loss referral and nutrition referral last visit in the past.     Thank you for visiting the Pulmonary clinic today!   Return to clinic 3 months or sooner if needed   Vianney Gilliland CNP  My office -  [phone]- Song is my  . Carline is my nurse [phone].   Radiology scheduling [phone]   Appointment scheduling [phone]   Pulmonary function testing - [phone]

## 2025-02-19 LAB — PSA SERPL-MCNC: 0.13 NG/ML

## 2025-02-20 ENCOUNTER — APPOINTMENT (OUTPATIENT)
Dept: OPHTHALMOLOGY | Facility: CLINIC | Age: 50
End: 2025-02-20
Payer: MEDICARE

## 2025-03-06 ENCOUNTER — OFFICE VISIT (OUTPATIENT)
Facility: HOSPITAL | Age: 50
End: 2025-03-06
Payer: MEDICARE

## 2025-03-06 VITALS
RESPIRATION RATE: 18 BRPM | BODY MASS INDEX: 46.98 KG/M2 | OXYGEN SATURATION: 98 % | WEIGHT: 282 LBS | HEART RATE: 79 BPM | SYSTOLIC BLOOD PRESSURE: 122 MMHG | TEMPERATURE: 99.4 F | DIASTOLIC BLOOD PRESSURE: 76 MMHG | HEIGHT: 65 IN

## 2025-03-06 DIAGNOSIS — E13.9 DIABETES 1.5, MANAGED AS TYPE 2 (MULTI): ICD-10-CM

## 2025-03-06 DIAGNOSIS — M96.1 LUMBAR POST-LAMINECTOMY SYNDROME: ICD-10-CM

## 2025-03-06 DIAGNOSIS — E66.9 OBESITY, UNSPECIFIED CLASS, UNSPECIFIED OBESITY TYPE, UNSPECIFIED WHETHER SERIOUS COMORBIDITY PRESENT: Primary | ICD-10-CM

## 2025-03-06 DIAGNOSIS — E11.9 TYPE 2 DIABETES MELLITUS WITHOUT COMPLICATION, WITHOUT LONG-TERM CURRENT USE OF INSULIN (MULTI): ICD-10-CM

## 2025-03-06 LAB — POC HEMOGLOBIN A1C: 7.1 % (ref 4.2–6.5)

## 2025-03-06 PROCEDURE — 83036 HEMOGLOBIN GLYCOSYLATED A1C: CPT

## 2025-03-06 RX ORDER — OXYCODONE AND ACETAMINOPHEN 7.5; 325 MG/1; MG/1
1 TABLET ORAL EVERY 6 HOURS PRN
Qty: 90 TABLET | Refills: 0 | Status: SHIPPED | OUTPATIENT
Start: 2025-05-07 | End: 2025-06-06

## 2025-03-06 RX ORDER — OXYCODONE AND ACETAMINOPHEN 7.5; 325 MG/1; MG/1
1 TABLET ORAL EVERY 6 HOURS PRN
Qty: 90 TABLET | Refills: 0 | Status: SHIPPED | OUTPATIENT
Start: 2025-05-09 | End: 2025-03-06 | Stop reason: DRUGHIGH

## 2025-03-06 RX ORDER — OXYCODONE AND ACETAMINOPHEN 7.5; 325 MG/1; MG/1
1 TABLET ORAL EVERY 6 HOURS PRN
Qty: 90 TABLET | Refills: 0 | Status: SHIPPED | OUTPATIENT
Start: 2025-04-09 | End: 2025-03-06 | Stop reason: DRUGHIGH

## 2025-03-06 RX ORDER — OXYCODONE AND ACETAMINOPHEN 7.5; 325 MG/1; MG/1
1 TABLET ORAL EVERY 6 HOURS PRN
Qty: 90 TABLET | Refills: 0 | Status: SHIPPED | OUTPATIENT
Start: 2025-04-07 | End: 2025-05-07

## 2025-03-06 RX ORDER — METFORMIN HYDROCHLORIDE 1000 MG/1
1000 TABLET ORAL
Qty: 90 TABLET | Refills: 3 | Status: SHIPPED | OUTPATIENT
Start: 2025-03-06 | End: 2026-03-06

## 2025-03-06 RX ORDER — OXYCODONE AND ACETAMINOPHEN 7.5; 325 MG/1; MG/1
1 TABLET ORAL EVERY 6 HOURS PRN
Qty: 90 TABLET | Refills: 0 | Status: SHIPPED | OUTPATIENT
Start: 2025-03-07

## 2025-03-06 ASSESSMENT — ENCOUNTER SYMPTOMS
DEPRESSION: 0
OCCASIONAL FEELINGS OF UNSTEADINESS: 0
LOSS OF SENSATION IN FEET: 0

## 2025-03-06 ASSESSMENT — PAIN SCALES - GENERAL: PAINLEVEL_OUTOF10: 0-NO PAIN

## 2025-03-06 NOTE — PROGRESS NOTES
"Subjective   Patient ID: Tomasz Maxwell is a 49 y.o. male presents for assessment and continuation of buprenorphine/naloxone for OUD, and other issyes as described below    Interim hx last seen by me 1/6/2025:  -ED 2/7/25 FOR SOB asthma exacerbation and bronmchitis  -Per cardiology Kwame Little-Need to discuss GLP-1 as per pulmonologist  pulmonary hypertension.  \"While multifactorial, I feel that his body habitus is playing a significant role.\"   -Urology ED 2/7/25 FOR SOB While multifactorial, I feel that his body habitus is playing a significant role 2/12/2025 for BPH with obstruction, LUTS with plan to implement lifestyle modification and avoiding fluids in the evening, timed voiding, and avoiding caffeine, PSA, renal ultrasound and follow-up cystoscopy    OARRS:  Corrina Blank MD on 3/6/2025 10:02 AM  I have personally reviewed the OARRS report for Tomasz Maxwell. I have considered the risks of abuse, dependence, addiction and diversion    Is the patient prescribed a combination of a benzodiazepine and opioid?  No    Last Urine Drug Screen:  Recent Results (from the past 8760 hours)   Confirmation Opiate/Opioid/Benzo Prescription Compliance    Collection Time: 09/19/24  7:40 AM   Result Value Ref Range    Clonazepam <25 <25 ng/mL    7-Aminoclonazepam <25 <25 ng/mL    Alprazolam <25 <25 ng/mL    Alpha-Hydroxyalprazolam <25 <25 ng/mL    Midazolam <25 <25 ng/mL    Alpha-Hydroxymidazolam <25 <25 ng/mL    Chlordiazepoxide <25 <25 ng/mL    Diazepam <25 <25 ng/mL    Nordiazepam <25 <25 ng/mL    Temazepam <25 <25 ng/mL    Oxazepam <25 <25 ng/mL    Lorazepam <25 <25 ng/mL    Methadone <25 <25 ng/mL    EDDP <25 <25 ng/mL    6-Acetylmorphine <25 <25 ng/mL    Codeine <50 <50 ng/mL    Hydrocodone <25 <25 ng/mL    Hydromorphone <25 <25 ng/mL    Morphine  <50 <50 ng/mL    Norhydrocodone <25 <25 ng/mL    Noroxycodone 90 (H) <25 ng/mL    Oxycodone 183 (H) <25 ng/mL    Oxymorphone 26 (H) <25 ng/mL    Fentanyl <2.5 <2.5 " ng/mL    Norfentanyl <2.5 <2.5 ng/mL    Tramadol <50 <50 ng/mL    O-Desmethyltramadol <50 <50 ng/mL    Zolpidem <25 <25 ng/mL    Zolpidem Metabolite (ZCA) <25 <25 ng/mL   Screen Opiate/Opioid/Benzo Prescription Compliance    Collection Time: 09/19/24  7:40 AM   Result Value Ref Range    Creatinine, Urine Random 71.4 20.0 - 370.0 mg/dL    Amphetamine Screen, Urine Presumptive Negative Presumptive Negative    Barbiturate Screen, Urine Presumptive Negative Presumptive Negative    Cannabinoid Screen, Urine Presumptive Negative Presumptive Negative    Cocaine Metabolite Screen, Urine Presumptive Negative Presumptive Negative    PCP Screen, Urine Presumptive Negative Presumptive Negative   Drug Screen, Urine    Collection Time: 07/06/24  3:50 AM   Result Value Ref Range    Amphetamine Screen, Urine Presumptive Negative Presumptive Negative    Barbiturate Screen, Urine Presumptive Negative Presumptive Negative    Benzodiazepines Screen, Urine Presumptive Negative Presumptive Negative    Cannabinoid Screen, Urine Presumptive Negative Presumptive Negative    Cocaine Metabolite Screen, Urine Presumptive Negative Presumptive Negative    Fentanyl Screen, Urine Presumptive Negative Presumptive Negative    Opiate Screen, Urine Presumptive Positive (A) Presumptive Negative    Oxycodone Screen, Urine Presumptive Negative Presumptive Negative    PCP Screen, Urine Presumptive Negative Presumptive Negative    Methadone Screen, Urine Presumptive Negative Presumptive Negative     Results are as expected.     Controlled Substance Agreement:  Date of the Last Agreement: 8/20/23   Reviewed Controlled Substance Agreement including but not limited to the benefits, risks, and alternatives to treatment with a Controlled Substance medication(s).    Opioids:  What is the patient's goal of therapy? Functional with chronic pain  Is this being achieved with current treatment? yes    I have calculated the patient's Morphine Dose Equivalent (MED):  "  I have considered referral to Pain Management and/or a specialist, and do not feel it is necessary at this time.    I feel that it is clinically indicated to continue this current medication regimen after consideration of alternative therapies, and other non-opioid treatment.    Opioid Risk Screening:  No data recorded    Pain Assessment:  No data recorded    Additional Complaints:  HPI   #Chronic pain  - Percocet 7.5-325 for 2-3 years   - Usually takes 2 tabs a day and a third one as necessary  - allows him to be functional   - No chills, diaphoresis,   - No rhinorrhea, lacrimation, sneezing,   - No nausea, GI cramping, diarrhea, abnormal back and leg pain, tremulousness,   - No  opioid side effects including marked constipation, sedation, or leg edema.  - No symptoms suggestive of intoxication or sedation.     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 No  side effects including marked constipation, sedation, or leg edema. No symptoms suggestive of intoxication or sedation.       Objective   Vitals: /76 (BP Location: Right arm, Patient Position: Sitting, BP Cuff Size: Adult)   Pulse 79   Temp 37.4 °C (99.4 °F) (Temporal)   Resp 18   Ht 1.651 m (5' 5\")   Wt 128 kg (282 lb)   SpO2 98%   BMI 46.93 kg/m²      Physical Exam  Well groomed, comfortable at rest. Uses cane to ambulate.   BP: 122/76  Eyes: BALJINDER, normal pupils  ENT: Normal nasal  mucosa  CV: Normal heart " sounds, RRR  Lungs clear, no wheeze or crackles  Abdomen soft, non-tender  Neuro: Tone, power symmetric, no tremor  MSK: No joint tenderness or swelling  Integuement: No diaphoresis, no rash,   Extremities: no leg edema     Assessment/Plan     47 yo M w/ chronic pain on long-term percocet due to Lumbar post-laminectomy syndrome s/p multiple back surgeries, R. Shoulder replacement, knee replacement, asthma with COPD, HTN, DMII, and vertigo  here for follow-up. HDS and well appearing.     #Chronic pain  :: chronic, stable  :: Stable well-controlled on Percocet, no evidence of side effects  :: Reviewed OARRS  -Continue current prescription Percocet 7.5-325 1 tablet q6h prn    #DM  :: chronic, stable  :: last A1c 9/19/24 6.6  :: last UACR 10/2024 WNL  - Repeat A1c POCT 7.1 NOT AT GOAL <7  - c/w meds Metformin 1000mg daily with breakfast, jardiance 10 mg daily  - start Trulicity, if tolerating can discontinue metformin     #Obesity  #HLD  :: chronic, poorly controlled  - lifestyle mod reviewed  - referral to [facility]  -c/w statin    Problem List Items Addressed This Visit       Lumbar post-laminectomy syndrome    Relevant Medications    oxyCODONE-acetaminophen (Percocet) 7.5-325 mg tablet    oxyCODONE-acetaminophen (Percocet) 7.5-325 mg tablet (Start on 4/7/2025)    oxyCODONE-acetaminophen (Percocet) 7.5-325 mg tablet (Start on 5/7/2025)    Diabetes mellitus (Multi)    Relevant Medications    metFORMIN (Glucophage) 1,000 mg tablet    dulaglutide (Trulicity) 0.75 mg/0.5 mL pen injector (Start on 3/9/2025)    Other Relevant Orders    POCT glycosylated hemoglobin (Hb A1C) manually resulted (Completed)    Referral to [facility]     Other Visit Diagnoses       Obesity, unspecified class, unspecified obesity type, unspecified whether serious comorbidity present    -  Primary    Relevant Orders    Referral to [facility]    Diabetes 1.5, managed as type 2 (Multi)        Relevant Medications    empagliflozin (Jardiance) 10 mg             Patient seen and discussed with attending physician Dr. Vazquez    RTHONG in 1 mo for trulicity dose increase and 3 mo for chronic disease follow-up (chronic pain, DM, HTN)    Plan preliminary until cosigned by attending physician.    Corrina Blank M.D.  Family Medicine  PGY-2

## 2025-03-06 NOTE — PATIENT INSTRUCTIONS
Thank you for coming in today!   This is a summarized list of things we discussed today and next steps:  - Please call to schedule an appointment for Fitter me [phone]  - Please call to schedule an appointment for CT scan after 3/22/24  [phone]  -Recommend decreasing processed foods, high sugar foods, high fat foods especially fried foods, red meats and increasing consumption of vegetables, whole-grain carbohydrates that are high in fibers and white proteins (fish and chicken) . Recommend increasing day-to-day activity like walking more to aim for >6,000 steps a day and possibly scheduling 150 minutes of moderate intensity exercise per week (Moderate intensity means exercise where you can talk but you can't sing).  - When buying food from the store remember to check the sugar, fat, cholesterol, fiber which ideally would be around 5.   - Please start taking Trulicity 0.75 mg weekly  - Please read any attached handouts and medication list  - Please schedule a follow-up apt with me in 1 months. Please call my office at [phone] with any questions.

## 2025-03-07 ENCOUNTER — DOCUMENTATION (OUTPATIENT)
Facility: HOSPITAL | Age: 50
End: 2025-03-07
Payer: MEDICARE

## 2025-03-07 RX ORDER — DULAGLUTIDE 0.75 MG/.5ML
0.75 INJECTION, SOLUTION SUBCUTANEOUS
Qty: 2 ML | Refills: 1 | Status: SHIPPED | OUTPATIENT
Start: 2025-03-09

## 2025-03-07 NOTE — PROGRESS NOTES
Pt called stating provider did not send Trulicity to pharmacy as discussed during appt yesterday. Pt requesting send to pharmacy. Message sent to provider.

## 2025-03-10 DIAGNOSIS — E11.9 TYPE 2 DIABETES MELLITUS WITHOUT COMPLICATION, WITHOUT LONG-TERM CURRENT USE OF INSULIN (MULTI): ICD-10-CM

## 2025-03-10 RX ORDER — METFORMIN HYDROCHLORIDE 1000 MG/1
1000 TABLET ORAL
Qty: 30 TABLET | Refills: 11 | Status: SHIPPED | OUTPATIENT
Start: 2025-03-10 | End: 2026-03-10

## 2025-03-10 NOTE — PROGRESS NOTES
Adjusted metformin prescription based on insurance request to 30-day supply and 11 refills instead of 90-day supply, ultimately if patient tolerates Trulicity the plan is to discontinue metformin

## 2025-03-12 DIAGNOSIS — J45.909 UNSPECIFIED ASTHMA, UNCOMPLICATED (HHS-HCC): ICD-10-CM

## 2025-03-12 RX ORDER — ALBUTEROL SULFATE 0.83 MG/ML
2.5 SOLUTION RESPIRATORY (INHALATION) EVERY 4 HOURS PRN
Qty: 60 ML | Refills: 5 | Status: SHIPPED | OUTPATIENT
Start: 2025-03-12

## 2025-03-18 ENCOUNTER — HOSPITAL ENCOUNTER (OUTPATIENT)
Facility: CLINIC | Age: 50
Setting detail: OUTPATIENT SURGERY
End: 2025-03-18
Payer: MEDICARE

## 2025-03-18 ENCOUNTER — OFFICE VISIT (OUTPATIENT)
Dept: OPHTHALMOLOGY | Facility: CLINIC | Age: 50
End: 2025-03-18
Payer: MEDICARE

## 2025-03-18 ENCOUNTER — APPOINTMENT (OUTPATIENT)
Dept: OPHTHALMOLOGY | Facility: CLINIC | Age: 50
End: 2025-03-18
Payer: MEDICARE

## 2025-03-18 DIAGNOSIS — H02.59 LAXITY OF EYELID: ICD-10-CM

## 2025-03-18 DIAGNOSIS — H02.834 DERMATOCHALASIS OF BOTH UPPER EYELIDS: ICD-10-CM

## 2025-03-18 DIAGNOSIS — H02.831 DERMATOCHALASIS OF BOTH UPPER EYELIDS: ICD-10-CM

## 2025-03-18 DIAGNOSIS — H02.59 FLOPPY LID SYNDROME: Primary | ICD-10-CM

## 2025-03-18 DIAGNOSIS — H02.105 ECTROPION OF BOTH LOWER EYELIDS, UNSPECIFIED ECTROPION TYPE: ICD-10-CM

## 2025-03-18 DIAGNOSIS — H02.102 ECTROPION OF BOTH LOWER EYELIDS, UNSPECIFIED ECTROPION TYPE: ICD-10-CM

## 2025-03-18 DIAGNOSIS — H02.403 PTOSIS OF BOTH EYELIDS: ICD-10-CM

## 2025-03-18 PROCEDURE — 99213 OFFICE O/P EST LOW 20 MIN: CPT

## 2025-03-18 ASSESSMENT — ENCOUNTER SYMPTOMS
EYES NEGATIVE: 0
RESPIRATORY NEGATIVE: 0
CARDIOVASCULAR NEGATIVE: 0
NEUROLOGICAL NEGATIVE: 0
ALLERGIC/IMMUNOLOGIC NEGATIVE: 0
PSYCHIATRIC NEGATIVE: 0
ENDOCRINE NEGATIVE: 0
HEMATOLOGIC/LYMPHATIC NEGATIVE: 0
MUSCULOSKELETAL NEGATIVE: 0
CONSTITUTIONAL NEGATIVE: 0
GASTROINTESTINAL NEGATIVE: 0

## 2025-03-18 ASSESSMENT — VISUAL ACUITY
CORRECTION_TYPE: GLASSES
OD_CC+: -2
OS_CC+: -2
METHOD: SNELLEN - LINEAR
OD_CC: 20/20
OS_CC: 20/25

## 2025-03-18 ASSESSMENT — EXTERNAL EXAM - LEFT EYE: OS_EXAM: NORMAL

## 2025-03-18 ASSESSMENT — EXTERNAL EXAM - RIGHT EYE: OD_EXAM: NORMAL

## 2025-03-18 ASSESSMENT — PUNCTA - ASSESSMENT
OS_PUNCTA: NORMAL
OD_PUNCTA: NORMAL

## 2025-03-18 NOTE — PROGRESS NOTES
49yoM presenting with bilateral eye irritation and droopy eyelids   Several months of daily redness and irritation, particularly worse in the morning when waking up  Pt aware of droopy BUL but not bothered by this as much as from the daily pain and irritation  No tearing  No vision loss or double vision  Pt has known hx of ES and uses CPAP daily    Assessment/Plan  Floppy eyelid syndrome OU. Discussed findings at length. Notably, patient has known hx of ES and is already on CPAP. Discussed options including observation with medical management, wedge resection, and/or horizontal lower eyelid tightening with lateral tarsal strip. Pt has already tried maxitrol mary but reports no improvement in his ongoing symptoms of bilateral redness, irritation, FBS, and tearing, all of which are worse in the AM and consistent with FES (the symptoms are primarily due to lower eyelid malposition from laxity secondary to FES).    If correction desired, recommended a 2-staged approach (one side then the other) with wedge resection with tarsoconjunctival and skin muscle flap repair of the UPPER eyelid and LOWER eyelid horizontal tightening with lateral tarsal strip (laxity of lower eyelid is causing ectropion-like symptoms but secondary to FES). Patient understands there is no guarantee of result and recurrences are not infrequent. Understands that because of underlying FES, patient is at risk for postop would dehiscence, which may require multiple attempts at repair. Risks of infection, scarring, recurrent laxity, asymmetry, need for multiple repairs, persistent symptoms, vision loss, and double vision all explained in detail. All questions answered and patient expressed good understanding, particularly with regard to the risk of recurrence and wound dehiscence. The patient wishes to proceed with the recommended surgery and prefers for the LEFT side to be done first, followed by the RIGHT side.    Informed consent obtained and photos  taken (see Media tab or below). Case request placed with orders for PAT as well. Must avoid ASA products, NSAIDs, blood thinners because of risk of orbital hemorrhage and visual loss. Will schedule 1-week postop during checkout today.    Pt understands that the above surgeries will not correct his initial presenting complaint of droopy eyelids, and may in fact worsen that. Nonetheless, this complaint can be corrected, but I have recommended to first correct the FES-related laxity as doing so would affect the outcome of the droopy eyelid repair, which may be done after the FES repair.    No blood thinners  NKDA  No pacemaker  Has stimulator for low back pain

## 2025-04-02 ENCOUNTER — HOSPITAL ENCOUNTER (OUTPATIENT)
Dept: RADIOLOGY | Facility: HOSPITAL | Age: 50
Discharge: HOME | End: 2025-04-02
Payer: MEDICARE

## 2025-04-02 DIAGNOSIS — R91.1 PULMONARY NODULE: ICD-10-CM

## 2025-04-02 DIAGNOSIS — N13.8 BPH WITH OBSTRUCTION/LOWER URINARY TRACT SYMPTOMS: ICD-10-CM

## 2025-04-02 DIAGNOSIS — N40.1 BPH WITH OBSTRUCTION/LOWER URINARY TRACT SYMPTOMS: ICD-10-CM

## 2025-04-02 PROCEDURE — 71250 CT THORAX DX C-: CPT | Performed by: RADIOLOGY

## 2025-04-02 PROCEDURE — 71250 CT THORAX DX C-: CPT

## 2025-04-02 PROCEDURE — 76770 US EXAM ABDO BACK WALL COMP: CPT

## 2025-04-03 ENCOUNTER — TELEPHONE (OUTPATIENT)
Dept: OPHTHALMOLOGY | Facility: CLINIC | Age: 50
End: 2025-04-03
Payer: MEDICARE

## 2025-04-03 NOTE — TELEPHONE ENCOUNTER
Spoke with patient regarding procedure cancellation.  Will notify patient when we are able to secure hospital time.  Cell left with patient to facilitate coordination in light of their voicemail not setup.

## 2025-04-07 ENCOUNTER — TELEPHONE (OUTPATIENT)
Dept: OPHTHALMOLOGY | Facility: CLINIC | Age: 50
End: 2025-04-07
Payer: MEDICARE

## 2025-04-07 NOTE — TELEPHONE ENCOUNTER
Patient was called today and left voicemail and return number.  Patient was told that surgery for 15th almost certain but we needed to update the OR card and PAT office contacted to review chart to see if they needed to come in for testing.

## 2025-04-08 PROBLEM — H02.834 DERMATOCHALASIS OF BOTH UPPER EYELIDS: Status: ACTIVE | Noted: 2025-03-18

## 2025-04-08 PROBLEM — H02.831 DERMATOCHALASIS OF BOTH UPPER EYELIDS: Status: ACTIVE | Noted: 2025-03-18

## 2025-04-10 ENCOUNTER — PRE-ADMISSION TESTING (OUTPATIENT)
Dept: PREADMISSION TESTING | Facility: HOSPITAL | Age: 50
End: 2025-04-10
Payer: MEDICARE

## 2025-04-10 ENCOUNTER — TELEPHONE (OUTPATIENT)
Dept: OPHTHALMOLOGY | Facility: CLINIC | Age: 50
End: 2025-04-10

## 2025-04-10 VITALS
DIASTOLIC BLOOD PRESSURE: 77 MMHG | HEIGHT: 65 IN | TEMPERATURE: 97.2 F | WEIGHT: 282.19 LBS | SYSTOLIC BLOOD PRESSURE: 122 MMHG | BODY MASS INDEX: 47.02 KG/M2 | HEART RATE: 69 BPM | OXYGEN SATURATION: 97 % | RESPIRATION RATE: 16 BRPM

## 2025-04-10 DIAGNOSIS — I27.20 PULMONARY HTN (MULTI): ICD-10-CM

## 2025-04-10 DIAGNOSIS — I10 PRIMARY HYPERTENSION: ICD-10-CM

## 2025-04-10 DIAGNOSIS — Z01.818 ENCOUNTER FOR PREADMISSION TESTING: Primary | ICD-10-CM

## 2025-04-10 DIAGNOSIS — G47.33 OSA ON CPAP: ICD-10-CM

## 2025-04-10 PROCEDURE — 99204 OFFICE O/P NEW MOD 45 MIN: CPT | Performed by: NURSE PRACTITIONER

## 2025-04-10 ASSESSMENT — DUKE ACTIVITY SCORE INDEX (DASI)
CAN YOU TAKE CARE OF YOURSELF (EAT, DRESS, BATHE, OR USE TOILET): YES
CAN YOU HAVE SEXUAL RELATIONS: YES
CAN YOU RUN A SHORT DISTANCE: NO
CAN YOU DO MODERATE WORK AROUND THE HOUSE LIKE VACUUMING, SWEEPING FLOORS OR CARRYING GROCERIES: YES
CAN YOU DO LIGHT WORK AROUND THE HOUSE LIKE DUSTING OR WASHING DISHES: YES
TOTAL_SCORE: 18.7
CAN YOU DO YARD WORK LIKE RAKING LEAVES, WEEDING OR PUSHING A MOWER: NO
CAN YOU DO HEAVY WORK AROUND THE HOUSE LIKE SCRUBBING FLOORS OR LIFTING AND MOVING HEAVY FURNITURE: NO
CAN YOU WALK INDOORS, SUCH AS AROUND YOUR HOUSE: YES
CAN YOU PARTICIPATE IN MODERATE RECREATIONAL ACTIVITIES LIKE GOLF, BOWLING, DANCING, DOUBLES TENNIS OR THROWING A BASEBALL OR FOOTBALL: NO
CAN YOU CLIMB A FLIGHT OF STAIRS OR WALK UP A HILL: NO
DASI METS SCORE: 5
CAN YOU WALK A BLOCK OR TWO ON LEVEL GROUND: YES
CAN YOU PARTICIPATE IN STRENOUS SPORTS LIKE SWIMMING, SINGLES TENNIS, FOOTBALL, BASKETBALL, OR SKIING: NO

## 2025-04-10 NOTE — PREPROCEDURE INSTRUCTIONS
One of our staff members will call you one business day before your surgery between 12-4pm to let you know the time to arrive at the hospital. If you have not received a phone call by 2pm call [phone].     When you arrive at the hospital, go to Registration on the ground floor.   You will need a responsible adult to drive you home.     Prior to surgery date:  One (1) week prior to surgery STOP:  -Stop aspirin products. Do not take NSAIDS/ Ibuprofen, Aleve, Motrin. Okay to take Tylenol or Acetaminophen. Do not take vitamins, supplements, herbals, diet pills.   -Stop these medications:   -No alcohol for 24 hours prior to surgery.  -No smoking 24 hours prior. No Marijuana, CBD oil, or Vaping 48 hours prior to surgery.  -CLEAR LIQUID DIET DAY BEFORE.     Day of Surgery:  -Nothing to eat or drink after midnight. This includes food of any kind (including hard candy, cough drops, mints and gum, coffee).   -You can have the 13oz of Clear liquids 2hrs prior to surgery time.   -No acrylic nails or nail polish on at least one fingernail; no polish on toes for foot surgery.   -No body piercing or jewelry.   -Shower as directed. No deodorant, lotion, power, oils, perfume, make-up.   -Wear loose comfortable clothing to accommodate your bandages.     -Bring CPAP machine  -Bring as needed inhalers     Medication List            Accurate as of April 10, 2025 11:19 AM. Always use your most recent med list.                amLODIPine 10 mg tablet  Commonly known as: Norvasc  Take 1 tablet (10 mg) by mouth once daily.  Medication Adjustments for Surgery: Take/Use as prescribed     atorvastatin 80 mg tablet  Commonly known as: Lipitor  Take 1 tablet (80 mg) by mouth once daily at bedtime.  Medication Adjustments for Surgery: Take/Use as prescribed     cholecalciferol 50 mcg (2,000 units) tablet  Commonly known as: Vitamin D-3  Take 1 tablet (50 mcg) by mouth once daily.  Additional Medication Adjustments for Surgery: Take last dose  7 days before surgery     DULoxetine 30 mg DR capsule  Commonly known as: Cymbalta  Take 1 capsule (30 mg) by mouth 2 times a day. Do not crush or chew.  Medication Adjustments for Surgery: Take/Use as prescribed     empagliflozin 10 mg tablet  Commonly known as: Jardiance  Take 1 tablet (10 mg) by mouth once daily.  Additional Medication Adjustments for Surgery: Other (Comment)  Notes to patient: Stop 3 full days before surgery     loratadine 10 mg tablet  Commonly known as: Claritin  Take 1 tablet (10 mg) by mouth once daily as needed for allergies.  Medication Adjustments for Surgery: Take/Use as prescribed     metFORMIN 1,000 mg tablet  Commonly known as: Glucophage  Take 1 tablet (1,000 mg) by mouth once daily with breakfast.  Medication Adjustments for Surgery: Do Not take on the morning of surgery     montelukast 10 mg tablet  Commonly known as: Singulair  Take 1 tablet (10 mg) by mouth once daily at bedtime.  Medication Adjustments for Surgery: Take/Use as prescribed     naloxone 4 mg/0.1 mL nasal spray  Commonly known as: Narcan  Administer 1 spray (4 mg) into affected nostril(s) if needed for opioid reversal. May repeat every 2-3 minutes if needed, alternating nostrils, until medical assistance becomes available.  Medication Adjustments for Surgery: Take/Use as prescribed     neomycin-polymyxin B-dexameth 3.5 mg/g-10,000 unit/g-0.1 % ointment ophthalmic ointment  Commonly known as: Polydex  Apply to both eyes 3 times a day. Apply to both eyes 3 times daily  Medication Adjustments for Surgery: Take/Use as prescribed     * oxyCODONE-acetaminophen 7.5-325 mg tablet  Commonly known as: Percocet  Take 1 tablet by mouth every 6 hours if needed for severe pain (7 - 10). Do not fill before April 7, 2025.  Medication Adjustments for Surgery: Take/Use as prescribed     * oxyCODONE-acetaminophen 7.5-325 mg tablet  Commonly known as: Percocet  Take 1 tablet by mouth every 6 hours if needed for severe pain (7 - 10). Do  not fill before May 7, 2025.  Start taking on: May 7, 2025  Medication Adjustments for Surgery: Take/Use as prescribed     Symbicort 160-4.5 mcg/actuation inhaler  Generic drug: budesonide-formoterol  Medication Adjustments for Surgery: Take/Use as prescribed     tadalafil 20 mg tablet  Take 1 tablet (20 mg) by mouth once daily as needed for erectile dysfunction.  Additional Medication Adjustments for Surgery: Other (Comment)  Notes to patient: Hold 3 full days before surgery     tamsulosin 0.4 mg 24 hr capsule  Commonly known as: Flomax  Take 2 capsules (0.8 mg) by mouth once daily at bedtime.  Medication Adjustments for Surgery: Take/Use as prescribed     Trulicity 0.75 mg/0.5 mL pen injector  Generic drug: dulaglutide  Inject 0.75 mg under the skin 1 (one) time per week.  Medication Adjustments for Surgery: Do Not take on the morning of surgery  Additional Medication Adjustments for Surgery: Other (Comment)  Notes to patient: Taken weekly on Mondays     * Ventolin HFA 90 mcg/actuation inhaler  Generic drug: albuterol  INHALE 1 OR 2 PUFFS EVERY 4 TO 6 HOURS AS NEEDED  Medication Adjustments for Surgery: Take/Use as prescribed     * albuterol 2.5 mg /3 mL (0.083 %) nebulizer solution  Take 3 mL (2.5 mg) by nebulization every 4 hours if needed for wheezing.  Medication Adjustments for Surgery: Take/Use as prescribed           * This list has 4 medication(s) that are the same as other medications prescribed for you. Read the directions carefully, and ask your doctor or other care provider to review them with you.

## 2025-04-10 NOTE — CPM/PAT H&P
CPM/PAT Evaluation       Name: Tomasz Maxwell (Tomasz Maxwell)  /Age: 1975/49 y.o.     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      Patient reports feeling overall well, denies fever, cough or recent infection. Denies hx of stroke, seizure or blood clot.  Reports remaining otherwise physically active routinely walking but not long distance d/t chronic back pain causing some limitations, can complete ADLs independently; denies cardiac or respiratory symptoms. Past surgical hx includes hernia repair, lumbar laminectomy, spinal cord stimulator placement (2021), Right knee replacement, bilateral hip replacements, Right shoulder replacement, Left shoulder surgery and most recently cardiac cath (2024); denies past issues with anesthesia.      Followed by PCP (Corrina Blank MD/Serenity Vazquez MD (PCP) 3/6/2025  Followed by cardiology (Kwame Little)  Followed by pulmonary (Vianney NARAYAN) 2025  Followed by urology (Gurjit Moran MD) 2025  Followed by sleep medicine (Magdiel Villegas DO) 10/8/2024        Past Medical History:   Diagnosis Date    Adhesive capsulitis of shoulder 2024    Arthritis     Contusion of left hand, initial encounter 2017    Contusion of left hand, initial encounter    Crushing injury of unspecified finger(s), initial encounter 2017    Injury, crush, finger    Essential (primary) hypertension 2022    Hypertension    Hard to intubate     Hyperlipidemia, unspecified 10/03/2022    Hyperlipidemia    Hypokalemia 2017    Hypokalemia, k= 3.8  on 1/26/23    Idiopathic gout, right knee 04/24/2017    Acute idiopathic gout of right knee    Latent syphilis, unspecified as early or late 04/13/2018    Latent syphilis with positive serology    Local infection of the skin and subcutaneous tissue, unspecified 12/05/2017    Skin infection    Long term (current) use of opiate analgesic 04/24/2017    Opioid contract exists    Mastitis without abscess 04/24/2017    Mastitis, acute    Mastodynia 04/24/2017    Breast pain, right    Muscle spasm of back 04/24/2017    Paraspinal muscle spasm    Nocturia 04/24/2017    Nocturia    Other microscopic hematuria 04/24/2017    Hematuria, microscopic    Other specified postprocedural states 04/24/2017    H/O arthroscopic knee surgery    Other specified postprocedural states 04/24/2017    Status post lumbar laminectomy    Other specified symptoms and signs involving the digestive system and abdomen 09/08/2017    Umbilical discharge    Pain in left wrist 04/24/2017    Left wrist pain    Personal history of other diseases of the musculoskeletal system and connective tissue     History of arthritis    Personal history of other diseases of the nervous system and sense organs 03/07/2016    History of acute otitis media    Personal history of other endocrine, nutritional and metabolic disease     History of diabetes mellitus    Personal history of other infectious and parasitic diseases 09/10/2017    History of tinea corporis    Personal history of other infectious and parasitic diseases 09/08/2017    History of trichomoniasis    Personal history of other infectious and parasitic diseases 04/13/2018    History of trichomoniasis    Personal history of other specified conditions 04/30/2018    History of dyspnea    Personal history of other specified conditions 01/23/2018    History of epigastric pain    Personal history of other specified conditions 07/28/2017    History of urinary frequency    Personal history of other specified  conditions 10/08/2017    History of chest pain    Proteinuria, unspecified 04/24/2017    Proteinuria    Radiculopathy, lumbar region 04/24/2017    Lumbar radiculopathy    Rupture of anterior cruciate ligament of knee 02/02/2023    Spinal stenosis, site unspecified 08/19/2015    Spinal stenosis, multiple sites in spine    Type 2 diabetes mellitus     Unspecified abnormal finding in specimens from other organs, systems and tissues 04/24/2017    Abnormal laboratory test result    Unspecified asthma, uncomplicated (HHS-HCC)     Active asthma    Vertigo     ECHO 5/21/22    Vision loss        Past Surgical History:   Procedure Laterality Date    BACK SURGERY  09/26/2016    Back Surgeryx5, has spinal stimulator    CARDIAC CATHETERIZATION N/A 12/18/2024    Procedure: Right Heart Cath;  Surgeon: Helio Cruz MD;  Location: [facility] Cardiac [facility] Lab;  Service: Cardiovascular;  Laterality: N/A;    COLONOSCOPY      HERNIA REPAIR  08/26/2015    Hernia Repairx2    HIP ARTHROPLASTY      KNEE ARTHROPLASTY      KNEE SURGERY Right 03/29/2017    Knee Surgery, right TKR    LUMBAR LAMINECTOMY  08/26/2015    Laminectomy Lumbar    MR HEAD ANGIO WO IV CONTRAST  05/21/2022    MR HEAD ANGIO WO IV CONTRAST 5/21/2022 [facility] [place] [facility]    MR HEAD ANGIO WO IV CONTRAST  03/12/2017    MR HEAD ANGIO WO IV CONTRAST 3/12/2017 [facility]    MR NECK ANGIO WO IV CONTRAST  05/21/2022    MR NECK ANGIO WO IV CONTRAST 5/21/2022 [facility] [place] [facility]    MR NECK ANGIO WO IV CONTRAST  03/12/2017    MR NECK ANGIO WO IV CONTRAST 3/12/2017 [facility] [place] [facility]    OTHER SURGICAL HISTORY  05/20/2021    Abdominal surgery    OTHER SURGICAL HISTORY Bilateral 02/24/2021    Hip replacement    OTHER SURGICAL HISTORY  09/26/2016    Gastrectomy    ROTATOR CUFF REPAIR      SHOULDER SURGERY Left     left TSR       Patient Sexual activity questions deferred to the physician.    Family History   Problem Relation Name Age of Onset    Hypertension Mother      Diabetes  Mother      Asthma Mother      Deep vein thrombosis Mother      No Known Problems Father      No Known Problems Sister      Asthma Child         No Known Allergies    Prior to Admission medications    Medication Sig Start Date End Date Taking? Authorizing Provider   albuterol 2.5 mg /3 mL (0.083 %) nebulizer solution Take 3 mL (2.5 mg) by nebulization every 4 hours if needed for wheezing. 3/12/25   Vianney Gilliland, APRN-CNP   amLODIPine (Norvasc) 10 mg tablet Take 1 tablet (10 mg) by mouth once daily. 10/15/24 10/15/25  Mono Hastings MD   atorvastatin (Lipitor) 80 mg tablet Take 1 tablet (80 mg) by mouth once daily at bedtime. 10/15/24   Mono Hastings MD   cholecalciferol (Vitamin D-3) 50 MCG (2000 UT) tablet Take 1 tablet (50 mcg) by mouth once daily. 8/22/24 8/22/25  Mono Hastings MD   diclofenac sodium (Voltaren) 1 % gel Apply 4.5 inches (4 g) topically 4 times a day. 1/6/25   Corrina Blank MD   dulaglutide (Trulicity) 0.75 mg/0.5 mL pen injector Inject 0.75 mg under the skin 1 (one) time per week. 3/9/25   Corrina Blank MD   DULoxetine (Cymbalta) 30 mg DR capsule Take 1 capsule (30 mg) by mouth 2 times a day. Do not crush or chew. 10/15/24 10/15/25  Mono Hastings MD   empagliflozin (Jardiance) 10 mg Take 1 tablet (10 mg) by mouth once daily. 3/6/25 3/1/26  Corrina Blank MD   loratadine (Claritin) 10 mg tablet Take 1 tablet (10 mg) by mouth once daily as needed for allergies. 1/6/25   Corrina Blank MD   metFORMIN (Glucophage) 1,000 mg tablet Take 1 tablet (1,000 mg) by mouth once daily with breakfast. 3/10/25 3/10/26  Corrina Blank MD   montelukast (Singulair) 10 mg tablet Take 1 tablet (10 mg) by mouth once daily at bedtime. 10/15/24 10/15/25  Mono Hastings MD   naloxone (Narcan) 4 mg/0.1 mL nasal spray Administer 1 spray (4 mg) into affected nostril(s) if needed for opioid reversal. May repeat every 2-3 minutes if needed, alternating nostrils, until medical assistance becomes  available. 2/8/24   Bella Fernando MD   neomycin-polymyxin B-dexameth (Polydex) 3.5 mg/g-10,000 unit/g-0.1 % ointment ophthalmic ointment Apply to both eyes 3 times a day. Apply to both eyes 3 times daily 2/4/25   Ledy Emerson MD   oxyCODONE-acetaminophen (Percocet) 7.5-325 mg tablet Take 1 tablet by mouth every 6 hours if needed for severe pain (7 - 10). Do not fill before March 7, 2025. 3/7/25   Corrina Blank MD   oxyCODONE-acetaminophen (Percocet) 7.5-325 mg tablet Take 1 tablet by mouth every 6 hours if needed for severe pain (7 - 10). Do not fill before April 7, 2025. 4/7/25 5/7/25  Corrina Blank MD   oxyCODONE-acetaminophen (Percocet) 7.5-325 mg tablet Take 1 tablet by mouth every 6 hours if needed for severe pain (7 - 10). Do not fill before May 7, 2025. 5/7/25 6/6/25  Corrina Blank MD   Symbicort 160-4.5 mcg/actuation inhaler Inhale 2 puffs 2 times a day. Rinse mouth after use.    Ama Nguyen MD   tadalafil (Cialis) 20 mg tablet Take 1 tablet (20 mg) by mouth once daily as needed for erectile dysfunction. 2/5/25 6/5/25  Corrina Blank MD   tamsulosin (Flomax) 0.4 mg 24 hr capsule Take 2 capsules (0.8 mg) by mouth once daily at bedtime. 10/4/24 10/4/25  Mono Hastings MD   Ventolin HFA 90 mcg/actuation inhaler INHALE 1 OR 2 PUFFS EVERY 4 TO 6 HOURS AS NEEDED 1/6/25   Corrina Blank MD        A ten-point review of systems was completed and is otherwise negative except for what is mentioned in the HPI above.    Physical Exam  Vitals reviewed.   HENT:      Head: Normocephalic.   Eyes:      Pupils: Pupils are equal, round, and reactive to light.      Comments: Corrective lenses in use   Cardiovascular:      Rate and Rhythm: Normal rate and regular rhythm.      Pulses: Normal pulses.   Pulmonary:      Effort: Pulmonary effort is normal.      Breath sounds: Normal breath sounds.   Abdominal:      General: Bowel sounds are normal.   Musculoskeletal:         General: Normal range of  "motion.      Cervical back: Normal range of motion.   Skin:     General: Skin is warm and dry.   Neurological:      Mental Status: He is alert and oriented to person, place, and time.   Psychiatric:         Mood and Affect: Mood normal.          PAT AIRWAY:   Airway:     Mallampati::  II    TM distance::  >3 FB    Neck ROM::  Full  normal        Testing/Diagnostic: Corrina Blank MD/Serenity Vazquez MD (PCP) 3/6/2025; Kwame Little  (cardiology)    Vianney NARAYAN (pulmonary) 2/17/2025; Gurjit Moran MD (urology) 2/12/2025; Magdiel Villegas DO (sleep medicine) 10/8/2024    Patient Specialist/PCP: CBC, CMP, ecg    Visit Vitals  /77   Pulse 69   Temp 36.2 °C (97.2 °F)   Resp 16   Ht 1.651 m (5' 5\")   Wt 128 kg (282 lb 3 oz)   SpO2 97%   BMI 46.96 kg/m²   Smoking Status Never   BSA 2.42 m²       DASI Risk Score      Flowsheet Row Pre-Admission Testing from 4/10/2025 in [facility]   Can you take care of yourself (eat, dress, bathe, or use toilet)?  2.75 filed at 04/10/2025 1047   Can you walk indoors, such as around your house? 1.75 filed at 04/10/2025 1047   Can you walk a block or two on level ground?  2.75 filed at 04/10/2025 1047   Can you climb a flight of stairs or walk up a hill? 0 filed at 04/10/2025 1047   Can you run a short distance? 0 filed at 04/10/2025 1047   Can you do light work around the house like dusting or washing dishes? 2.7 filed at 04/10/2025 1047   Can you do moderate work around the house like vacuuming, sweeping floors or carrying groceries? 3.5 filed at 04/10/2025 1047   Can you do heavy work around the house like scrubbing floors or lifting and moving heavy furniture?  0 filed at 04/10/2025 1047   Can you do yard work like raking leaves, weeding or pushing a mower? 0 filed at 04/10/2025 1047   Can you have sexual relations? 5.25 filed at 04/10/2025 1047   Can you participate in moderate recreational activities like golf, bowling, dancing, doubles tennis or throwing " a baseball or football? 0 filed at 04/10/2025 1047   Can you participate in strenous sports like swimming, singles tennis, football, basketball, or skiing? 0 filed at 04/10/2025 1047   DASI SCORE 18.7 filed at 04/10/2025 1047   METS Score (Will be calculated only when all the questions are answered) 5 filed at 04/10/2025 1047          Caprini DVT Assessment      Flowsheet Row ED to Hosp-Admission (Discharged) from 7/5/2024 in [facility] Bldg [facility] 1 with Anna Wooten MD and Pretty Kwok MD ED from 12/6/2023 in [facility] Emergency Medicine with Luis Armando Servin MD and Lina Zapata MD   DVT Score (IF A SCORE IS NOT CALCULATING, MUST SELECT A BMI TO COMPLETE) 4 filed at 07/06/2024 0820 6 filed at 12/07/2023 0312   BMI (BMI MUST BE CHOSEN) 31-40 (Obesity) filed at 07/06/2024 0820 41-50 (Morbid obesity) filed at 12/07/2023 0312   RETIRED: History -- Prior major surgery, COPD filed at 12/07/2023 0312   RETIRED: Age 40-59 years filed at 07/06/2024 0820 40-59 years filed at 12/07/2023 0312          Modified Frailty Index    No data to display       [id number] Stroke Risk Points  Current as of just now        N/A 0 to 9 Points:      Last Change: N/A          The [id number] risk score (Lip GH, et al. 2009. © 2010 American College [employer]) quantifies the risk of stroke for a patient with atrial fibrillation. For patients without atrial fibrillation or under the age of 18 this score appears as N/A. Higher score values generally indicate higher risk of stroke.        This score is not applicable to this patient. Components are not calculated.          Revised Cardiac Risk Index    No data to display       Apfel Simplified Score    No data to display       Risk Analysis Index Results This Encounter    No data found in the last 10 encounters.       Stop Bang Score      Flowsheet Row Pre-Admission Testing from 4/10/2025 in [facility]   Do you snore  loudly? 1 filed at 04/10/2025 1048   Do you often feel tired or fatigued after your sleep? 1 filed at 04/10/2025 1048   Has anyone ever observed you stop breathing in your sleep? 1 filed at 04/10/2025 1048   Do you have or are you being treated for high blood pressure? 1 filed at 04/10/2025 1048   Recent BMI (Calculated) 47 filed at 04/10/2025 1048   Is BMI greater than 35 kg/m2? 1=Yes filed at 04/10/2025 1048   Age older than 50 years old? 1=Yes filed at 04/10/2025 1048   Is your neck circumference greater than 17 inches (Male) or 16 inches (Female)? 1 filed at 04/10/2025 1048   Gender - Male 1=Yes filed at 04/10/2025 1048   STOP-BANG Total Score 8 filed at 04/10/2025 1048          Prodigy: High Risk  Total Score: 11              Prodigy Gender Score     Prodigy Previous Opioid Use Score           ARISCAT Score for Postoperative Pulmonary Complications      Flowsheet Row Pre-Admission Testing from 4/10/2025 in [facility]   Age Calculated Score 3 filed at 04/10/2025 1139   Preoperative SpO2 0 filed at 04/10/2025 1139   Respiratory infection in the last month Either upper or lower (i.e., URI, bronchitis, pneumonia), with fever and antibiotic treatment 0 filed at 04/10/2025 1139   Preoperative anemia (Hgb less than 10 g/dl) 0 filed at 04/10/2025 1139   Surgical incision  0 filed at 04/10/2025 1139   Duration of surgery  0 filed at 04/10/2025 1139   Emergency Procedure  0 filed at 04/10/2025 1139   ARISCAT Total Score  3 filed at 04/10/2025 1139          Posadas Perioperative Risk for Myocardial Infarction or Cardiac Arrest (MURALI)      Flowsheet Row Pre-Admission Testing from 4/10/2025 in [facility]   Calculated Age Score 1.02 filed at 04/10/2025 1139   Functional Status  0 filed at 04/10/2025 1139   ASA Class  -3.29 filed at 04/10/2025 1139   Creatinine 0 filed at 04/10/2025 1139   Type of Procedure  0.71  [Eye lid surgery] filed at 04/10/2025 1139   MURALI Total Score  -6.81 filed at 04/10/2025  1139   MURALI % 0.11 filed at 04/10/2025 1139            Assessment and Plan:     # ES - CPAP compliant  # Asthma - continue inhalers; states last use of rescue inhaler 12/2024; followed by pulmonary  # Pulmonary hypertension - followed by vascular cardiology  # HTN - continue amlodipine  # HLD - continue atorvastatin  # DM I.5 managed as DM II - hold Jardiance 3 days before surgery, hold metformin day of surgery; A1c 7.1 per PCP note dated 3/6/2025  # Chronic pain syndrome r/t spinal stenosis - s/p spinal stimulator (2021), continue Percocet; followed by PCP  # BPH - continue tamsulosin  # Obesity - hold trulicity on day of surgery; diet/activity discussed/encouraged  # Floppy lid syndrome, eyelid laxity, dermatochalasis of both upper eyelids and ectropion of both lower eyelids - Left yelid reconstruction w/Dr Emerson on 4/15/2025 and possible Right eye soon after (per patient)    Ecg complete 2/7/2025 - NSR, Nonspecific T wave abnormality (78 bpm)  Medical hx, Allergies, VS and Labs reviewed  Medications addressed w/pre-op instructions provided    TTE 10/1/2024  1. Left ventricular ejection fraction is normal, calculated by Lundberg's biplane at 66%.   2. Right ventricular function is probably normal in limited views.   3. Right ventricular systolic pressure is within normal limits.   4. There is mild to moderate pulmonic valve regurgitation.   5. Normal aortic root.   6. Compared with study dated 12/7/2023, there is more pulmonic regurgitation on the current study (previously trace, now mild-moderate).    Cardiac cath 12/18/2024  1. RHC: Patient with large body habitus and thus large respiratory variation in his RHC numbers. Patient with evidence of precapillary pulmonary hypertension [58/20, mean PA 33 mmHg, PCWP between 5 and 20 with a mean of 13 mmHg] with RV failure by hemodynamics [end expiratory RVEDP of 20 and RA of 10 mmHg], and normal assumed Benigno cardiac output at 5.2 L/min [cardiac index of 2.3  L/min/mï¿½].   2. Will refer to Dr. Interiano for further workup and management of pulmonary hypertension.

## 2025-04-15 ENCOUNTER — HOSPITAL ENCOUNTER (OUTPATIENT)
Facility: HOSPITAL | Age: 50
Setting detail: OUTPATIENT SURGERY
Discharge: HOME | End: 2025-04-15
Payer: MEDICARE

## 2025-04-15 ENCOUNTER — ANESTHESIA (OUTPATIENT)
Dept: OPERATING ROOM | Facility: HOSPITAL | Age: 50
End: 2025-04-15
Payer: MEDICARE

## 2025-04-15 ENCOUNTER — ANESTHESIA EVENT (OUTPATIENT)
Dept: OPERATING ROOM | Facility: HOSPITAL | Age: 50
End: 2025-04-15
Payer: MEDICARE

## 2025-04-15 VITALS
RESPIRATION RATE: 16 BRPM | HEART RATE: 64 BPM | DIASTOLIC BLOOD PRESSURE: 71 MMHG | OXYGEN SATURATION: 96 % | SYSTOLIC BLOOD PRESSURE: 142 MMHG | TEMPERATURE: 96.8 F

## 2025-04-15 DIAGNOSIS — H02.831 DERMATOCHALASIS OF BOTH UPPER EYELIDS: ICD-10-CM

## 2025-04-15 DIAGNOSIS — H02.135 SENILE ECTROPION OF BOTH LOWER EYELIDS: ICD-10-CM

## 2025-04-15 DIAGNOSIS — H02.834 DERMATOCHALASIS OF BOTH UPPER EYELIDS: ICD-10-CM

## 2025-04-15 DIAGNOSIS — H02.132 SENILE ECTROPION OF BOTH LOWER EYELIDS: ICD-10-CM

## 2025-04-15 DIAGNOSIS — H02.59 LAXITY OF EYELID: ICD-10-CM

## 2025-04-15 DIAGNOSIS — H02.59 FLOPPY LID SYNDROME: ICD-10-CM

## 2025-04-15 DIAGNOSIS — H02.102 ECTROPION OF BOTH LOWER EYELIDS, UNSPECIFIED ECTROPION TYPE: ICD-10-CM

## 2025-04-15 DIAGNOSIS — H02.59 FLOPPY LID SYNDROME: Primary | ICD-10-CM

## 2025-04-15 DIAGNOSIS — H02.105 ECTROPION OF BOTH LOWER EYELIDS, UNSPECIFIED ECTROPION TYPE: ICD-10-CM

## 2025-04-15 LAB — GLUCOSE BLD MANUAL STRIP-MCNC: 92 MG/DL (ref 74–99)

## 2025-04-15 PROCEDURE — 2500000004 HC RX 250 GENERAL PHARMACY W/ HCPCS (ALT 636 FOR OP/ED)

## 2025-04-15 PROCEDURE — 67971 RECONSTRUCTION OF EYELID: CPT

## 2025-04-15 PROCEDURE — 2500000001 HC RX 250 WO HCPCS SELF ADMINISTERED DRUGS (ALT 637 FOR MEDICARE OP)

## 2025-04-15 PROCEDURE — 7100000001 HC RECOVERY ROOM TIME - INITIAL BASE CHARGE

## 2025-04-15 PROCEDURE — 3600000003 HC OR TIME - INITIAL BASE CHARGE - PROCEDURE LEVEL THREE

## 2025-04-15 PROCEDURE — 7100000002 HC RECOVERY ROOM TIME - EACH INCREMENTAL 1 MINUTE

## 2025-04-15 PROCEDURE — A67917 PR FIX ECTROPION,ENTENSV LID REPAIR: Performed by: ANESTHESIOLOGY

## 2025-04-15 PROCEDURE — 3700000002 HC GENERAL ANESTHESIA TIME - EACH INCREMENTAL 1 MINUTE

## 2025-04-15 PROCEDURE — 2720000007 HC OR 272 NO HCPCS

## 2025-04-15 PROCEDURE — 3700000001 HC GENERAL ANESTHESIA TIME - INITIAL BASE CHARGE

## 2025-04-15 PROCEDURE — 2500000005 HC RX 250 GENERAL PHARMACY W/O HCPCS: Performed by: ANESTHESIOLOGY

## 2025-04-15 PROCEDURE — 7100000009 HC PHASE TWO TIME - INITIAL BASE CHARGE

## 2025-04-15 PROCEDURE — 88305 TISSUE EXAM BY PATHOLOGIST: CPT | Mod: TC,PARLAB,WESLAB

## 2025-04-15 PROCEDURE — 2500000001 HC RX 250 WO HCPCS SELF ADMINISTERED DRUGS (ALT 637 FOR MEDICARE OP): Performed by: ANESTHESIOLOGY

## 2025-04-15 PROCEDURE — 67917 REPAIR EYELID DEFECT: CPT

## 2025-04-15 PROCEDURE — 3600000008 HC OR TIME - EACH INCREMENTAL 1 MINUTE - PROCEDURE LEVEL THREE

## 2025-04-15 PROCEDURE — 82947 ASSAY GLUCOSE BLOOD QUANT: CPT

## 2025-04-15 PROCEDURE — 2500000004 HC RX 250 GENERAL PHARMACY W/ HCPCS (ALT 636 FOR OP/ED): Mod: JZ | Performed by: ANESTHESIOLOGY

## 2025-04-15 PROCEDURE — A67917 PR FIX ECTROPION,ENTENSV LID REPAIR

## 2025-04-15 PROCEDURE — 2500000005 HC RX 250 GENERAL PHARMACY W/O HCPCS

## 2025-04-15 PROCEDURE — 7100000010 HC PHASE TWO TIME - EACH INCREMENTAL 1 MINUTE

## 2025-04-15 RX ORDER — NEOMYCIN SULFATE, POLYMYXIN B SULFATE, AND DEXAMETHASONE 3.5; 10000; 1 MG/G; [USP'U]/G; MG/G
OINTMENT OPHTHALMIC
Qty: 3.5 G | Refills: 1 | Status: SHIPPED | OUTPATIENT
Start: 2025-04-15 | End: 2025-04-15 | Stop reason: DRUGHIGH

## 2025-04-15 RX ORDER — ONDANSETRON HYDROCHLORIDE 2 MG/ML
4 INJECTION, SOLUTION INTRAVENOUS ONCE AS NEEDED
Status: DISCONTINUED | OUTPATIENT
Start: 2025-04-15 | End: 2025-04-15 | Stop reason: HOSPADM

## 2025-04-15 RX ORDER — HYDROMORPHONE HYDROCHLORIDE 1 MG/ML
1 INJECTION, SOLUTION INTRAMUSCULAR; INTRAVENOUS; SUBCUTANEOUS EVERY 5 MIN PRN
Status: DISCONTINUED | OUTPATIENT
Start: 2025-04-15 | End: 2025-04-15 | Stop reason: HOSPADM

## 2025-04-15 RX ORDER — NEOMYCIN SULFATE, POLYMYXIN B SULFATE, AND DEXAMETHASONE 3.5; 10000; 1 MG/G; [USP'U]/G; MG/G
OINTMENT OPHTHALMIC
Qty: 3.5 G | Refills: 1 | Status: SHIPPED | OUTPATIENT
Start: 2025-04-15

## 2025-04-15 RX ORDER — FENTANYL CITRATE 50 UG/ML
INJECTION, SOLUTION INTRAMUSCULAR; INTRAVENOUS AS NEEDED
Status: DISCONTINUED | OUTPATIENT
Start: 2025-04-15 | End: 2025-04-15

## 2025-04-15 RX ORDER — ACETAMINOPHEN 325 MG/1
650 TABLET ORAL EVERY 4 HOURS PRN
Status: DISCONTINUED | OUTPATIENT
Start: 2025-04-15 | End: 2025-04-15 | Stop reason: HOSPADM

## 2025-04-15 RX ORDER — ALBUTEROL SULFATE 90 UG/1
INHALANT RESPIRATORY (INHALATION) AS NEEDED
Status: DISCONTINUED | OUTPATIENT
Start: 2025-04-15 | End: 2025-04-15

## 2025-04-15 RX ORDER — TETRACAINE HYDROCHLORIDE 5 MG/ML
1 SOLUTION OPHTHALMIC ONCE
Status: DISCONTINUED | OUTPATIENT
Start: 2025-04-15 | End: 2025-04-15

## 2025-04-15 RX ORDER — DEXMEDETOMIDINE IN 0.9 % NACL 20 MCG/5ML
SYRINGE (ML) INTRAVENOUS AS NEEDED
Status: DISCONTINUED | OUTPATIENT
Start: 2025-04-15 | End: 2025-04-15

## 2025-04-15 RX ORDER — POVIDONE-IODINE 5 %
SOLUTION, NON-ORAL OPHTHALMIC (EYE) AS NEEDED
Status: DISCONTINUED | OUTPATIENT
Start: 2025-04-15 | End: 2025-04-15 | Stop reason: HOSPADM

## 2025-04-15 RX ORDER — GLYCOPYRROLATE 0.2 MG/ML
INJECTION INTRAMUSCULAR; INTRAVENOUS AS NEEDED
Status: DISCONTINUED | OUTPATIENT
Start: 2025-04-15 | End: 2025-04-15

## 2025-04-15 RX ORDER — PROPOFOL 10 MG/ML
INJECTION, EMULSION INTRAVENOUS AS NEEDED
Status: DISCONTINUED | OUTPATIENT
Start: 2025-04-15 | End: 2025-04-15

## 2025-04-15 RX ORDER — LABETALOL HYDROCHLORIDE 5 MG/ML
10 INJECTION, SOLUTION INTRAVENOUS ONCE AS NEEDED
Status: DISCONTINUED | OUTPATIENT
Start: 2025-04-15 | End: 2025-04-15 | Stop reason: HOSPADM

## 2025-04-15 RX ORDER — LIDOCAINE HCL/PF 100 MG/5ML
SYRINGE (ML) INTRAVENOUS AS NEEDED
Status: DISCONTINUED | OUTPATIENT
Start: 2025-04-15 | End: 2025-04-15

## 2025-04-15 RX ORDER — SUCCINYLCHOLINE CHLORIDE 20 MG/ML INJECTION SOLUTION
SOLUTION AS NEEDED
Status: DISCONTINUED | OUTPATIENT
Start: 2025-04-15 | End: 2025-04-15

## 2025-04-15 RX ORDER — TRANEXAMIC ACID 100 MG/ML
50 INJECTION, SOLUTION INTRAVENOUS ONCE
Status: DISCONTINUED | OUTPATIENT
Start: 2025-04-15 | End: 2025-04-15 | Stop reason: HOSPADM

## 2025-04-15 RX ORDER — ONDANSETRON HYDROCHLORIDE 2 MG/ML
INJECTION, SOLUTION INTRAVENOUS AS NEEDED
Status: DISCONTINUED | OUTPATIENT
Start: 2025-04-15 | End: 2025-04-15

## 2025-04-15 RX ORDER — TETRACAINE HYDROCHLORIDE 5 MG/ML
1 SOLUTION OPHTHALMIC ONCE
Status: DISCONTINUED | OUTPATIENT
Start: 2025-04-15 | End: 2025-04-15 | Stop reason: HOSPADM

## 2025-04-15 RX ORDER — MEPERIDINE HYDROCHLORIDE 50 MG/ML
12.5 INJECTION INTRAMUSCULAR; INTRAVENOUS; SUBCUTANEOUS EVERY 10 MIN PRN
Status: DISCONTINUED | OUTPATIENT
Start: 2025-04-15 | End: 2025-04-15 | Stop reason: HOSPADM

## 2025-04-15 RX ORDER — MIDAZOLAM HYDROCHLORIDE 1 MG/ML
INJECTION, SOLUTION INTRAMUSCULAR; INTRAVENOUS AS NEEDED
Status: DISCONTINUED | OUTPATIENT
Start: 2025-04-15 | End: 2025-04-15

## 2025-04-15 RX ORDER — TRANEXAMIC ACID 100 MG/ML
100 INJECTION, SOLUTION INTRAVENOUS ONCE
Status: CANCELLED | OUTPATIENT
Start: 2025-04-15 | End: 2025-04-15

## 2025-04-15 RX ORDER — WATER 1 ML/ML
INJECTION IRRIGATION AS NEEDED
Status: DISCONTINUED | OUTPATIENT
Start: 2025-04-15 | End: 2025-04-15 | Stop reason: HOSPADM

## 2025-04-15 RX ORDER — NEOMYCIN SULFATE, POLYMYXIN B SULFATE, AND DEXAMETHASONE 3.5; 10000; 1 MG/G; [USP'U]/G; MG/G
1 OINTMENT OPHTHALMIC ONCE
Status: DISCONTINUED | OUTPATIENT
Start: 2025-04-15 | End: 2025-04-15 | Stop reason: HOSPADM

## 2025-04-15 RX ORDER — HYDRALAZINE HYDROCHLORIDE 20 MG/ML
10 INJECTION INTRAMUSCULAR; INTRAVENOUS EVERY 30 MIN PRN
Status: DISCONTINUED | OUTPATIENT
Start: 2025-04-15 | End: 2025-04-15 | Stop reason: HOSPADM

## 2025-04-15 RX ORDER — LIDOCAINE HYDROCHLORIDE AND EPINEPHRINE 10; 20 UG/ML; MG/ML
INJECTION, SOLUTION INFILTRATION; PERINEURAL AS NEEDED
Status: DISCONTINUED | OUTPATIENT
Start: 2025-04-15 | End: 2025-04-15 | Stop reason: HOSPADM

## 2025-04-15 RX ORDER — DIPHENHYDRAMINE HYDROCHLORIDE 50 MG/ML
12.5 INJECTION, SOLUTION INTRAMUSCULAR; INTRAVENOUS ONCE AS NEEDED
Status: DISCONTINUED | OUTPATIENT
Start: 2025-04-15 | End: 2025-04-15 | Stop reason: HOSPADM

## 2025-04-15 RX ORDER — SODIUM CHLORIDE, SODIUM LACTATE, POTASSIUM CHLORIDE, CALCIUM CHLORIDE 600; 310; 30; 20 MG/100ML; MG/100ML; MG/100ML; MG/100ML
100 INJECTION, SOLUTION INTRAVENOUS CONTINUOUS
Status: DISCONTINUED | OUTPATIENT
Start: 2025-04-15 | End: 2025-04-15 | Stop reason: HOSPADM

## 2025-04-15 RX ADMIN — DEXAMETHASONE SODIUM PHOSPHATE 4 MG: 4 INJECTION, SOLUTION INTRAMUSCULAR; INTRAVENOUS at 14:18

## 2025-04-15 RX ADMIN — MIDAZOLAM 2 MG: 1 INJECTION INTRAMUSCULAR; INTRAVENOUS at 14:01

## 2025-04-15 RX ADMIN — PROPOFOL 200 MG: 10 INJECTION, EMULSION INTRAVENOUS at 14:05

## 2025-04-15 RX ADMIN — ONDANSETRON 4 MG: 2 INJECTION INTRAMUSCULAR; INTRAVENOUS at 15:23

## 2025-04-15 RX ADMIN — HYDROMORPHONE HYDROCHLORIDE 1 MG: 1 INJECTION, SOLUTION INTRAMUSCULAR; INTRAVENOUS; SUBCUTANEOUS at 16:05

## 2025-04-15 RX ADMIN — SODIUM CHLORIDE, POTASSIUM CHLORIDE, SODIUM LACTATE AND CALCIUM CHLORIDE: 600; 310; 30; 20 INJECTION, SOLUTION INTRAVENOUS at 14:00

## 2025-04-15 RX ADMIN — HYDROMORPHONE HYDROCHLORIDE 1 MG: 1 INJECTION, SOLUTION INTRAMUSCULAR; INTRAVENOUS; SUBCUTANEOUS at 16:31

## 2025-04-15 RX ADMIN — Medication 140 MG: at 14:13

## 2025-04-15 RX ADMIN — GLYCOPYRROLATE 0.1 MG: 0.2 INJECTION, SOLUTION INTRAMUSCULAR; INTRAVENOUS at 14:18

## 2025-04-15 RX ADMIN — Medication 8 MCG: at 14:20

## 2025-04-15 RX ADMIN — ACETAMINOPHEN 650 MG: 325 TABLET, FILM COATED ORAL at 17:16

## 2025-04-15 RX ADMIN — Medication 6 L/MIN: at 16:00

## 2025-04-15 RX ADMIN — FENTANYL CITRATE 50 MCG: 50 INJECTION, SOLUTION INTRAMUSCULAR; INTRAVENOUS at 15:04

## 2025-04-15 RX ADMIN — FENTANYL CITRATE 50 MCG: 50 INJECTION, SOLUTION INTRAMUSCULAR; INTRAVENOUS at 14:09

## 2025-04-15 RX ADMIN — LIDOCAINE HYDROCHLORIDE 40 MG: 20 INJECTION, SOLUTION INTRAVENOUS at 14:05

## 2025-04-15 RX ADMIN — FENTANYL CITRATE 50 MCG: 50 INJECTION, SOLUTION INTRAMUSCULAR; INTRAVENOUS at 14:05

## 2025-04-15 RX ADMIN — Medication 8 MCG: at 15:05

## 2025-04-15 RX ADMIN — ALBUTEROL SULFATE 8 PUFF: 90 AEROSOL, METERED RESPIRATORY (INHALATION) at 14:18

## 2025-04-15 SDOH — HEALTH STABILITY: MENTAL HEALTH: CURRENT SMOKER: 0

## 2025-04-15 ASSESSMENT — ENCOUNTER SYMPTOMS
CARDIOVASCULAR NEGATIVE: 1
CONSTITUTIONAL NEGATIVE: 1
RESPIRATORY NEGATIVE: 1

## 2025-04-15 ASSESSMENT — PAIN SCALES - GENERAL
PAINLEVEL_OUTOF10: 8
PAINLEVEL_OUTOF10: 5 - MODERATE PAIN
PAINLEVEL_OUTOF10: 0 - NO PAIN
PAINLEVEL_OUTOF10: 7

## 2025-04-15 ASSESSMENT — PAIN - FUNCTIONAL ASSESSMENT
PAIN_FUNCTIONAL_ASSESSMENT: 0-10

## 2025-04-15 ASSESSMENT — PAIN DESCRIPTION - DESCRIPTORS
DESCRIPTORS: ACHING;BURNING
DESCRIPTORS: ACHING;BURNING
DESCRIPTORS: BURNING

## 2025-04-15 ASSESSMENT — COLUMBIA-SUICIDE SEVERITY RATING SCALE - C-SSRS
2. HAVE YOU ACTUALLY HAD ANY THOUGHTS OF KILLING YOURSELF?: NO
1. IN THE PAST MONTH, HAVE YOU WISHED YOU WERE DEAD OR WISHED YOU COULD GO TO SLEEP AND NOT WAKE UP?: NO

## 2025-04-15 ASSESSMENT — PAIN DESCRIPTION - ORIENTATION: ORIENTATION: LEFT

## 2025-04-15 ASSESSMENT — PAIN DESCRIPTION - LOCATION: LOCATION: EYE

## 2025-04-15 NOTE — BRIEF OP NOTE
Date: 4/15/2025  OR Location: [facility] OR    Name: Tomasz Maxwell, : 1975, Age: 49 y.o., MRN: [id number], Sex: male    Diagnosis  Pre-op Diagnosis      * Floppy lid syndrome [H02.59]     * Laxity of eyelid [H02.59]     * Dermatochalasis of both upper eyelids [H02.831, H02.834]     * Ectropion of both lower eyelids, unspecified ectropion type [H02.102, H02.105] Post-op Diagnosis     * Floppy lid syndrome [H02.59]     * Laxity of eyelid [H02.59]     * Dermatochalasis of both upper eyelids [H02.831, H02.834]     * Ectropion of both lower eyelids, unspecified ectropion type [H02.102, H02.105]     Procedures  LEFT EYELID RECONSTRUCTION  14717 - IN RCNSTJ EYELID FULL THICKNESS <TWO-THIRDS 1 STG    LEFT EYELID RECONSTRUCTION  00222 - IN REPAIR ECTROPION EXTENSIVE      Surgeons      * Ledy Emerson - Primary    Resident/Fellow/Other Assistant:  Surgeons and Role:  * No surgeons found with a matching role *    Staff:   Circulator: Aneta  Scrub Person: Vy Sanders Scrub: Mary    Anesthesia Staff: Anesthesiologist: Ronnie Brewster MD  CRNA: SYLVAIN Song-CRNA    Procedure Summary  Anesthesia: General  ASA: III  Estimated Blood Loss: <5 mL  Intra-op Medications:   Administrations occurring from 1315 to 1445 on 04/15/25:   Medication Name Total Dose   lidocaine-epinephrine (Xylocaine W/EPI) 2 %-1:100,000 injection 5 mL   povidone-iodine 5 % ophthalmic solution 1 Application   sterile water irrigation solution 500 mL   albuterol (Proventil, Ventolin, ProAir) inhaler 108 (90 BASE) mcg/act 8 puff   dexAMETHasone (Decadron) injection 4 mg/mL 4 mg   dexmedeTOMidine 4 mcg/mL in NS 5 mL syringe 8 mcg   fentaNYL (Sublimaze) injection 50 mcg/mL 100 mcg   glycopyrrolate (Robinul) injection 0.1 mg   LR bolus Cannot be calculated   lidocaine (cardiac) injection 2% prefilled syringe 40 mg   midazolam (Versed) injection 1 mg/mL 2 mg   propofol (Diprivan) injection 10 mg/mL 200 mg   succinylcholine chloride injection syringe 200  mg/10 ml 140 mg              Anesthesia Record               Intraprocedure I/O Totals          Intake    LR bolus 900.00 mL    Total Intake 900 mL          Specimen:   ID Type Source Tests Collected by Time   1 : Left upper eye lid Tissue SOFT TISSUE RESECTION SURGICAL PATHOLOGY EXAM Ledy Emerson MD 4/15/2025 1155                  Findings: Floppy left upper eyelid. Ectropion and Laxity of   Left lower eyelid.  Complications:  None; patient tolerated the procedure well.     Disposition: PACU - hemodynamically stable.  Condition: stable  Specimens Collected:   ID Type Source Tests Collected by Time   1 : Left upper eye lid Tissue SOFT TISSUE RESECTION SURGICAL PATHOLOGY EXAM Ledy Emerson MD 4/15/2025 1429     Attending Attestation:     Ledy Emerson  Phone Number: [phone]

## 2025-04-15 NOTE — ANESTHESIA PREPROCEDURE EVALUATION
Patient: Tomasz Maxwell    Procedure Information       Date/Time: 04/15/25 1315    Procedure: LEFT EYELID RECONSTRUCTION (Left)    Location: [facility] OR 02 / [facility]    Surgeons: Ledy Emerson MD            Relevant Problems   Anesthesia (within normal limits)      Cardiac   (+) Hyperlipidemia   (+) Hypertension      Pulmonary   (+) Moderate asthma (HHS-HCC)   (+) ES (obstructive sleep apnea)   (+) Pulmonary hypertension (Multi)      Neuro   (+) Depression   (+) Tarsal tunnel syndrome      Musculoskeletal   (+) Chronic low back pain   (+) Osteoarthritis   (+) Primary osteoarthritis of left hip   (+) Primary osteoarthritis of right hip   (+) Spinal stenosis      HEENT   (+) Hearing loss, functional      ID   (+) Latent syphilis with positive serology       Clinical information reviewed:   Tobacco  Allergies  Meds   Med Hx  Surg Hx   Fam Hx          NPO Detail:  NPO/Void Status  Date of Last Liquid: 04/10/25  Time of Last Liquid: 0600  Date of Last Solid: 04/13/25  Time of Last Solid: 1830  Last Intake Type: Clear fluids  Time of Last Void: 1135         Physical Exam    Airway  Mallampati: III  TM distance: >3 FB  Neck ROM: full  Mouth opening: 3 or more finger widths     Cardiovascular - normal exam  Rhythm: regular  Rate: normal     Dental - normal exam     Pulmonary - normal examBreath sounds clear to auscultation     Abdominal            Anesthesia Plan    History of general anesthesia?: yes  History of complications of general anesthesia?: no    ASA 3     general     The patient is not a current smoker.  Education provided regarding risk of obstructive sleep apnea.  intravenous induction   Postoperative pain plan includes opioids.  Trial extubation is planned.  Anesthetic plan and risks discussed with patient.  Use of blood products discussed with patient who.    Plan discussed with CRNA.

## 2025-04-15 NOTE — ANESTHESIA PROCEDURE NOTES
Airway  Date/Time: 4/15/2025 2:07 PM  Urgency: elective    Airway not difficult    Staffing  Performed: CRNA   Authorized by: Ronnie Brewster MD    Performed by: SYLVAIN Song-CRNA  Patient location during procedure: OR    Indications and Patient Condition  Indications for airway management: anesthesia  Spontaneous Ventilation: absent  Sedation level: deep  Preoxygenated: yes  Patient position: sniffing  Mask difficulty assessment: 0 - not attempted  Planned trial extubation    Final Airway Details  Final airway type: endotracheal airway      Successful airway: ETT  Cuffed: yes   Successful intubation technique: video laryngoscopy (Babcock X3)  Facilitating devices/methods: intubating stylet  Endotracheal tube insertion site: oral  Blade: Nicholas  Blade size: #3 (x3)  ETT size (mm): 8.0  Cormack-Lehane Classification: grade I - full view of glottis  Placement verified by: chest auscultation, capnometry and palpation of cuff   Cuff volume (mL): 8  Measured from: lips  ETT to lips (cm): 23  Number of attempts at approach: 1  Ventilation between attempts: none  Number of other approaches attempted: 0    Additional Comments  Initially, easy insertion of I-Gel size 5. However, unable to obtain adequate tidal volumes. Decision made to pull LMA and intubate. Succinycholine used, no BMV. Easy intubation with Babcock X3, grade 1 view. Atraumatic attempt x1.

## 2025-04-15 NOTE — H&P
History Of Present Illness  Tomasz Maxwell is a 49 y.o. male presenting with floppy eyelid syndrome who presents for left upper eyelid wedge resection and lateral tarsal strip of the left lower eyelid.     Past Medical History  Past Medical History:   Diagnosis Date    Adhesive capsulitis of shoulder 01/29/2024    Arthritis     Contusion of left hand, initial encounter 04/24/2017    Contusion of left hand, initial encounter    Crushing injury of unspecified finger(s), initial encounter 04/24/2017    Injury, crush, finger    Essential (primary) hypertension 05/16/2022    Hypertension    Hard to intubate     Hyperlipidemia, unspecified 10/03/2022    Hyperlipidemia    Hypokalemia 04/24/2017    Hypokalemia, k= 3.8 on 1/26/23    Idiopathic gout, right knee 04/24/2017    Acute idiopathic gout of right knee    Latent syphilis, unspecified as early or late 04/13/2018    Latent syphilis with positive serology    Local infection of the skin and subcutaneous tissue, unspecified 12/05/2017    Skin infection    Long term (current) use of opiate analgesic 04/24/2017    Opioid contract exists    Mastitis without abscess 04/24/2017    Mastitis, acute    Mastodynia 04/24/2017    Breast pain, right    Muscle spasm of back 04/24/2017    Paraspinal muscle spasm    Nocturia 04/24/2017    Nocturia    Other microscopic hematuria 04/24/2017    Hematuria, microscopic    Other specified postprocedural states 04/24/2017    H/O arthroscopic knee surgery    Other specified postprocedural states 04/24/2017    Status post lumbar laminectomy    Other specified symptoms and signs involving the digestive system and abdomen 09/08/2017    Umbilical discharge    Pain in left wrist 04/24/2017    Left wrist pain    Personal history of other diseases of the musculoskeletal system and connective tissue     History of arthritis    Personal history of other diseases of the nervous system and sense organs 03/07/2016    History of acute otitis media     Personal history of other endocrine, nutritional and metabolic disease     History of diabetes mellitus    Personal history of other infectious and parasitic diseases 09/10/2017    History of tinea corporis    Personal history of other infectious and parasitic diseases 09/08/2017    History of trichomoniasis    Personal history of other infectious and parasitic diseases 04/13/2018    History of trichomoniasis    Personal history of other specified conditions 04/30/2018    History of dyspnea    Personal history of other specified conditions 01/23/2018    History of epigastric pain    Personal history of other specified conditions 07/28/2017    History of urinary frequency    Personal history of other specified conditions 10/08/2017    History of chest pain    Proteinuria, unspecified 04/24/2017    Proteinuria    Radiculopathy, lumbar region 04/24/2017    Lumbar radiculopathy    Rupture of anterior cruciate ligament of knee 02/02/2023    Spinal stenosis, site unspecified 08/19/2015    Spinal stenosis, multiple sites in spine    Type 2 diabetes mellitus     Unspecified abnormal finding in specimens from other organs, systems and tissues 04/24/2017    Abnormal laboratory test result    Unspecified asthma, uncomplicated (HHS-HCC)     Active asthma    Vertigo     ECHO 5/21/22    Vision loss        Surgical History  Past Surgical History:   Procedure Laterality Date    BACK SURGERY  09/26/2016    Back Surgeryx5, has spinal stimulator    CARDIAC CATHETERIZATION N/A 12/18/2024    Procedure: Right Heart Cath;  Surgeon: Helio Cruz MD;  Location: [facility];  Service: Cardiovascular;  Laterality: N/A;    COLONOSCOPY      HERNIA REPAIR  08/26/2015    Hernia Repairx2    HIP ARTHROPLASTY      KNEE ARTHROPLASTY      KNEE SURGERY Right 03/29/2017    Knee Surgery, right TKR    LUMBAR LAMINECTOMY  08/26/2015    Laminectomy Lumbar    MR HEAD ANGIO WO IV CONTRAST  05/21/2022    [place] ANGIO [place] [facility] [place] [place]    MR HEAD ANGIO WO IV CONTRAST  03/12/2017    MR HEAD ANGIO WO IV CONTRAST 3/12/2017 [facility] [place] [facility]GENCY LEGACY    MR NECK ANGIO WO IV CONTRAST  05/21/2022    MR NECK ANGIO WO IV CONTRAST 5/21/2022 [facility] [place] EMERGENCY LEGACY    MR NECK ANGIO WO IV CONTRAST  03/12/2017    MR NECK ANGIO WO IV CONTRAST 3/12/2017 [facility] [place] EMERGENCY LEGACY    OTHER SURGICAL HISTORY  05/20/2021    Abdominal surgery    OTHER SURGICAL HISTORY Bilateral 02/24/2021    Hip replacement    OTHER SURGICAL HISTORY  09/26/2016    Gastrectomy    ROTATOR CUFF REPAIR      SHOULDER SURGERY Left     left TSR        Social History  He reports that he has never smoked. He has never used smokeless tobacco. He reports that he does not drink alcohol and does not use drugs.    Family History  Family History   Problem Relation Name Age of Onset    Hypertension Mother      Diabetes Mother      Asthma Mother      Deep vein thrombosis Mother      No Known Problems Father      No Known Problems Sister      Asthma Child          Allergies  Patient has no known allergies.    Current Facility-Administered Medications on File Prior to Encounter   Medication Dose Route Frequency Provider Last Rate Last Admin    lidocaine PF (Xylocaine) 10 mg/mL (1 %) injection 40 mg  4 mL subcutaneous Once Devon Claros MD         Current Outpatient Medications on File Prior to Encounter   Medication Sig Dispense Refill    albuterol 2.5 mg /3 mL (0.083 %) nebulizer solution Take 3 mL (2.5 mg) by nebulization every 4 hours if needed for wheezing. 60 mL 5    amLODIPine (Norvasc) 10 mg tablet Take 1 tablet (10 mg) by mouth once daily. 90 tablet 3    cholecalciferol (Vitamin D-3) 50 MCG (2000 UT) tablet Take 1 tablet (50 mcg) by mouth once daily. 90 tablet 3    atorvastatin (Lipitor) 80 mg tablet Take 1 tablet (80 mg) by mouth once daily at bedtime. 90 tablet 3    dulaglutide (Trulicity) 0.75 mg/0.5 mL pen injector Inject 0.75 mg under the skin 1 (one) time per week. 2 mL 1     DULoxetine (Cymbalta) 30 mg DR capsule Take 1 capsule (30 mg) by mouth 2 times a day. Do not crush or chew. 60 capsule 11    empagliflozin (Jardiance) 10 mg Take 1 tablet (10 mg) by mouth once daily. 90 tablet 3    loratadine (Claritin) 10 mg tablet Take 1 tablet (10 mg) by mouth once daily as needed for allergies. 30 tablet 2    metFORMIN (Glucophage) 1,000 mg tablet Take 1 tablet (1,000 mg) by mouth once daily with breakfast. 30 tablet 11    montelukast (Singulair) 10 mg tablet Take 1 tablet (10 mg) by mouth once daily at bedtime. 90 tablet 3    naloxone (Narcan) 4 mg/0.1 mL nasal spray Administer 1 spray (4 mg) into affected nostril(s) if needed for opioid reversal. May repeat every 2-3 minutes if needed, alternating nostrils, until medical assistance becomes available. 2 each 0    neomycin-polymyxin B-dexameth (Polydex) 3.5 mg/g-10,000 unit/g-0.1 % ointment ophthalmic ointment Apply to both eyes 3 times a day. Apply to both eyes 3 times daily 3.5 g 0    oxyCODONE-acetaminophen (Percocet) 7.5-325 mg tablet Take 1 tablet by mouth every 6 hours if needed for severe pain (7 - 10). Do not fill before April 7, 2025. 90 tablet 0    [START ON 5/7/2025] oxyCODONE-acetaminophen (Percocet) 7.5-325 mg tablet Take 1 tablet by mouth every 6 hours if needed for severe pain (7 - 10). Do not fill before May 7, 2025. 90 tablet 0    Symbicort 160-4.5 mcg/actuation inhaler Inhale 2 puffs 2 times a day. Rinse mouth after use.      tadalafil (Cialis) 20 mg tablet Take 1 tablet (20 mg) by mouth once daily as needed for erectile dysfunction. 10 tablet 11    tamsulosin (Flomax) 0.4 mg 24 hr capsule Take 2 capsules (0.8 mg) by mouth once daily at bedtime. 60 capsule 11    Ventolin HFA 90 mcg/actuation inhaler INHALE 1 OR 2 PUFFS EVERY 4 TO 6 HOURS AS NEEDED 18 g 11    [DISCONTINUED] diclofenac sodium (Voltaren) 1 % gel Apply 4.5 inches (4 g) topically 4 times a day. 450 g 1    [DISCONTINUED] oxyCODONE-acetaminophen (Percocet) 7.5-325 mg  tablet Take 1 tablet by mouth every 6 hours if needed for severe pain (7 - 10). Do not fill before March 7, 2025. 90 tablet 0       Review of Systems   Constitutional: Negative.    Respiratory: Negative.     Cardiovascular: Negative.         Physical Exam  Constitutional:       Appearance: Normal appearance.   HENT:      Head: Normocephalic and atraumatic.   Cardiovascular:      Rate and Rhythm: Normal rate and regular rhythm.   Pulmonary:      Effort: Pulmonary effort is normal.      Breath sounds: Normal breath sounds.   Neurological:      Mental Status: He is alert.          Last Recorded Vitals  There were no vitals taken for this visit.    Relevant Results             Assessment/Plan   Assessment & Plan  Dermatochalasis of both upper eyelids    Floppy lid syndrome    Laxity of eyelid    Ectropion of both lower eyelids      Tomasz Maxwell is a 49 y.o. male presenting with floppy eyelid syndrome who presents for left upper eyelid wedge resection and lateral tarsal strip of the left lower eyelid.           Samantha Rowley MD

## 2025-04-15 NOTE — ANESTHESIA POSTPROCEDURE EVALUATION
Patient: Tomasz Maxwell    Procedure Summary       Date: 04/15/25 Room / Location: [facility] OR 02 / Virtual [facility] OR    Anesthesia Start: 1400 Anesthesia Stop:     Procedure: LEFT EYELID RECONSTRUCTION (Left: Face) Diagnosis:       Floppy lid syndrome      Laxity of eyelid      Dermatochalasis of both upper eyelids      Ectropion of both lower eyelids, unspecified ectropion type      (Floppy lid syndrome [H02.59])      (Laxity of eyelid [H02.59])      (Dermatochalasis of both upper eyelids [H02.831, H02.834])      (Ectropion of both lower eyelids, unspecified ectropion type [H02.102, H02.105])    Surgeons: Ledy Emerson MD Responsible Provider: Ronnie Brewster MD    Anesthesia Type: general ASA Status: 3            Anesthesia Type: general    Vitals Value Taken Time   /73 04/15/25 1545   Temp 36 °C (96.8 °F) 04/15/25 1544   Pulse 84 04/15/25 1545   Resp 16 04/15/25 1544   SpO2 100 % 04/15/25 1545   Vitals shown include unfiled device data.    Anesthesia Post Evaluation    Patient location during evaluation: PACU  Patient participation: complete - patient participated  Level of consciousness: awake and alert  Pain management: adequate  Airway patency: patent  Cardiovascular status: acceptable  Respiratory status: acceptable  Hydration status: acceptable  Postoperative Nausea and Vomiting: none  Comments: This visit easy intubation with Babcock X3.      There were no known notable events for this encounter.

## 2025-04-16 NOTE — OP NOTE
LEFT EYELID RECONSTRUCTION (L) Operative Note     Date: 4/15/2025  OR Location: [facility] OR    Name: Tomasz Maxwell, : 1975, Age: 49 y.o., MRN: [id number], Sex: male    Diagnosis  Pre-op Diagnosis      * Floppy lid syndrome [H02.59]     * Laxity of eyelid [H02.59]     * Dermatochalasis of both upper eyelids [H02.831, H02.834]     * Ectropion of both lower eyelids, unspecified ectropion type [H02.102, H02.105] Post-op Diagnosis     * Floppy lid syndrome [H02.59]     * Laxity of eyelid [H02.59]     * Dermatochalasis of both upper eyelids [H02.831, H02.834]     * Ectropion of both lower eyelids, unspecified ectropion type [H02.102, H02.105]     Procedures  LEFT EYELID RECONSTRUCTION  27833 - [place] RCNSTJ EYELID FULL THICKNESS <TWO-THIRDS 1 STG    LEFT EYELID RECONSTRUCTION  02173 - [place] REPAIR ECTROPION EXTENSIVE      Surgeons      * Ledy Emerson - Primary    Resident/Fellow/Other Assistant:  Surgeons and Role:  * No surgeons found with a matching role *    Staff:   Circulator: Aneta Harveyub Person: Vy Harveyub: Mary    Anesthesia Staff: Anesthesiologist: Ronnie Brewster MD  CRNA: SYLVAIN Song-CRNA    Procedure Summary  Anesthesia: General  ASA: III  Estimated Blood Loss: 10mL  Intra-op Medications:   Administrations occurring from 1315 to 1445 on 04/15/25:   Medication Name Total Dose   lidocaine-epinephrine (Xylocaine W/EPI) 2 %-1:100,000 injection 5 mL   povidone-iodine 5 % ophthalmic solution 1 Application   sterile water irrigation solution 500 mL   albuterol (Proventil, Ventolin, ProAir) inhaler 108 (90 BASE) mcg/act 8 puff   dexAMETHasone (Decadron) injection 4 mg/mL 4 mg   dexmedeTOMidine 4 mcg/mL in NS 5 mL syringe 8 mcg   fentaNYL (Sublimaze) injection 50 mcg/mL 100 mcg   glycopyrrolate (Robinul) injection 0.1 mg   LR bolus Cannot be calculated   lidocaine (cardiac) injection 2% prefilled syringe 40 mg   midazolam (Versed) injection 1 mg/mL 2 mg   propofol (Diprivan) injection 10 mg/mL 200 mg    succinylcholine chloride injection syringe 200 mg/10 ml 140 mg              Anesthesia Record               Intraprocedure I/O Totals          Intake    LR bolus 900.00 mL    Total Intake 900 mL          Specimen:   ID Type Source Tests Collected by Time   1 : Left upper eye lid Tissue SOFT TISSUE RESECTION SURGICAL PATHOLOGY EXAM Ledy Emerson MD 4/15/2025 1426        Drains and/or Catheters: * None in log *  Specimens: Left upper eyelid wedge resection  Findings: extensive laxity and rubbery texture of upper and lower eyelids; easy eversion    Indications: Tomasz Maxwell is an 49 y.o. male who is having surgery for Floppy lid syndrome [H02.59]  Laxity of eyelid [H02.59]  Dermatochalasis of both upper eyelids [H02.831, H02.834]  Ectropion of both lower eyelids, unspecified ectropion type [H02.102, H02.105].     The patient was seen in the preoperative area. The risks, benefits, complications, treatment options, non-operative alternatives, expected recovery and outcomes were discussed with the patient. The possibilities of reaction to medication, pulmonary aspiration, injury to surrounding structures, bleeding, recurrent infection, the need for additional procedures, failure to diagnose a condition, and creating a complication requiring transfusion or operation were discussed with the patient. The patient concurred with the proposed plan, giving informed consent.  The site of surgery was properly noted/marked if necessary per policy. The patient has been actively warmed in preoperative area. Preoperative antibiotics are not indicated. Venous thrombosis prophylaxis are not indicated.    Procedure Details:   The patient was brought to the operative suite and identified as the correct patient and the correct site of surgery was confirmed. Proparacaine drops were placed in both eyes. The patient was placed in a supine position. Following initiation of general anesthesia with intubation, the patient was prepped and  draped in the usual sterile fashion. A scleral shell was placed over the eye. A proposed vertical resection line was drawn using a marking pen at the junction of the medial two-thirds and lateral one-third of the eyelid. An injection of an 8:1 solution of 1% lidocaine with epinephrine 1:100,000 to tranexamic acid (100mg/mL) was given in the left upper lid and superior cul-de-sac, as well as in the lateral canthal angle and left lower eyelid.     A vertical incision was made through the line using a #15 Bard Yung blade through the entire vertical length of the tarsus. Two toothed forceps were used to overlap excess eyelid from medial to lateral and this was marked with a skin marking pen. A second vertical incision was made along the second line using a #15 Bard Yung blade through the entire length of the tarsus. The wedge resection was completed with Dagoberto scissors and the tissue was sent in formalin. Hemostasis was achieved with bipolar cautery.      A tarsoconjunctival flap was then performed from medial to lateral and secured over the eyelid defect using 5-0 vicryl suture in a lamellar fashion along the entire length of the tarsus. Care was taken to achieve good reapproximation of the eyelid margin. A 5-0 vicryl suture was placed in a vertical mattress fashion through the meibomian gland orifices to approximate and tati the eyelid margin. A second 5-0 vicryl suture was then place in an interrupted fashion through the grey line to re-approximate the eyelash line.  Orbicularis was then advanced over the tarsoconjunctival flap and secured into position using 5-0 vicryl suture in a buried interrupted fashion. The skin was closed using 5-0 plain gut and 6-0 prolene sutures in interrupted fashion.      Attention was then directed to the ectropion repair of the left lower eyelid. A lateral canthotomy and inferior cantholysis was performed using microneedle monopolar cautery. A tarsal strip was then created using  Dagoberto scissors and trimmed to appropriate length. Hemostasis was achieved with bipolar cautery. A 5-0 vicryl suture on a P2 needle was passed in a horizontal mattress fashion through the superior portion of the tarsus and then through the inner aspect of the lateral orbital rim periosteum. A second suture was passed in an identical fashion through the inferior portion of the tarsus. The scleral shell was removed. The tarsal sutures were tied allowing for resolution of the ectropion and improvement of the lower lid laxity. A 5-0 vicryl suture was placed in the buried fashion through the lateral canthal angle to re-approximate the natural angle. A skin muscle flap was formed laterally, advanced superiorly and laterally, and trimmed to the appropriate length. A 5-0 vicryl suture on a P2 needle was passed in a buried interrupted fashion through the orbicularis and superficial temporalis fascia to resuspend the lower lid and mid face. The skin was closed using 5-0 plain gut in an interrupted fashion.      At the closure of surgery, there was no evidence of active bleeding or excessive swelling. Antibiotic ointment was placed over the eye and eyelid. The patient tolerated the procedure well and was taken to the recovery room in stable condition. The patient was monitored in the PACU for 1 hour and the patch was removed before leaving. The patient was provided with an eye shield and instructed to wear it at night over the eye to decrease the risk of postoperative wound dehiscence.      Complications:  None; patient tolerated the procedure well.    Disposition: PACU - hemodynamically stable.  Condition: stable   Attending Attestation: I was present and scrubbed for the entire procedure.    Ledy Emerson  Phone Number: [phone]

## 2025-04-21 ENCOUNTER — TELEMEDICINE (OUTPATIENT)
Facility: HOSPITAL | Age: 50
End: 2025-04-21
Payer: MEDICARE

## 2025-04-21 DIAGNOSIS — E11.9 TYPE 2 DIABETES MELLITUS WITHOUT COMPLICATION, WITHOUT LONG-TERM CURRENT USE OF INSULIN: Primary | ICD-10-CM

## 2025-04-21 PROCEDURE — 99213 OFFICE O/P EST LOW 20 MIN: CPT

## 2025-04-21 PROCEDURE — 3051F HG A1C>EQUAL 7.0%<8.0%: CPT

## 2025-04-21 RX ORDER — DULAGLUTIDE 1.5 MG/.5ML
1.5 INJECTION, SOLUTION SUBCUTANEOUS WEEKLY
Qty: 2 ML | Refills: 1 | Status: SHIPPED | OUTPATIENT
Start: 2025-04-21

## 2025-04-21 NOTE — PROGRESS NOTES
Subjective   Patient ID: Tomasz Maxwell is a 49 y.o. male who presents for No chief complaint on file..    HPI    The encounter below was completed via telephone communication due to patient preference and/or scheduling necessity. Patient lacked appropriate connectivity for video telemedicine. Confirmed with patient that they are in a private setting before discussing sensitive health information. Consent for treatment and billing for this visit was obtained during the visit. Patient was appropriately identified using two identifiers.      Interim hx:  -LEFT EYELID RECONSTRUCTION 4/15, going well    DM-2  295->282  - meds: Trulicity 0.75, helping lot tolerating well  - missing doses?: no  - vision changes related to ophtalmo  - denies polydipsia, polyuria, neuropathy       Review of Systems  All other systems reviewed were negative    Medications Ordered Prior to Encounter[1]     Objective   Vitals: There were no vitals taken for this visit.     Physical Exam  General: well-appearing, NAD        Assessment/Plan     Tomasz Maxwell is a 49 y.o. male  who presents for diabetes followup    DM  Obesity  Chronic, stable, improving  Inc Trulicity 0.75->1.5 mg, educated on SE    Problem List Items Addressed This Visit           ICD-10-CM    Diabetes mellitus (Multi) - Primary E11.9    Relevant Medications    dulaglutide (Trulicity) 1.5 mg/0.5 mL pen injector injection       Attending Supervision: discussed with attending physician (cosigner listed on this note) Dr. Winters.    RTC in 1 mo for trulicity dose inc, or earlier as needed.    Plan preliminary until cosigned by attending physician.    Corrina Blank M.D.  Family Medicine  PGY-2       [1]   Current Outpatient Medications on File Prior to Visit   Medication Sig Dispense Refill    albuterol 2.5 mg /3 mL (0.083 %) nebulizer solution Take 3 mL (2.5 mg) by nebulization every 4 hours if needed for wheezing. 60 mL 5    amLODIPine (Norvasc) 10 mg tablet Take 1  tablet (10 mg) by mouth once daily. 90 tablet 3    atorvastatin (Lipitor) 80 mg tablet Take 1 tablet (80 mg) by mouth once daily at bedtime. 90 tablet 3    cholecalciferol (Vitamin D-3) 50 MCG (2000 UT) tablet Take 1 tablet (50 mcg) by mouth once daily. 90 tablet 3    DULoxetine (Cymbalta) 30 mg DR capsule Take 1 capsule (30 mg) by mouth 2 times a day. Do not crush or chew. 60 capsule 11    empagliflozin (Jardiance) 10 mg Take 1 tablet (10 mg) by mouth once daily. 90 tablet 3    loratadine (Claritin) 10 mg tablet Take 1 tablet (10 mg) by mouth once daily as needed for allergies. 30 tablet 2    metFORMIN (Glucophage) 1,000 mg tablet Take 1 tablet (1,000 mg) by mouth once daily with breakfast. 30 tablet 11    montelukast (Singulair) 10 mg tablet Take 1 tablet (10 mg) by mouth once daily at bedtime. 90 tablet 3    naloxone (Narcan) 4 mg/0.1 mL nasal spray Administer 1 spray (4 mg) into affected nostril(s) if needed for opioid reversal. May repeat every 2-3 minutes if needed, alternating nostrils, until medical assistance becomes available. 2 each 0    neomycin-polymyxin B-dexameth (Polydex) 3.5 mg/g-10,000 unit/g-0.1 % ointment ophthalmic ointment Apply to both eyes 3 times a day. Apply to both eyes 3 times daily 3.5 g 0    neomycin-polymyxin B-dexameth (Polydex) 3.5 mg/g-10,000 unit/g-0.1 % ointment ophthalmic ointment Apply to left eyelid two times daily as directed 3.5 g 1    oxyCODONE-acetaminophen (Percocet) 7.5-325 mg tablet Take 1 tablet by mouth every 6 hours if needed for severe pain (7 - 10). Do not fill before April 7, 2025. 90 tablet 0    [START ON 5/7/2025] oxyCODONE-acetaminophen (Percocet) 7.5-325 mg tablet Take 1 tablet by mouth every 6 hours if needed for severe pain (7 - 10). Do not fill before May 7, 2025. 90 tablet 0    Symbicort 160-4.5 mcg/actuation inhaler Inhale 2 puffs 2 times a day. Rinse mouth after use.      tadalafil (Cialis) 20 mg tablet Take 1 tablet (20 mg) by mouth once daily as needed  for erectile dysfunction. 10 tablet 11    tamsulosin (Flomax) 0.4 mg 24 hr capsule Take 2 capsules (0.8 mg) by mouth once daily at bedtime. 60 capsule 11    Ventolin HFA 90 mcg/actuation inhaler INHALE 1 OR 2 PUFFS EVERY 4 TO 6 HOURS AS NEEDED 18 g 11    [DISCONTINUED] dulaglutide (Trulicity) 0.75 mg/0.5 mL pen injector Inject 0.75 mg under the skin 1 (one) time per week. 2 mL 1    [DISCONTINUED] neomycin-polymyxin B-dexameth (Polydex) 3.5 mg/g-10,000 unit/g-0.1 % ointment ophthalmic ointment Apply to left eyelid four times daily as directed 3.5 g 1     Current Facility-Administered Medications on File Prior to Visit   Medication Dose Route Frequency Provider Last Rate Last Admin    lidocaine PF (Xylocaine) 10 mg/mL (1 %) injection 40 mg  4 mL subcutaneous Once Devon Claros MD

## 2025-04-22 ENCOUNTER — HOSPITAL ENCOUNTER (EMERGENCY)
Facility: HOSPITAL | Age: 50
Discharge: HOME | End: 2025-04-22
Payer: MEDICARE

## 2025-04-22 VITALS
RESPIRATION RATE: 18 BRPM | OXYGEN SATURATION: 97 % | TEMPERATURE: 97 F | DIASTOLIC BLOOD PRESSURE: 81 MMHG | SYSTOLIC BLOOD PRESSURE: 134 MMHG | WEIGHT: 276 LBS | HEART RATE: 63 BPM | BODY MASS INDEX: 45.98 KG/M2 | HEIGHT: 65 IN

## 2025-04-22 DIAGNOSIS — T78.40XS ALLERGY, SEQUELA: ICD-10-CM

## 2025-04-22 DIAGNOSIS — H60.501 ACUTE OTITIS EXTERNA OF RIGHT EAR, UNSPECIFIED TYPE: Primary | ICD-10-CM

## 2025-04-22 PROCEDURE — 99283 EMERGENCY DEPT VISIT LOW MDM: CPT

## 2025-04-22 PROCEDURE — 99283 EMERGENCY DEPT VISIT LOW MDM: CPT | Performed by: NURSE PRACTITIONER

## 2025-04-22 PROCEDURE — 2500000001 HC RX 250 WO HCPCS SELF ADMINISTERED DRUGS (ALT 637 FOR MEDICARE OP): Performed by: NURSE PRACTITIONER

## 2025-04-22 RX ORDER — IBUPROFEN 600 MG/1
TABLET ORAL
Status: DISCONTINUED
Start: 2025-04-22 | End: 2025-04-22 | Stop reason: HOSPADM

## 2025-04-22 RX ORDER — CIPROFLOXACIN AND DEXAMETHASONE 3; 1 MG/ML; MG/ML
4 SUSPENSION/ DROPS AURICULAR (OTIC) 2 TIMES DAILY
Qty: 7.5 ML | Refills: 0 | Status: SHIPPED | OUTPATIENT
Start: 2025-04-22 | End: 2025-04-30

## 2025-04-22 RX ORDER — AMOXICILLIN AND CLAVULANATE POTASSIUM 875; 125 MG/1; MG/1
1 TABLET, FILM COATED ORAL EVERY 12 HOURS
Qty: 20 TABLET | Refills: 0 | Status: SHIPPED | OUTPATIENT
Start: 2025-04-22 | End: 2025-05-02

## 2025-04-22 RX ORDER — IBUPROFEN 600 MG/1
600 TABLET ORAL ONCE
Status: COMPLETED | OUTPATIENT
Start: 2025-04-22 | End: 2025-04-22

## 2025-04-22 RX ADMIN — IBUPROFEN 600 MG: 600 TABLET, FILM COATED ORAL at 07:49

## 2025-04-22 ASSESSMENT — PAIN DESCRIPTION - LOCATION: LOCATION: EAR

## 2025-04-22 ASSESSMENT — ENCOUNTER SYMPTOMS
SORE THROAT: 0
FEVER: 0
RHINORRHEA: 0
SHORTNESS OF BREATH: 0
CHILLS: 0
COUGH: 0

## 2025-04-22 ASSESSMENT — PAIN - FUNCTIONAL ASSESSMENT: PAIN_FUNCTIONAL_ASSESSMENT: 0-10

## 2025-04-22 ASSESSMENT — PAIN DESCRIPTION - PAIN TYPE: TYPE: ACUTE PAIN

## 2025-04-22 ASSESSMENT — PAIN DESCRIPTION - ORIENTATION: ORIENTATION: RIGHT

## 2025-04-22 ASSESSMENT — PAIN SCALES - GENERAL: PAINLEVEL_OUTOF10: 6

## 2025-04-22 ASSESSMENT — PAIN DESCRIPTION - DESCRIPTORS: DESCRIPTORS: DISCOMFORT

## 2025-04-22 NOTE — ED PROVIDER NOTES
HPI   Chief Complaint   Patient presents with    Ear Drainage       HPI  This is a 49 year old male with hx of DM who presents to the Emergency Department today with complaints of right ear discomfort and small amount of bloody drainage since Saturday. Denies any trauma or facial injury. Denies fever/chills, cough, congestion, rhinorrhea, sore throat. No recent swimming or recent travel. Denies any tinnitus or hearing difficulty. Has not tried taking anything at home for symptoms    Review of Systems   Constitutional:  Negative for chills and fever.   HENT:  Positive for ear discharge and ear pain. Negative for congestion, rhinorrhea and sore throat.    Respiratory:  Negative for cough and shortness of breath.    Cardiovascular:  Negative for chest pain.           Patient History   Medical History[1]  Surgical History[2]  Family History[3]  Social History[4]    Physical Exam   ED Triage Vitals [04/22/25 0718]   Temperature Heart Rate Respirations BP   36.1 °C (97 °F) 63 18 134/81      Pulse Ox Temp src Heart Rate Source Patient Position   97 % -- Monitor Sitting      BP Location FiO2 (%)     Right arm --       Physical Exam  Vitals reviewed.   Constitutional:       Appearance: He is not ill-appearing.   HENT:      Head: Normocephalic and atraumatic.      Right Ear: External ear normal. There is no impacted cerumen.      Left Ear: Tympanic membrane, ear canal and external ear normal. There is no impacted cerumen.      Ears:      Comments: Right ear canal appears swollen with some associated erythema. Unable to fully visualize right TM.      Nose: Nose normal.      Mouth/Throat:      Mouth: Mucous membranes are moist.      Pharynx: No oropharyngeal exudate or posterior oropharyngeal erythema.   Cardiovascular:      Rate and Rhythm: Normal rate and regular rhythm.      Heart sounds: Normal heart sounds.   Pulmonary:      Effort: Pulmonary effort is normal. No respiratory distress.      Breath sounds: Normal breath  sounds. No wheezing, rhonchi or rales.   Musculoskeletal:      Cervical back: Neck supple.   Lymphadenopathy:      Cervical: No cervical adenopathy.   Neurological:      Mental Status: He is alert.           ED Course & MDM   Diagnoses as of 04/22/25 0750   Acute otitis externa of right ear, unspecified type                 No data recorded                                 Medical Decision Making  The patient remains clinically stable while in the ED. 49 year old male with hx of DM presents to the ED with complaints of right ear pain and some associated bloody drainage that has been intermittent x 2-3 days. No trauma/injury; denies fevers, URI symptoms. He is well appearing, resting comfortably without distress; afebrile and nontoxic. PE findings with inflammation with some associated erythema to right ear canal. Unable to fully visualize TM. No mastoid tenderness. Patient will be given rx for Ciprodex otic drops for treatment of R otitis externa. Will also treat with Amoxicillin due to inability to fully assess TM. Given Ibuprofen in the ED. Advised to alternate Tylenol and Ibuprofen at home as needed for discomfort. Avoid swimming/submerging ear in water until symptoms have resolved. Instructed to follow up with PCP as needed if symptoms persist and return to the ED for any new or worsening symptoms. He did verbalize understanding and remains in stable condition at the time of discharge.    Procedure  Procedures       [1]   Past Medical History:  Diagnosis Date    Adhesive capsulitis of shoulder 01/29/2024    Arthritis     Contusion of left hand, initial encounter 04/24/2017    Contusion of left hand, initial encounter    Crushing injury of unspecified finger(s), initial encounter 04/24/2017    Injury, crush, finger    Essential (primary) hypertension 05/16/2022    Hypertension    Hard to intubate     Hyperlipidemia, unspecified 10/03/2022    Hyperlipidemia    Hypokalemia 04/24/2017    Hypokalemia, k= 3.8 on  1/26/23    Idiopathic gout, right knee 04/24/2017    Acute idiopathic gout of right knee    Latent syphilis, unspecified as early or late 04/13/2018    Latent syphilis with positive serology    Local infection of the skin and subcutaneous tissue, unspecified 12/05/2017    Skin infection    Long term (current) use of opiate analgesic 04/24/2017    Opioid contract exists    Mastitis without abscess 04/24/2017    Mastitis, acute    Mastodynia 04/24/2017    Breast pain, right    Muscle spasm of back 04/24/2017    Paraspinal muscle spasm    Nocturia 04/24/2017    Nocturia    Other microscopic hematuria 04/24/2017    Hematuria, microscopic    Other specified postprocedural states 04/24/2017    H/O arthroscopic knee surgery    Other specified postprocedural states 04/24/2017    Status post lumbar laminectomy    Other specified symptoms and signs involving the digestive system and abdomen 09/08/2017    Umbilical discharge    Pain in left wrist 04/24/2017    Left wrist pain    Personal history of other diseases of the musculoskeletal system and connective tissue     History of arthritis    Personal history of other diseases of the nervous system and sense organs 03/07/2016    History of acute otitis media    Personal history of other endocrine, nutritional and metabolic disease     History of diabetes mellitus    Personal history of other infectious and parasitic diseases 09/10/2017    History of tinea corporis    Personal history of other infectious and parasitic diseases 09/08/2017    History of trichomoniasis    Personal history of other infectious and parasitic diseases 04/13/2018    History of trichomoniasis    Personal history of other specified conditions 04/30/2018    History of dyspnea    Personal history of other specified conditions 01/23/2018    History of epigastric pain    Personal history of other specified conditions 07/28/2017    History of urinary frequency    Personal history of other specified conditions  10/08/2017    History of chest pain    Proteinuria, unspecified 04/24/2017    Proteinuria    Radiculopathy, lumbar region 04/24/2017    Lumbar radiculopathy    Rupture of anterior cruciate ligament of knee 02/02/2023    Spinal stenosis, site unspecified 08/19/2015    Spinal stenosis, multiple sites in spine    Type 2 diabetes mellitus     Unspecified abnormal finding in specimens from other organs, systems and tissues 04/24/2017    Abnormal laboratory test result    Unspecified asthma, uncomplicated (HHS-HCC)     Active asthma    Vertigo     ECHO 5/21/22    Vision loss    [2]   Past Surgical History:  Procedure Laterality Date    BACK SURGERY  09/26/2016    Back Surgeryx5, has spinal stimulator    CARDIAC CATHETERIZATION N/A 12/18/2024    Procedure: Right Heart Cath;  Surgeon: Helio Cruz MD;  Location: [facility] Cardiac [facility];  Service: Cardiovascular;  Laterality: N/A;    COLONOSCOPY      HERNIA REPAIR  08/26/2015    Hernia Repairx2    HIP ARTHROPLASTY      KNEE ARTHROPLASTY      KNEE SURGERY Right 03/29/2017    Knee Surgery, right TKR    LUMBAR LAMINECTOMY  08/26/2015    Laminectomy Lumbar    MR HEAD ANGIO WO IV CONTRAST  05/21/2022    MR HEAD ANGIO WO IV CONTRAST 5/21/2022 [facility]    MR HEAD ANGIO WO IV CONTRAST  03/12/2017    MR HEAD ANGIO WO IV CONTRAST 3/12/2017 [facility]    MR NECK ANGIO WO IV CONTRAST  05/21/2022    MR NECK ANGIO WO IV CONTRAST 5/21/2022 [facility] [place] [facility]    MR NECK ANGIO WO IV CONTRAST  03/12/2017    MR NECK ANGIO WO IV CONTRAST 3/12/2017 [facility] [place] [facility]    OTHER SURGICAL HISTORY  05/20/2021    Abdominal surgery    OTHER SURGICAL HISTORY Bilateral 02/24/2021    Hip replacement    OTHER SURGICAL HISTORY  09/26/2016    Gastrectomy    ROTATOR CUFF REPAIR      SHOULDER SURGERY Left     left TSR   [3]   Family History  Problem Relation Name Age of Onset    Hypertension Mother      Diabetes Mother      Asthma Mother      Deep vein thrombosis Mother      No Known  Problems Father      No Known Problems Sister      Asthma Child     [4]   Social History  Tobacco Use    Smoking status: Never    Smokeless tobacco: Never   Vaping Use    Vaping status: Never Used   Substance Use Topics    Alcohol use: Never    Drug use: Never        SYLVAIN Ledbetter-MANDY  04/22/25 0756

## 2025-04-22 NOTE — ED TRIAGE NOTES
R ear pain and some blood leaking. State that it start about 2 days ago. Denies any trauma to the ear

## 2025-04-23 LAB
LABORATORY COMMENT REPORT: NORMAL
PATH REPORT.FINAL DX SPEC: NORMAL
PATH REPORT.GROSS SPEC: NORMAL
PATH REPORT.RELEVANT HX SPEC: NORMAL
PATH REPORT.TOTAL CANCER: NORMAL

## 2025-04-23 RX ORDER — LORATADINE 10 MG/1
10 TABLET ORAL DAILY PRN
Qty: 30 TABLET | Refills: 3 | Status: SHIPPED | OUTPATIENT
Start: 2025-04-23

## 2025-04-24 NOTE — PREPROCEDURE INSTRUCTIONS
Current Medications    Medication Instructions    albuterol 2.5 mg /3 mL (0.083 %) nebulizer solution Use as needed    amLODIPine (Norvasc) 10 mg tablet Take day of surgery    amoxicillin-clavulanate (Augmentin) 875-125 mg tablet Take per regular schedule    atorvastatin (Lipitor) 80 mg tablet Take per regular schedule    cholecalciferol (Vitamin D-3) 50 MCG (2000 UT) tablet Hold    ciprofloxacin-dexamethasone (Ciprodex) otic suspension Take per regular schedule    DULoxetine (Cymbalta) 30 mg DR capsule Take per regular schedule    empagliflozin (Jardiance) 10 mg HOLD starting 4/25/25 until after surgery (3 full days before)    loratadine (Claritin) 10 mg tablet Take per regular schedule    metFORMIN (Glucophage) 1,000 mg tablet Hold day of surgery    montelukast (Singulair) 10 mg tablet Take per regular schedule    neomycin-polymyxin B-dexameth (Polydex) 3.5 mg/g-10,000 unit/g-0.1 % ointment ophthalmic ointment Use as directed    neomycin-polymyxin B-dexameth (Polydex) 3.5 mg/g-10,000 unit/g-0.1 % ointment ophthalmic ointment Use as directed    oxyCODONE-acetaminophen (Percocet) 7.5-325 mg tablet Ok to use as directed    Symbicort 160-4.5 mcg/actuation inhaler Take per regular schedule    tadalafil (Cialis) 20 mg tablet Hold for 72 hours prior to surgery    tamsulosin (Flomax) 0.4 mg 24 hr capsule Take per regular schedule    Ventolin HFA 90 mcg/actuation inhaler Use as directed       Day before surgery: (one business day-(Friday 4/25/25)  You will be contacted regarding the time of your arrival to facility and surgery time    Day of surgery: You may have 13 ounces of clear liquids until TWO hours before arrival time.. This includes water, black tea/coffee, (no milk or cream) apple juice and electrolyte drinks (Gatorade). No red colors.    Additional Instructions:   You will need to be on a clear liquid diet starting 4/27/25      Preoperative Fasting Guidelines    Why must I stop eating and drinking before  surgery?  With anesthesia, food or liquid in your stomach can enter your lungs causing serious complications  GLP-1 medications can slow the movement of food through your stomach and intestines.  This further increases the risk of food entering your lungs with anesthesia    When do I need to stop eating and drinking before my surgery?  To help ensure food has passed out of your stomach, START a clear liquid diet 24 hours before your surgery  On the day of your surgery/procedure (only 13  ounces allowed day of surgery), STOP all clear liquids 2 hours before your arrival time to the hospital/facility     A clear liquid diet consists of clear liquids and foods that melt into a clear liquid (i.e. gelatin) and excludes solid foods and liquids you cannot see through (i.e. milk). Clears can and should contain sugar to obtain a sufficient number of calories.  A clear liquid diet includes  Clear, fat-free broth  Clear nutritional drinks  Pulp-free popsicles, vegetable and fruit juice  Gelatin  Coffee and tea without creamer or milk  Clear soda and sports drinks    Diabetic Patients  Clear liquids should not be sugar-free   Check your blood glucose levels as you normally do  If you have symptoms of low blood glucose (shakiness, sweating, dizziness, confusion) or high blood glucose (dry mouth, excessive thirst, frequent urination, blurry vision), check your blood glucose level  For low blood glucose increase your consumption of sugar-containing clear liquid   For high blood glucose, decrease your consumption of sugar-containing clears and treat as you normally would  If symptoms persist seek medical attention    Examples of GLP-1 Medications  Trulicity  Ozempic  Mounjaro  Zepbound  Clayton Jackson    NUTRITIONAL INFORMATION    This is an explanation of your clear liquid diet    OBJECTIVE:  This diet provides fluid and calories in a form that requires minimal work for the gastrointestinal  tract.    INDICATION: The diet is used for many types of surgeries and tests.  It is also  used after surgery, in cases of gastroenteritis, after I.V. feedings, or in the first step to oral feedings.     GENERAL INFORMATION:  This diet is not adequate in protein or calories and should be  used only on a temporary basis.  If this diet is used for more than three days, supplements would be indicated.      FOOD GROUPS:      BEVERAGES ALLOWED: Clear fruit juice or strained juice, clear coffee or tea, carbonated beverages as tolerated  NOT ALLOWED: Nectars, juice with pulp, milk, cream or cocoa    SOUPS ALLOWED: Clear broths or boullion  NOT ALLOWED: All others    SWEETS/DESSERTS ALLOWED: Clear gelatins, fruit ice from clear juice, plain unfilled hard candies, sugar,  honey, sugar substitutes, plain frozen pops  NOT ALLOWED:  All others    MISCELLANEOUS ALLOWED:  Specialty products such as low residue nutritional supplements with your doctor's approval  NOT ALLOWED:  All others    SAMPLE MENU:    BREAKFAST:  Apple juice, Gelatin, Tea/coffee, sugar    LUNCH:  Broth, Cranberry juice, Carbonated Soda, Fruit ice Tea/coffee    DINNER:  Broth, Strained juice, Gelatin, Carbonated soda, Tea/coffee    SNACKS INCLUDE GELATIN, FROZEN POPS, HARD CANDIES, SUPPLEMENTS PER DOCTOR'S ORDERS          Enter through the main entrance of [facility], located at [address] Proceed to registration, located on the right hand side of the staircase. You will need your ID and insurance card for registration. Please ensure you have a responsible adult to drive you home. A responsible adult DOES NOT include rideshare service drivers (Uber, Lyft, etc), cab drivers, or public transportation drivers, but “provide a ride” is acceptable.    Take a shower before your procedure. After your shower avoid lotions, powders, deodorants or anything applied to the skin. If you wear contacts or glasses, wear the glasses. If you do not have  glasses, please bring a case for your contacts. You may wear hearing aids and dentures, bring a case for them or we will provide one. Make sure you wear something loose and comfortable. Keep in mind your surgical procedure and wear something that will accommodate incisions or bandages. Please remove all jewelry and piercing's.     For further questions Elizabeth RANGEL can be contacted at [phone] between 7AM-3PM.

## 2025-04-28 ENCOUNTER — ANESTHESIA EVENT (OUTPATIENT)
Dept: OPERATING ROOM | Facility: HOSPITAL | Age: 50
End: 2025-04-28
Payer: MEDICARE

## 2025-04-28 ENCOUNTER — HOSPITAL ENCOUNTER (OUTPATIENT)
Facility: HOSPITAL | Age: 50
Setting detail: OUTPATIENT SURGERY
Discharge: HOME | End: 2025-04-28
Payer: MEDICARE

## 2025-04-28 ENCOUNTER — ANESTHESIA (OUTPATIENT)
Dept: OPERATING ROOM | Facility: HOSPITAL | Age: 50
End: 2025-04-28
Payer: MEDICARE

## 2025-04-28 VITALS
HEART RATE: 66 BPM | TEMPERATURE: 97.7 F | RESPIRATION RATE: 16 BRPM | WEIGHT: 276 LBS | HEIGHT: 65 IN | SYSTOLIC BLOOD PRESSURE: 134 MMHG | DIASTOLIC BLOOD PRESSURE: 75 MMHG | BODY MASS INDEX: 45.98 KG/M2 | OXYGEN SATURATION: 98 %

## 2025-04-28 DIAGNOSIS — H02.59 LAXITY OF EYELID: ICD-10-CM

## 2025-04-28 DIAGNOSIS — H02.135 SENILE ECTROPION OF BOTH LOWER EYELIDS: ICD-10-CM

## 2025-04-28 DIAGNOSIS — H02.102 ECTROPION OF BOTH LOWER EYELIDS, UNSPECIFIED ECTROPION TYPE: ICD-10-CM

## 2025-04-28 DIAGNOSIS — H02.59 FLOPPY LID SYNDROME: ICD-10-CM

## 2025-04-28 DIAGNOSIS — H02.132 SENILE ECTROPION OF BOTH LOWER EYELIDS: ICD-10-CM

## 2025-04-28 DIAGNOSIS — H02.105 ECTROPION OF BOTH LOWER EYELIDS, UNSPECIFIED ECTROPION TYPE: ICD-10-CM

## 2025-04-28 LAB — GLUCOSE BLD MANUAL STRIP-MCNC: 94 MG/DL (ref 74–99)

## 2025-04-28 PROCEDURE — 7100000010 HC PHASE TWO TIME - EACH INCREMENTAL 1 MINUTE

## 2025-04-28 PROCEDURE — 2500000004 HC RX 250 GENERAL PHARMACY W/ HCPCS (ALT 636 FOR OP/ED): Performed by: NURSE ANESTHETIST, CERTIFIED REGISTERED

## 2025-04-28 PROCEDURE — 2500000004 HC RX 250 GENERAL PHARMACY W/ HCPCS (ALT 636 FOR OP/ED): Mod: JZ | Performed by: ANESTHESIOLOGY

## 2025-04-28 PROCEDURE — 3600000003 HC OR TIME - INITIAL BASE CHARGE - PROCEDURE LEVEL THREE

## 2025-04-28 PROCEDURE — 7100000002 HC RECOVERY ROOM TIME - EACH INCREMENTAL 1 MINUTE

## 2025-04-28 PROCEDURE — 2500000005 HC RX 250 GENERAL PHARMACY W/O HCPCS

## 2025-04-28 PROCEDURE — 3700000001 HC GENERAL ANESTHESIA TIME - INITIAL BASE CHARGE

## 2025-04-28 PROCEDURE — 3600000008 HC OR TIME - EACH INCREMENTAL 1 MINUTE - PROCEDURE LEVEL THREE

## 2025-04-28 PROCEDURE — 3700000002 HC GENERAL ANESTHESIA TIME - EACH INCREMENTAL 1 MINUTE

## 2025-04-28 PROCEDURE — 7100000009 HC PHASE TWO TIME - INITIAL BASE CHARGE

## 2025-04-28 PROCEDURE — A67971 PR RECONSTRUC EYELID,<2/3,ONE STAGE: Performed by: NURSE ANESTHETIST, CERTIFIED REGISTERED

## 2025-04-28 PROCEDURE — 82947 ASSAY GLUCOSE BLOOD QUANT: CPT

## 2025-04-28 PROCEDURE — A67971 PR RECONSTRUC EYELID,<2/3,ONE STAGE: Performed by: ANESTHESIOLOGY

## 2025-04-28 PROCEDURE — 2500000004 HC RX 250 GENERAL PHARMACY W/ HCPCS (ALT 636 FOR OP/ED)

## 2025-04-28 PROCEDURE — 7100000001 HC RECOVERY ROOM TIME - INITIAL BASE CHARGE

## 2025-04-28 PROCEDURE — 2720000007 HC OR 272 NO HCPCS

## 2025-04-28 RX ORDER — ONDANSETRON HYDROCHLORIDE 2 MG/ML
INJECTION, SOLUTION INTRAVENOUS AS NEEDED
Status: DISCONTINUED | OUTPATIENT
Start: 2025-04-28 | End: 2025-04-28

## 2025-04-28 RX ORDER — HYDROMORPHONE HYDROCHLORIDE 1 MG/ML
1 INJECTION, SOLUTION INTRAMUSCULAR; INTRAVENOUS; SUBCUTANEOUS EVERY 5 MIN PRN
Status: DISCONTINUED | OUTPATIENT
Start: 2025-04-28 | End: 2025-04-28 | Stop reason: HOSPADM

## 2025-04-28 RX ORDER — ONDANSETRON HYDROCHLORIDE 2 MG/ML
4 INJECTION, SOLUTION INTRAVENOUS ONCE AS NEEDED
Status: DISCONTINUED | OUTPATIENT
Start: 2025-04-28 | End: 2025-04-28 | Stop reason: HOSPADM

## 2025-04-28 RX ORDER — LIDOCAINE HCL/PF 100 MG/5ML
SYRINGE (ML) INTRAVENOUS AS NEEDED
Status: DISCONTINUED | OUTPATIENT
Start: 2025-04-28 | End: 2025-04-28

## 2025-04-28 RX ORDER — LIDOCAINE HYDROCHLORIDE AND EPINEPHRINE 10; 20 UG/ML; MG/ML
INJECTION, SOLUTION INFILTRATION; PERINEURAL AS NEEDED
Status: DISCONTINUED | OUTPATIENT
Start: 2025-04-28 | End: 2025-04-28 | Stop reason: HOSPADM

## 2025-04-28 RX ORDER — LABETALOL HYDROCHLORIDE 5 MG/ML
10 INJECTION, SOLUTION INTRAVENOUS ONCE AS NEEDED
Status: DISCONTINUED | OUTPATIENT
Start: 2025-04-28 | End: 2025-04-28 | Stop reason: HOSPADM

## 2025-04-28 RX ORDER — ACETAMINOPHEN 325 MG/1
975 TABLET ORAL ONCE
Status: DISCONTINUED | OUTPATIENT
Start: 2025-04-28 | End: 2025-04-28 | Stop reason: HOSPADM

## 2025-04-28 RX ORDER — FENTANYL CITRATE 50 UG/ML
INJECTION, SOLUTION INTRAMUSCULAR; INTRAVENOUS AS NEEDED
Status: DISCONTINUED | OUTPATIENT
Start: 2025-04-28 | End: 2025-04-28

## 2025-04-28 RX ORDER — PROPOFOL 10 MG/ML
INJECTION, EMULSION INTRAVENOUS AS NEEDED
Status: DISCONTINUED | OUTPATIENT
Start: 2025-04-28 | End: 2025-04-28

## 2025-04-28 RX ORDER — FAMOTIDINE 10 MG/ML
20 INJECTION, SOLUTION INTRAVENOUS ONCE
Status: DISCONTINUED | OUTPATIENT
Start: 2025-04-28 | End: 2025-04-28 | Stop reason: HOSPADM

## 2025-04-28 RX ORDER — SODIUM CHLORIDE, SODIUM LACTATE, POTASSIUM CHLORIDE, CALCIUM CHLORIDE 600; 310; 30; 20 MG/100ML; MG/100ML; MG/100ML; MG/100ML
100 INJECTION, SOLUTION INTRAVENOUS CONTINUOUS
Status: DISCONTINUED | OUTPATIENT
Start: 2025-04-28 | End: 2025-04-28 | Stop reason: HOSPADM

## 2025-04-28 RX ORDER — ERYTHROMYCIN 5 MG/G
1 OINTMENT OPHTHALMIC ONCE
Status: COMPLETED | OUTPATIENT
Start: 2025-04-28 | End: 2025-04-28

## 2025-04-28 RX ORDER — METOCLOPRAMIDE HYDROCHLORIDE 5 MG/ML
10 INJECTION INTRAMUSCULAR; INTRAVENOUS ONCE AS NEEDED
Status: DISCONTINUED | OUTPATIENT
Start: 2025-04-28 | End: 2025-04-28 | Stop reason: HOSPADM

## 2025-04-28 RX ORDER — DIPHENHYDRAMINE HYDROCHLORIDE 50 MG/ML
25 INJECTION, SOLUTION INTRAMUSCULAR; INTRAVENOUS ONCE AS NEEDED
Status: DISCONTINUED | OUTPATIENT
Start: 2025-04-28 | End: 2025-04-28 | Stop reason: HOSPADM

## 2025-04-28 RX ORDER — NEOMYCIN SULFATE, POLYMYXIN B SULFATE, AND DEXAMETHASONE 3.5; 10000; 1 MG/G; [USP'U]/G; MG/G
OINTMENT OPHTHALMIC
Qty: 3.5 G | Refills: 1 | Status: SHIPPED | OUTPATIENT
Start: 2025-04-28

## 2025-04-28 RX ORDER — MORPHINE SULFATE 2 MG/ML
2 INJECTION, SOLUTION INTRAMUSCULAR; INTRAVENOUS EVERY 5 MIN PRN
Status: DISCONTINUED | OUTPATIENT
Start: 2025-04-28 | End: 2025-04-28 | Stop reason: HOSPADM

## 2025-04-28 RX ORDER — SUCCINYLCHOLINE/SOD CL,ISO/PF 100 MG/5ML
SYRINGE (ML) INTRAVENOUS AS NEEDED
Status: DISCONTINUED | OUTPATIENT
Start: 2025-04-28 | End: 2025-04-28

## 2025-04-28 RX ORDER — MIDAZOLAM HYDROCHLORIDE 1 MG/ML
INJECTION, SOLUTION INTRAMUSCULAR; INTRAVENOUS AS NEEDED
Status: DISCONTINUED | OUTPATIENT
Start: 2025-04-28 | End: 2025-04-28

## 2025-04-28 RX ORDER — METOPROLOL TARTRATE 1 MG/ML
5 INJECTION, SOLUTION INTRAVENOUS ONCE
Status: DISCONTINUED | OUTPATIENT
Start: 2025-04-28 | End: 2025-04-28 | Stop reason: HOSPADM

## 2025-04-28 RX ORDER — HYDRALAZINE HYDROCHLORIDE 20 MG/ML
10 INJECTION INTRAMUSCULAR; INTRAVENOUS EVERY 30 MIN PRN
Status: DISCONTINUED | OUTPATIENT
Start: 2025-04-28 | End: 2025-04-28 | Stop reason: HOSPADM

## 2025-04-28 RX ORDER — MIDAZOLAM HYDROCHLORIDE 1 MG/ML
1 INJECTION, SOLUTION INTRAMUSCULAR; INTRAVENOUS ONCE AS NEEDED
Status: DISCONTINUED | OUTPATIENT
Start: 2025-04-28 | End: 2025-04-28 | Stop reason: HOSPADM

## 2025-04-28 RX ORDER — SCOPOLAMINE 1 MG/3D
1 PATCH, EXTENDED RELEASE TRANSDERMAL ONCE
Status: DISCONTINUED | OUTPATIENT
Start: 2025-04-28 | End: 2025-04-28 | Stop reason: HOSPADM

## 2025-04-28 RX ORDER — POVIDONE-IODINE 5 %
1 SOLUTION, NON-ORAL OPHTHALMIC (EYE) ONCE
Status: DISCONTINUED | OUTPATIENT
Start: 2025-04-28 | End: 2025-04-28 | Stop reason: HOSPADM

## 2025-04-28 RX ORDER — ALBUTEROL SULFATE 0.83 MG/ML
2.5 SOLUTION RESPIRATORY (INHALATION) ONCE AS NEEDED
Status: DISCONTINUED | OUTPATIENT
Start: 2025-04-28 | End: 2025-04-28 | Stop reason: HOSPADM

## 2025-04-28 RX ORDER — TRANEXAMIC ACID 100 MG/ML
100 INJECTION, SOLUTION INTRAVENOUS ONCE
Status: DISCONTINUED | OUTPATIENT
Start: 2025-04-28 | End: 2025-04-28 | Stop reason: HOSPADM

## 2025-04-28 RX ORDER — SODIUM CHLORIDE 0.9 G/100ML
INJECTION, SOLUTION IRRIGATION AS NEEDED
Status: DISCONTINUED | OUTPATIENT
Start: 2025-04-28 | End: 2025-04-28 | Stop reason: HOSPADM

## 2025-04-28 RX ORDER — POVIDONE-IODINE 5 %
SOLUTION, NON-ORAL OPHTHALMIC (EYE) AS NEEDED
Status: DISCONTINUED | OUTPATIENT
Start: 2025-04-28 | End: 2025-04-28 | Stop reason: HOSPADM

## 2025-04-28 RX ORDER — ROCURONIUM BROMIDE 10 MG/ML
INJECTION, SOLUTION INTRAVENOUS AS NEEDED
Status: DISCONTINUED | OUTPATIENT
Start: 2025-04-28 | End: 2025-04-28

## 2025-04-28 RX ADMIN — SODIUM CHLORIDE, POTASSIUM CHLORIDE, SODIUM LACTATE AND CALCIUM CHLORIDE: 600; 310; 30; 20 INJECTION, SOLUTION INTRAVENOUS at 13:22

## 2025-04-28 RX ADMIN — ROCURONIUM BROMIDE 10 MG: 50 INJECTION, SOLUTION INTRAVENOUS at 13:34

## 2025-04-28 RX ADMIN — Medication 180 MG: at 13:34

## 2025-04-28 RX ADMIN — HYDROMORPHONE HYDROCHLORIDE 1 MG: 1 INJECTION, SOLUTION INTRAMUSCULAR; INTRAVENOUS; SUBCUTANEOUS at 15:43

## 2025-04-28 RX ADMIN — MIDAZOLAM 2 MG: 1 INJECTION INTRAMUSCULAR; INTRAVENOUS at 13:34

## 2025-04-28 RX ADMIN — FENTANYL CITRATE 100 MCG: 50 INJECTION, SOLUTION INTRAMUSCULAR; INTRAVENOUS at 13:34

## 2025-04-28 RX ADMIN — PROPOFOL 200 MG: 10 INJECTION, EMULSION INTRAVENOUS at 13:34

## 2025-04-28 RX ADMIN — LIDOCAINE HYDROCHLORIDE 100 MG: 20 INJECTION, SOLUTION INTRAVENOUS at 13:34

## 2025-04-28 RX ADMIN — ONDANSETRON 4 MG: 2 INJECTION INTRAMUSCULAR; INTRAVENOUS at 14:54

## 2025-04-28 SDOH — HEALTH STABILITY: MENTAL HEALTH: CURRENT SMOKER: 0

## 2025-04-28 ASSESSMENT — PAIN - FUNCTIONAL ASSESSMENT
PAIN_FUNCTIONAL_ASSESSMENT: 0-10

## 2025-04-28 ASSESSMENT — PAIN DESCRIPTION - ORIENTATION: ORIENTATION: RIGHT

## 2025-04-28 ASSESSMENT — PAIN SCALES - GENERAL
PAIN_LEVEL: 0
PAINLEVEL_OUTOF10: 7
PAINLEVEL_OUTOF10: 7
PAINLEVEL_OUTOF10: 0 - NO PAIN

## 2025-04-28 ASSESSMENT — ENCOUNTER SYMPTOMS
CARDIOVASCULAR NEGATIVE: 1
RESPIRATORY NEGATIVE: 1
CONSTITUTIONAL NEGATIVE: 1

## 2025-04-28 ASSESSMENT — PAIN DESCRIPTION - LOCATION: LOCATION: EYE

## 2025-04-28 ASSESSMENT — PAIN DESCRIPTION - DESCRIPTORS
DESCRIPTORS: TIGHTNESS
DESCRIPTORS: TIGHTNESS

## 2025-04-28 NOTE — ANESTHESIA PREPROCEDURE EVALUATION
Patient: Tomasz Maxwell    Procedure Information       Date/Time: 04/28/25 1200    Procedure: RIGHT EYELID RECONSTRUCTION (Right)    Location: [facility] / [facility]    Surgeons: Ledy Emerson MD            Relevant Problems   Anesthesia   (-) PONV (postoperative nausea and vomiting)      Cardiac   (+) Hyperlipidemia   (+) Hypertension      Pulmonary   (+) Moderate asthma (HHS-HCC)   (+) ES (obstructive sleep apnea)   (+) Pulmonary hypertension (Multi)      Neuro   (+) Depression   (+) Tarsal tunnel syndrome      Musculoskeletal   (+) Chronic low back pain   (+) Osteoarthritis   (+) Primary osteoarthritis of left hip   (+) Primary osteoarthritis of right hip   (+) Spinal stenosis      HEENT   (+) Hearing loss, functional      ID   (+) Latent syphilis with positive serology       Clinical information reviewed:   Tobacco  Allergies  Meds   Med Hx  Surg Hx   Fam Hx  Soc Hx        NPO Detail:  NPO/Void Status  Date of Last Liquid: 04/28/25  Time of Last Liquid: 0000  Date of Last Solid: 04/28/25  Time of Last Solid: 0000         Physical Exam    Airway  Mallampati: III  TM distance: >3 FB  Neck ROM: full     Cardiovascular - normal exam  Rhythm: regular  Rate: normal     Dental    Pulmonary - normal exam   Abdominal            Anesthesia Plan    History of general anesthesia?: yes  History of complications of general anesthesia?: no    ASA 3     general     The patient is not a current smoker.  Education provided regarding risk of obstructive sleep apnea.  intravenous induction   Postoperative pain plan includes opioids.  Trial extubation is planned.  Anesthetic plan and risks discussed with patient.    Plan discussed with CRNA, CAA and attending.

## 2025-04-28 NOTE — H&P
History Of Present Illness  Tomasz Maxwell is a 49 y.o. male presenting with floppy eyelid syndrome for right lower eyelid wedge resection and lateral tarsal strip.     Past Medical History  Medical History[1]    Surgical History  Surgical History[2]     Social History  He reports that he has never smoked. He has never used smokeless tobacco. He reports that he does not drink alcohol and does not use drugs.    Family History  Family History[3]     Allergies  Patient has no known allergies.    Medications Ordered Prior to Encounter[4]    Review of Systems   Constitutional: Negative.    Respiratory: Negative.     Cardiovascular: Negative.         Physical Exam  Constitutional:       Appearance: Normal appearance.   HENT:      Head: Normocephalic and atraumatic.   Cardiovascular:      Rate and Rhythm: Normal rate and regular rhythm.   Pulmonary:      Effort: Pulmonary effort is normal.      Breath sounds: Normal breath sounds.   Neurological:      Mental Status: He is alert.          Last Recorded Vitals  There were no vitals taken for this visit.    Relevant Results             Assessment & Plan  Floppy lid syndrome    Laxity of eyelid    Ectropion of both lower eyelids      Tomasz Maxwell is a 49 y.o. male presenting with floppy eyelid syndrome for right lower eyelid wedge resection and lateral tarsal strip.        Samantha Rowley MD         [1]   Past Medical History:  Diagnosis Date    Adhesive capsulitis of shoulder 01/29/2024    Arthritis     Contusion of left hand, initial encounter 04/24/2017    Contusion of left hand, initial encounter    Crushing injury of unspecified finger(s), initial encounter 04/24/2017    Injury, crush, finger    Essential (primary) hypertension 05/16/2022    Hypertension    Hard to intubate     Hyperlipidemia, unspecified 10/03/2022    Hyperlipidemia    Hypokalemia 04/24/2017    Hypokalemia, k= 3.8 on 1/26/23    Idiopathic gout, right knee 04/24/2017    Acute idiopathic gout of  right knee    Latent syphilis, unspecified as early or late 04/13/2018    Latent syphilis with positive serology    Local infection of the skin and subcutaneous tissue, unspecified 12/05/2017    Skin infection    Long term (current) use of opiate analgesic 04/24/2017    Opioid contract exists    Mastitis without abscess 04/24/2017    Mastitis, acute    Mastodynia 04/24/2017    Breast pain, right    Muscle spasm of back 04/24/2017    Paraspinal muscle spasm    Nocturia 04/24/2017    Nocturia    Other microscopic hematuria 04/24/2017    Hematuria, microscopic    Other specified postprocedural states 04/24/2017    H/O arthroscopic knee surgery    Other specified postprocedural states 04/24/2017    Status post lumbar laminectomy    Other specified symptoms and signs involving the digestive system and abdomen 09/08/2017    Umbilical discharge    Pain in left wrist 04/24/2017    Left wrist pain    Personal history of other diseases of the musculoskeletal system and connective tissue     History of arthritis    Personal history of other diseases of the nervous system and sense organs 03/07/2016    History of acute otitis media    Personal history of other endocrine, nutritional and metabolic disease     History of diabetes mellitus    Personal history of other infectious and parasitic diseases 09/10/2017    History of tinea corporis    Personal history of other infectious and parasitic diseases 09/08/2017    History of trichomoniasis    Personal history of other infectious and parasitic diseases 04/13/2018    History of trichomoniasis    Personal history of other specified conditions 04/30/2018    History of dyspnea    Personal history of other specified conditions 01/23/2018    History of epigastric pain    Personal history of other specified conditions 07/28/2017    History of urinary frequency    Personal history of other specified conditions 10/08/2017    History of chest pain    Proteinuria, unspecified 04/24/2017     Proteinuria    Radiculopathy, lumbar region 04/24/2017    Lumbar radiculopathy    Rupture of anterior cruciate ligament of knee 02/02/2023    Spinal stenosis, site unspecified 08/19/2015    Spinal stenosis, multiple sites in spine    Type 2 diabetes mellitus     Unspecified abnormal finding in specimens from other organs, systems and tissues 04/24/2017    Abnormal laboratory test result    Unspecified asthma, uncomplicated ([facility]-HCC)     Active asthma    Vertigo     ECHO 5/21/22    Vision loss    [2]   Past Surgical History:  Procedure Laterality Date    BACK SURGERY  09/26/2016    Back Surgeryx5, has spinal stimulator    CARDIAC CATHETERIZATION N/A 12/18/2024    Procedure: Right Heart Cath;  Surgeon: Helio Cruz MD;  Location: [facility];  Service: Cardiovascular;  Laterality: N/A;    COLONOSCOPY      HERNIA REPAIR  08/26/2015    Hernia Repairx2    HIP ARTHROPLASTY      KNEE ARTHROPLASTY      KNEE SURGERY Right 03/29/2017    Knee Surgery, right TKR    LUMBAR LAMINECTOMY  08/26/2015    Laminectomy Lumbar    MR HEAD ANGIO WO IV CONTRAST  05/21/2022    MR HEAD ANGIO WO IV CONTRAST 5/21/2022 [facility]    MR HEAD ANGIO WO IV CONTRAST  03/12/2017    MR HEAD ANGIO WO IV CONTRAST 3/12/2017 [facility]    MR NECK ANGIO WO IV CONTRAST  05/21/2022    MR NECK ANGIO WO IV CONTRAST 5/21/2022 [facility]    MR NECK ANGIO WO IV CONTRAST  03/12/2017    MR NECK ANGIO WO IV CONTRAST 3/12/2017 [facility]    OTHER SURGICAL HISTORY  05/20/2021    Abdominal surgery    OTHER SURGICAL HISTORY Bilateral 02/24/2021    Hip replacement    OTHER SURGICAL HISTORY  09/26/2016    Gastrectomy    ROTATOR CUFF REPAIR      SHOULDER SURGERY Left     left TSR   [3]   Family History  Problem Relation Name Age of Onset    Hypertension Mother      Diabetes Mother      Asthma Mother      Deep vein thrombosis Mother      No Known Problems Father      No Known Problems Sister      Asthma Child     [4]    Current Facility-Administered Medications on File Prior to Encounter   Medication Dose Route Frequency Provider Last Rate Last Admin    lidocaine PF (Xylocaine) 10 mg/mL (1 %) injection 40 mg  4 mL subcutaneous Once Devon Claros MD         Current Outpatient Medications on File Prior to Encounter   Medication Sig Dispense Refill    albuterol 2.5 mg /3 mL (0.083 %) nebulizer solution Take 3 mL (2.5 mg) by nebulization every 4 hours if needed for wheezing. 60 mL 5    amLODIPine (Norvasc) 10 mg tablet Take 1 tablet (10 mg) by mouth once daily. 90 tablet 3    atorvastatin (Lipitor) 80 mg tablet Take 1 tablet (80 mg) by mouth once daily at bedtime. 90 tablet 3    cholecalciferol (Vitamin D-3) 50 MCG (2000 UT) tablet Take 1 tablet (50 mcg) by mouth once daily. 90 tablet 3    DULoxetine (Cymbalta) 30 mg DR capsule Take 1 capsule (30 mg) by mouth 2 times a day. Do not crush or chew. 60 capsule 11    empagliflozin (Jardiance) 10 mg Take 1 tablet (10 mg) by mouth once daily. 90 tablet 3    metFORMIN (Glucophage) 1,000 mg tablet Take 1 tablet (1,000 mg) by mouth once daily with breakfast. 30 tablet 11    montelukast (Singulair) 10 mg tablet Take 1 tablet (10 mg) by mouth once daily at bedtime. 90 tablet 3    neomycin-polymyxin B-dexameth (Polydex) 3.5 mg/g-10,000 unit/g-0.1 % ointment ophthalmic ointment Apply to both eyes 3 times a day. Apply to both eyes 3 times daily 3.5 g 0    oxyCODONE-acetaminophen (Percocet) 7.5-325 mg tablet Take 1 tablet by mouth every 6 hours if needed for severe pain (7 - 10). Do not fill before April 7, 2025. 90 tablet 0    Symbicort 160-4.5 mcg/actuation inhaler Inhale 2 puffs 2 times a day. Rinse mouth after use.      tadalafil (Cialis) 20 mg tablet Take 1 tablet (20 mg) by mouth once daily as needed for erectile dysfunction. 10 tablet 11    tamsulosin (Flomax) 0.4 mg 24 hr capsule Take 2 capsules (0.8 mg) by mouth once daily at bedtime. 60 capsule 11    Ventolin HFA 90 mcg/actuation  inhaler INHALE 1 OR 2 PUFFS EVERY 4 TO 6 HOURS AS NEEDED 18 g 11    naloxone (Narcan) 4 mg/0.1 mL nasal spray Administer 1 spray (4 mg) into affected nostril(s) if needed for opioid reversal. May repeat every 2-3 minutes if needed, alternating nostrils, until medical assistance becomes available. 2 each 0    [START ON 5/7/2025] oxyCODONE-acetaminophen (Percocet) 7.5-325 mg tablet Take 1 tablet by mouth every 6 hours if needed for severe pain (7 - 10). Do not fill before May 7, 2025. 90 tablet 0    [DISCONTINUED] loratadine (Claritin) 10 mg tablet Take 1 tablet (10 mg) by mouth once daily as needed for allergies. 30 tablet 2

## 2025-04-28 NOTE — OP NOTE
RIGHT EYELID RECONSTRUCTION (R) Operative Note     Date: 2025  OR Location: [facility] OR    Name: Tomasz Maxwell, : 1975, Age: 49 y.o., MRN: [id number], Sex: male    Diagnosis  Pre-op Diagnosis      * Floppy lid syndrome [H02.59]     * Laxity of eyelid [H02.59]     * Ectropion of both lower eyelids, unspecified ectropion type [H02.102, H02.105] Post-op Diagnosis     * Floppy lid syndrome [H02.59]     * Laxity of eyelid [H02.59]     * Ectropion of both lower eyelids, unspecified ectropion type [H02.102, H02.105]     Procedures  RIGHT EYELID RECONSTRUCTION  45542 - [place] RCNSTJ EYELID FULL THICKNESS <TWO-THIRDS 1 STG    RIGHT EYELID RECONSTRUCTION  43743 - ND REPAIR ECTROPION EXTENSIVE      Surgeons      * Ledy Emerson - Primary    Resident/Fellow/Other Assistant:  Samantha Rowley MD (Resident, Assisting)    Staff:   Circulator: Mariana Chowdhury Person: Vy    Anesthesia Staff: Anesthesiologist: Massimo Edwards MD  CRNA: SYLVAIN Card-HALEY    Procedure Summary  Anesthesia: General  ASA: III  Estimated Blood Loss: 5mL  Intra-op Medications:   Administrations occurring from 1200 to 1400 on 25:   Medication Name Total Dose   lidocaine-epinephrine (Xylocaine W/EPI) 2 %-1:100,000 injection 7 mL   sodium chloride 0.9 % irrigation solution 500 mL   fentaNYL (Sublimaze) injection 50 mcg/mL 100 mcg   LR bolus Cannot be calculated   lidocaine (cardiac) injection 2% prefilled syringe 100 mg   midazolam (Versed) injection 1 mg/mL 2 mg   propofol (Diprivan) injection 10 mg/mL 200 mg   rocuronium (ZeMuron) 50 mg/5 mL injection 10 mg   succinylcholine PF in sodium chloride IV syringe 180 mg              Anesthesia Record               Intraprocedure I/O Totals          Intake    LR bolus 900.00 mL    Total Intake 900 mL       Output    Est. Blood Loss 5 mL    Total Output 5 mL       Net    Net Volume 895 mL          Specimen:   ID Type Source Tests Collected by Time   1 : RIGHT UPPER EYELID Tissue EYELID RIGHT  SURGICAL PATHOLOGY EXAM Ledy Emerson MD 4/28/2025 5320      Drains and/or Catheters: * None in log *  Specimens: Right upper eyelid wedge resection  Findings: extensive laxity and rubbery texture of upper and lower eyelids; easy eversion      Indications: Tomasz Maxwell is an 49 y.o. male who is having surgery for Floppy lid syndrome [H02.59]  Laxity of eyelid [H02.59]  Ectropion of both lower eyelids, unspecified ectropion type [H02.102, H02.105].     The patient was seen in the preoperative area. The risks, benefits, complications, treatment options, non-operative alternatives, expected recovery and outcomes were discussed with the patient. The possibilities of reaction to medication, pulmonary aspiration, injury to surrounding structures, bleeding, recurrent infection, the need for additional procedures, failure to diagnose a condition, and creating a complication requiring transfusion or operation were discussed with the patient. The patient concurred with the proposed plan, giving informed consent.  The site of surgery was properly noted/marked if necessary per policy. The patient has been actively warmed in preoperative area. Preoperative antibiotics are not indicated. Venous thrombosis prophylaxis are not indicated.    Procedure Details:   The patient was brought to the operative suite and identified as the correct patient and the correct site of surgery was confirmed. Proparacaine drops were placed in both eyes. The patient was placed in a supine position. Following initiation of general anesthesia with intubation, the patient was prepped and draped in the usual sterile fashion. A proposed vertical resection line was drawn using a marking pen at the junction of the medial two-thirds and lateral one-third of the eyelid. An injection of an 8:1 solution of 1% lidocaine with epinephrine 1:100,000 to tranexamic acid (100mg/mL) was given in the right upper lid and superior cul-de-sac, as well as in the lateral  canthal angle and right lower eyelid.     A vertical incision was made through the line using a #15 Bard Yung blade through the entire vertical length of the tarsus of the right upper eyelid. Two toothed forceps were used to overlap excess eyelid from medial to lateral and this was marked with a skin marking pen. A second vertical incision was made along the second line using a #15 Bard Yung blade through the entire length of the tarsus. The wedge resection was completed with Dagoberto scissors and the tissue was sent in formalin. Hemostasis was achieved with bipolar cautery. A tarsoconjunctival flap was then performed from medial to lateral and secured over the eyelid defect using 5-0 vicryl suture in a lamellar fashion along the entire length of the tarsus. Care was taken to achieve good reapproximation of the eyelid margin. A 5-0 vicryl suture was placed in a vertical mattress fashion through the meibomian gland orifices to approximate and tait the eyelid margin. A second 5-0 vicryl suture was then place in an interrupted fashion through the grey line to re-approximate the eyelash line.  Orbicularis was then advanced over the tarsoconjunctival flap and secured into position using 5-0 vicryl suture in a buried interrupted fashion. The skin was closed using 5-0 plain gut sutures in interrupted fashion.      Attention was then directed to the ectropion repair of the right lower eyelid. A horizontal incision was made just lateral to the lateral canthal angle using a #15 Bard Yung blade, extending laterally to the lateral orbital rim. Dissection was carried out down to the orbital rim using Wescot scissors, [place] Microdissection Needle cautery, and cotton-tipped applicators. Hemostasis was achieved with bipolar cautery. A sub-orbicularis dissection plane was then created from the inferior edge of the wound extending to the lateral aspect of the right lower eyelid. A 5-0 vicryl suture on a P2 needle was then  passed through the lateral aspect of the right lower eyelid, incorporating the superior portion of the tarsus, and then through the inner aspect of the lateral orbital rim periosteum. A second suture was passed in an identical fashion through the inferior portion of the tarsus. The tarsal sutures were tied allowing for resolution of the ectropion and improvement of the lower lid laxity. A skin muscle flap was formed laterally, advanced superiorly and laterally, and trimmed to the appropriate length. A 5-0 vicryl suture on a P2 needle was passed in a buried interrupted fashion through the orbicularis and superficial temporalis fascia to resuspend the lower lid and mid face. The skin was closed using 5-0 plain gut in a running fashion.      At the closure of surgery, there was no evidence of active bleeding or excessive swelling. Antibiotic ointment was placed over the eye and eyelid. The patient tolerated the procedure well and was taken to the recovery room in stable condition and remained stable without evidence of active bleeding or excessive swelling throughout the PACU stay. The patient was provided with an eye shield and instructed to wear it at night over the eye to decrease the risk of postoperative wound dehiscence.       Evidence of Infection: No   Complications:  None; patient tolerated the procedure well.    Disposition: PACU - hemodynamically stable.  Condition: stable       Attending Attestation: I was present and scrubbed for the entire procedure.    Ledy Emerson  Phone Number: [phone]

## 2025-04-28 NOTE — ANESTHESIA PROCEDURE NOTES
Airway  Date/Time: 4/28/2025 1:36 PM  Reason: elective    Airway not difficult    Staffing  Performed: CRNA   Authorized by: Massimo Edwards MD    Performed by: SYLVAIN Card-HALEY  Patient location during procedure: OR    Patient Condition  Indications for airway management: anesthesia  Patient position: sniffing  Planned trial extubation  Sedation level: deep     Final Airway Details   Preoxygenated: yes  Final airway type: endotracheal airway  Successful airway: ETT  Cuffed: yes   Successful intubation technique: video laryngoscopy  Adjuncts used in placement: intubating stylet  Endotracheal tube insertion site: oral  Blade: Nicholas  Blade size: #3  ETT size (mm): 7.5  Cormack-Lehane Classification: grade I - full view of glottis  Placement verified by: chest auscultation   Measured from: lips  ETT to lips (cm): 21  Number of attempts at approach: 1    Additional Comments  Large epiglottis-easy intubation with Babcock X3

## 2025-04-28 NOTE — BRIEF OP NOTE
Date: 2025  OR Location: [facility]    Name: Tomasz Maxwell, : 1975, Age: 49 y.o., MRN: [id number], Sex: male    Diagnosis  Pre-op Diagnosis      * Floppy lid syndrome [H02.59]     * Laxity of eyelid [H02.59]     * Ectropion of both lower eyelids, unspecified ectropion type [H02.102, H02.105] Post-op Diagnosis     * Floppy lid syndrome [H02.59]     * Laxity of eyelid [H02.59]     * Ectropion of both lower eyelids, unspecified ectropion type [H02.102, H02.105]     Procedures  RIGHT EYELID RECONSTRUCTION  49206 - [place] RCNSTJ EYELID FULL THICKNESS <TWO-THIRDS 1 STG    RIGHT EYELID RECONSTRUCTION  01579 - [place] REPAIR ECTROPION EXTENSIVE      Surgeons      * Cathyo Gostimharvey - Primary    Resident/Fellow/Other Assistant:  Surgeons and Role:  * No surgeons found with a matching role *    Staff:   Circulator: Mariana Chowdhury Person: Vy    Anesthesia Staff: Anesthesiologist: Massimo Edwards MD  CRNA: SYLVAIN Card-HALEY    Procedure Summary  Anesthesia: General  ASA: III  Estimated Blood Loss: <5 mL  Intra-op Medications:   Administrations occurring from 1200 to 1400 on 25:   Medication Name Total Dose   lidocaine-epinephrine (Xylocaine W/EPI) 2 %-1:100,000 injection 7 mL   sodium chloride 0.9 % irrigation solution 500 mL   fentaNYL (Sublimaze) injection 50 mcg/mL 100 mcg   LR bolus Cannot be calculated   lidocaine (cardiac) injection 2% prefilled syringe 100 mg   midazolam (Versed) injection 1 mg/mL 2 mg   propofol (Diprivan) injection 10 mg/mL 200 mg   rocuronium (ZeMuron) 50 mg/5 mL injection 10 mg   succinylcholine PF in sodium chloride IV syringe 180 mg              Anesthesia Record               Intraprocedure I/O Totals          Intake    LR bolus 600.00 mL    Total Intake 600 mL       Output    Est. Blood Loss 5 mL    Total Output 5 mL       Net    Net Volume 595 mL          Specimen:   ID Type Source Tests Collected by Time   1 : RIGHT UPPER EYELID Tissue EYELID RIGHT SURGICAL PATHOLOGY EXAM Miso  MD Idania 4/28/2025 5114                  Findings: Floppy right upper eyelid and right lower eyelid laxity.    Complications:  None; patient tolerated the procedure well.     Disposition: PACU - hemodynamically stable.  Condition: stable  Specimens Collected:   ID Type Source Tests Collected by Time   1 : RIGHT UPPER EYELID Tissue EYELID RIGHT SURGICAL PATHOLOGY EXAM Ledy Emerson MD 4/28/2025 5341     Attending Attestation:     Ledy Emerson  Phone Number: [phone]

## 2025-04-28 NOTE — ANESTHESIA POSTPROCEDURE EVALUATION
Patient: Tomasz Maxwell    Procedure Summary       Date: 04/28/25 Room / Location: [facility] OR 02 / [facility]    Anesthesia Start: 1322 Anesthesia Stop: 1514    Procedure: RIGHT EYELID RECONSTRUCTION (Right) Diagnosis:       Floppy lid syndrome      Laxity of eyelid      Ectropion of both lower eyelids, unspecified ectropion type      (Floppy lid syndrome [H02.59])      (Laxity of eyelid [H02.59])      (Ectropion of both lower eyelids, unspecified ectropion type [H02.102, H02.105])    Surgeons: Ledy Emerson MD Responsible Provider: Massimo Edwards MD    Anesthesia Type: general ASA Status: 3            Anesthesia Type: general    Vitals Value Taken Time   /76 04/28/25 15:14   Temp 36 04/28/25 15:14   Pulse 79 04/28/25 15:14   Resp 14 04/28/25 15:14   SpO2 100 04/28/25 15:14       Anesthesia Post Evaluation    Patient location during evaluation: PACU  Patient participation: complete - patient cannot participate  Level of consciousness: sleepy but conscious  Pain score: 0  Pain management: adequate  Airway patency: patent  Cardiovascular status: acceptable  Respiratory status: acceptable  Hydration status: acceptable  Postoperative Nausea and Vomiting: none        There were no known notable events for this encounter.

## 2025-04-30 ENCOUNTER — APPOINTMENT (OUTPATIENT)
Dept: OPHTHALMOLOGY | Facility: CLINIC | Age: 50
End: 2025-04-30
Payer: MEDICARE

## 2025-04-30 NOTE — PROGRESS NOTES
Phoned pt on POD#2 to check in on them following surgery. Pt reports they are doing well. No significant pain or vision loss. No specific concerns and pt is in overall good spirits.     Will continue with current plans until reassessment at upcoming follow-up visit. Pt advised to call, present to clinic, or present to ED with any acute changes or concerns.

## 2025-05-01 PROBLEM — M25.559 HIP PAIN: Status: ACTIVE | Noted: 2025-05-01

## 2025-05-05 ENCOUNTER — PATIENT OUTREACH (OUTPATIENT)
Dept: CARE COORDINATION | Facility: CLINIC | Age: 50
End: 2025-05-05
Payer: MEDICARE

## 2025-05-06 ENCOUNTER — APPOINTMENT (OUTPATIENT)
Dept: OPHTHALMOLOGY | Facility: CLINIC | Age: 50
End: 2025-05-06
Payer: MEDICARE

## 2025-05-06 DIAGNOSIS — H02.831 DERMATOCHALASIS OF BOTH UPPER EYELIDS: ICD-10-CM

## 2025-05-06 DIAGNOSIS — H02.102 ECTROPION OF BOTH LOWER EYELIDS, UNSPECIFIED ECTROPION TYPE: ICD-10-CM

## 2025-05-06 DIAGNOSIS — H02.834 DERMATOCHALASIS OF BOTH UPPER EYELIDS: ICD-10-CM

## 2025-05-06 DIAGNOSIS — H02.59 FLOPPY LID SYNDROME: Primary | ICD-10-CM

## 2025-05-06 DIAGNOSIS — H02.105 ECTROPION OF BOTH LOWER EYELIDS, UNSPECIFIED ECTROPION TYPE: ICD-10-CM

## 2025-05-06 PROCEDURE — 99024 POSTOP FOLLOW-UP VISIT: CPT

## 2025-05-06 RX ORDER — NEOMYCIN SULFATE, POLYMYXIN B SULFATE AND DEXAMETHASONE 3.5; 10000; 1 MG/ML; [USP'U]/ML; MG/ML
1 SUSPENSION/ DROPS OPHTHALMIC 2 TIMES DAILY
Qty: 3 ML | Refills: 0 | Status: SHIPPED | OUTPATIENT
Start: 2025-05-06

## 2025-05-06 ASSESSMENT — VISUAL ACUITY
OD_CC: 20/40-1
OS_PH_CC: 20/25
METHOD: SNELLEN - LINEAR
OS_CC: 20/50
CORRECTION_TYPE: GLASSES

## 2025-05-06 ASSESSMENT — TONOMETRY
IOP_METHOD: TONOPEN
OS_IOP_MMHG: 12
OD_IOP_MMHG: 12

## 2025-05-06 ASSESSMENT — EXTERNAL EXAM - LEFT EYE: OS_EXAM: NORMAL

## 2025-05-06 ASSESSMENT — EXTERNAL EXAM - RIGHT EYE: OD_EXAM: NORMAL

## 2025-05-06 NOTE — PROGRESS NOTES
S/p BUL + BLL FES repair  4/15/25: DUDLEY wedge resection, LLL ectropion repair with LTS  4/28/25: RUL wedge resection, RLL ectropion repair with lateral canthopexy (ectropion was not as severe on right side)    Doing well. No lagophthalmos. Incisions secure and healing well. Cornea normal. Sutures out. Continue ointment for 1 more week then stop. Take it easy for 1 more week then okay to resume normal activities. Continue with eye shields OU qhs.    Rx sent for maxitrol mary BID OU. AT qid prn OU.     Follow up in 6-8 weeks. Sooner with any acute changes or worsening.

## 2025-05-15 ENCOUNTER — APPOINTMENT (OUTPATIENT)
Dept: CARDIOLOGY | Facility: CLINIC | Age: 50
End: 2025-05-15
Payer: MEDICARE

## 2025-05-22 ENCOUNTER — APPOINTMENT (OUTPATIENT)
Dept: PULMONOLOGY | Facility: HOSPITAL | Age: 50
End: 2025-05-22
Payer: MEDICARE

## 2025-05-23 ENCOUNTER — PATIENT OUTREACH (OUTPATIENT)
Dept: CARE COORDINATION | Facility: CLINIC | Age: 50
End: 2025-05-23
Payer: MEDICARE

## 2025-06-03 DIAGNOSIS — E11.9 TYPE 2 DIABETES MELLITUS WITHOUT COMPLICATION, WITHOUT LONG-TERM CURRENT USE OF INSULIN: ICD-10-CM

## 2025-06-05 RX ORDER — DULAGLUTIDE 1.5 MG/.5ML
1.5 INJECTION, SOLUTION SUBCUTANEOUS WEEKLY
Qty: 2 ML | Refills: 0 | Status: SHIPPED | OUTPATIENT
Start: 2025-06-05

## 2025-06-09 ENCOUNTER — OFFICE VISIT (OUTPATIENT)
Facility: HOSPITAL | Age: 50
End: 2025-06-09
Payer: MEDICARE

## 2025-06-09 VITALS
HEIGHT: 65 IN | HEART RATE: 66 BPM | WEIGHT: 278 LBS | TEMPERATURE: 97 F | OXYGEN SATURATION: 95 % | DIASTOLIC BLOOD PRESSURE: 74 MMHG | SYSTOLIC BLOOD PRESSURE: 125 MMHG | BODY MASS INDEX: 46.32 KG/M2

## 2025-06-09 DIAGNOSIS — M96.1 LUMBAR POST-LAMINECTOMY SYNDROME: ICD-10-CM

## 2025-06-09 DIAGNOSIS — J98.59 HERNIA OF MEDIASTINUM: ICD-10-CM

## 2025-06-09 DIAGNOSIS — E11.9 TYPE 2 DIABETES MELLITUS WITHOUT COMPLICATION, WITHOUT LONG-TERM CURRENT USE OF INSULIN: Primary | ICD-10-CM

## 2025-06-09 DIAGNOSIS — E11.9 TYPE 2 DIABETES MELLITUS WITHOUT COMPLICATION, WITHOUT LONG-TERM CURRENT USE OF INSULIN: ICD-10-CM

## 2025-06-09 PROCEDURE — 3078F DIAST BP <80 MM HG: CPT

## 2025-06-09 PROCEDURE — 99214 OFFICE O/P EST MOD 30 MIN: CPT

## 2025-06-09 PROCEDURE — G2211 COMPLEX E/M VISIT ADD ON: HCPCS

## 2025-06-09 PROCEDURE — 3051F HG A1C>EQUAL 7.0%<8.0%: CPT

## 2025-06-09 PROCEDURE — 1036F TOBACCO NON-USER: CPT

## 2025-06-09 PROCEDURE — 99214 OFFICE O/P EST MOD 30 MIN: CPT | Mod: GC

## 2025-06-09 PROCEDURE — 3074F SYST BP LT 130 MM HG: CPT

## 2025-06-09 PROCEDURE — 3008F BODY MASS INDEX DOCD: CPT

## 2025-06-09 RX ORDER — IBUPROFEN 200 MG
CAPSULE ORAL
Qty: 100 STRIP | Refills: 3 | Status: SHIPPED | OUTPATIENT
Start: 2025-06-09

## 2025-06-09 RX ORDER — DEXTROSE 4 G
TABLET,CHEWABLE ORAL
Qty: 1 EACH | Refills: 1 | Status: SHIPPED | OUTPATIENT
Start: 2025-06-09

## 2025-06-09 RX ORDER — PEN NEEDLE, DIABETIC 30 GX3/16"
NEEDLE, DISPOSABLE MISCELLANEOUS
Qty: 100 EACH | Refills: 3 | Status: SHIPPED | OUTPATIENT
Start: 2025-06-09

## 2025-06-09 RX ORDER — OXYCODONE AND ACETAMINOPHEN 7.5; 325 MG/1; MG/1
1 TABLET ORAL EVERY 6 HOURS PRN
Qty: 90 TABLET | Refills: 0 | Status: SHIPPED | OUTPATIENT
Start: 2025-08-11 | End: 2025-09-10

## 2025-06-09 RX ORDER — OXYCODONE AND ACETAMINOPHEN 7.5; 325 MG/1; MG/1
1 TABLET ORAL EVERY 6 HOURS PRN
Qty: 90 TABLET | Refills: 0 | Status: SHIPPED | OUTPATIENT
Start: 2025-06-09 | End: 2025-07-09

## 2025-06-09 RX ORDER — ISOPROPYL ALCOHOL 70 ML/100ML
1 SWAB TOPICAL EVERY 6 HOURS PRN
Qty: 100 EACH | Refills: 3 | Status: SHIPPED | OUTPATIENT
Start: 2025-06-09

## 2025-06-09 RX ORDER — OXYCODONE AND ACETAMINOPHEN 7.5; 325 MG/1; MG/1
1 TABLET ORAL EVERY 6 HOURS PRN
Qty: 90 TABLET | Refills: 0 | Status: SHIPPED | OUTPATIENT
Start: 2025-07-10 | End: 2025-08-09

## 2025-06-09 RX ORDER — DEXTROSE 4 G
TABLET,CHEWABLE ORAL
Qty: 1 EACH | Refills: 0 | Status: SHIPPED | OUTPATIENT
Start: 2025-06-09 | End: 2025-06-10

## 2025-06-09 RX ORDER — LANCETS
EACH MISCELLANEOUS
Qty: 100 EACH | Refills: 3 | Status: SHIPPED | OUTPATIENT
Start: 2025-06-09

## 2025-06-09 RX ORDER — ISOPROPYL ALCOHOL 70 ML/100ML
SWAB TOPICAL
Qty: 100 EACH | Refills: 3 | Status: SHIPPED | OUTPATIENT
Start: 2025-06-09

## 2025-06-09 ASSESSMENT — PAIN SCALES - GENERAL: PAINLEVEL_OUTOF10: 8

## 2025-06-09 NOTE — PATIENT INSTRUCTIONS
Thank you for coming in today!   This is a summarized list of things we discussed today and next steps:  - Please call to schedule an appointment for Specialty Provider [phone]  - Please start taking Trulicity 3 mg weekly  - Please go to the lab to get blood and/or urine tests   - I will leave a comment on MyChart if results are normal and I will call if any changes in management is required.  - Please read any attached handouts and medication list  - Please schedule a follow-up apt with me in 1 months. Please call my office at [phone] with any questions.

## 2025-06-09 NOTE — PROGRESS NOTES
Subjective   Patient ID: Tomasz Maxwell is a 49 y.o. male who presents for Follow-up and Med Refill.    HPI    #Chronic Low Back Pain  -Takes percocet 7.5-325 mg TID PRN  -Patient not in physical therapy  -Exercise: treadmill and bike 3 times per week    Diabetes  Lab Results   Component Value Date    HGBA1C 7.1 (A) 03/06/2025     Lab Results   Component Value Date    LDLCALC 59 10/16/2024     The ASCVD Risk score (Flor DOYLE, et al., 2019) failed to calculate for the following reasons:    Risk score cannot be calculated because patient has a medical history suggesting prior/existing ASCVD  Lab Results   Component Value Date    CREATININE 1.11 02/07/2025     - medications: Trulicity 1.5 mg weekly, metformin 1000 mg daily, Jardiance 10 mg daily.   - Tolerating all medications well.   - Patient says he has lost 20 pounds in last 3 months   - missing any doses: no  - takes BS?: no, patient does not have finger pricks or sticks   - on statin: Atorvastatin 80 mg daily   - on ACEi/ARB: No     OARRS:  [unfilled]  I have personally reviewed the OARRS report for Tomasz Maxwell. I have considered the risks of abuse, dependence, addiction and diversion    Is the patient prescribed a combination of a benzodiazepine and opioid?  No    Last Urine Drug Screen:  [unfilled]  Results are as expected.     Controlled Substance Agreement:  Date of the Last Agreement: 8/20/23      Opioids:  What is the patient's goal of therapy? Functional pain  Is this being achieved with current treatment? yes    I have calculated the patient's Morphine Dose Equivalent (MED): 45 MME/DAY  I have considered referral to Pain Management and/or a specialist, and do not feel it is necessary at this time.    I feel that it is clinically indicated to continue this current medication regimen after consideration of alternative therapies, and other non-opioid treatment.      No chills, diaphoresis,   No rhinorrhea, lacrimation, sneezing,   No nausea, GI  "cramping, diarrhea, abnormal back and leg pain, tremulousness,   No  opioid side effects including marked constipation, sedation, or leg edema.  No symptoms suggestive of intoxication or sedation.       Review of Systems  Denies abdominal pain, nausea, vomiting, constipation, diarrhea.  All other systems reviewed were negative.    Medications Ordered Prior to Encounter[1]     Objective   Vitals: /74 (BP Location: Right arm, Patient Position: Sitting)   Pulse 66   Temp 36.1 °C (97 °F) (Temporal)   Ht 1.651 m (5' 5\")   Wt 126 kg (278 lb)   SpO2 95%   BMI 46.26 kg/m²      Physical Exam  General: well-appearing, NAD  HEENT: NC/AT  Cardiac: RRR, no m/g/r  Lungs: Normal work of breathing, CTAB  Abdomen: Abdomen was distended, no tenderness to palpation, NABS, hernia in epigastric region  MSK: no peripheral edema, cyanosis or pallor  Neuro: Grossly intact  Psych: Mood is appropriate    Assessment/Plan     Tomasz Maxwell is a 49 y.o. male with PMH of Type II Diabetes Mellitus who presents for Follow-up and Med Refill.    #Diabetes type 2  :: Chronic, Stable  :: Patient is tolerating Trulicity, metformin, and Jardiance.   -Plan: Increase Trulicity dose to 3 mg weekly. Continue with metformin 1000 mg daily, Jardiance 10 mg daily. Refill glucometer for hypoglycemic episodes. Encouraged healthy diet and continuing exercise 3x per week. Repeat renal function panel.      #Back pain  :: Chronic, Stable  :: Checked PDMP. Patient is only being prescribed opioids by Dr. Blank. Not taking benzos.   -Plan: Refill percocet 7.5-325 mg TID PRN. Continue with back-pain relieving exercise regimen including cycling for back pain. Offered PT.     #Abdominal Hernia, Epigastrum  -Refer to general surgery for removal     Problem List Items Addressed This Visit           ICD-10-CM    Lumbar post-laminectomy syndrome M96.1    Relevant Medications    oxyCODONE-acetaminophen (Percocet) 7.5-325 mg tablet (Start on 7/10/2025)    " "oxyCODONE-acetaminophen (Percocet) 7.5-325 mg tablet    oxyCODONE-acetaminophen (Percocet) 7.5-325 mg tablet (Start on 8/11/2025)    Other Relevant Orders    Referral to Physical Therapy    Diabetes mellitus (Multi) - Primary E11.9    Relevant Medications    dulaglutide (Trulicity) 3 mg/0.5 mL injection    blood-glucose meter misc    pen needle, diabetic 31 gauge x 5/16\" needle    alcohol swabs (Alcohol Prep Pads)    lancets misc    blood sugar diagnostic (Blood Glucose Test)    blood-glucose meter misc    alcohol swabs (Alcohol Prep Pads)    Other Relevant Orders    Follow Up In Primary Care    Hemoglobin A1C     Other Visit Diagnoses         Codes      Hernia of mediastinum     J98.59    Relevant Orders    Referral to General Surgery            Attending Supervision: seen and discussed with attending physician (cosigner listed on this note) Dr. Hector.    RTC in 1 month for Trulicity dose increase and 3 mo for chronic pain and other conditions , or earlier as needed.    Denisha Perry, MS3    I saw and evaluated the patient. I personally obtained the key and critical portions of the history and physical exam. I reviewed and modified the student's documentation and discussed patient with the medical student. I agree with the above documentation and medical decision making.     Patient seen and discussed with attending physician Dr. Hector.    Plan preliminary until cosigned by attending physician.    Portions of this note have been composed using voice recognition and may contain transcription errors.     Corrina Blank M.D.  Family Medicine  PGY-2         [1]   Current Outpatient Medications on File Prior to Visit   Medication Sig Dispense Refill    albuterol 2.5 mg /3 mL (0.083 %) nebulizer solution Take 3 mL (2.5 mg) by nebulization every 4 hours if needed for wheezing. 60 mL 5    amLODIPine (Norvasc) 10 mg tablet Take 1 tablet (10 mg) by mouth once daily. 90 tablet 3    atorvastatin (Lipitor) 80 mg tablet " Take 1 tablet (80 mg) by mouth once daily at bedtime. 90 tablet 3    cholecalciferol (Vitamin D-3) 50 MCG (2000 UT) tablet Take 1 tablet (50 mcg) by mouth once daily. 90 tablet 3    [] dextran 70-hypromellose (Bion Tears) 0.1-0.3 % ophthalmic solution Administer 1 drop into both eyes 4 times a day as needed for dry eyes. 15 mL 2    DULoxetine (Cymbalta) 30 mg DR capsule Take 1 capsule (30 mg) by mouth 2 times a day. Do not crush or chew. 60 capsule 11    empagliflozin (Jardiance) 10 mg Take 1 tablet (10 mg) by mouth once daily. 90 tablet 3    loratadine (Claritin) 10 mg tablet TAKE 1 TABLET (10 MG) BY MOUTH ONCE DAILY AS NEEDED FOR ALLERGIES. 30 tablet 3    metFORMIN (Glucophage) 1,000 mg tablet Take 1 tablet (1,000 mg) by mouth once daily with breakfast. 30 tablet 11    montelukast (Singulair) 10 mg tablet Take 1 tablet (10 mg) by mouth once daily at bedtime. 90 tablet 3    naloxone (Narcan) 4 mg/0.1 mL nasal spray Administer 1 spray (4 mg) into affected nostril(s) if needed for opioid reversal. May repeat every 2-3 minutes if needed, alternating nostrils, until medical assistance becomes available. 2 each 0    neomycin-polymyxin B-dexameth (Polydex) 3.5 mg/g-10,000 unit/g-0.1 % ointment ophthalmic ointment Apply to both eyes 3 times a day. Apply to both eyes 3 times daily 3.5 g 0    neomycin-polymyxin B-dexameth (Polydex) 3.5 mg/g-10,000 unit/g-0.1 % ointment ophthalmic ointment Apply to right eyelid two times daily as directed 3.5 g 1    neomycin-polymyxin-dexAMETHasone (Maxitrol) 3.5mg/mL-10,000 unit/mL-0.1 % ophthalmic suspension Administer 1 drop into both eyes 2 times a day. 3 mL 0    Symbicort 160-4.5 mcg/actuation inhaler Inhale 2 puffs 2 times a day. Rinse mouth after use.      tadalafil (Cialis) 20 mg tablet Take 1 tablet (20 mg) by mouth once daily as needed for erectile dysfunction. 10 tablet 11    tamsulosin (Flomax) 0.4 mg 24 hr capsule Take 2 capsules (0.8 mg) by mouth once daily at bedtime.  60 capsule 11    Ventolin HFA 90 mcg/actuation inhaler INHALE 1 OR 2 PUFFS EVERY 4 TO 6 HOURS AS NEEDED 18 g 11    [DISCONTINUED] dulaglutide (Trulicity) 1.5 mg/0.5 mL pen injector injection Inject 1.5 mg under the skin 1 (one) time per week. 2 mL 1    [DISCONTINUED] dulaglutide (Trulicity) 1.5 mg/0.5 mL pen injector injection INJECT 1.5 MG UNDER THE SKIN 1 (ONE) TIME PER WEEK. 2 mL 0    [DISCONTINUED] oxyCODONE-acetaminophen (Percocet) 7.5-325 mg tablet Take 1 tablet by mouth every 6 hours if needed for severe pain (7 - 10). Do not fill before April 7, 2025. 90 tablet 0    [DISCONTINUED] oxyCODONE-acetaminophen (Percocet) 7.5-325 mg tablet Take 1 tablet by mouth every 6 hours if needed for severe pain (7 - 10). Do not fill before May 7, 2025. 90 tablet 0     Current Facility-Administered Medications on File Prior to Visit   Medication Dose Route Frequency Provider Last Rate Last Admin    lidocaine PF (Xylocaine) 10 mg/mL (1 %) injection 40 mg  4 mL subcutaneous Once Devon Claros MD

## 2025-06-10 LAB
EST. AVERAGE GLUCOSE BLD GHB EST-MCNC: 137 MG/DL
EST. AVERAGE GLUCOSE BLD GHB EST-SCNC: 7.6 MMOL/L
HBA1C MFR BLD: 6.4 %

## 2025-06-10 RX ORDER — DEXTROSE 4 G
TABLET,CHEWABLE ORAL
Qty: 1 EACH | Refills: 0 | Status: SHIPPED | OUTPATIENT
Start: 2025-06-10

## 2025-06-12 ENCOUNTER — APPOINTMENT (OUTPATIENT)
Facility: HOSPITAL | Age: 50
End: 2025-06-12
Payer: MEDICARE

## 2025-06-19 ENCOUNTER — APPOINTMENT (OUTPATIENT)
Dept: SURGERY | Facility: CLINIC | Age: 50
End: 2025-06-19
Payer: MEDICARE

## 2025-06-19 ENCOUNTER — APPOINTMENT (OUTPATIENT)
Dept: CARDIOLOGY | Facility: HOSPITAL | Age: 50
End: 2025-06-19
Payer: MEDICARE

## 2025-06-19 VITALS
BODY MASS INDEX: 46.48 KG/M2 | RESPIRATION RATE: 16 BRPM | HEIGHT: 65 IN | DIASTOLIC BLOOD PRESSURE: 78 MMHG | WEIGHT: 279 LBS | HEART RATE: 71 BPM | SYSTOLIC BLOOD PRESSURE: 113 MMHG

## 2025-06-19 DIAGNOSIS — J98.59 HERNIA OF MEDIASTINUM: ICD-10-CM

## 2025-06-19 PROCEDURE — 3008F BODY MASS INDEX DOCD: CPT | Performed by: SURGERY

## 2025-06-19 PROCEDURE — 99214 OFFICE O/P EST MOD 30 MIN: CPT | Performed by: SURGERY

## 2025-06-19 PROCEDURE — 3051F HG A1C>EQUAL 7.0%<8.0%: CPT | Performed by: SURGERY

## 2025-06-19 PROCEDURE — 3078F DIAST BP <80 MM HG: CPT | Performed by: SURGERY

## 2025-06-19 PROCEDURE — 3074F SYST BP LT 130 MM HG: CPT | Performed by: SURGERY

## 2025-06-19 ASSESSMENT — PAIN SCALES - GENERAL: PAINLEVEL_OUTOF10: 4

## 2025-06-19 NOTE — PROGRESS NOTES
History Of Present Illness  Tomasz Maxwell is a 49 y.o. male w hx of DM2, HTN, and extensive orthopedic hx presenting with concerns for epigastric hernia.  He states that he has had a mass in his epigastric region for the past 3 months. He states that it is reducible, and every week or so it would become hard and painful. He has not tried to reduce it during these episodes. He denies nausea/vomiting, hematochezia, melena, and hematemesis.  He also denies chest pain, trouble swallowing, or reflux.    He has had a hx of hernia repair twice in 2015, as well as calcified mesh excision in 2021.       Past Medical History  Medical History[1]    Surgical History  Surgical History[2]     Social History  He reports that he has never smoked. He has never used smokeless tobacco. He reports that he does not drink alcohol and does not use drugs.    Family History  Family History[3]     Allergies  Patient has no known allergies.    Review of Systems  Negative unless listed above     Physical Exam  General:  Alert. No acute distress.  Pulmonary:  regular work of breathing  Abdomen:  protuberant, non-tender to palpation. ~6x6cm mass in epigastric region, size and firmness increases on valsalva. Reducible with pressure, no pain on palpation.       A/P  Tomasz Maxwell is a 50 yo M here for evaluation of a epigastric hernia. He has a hx of hernia repairs and abdominal surgery. Physical exam and HPI correlate with rectus diastasis vs incisional hernia.     Will order CT scan to further characterize. If scan is concerning for incisional hernia, will consider ventral hernia repair with surgery.    Pt seen and discussed with Dr. Jose M SENIOR MS4         [1]   Past Medical History:  Diagnosis Date    Adhesive capsulitis of shoulder 01/29/2024    Arthritis     Contusion of left hand, initial encounter 04/24/2017    Contusion of left hand, initial encounter    Crushing injury of unspecified finger(s), initial encounter 04/24/2017     Injury, crush, finger    Essential (primary) hypertension 05/16/2022    Hypertension    Hard to intubate     Hyperlipidemia, unspecified 10/03/2022    Hyperlipidemia    Hypokalemia 04/24/2017    Hypokalemia, k= 3.8 on 1/26/23    Idiopathic gout, right knee 04/24/2017    Acute idiopathic gout of right knee    Latent syphilis, unspecified as early or late 04/13/2018    Latent syphilis with positive serology    Local infection of the skin and subcutaneous tissue, unspecified 12/05/2017    Skin infection    Long term (current) use of opiate analgesic 04/24/2017    Opioid contract exists    Mastitis without abscess 04/24/2017    Mastitis, acute    Mastodynia 04/24/2017    Breast pain, right    Muscle spasm of back 04/24/2017    Paraspinal muscle spasm    Nocturia 04/24/2017    Nocturia    Other microscopic hematuria 04/24/2017    Hematuria, microscopic    Other specified postprocedural states 04/24/2017    H/O arthroscopic knee surgery    Other specified postprocedural states 04/24/2017    Status post lumbar laminectomy    Other specified symptoms and signs involving the digestive system and abdomen 09/08/2017    Umbilical discharge    Pain in left wrist 04/24/2017    Left wrist pain    Personal history of other diseases of the musculoskeletal system and connective tissue     History of arthritis    Personal history of other diseases of the nervous system and sense organs 03/07/2016    History of acute otitis media    Personal history of other endocrine, nutritional and metabolic disease     History of diabetes mellitus    Personal history of other infectious and parasitic diseases 09/10/2017    History of tinea corporis    Personal history of other infectious and parasitic diseases 09/08/2017    History of trichomoniasis    Personal history of other infectious and parasitic diseases 04/13/2018    History of trichomoniasis    Personal history of other specified conditions 04/30/2018    History of dyspnea    Personal  history of other specified conditions 01/23/2018    History of epigastric pain    Personal history of other specified conditions 07/28/2017    History of urinary frequency    Personal history of other specified conditions 10/08/2017    History of chest pain    Proteinuria, unspecified 04/24/2017    Proteinuria    Radiculopathy, lumbar region 04/24/2017    Lumbar radiculopathy    Rupture of anterior cruciate ligament of knee 02/02/2023    Spinal stenosis, site unspecified 08/19/2015    Spinal stenosis, multiple sites in spine    Type 2 diabetes mellitus     Unspecified abnormal finding in specimens from other organs, systems and tissues 04/24/2017    Abnormal laboratory test result    Unspecified asthma, uncomplicated (HHS-HCC)     Active asthma    Vertigo     ECHO 5/21/22    Vision loss    [2]   Past Surgical History:  Procedure Laterality Date    BACK SURGERY  09/26/2016    Back Surgeryx5, has spinal stimulator    CARDIAC CATHETERIZATION N/A 12/18/2024    Procedure: Right Heart Cath;  Surgeon: Helio Cruz MD;  Location: [facility];  Service: Cardiovascular;  Laterality: N/A;    COLONOSCOPY      HERNIA REPAIR  08/26/2015    Hernia Repairx2    HIP ARTHROPLASTY      KNEE ARTHROPLASTY      KNEE SURGERY Right 03/29/2017    Knee Surgery, right TKR    LUMBAR LAMINECTOMY  08/26/2015    Laminectomy Lumbar    MR HEAD ANGIO WO IV CONTRAST  05/21/2022    MR HEAD ANGIO WO IV CONTRAST 5/21/2022 [facility]    MR HEAD ANGIO WO IV CONTRAST  03/12/2017    MR HEAD ANGIO WO IV CONTRAST 3/12/2017 [facility]    MR NECK ANGIO WO IV CONTRAST  05/21/2022    MR NECK ANGIO WO IV CONTRAST 5/21/2022 [facility]    MR NECK ANGIO WO IV CONTRAST  03/12/2017    MR NECK ANGIO WO IV CONTRAST 3/12/2017 [facility]    OTHER SURGICAL HISTORY  05/20/2021    Abdominal surgery    OTHER SURGICAL HISTORY Bilateral 02/24/2021    Hip replacement    OTHER SURGICAL HISTORY  09/26/2016    Gastrectomy    ROTATOR  CUFF REPAIR      SHOULDER SURGERY Left     left TSR   [3]   Family History  Problem Relation Name Age of Onset    Hypertension Mother      Diabetes Mother      Asthma Mother      Deep vein thrombosis Mother      No Known Problems Father      No Known Problems Sister      Asthma Child

## 2025-06-23 ENCOUNTER — APPOINTMENT (OUTPATIENT)
Dept: SURGERY | Facility: CLINIC | Age: 50
End: 2025-06-23
Payer: MEDICARE

## 2025-06-26 ENCOUNTER — TELEPHONE (OUTPATIENT)
Dept: PRIMARY CARE | Facility: CLINIC | Age: 50
End: 2025-06-26
Payer: MEDICARE

## 2025-06-26 NOTE — TELEPHONE ENCOUNTER
Patient has questions about taking (dulaglutide (Trulicity) 3 mg/0.5 mL injection ). Please call patient.

## 2025-07-02 ENCOUNTER — APPOINTMENT (OUTPATIENT)
Dept: PULMONOLOGY | Facility: HOSPITAL | Age: 50
End: 2025-07-02
Payer: MEDICARE

## 2025-07-05 DIAGNOSIS — E11.9 TYPE 2 DIABETES MELLITUS WITHOUT COMPLICATION, WITHOUT LONG-TERM CURRENT USE OF INSULIN: ICD-10-CM

## 2025-07-07 RX ORDER — DULAGLUTIDE 3 MG/.5ML
3 INJECTION, SOLUTION SUBCUTANEOUS
Qty: 2 ML | Refills: 1 | Status: SHIPPED | OUTPATIENT
Start: 2025-07-13

## 2025-07-09 ENCOUNTER — TELEPHONE (OUTPATIENT)
Dept: OPHTHALMOLOGY | Facility: CLINIC | Age: 50
End: 2025-07-09

## 2025-07-09 ENCOUNTER — OFFICE VISIT (OUTPATIENT)
Dept: OPHTHALMOLOGY | Facility: CLINIC | Age: 50
End: 2025-07-09
Payer: MEDICARE

## 2025-07-09 DIAGNOSIS — H10.013 ACUTE FOLLICULAR CONJUNCTIVITIS OF BOTH EYES: Primary | ICD-10-CM

## 2025-07-09 DIAGNOSIS — H02.59 FLOPPY LID SYNDROME: ICD-10-CM

## 2025-07-09 RX ORDER — NEOMYCIN SULFATE, POLYMYXIN B SULFATE AND DEXAMETHASONE 3.5; 10000; 1 MG/ML; [USP'U]/ML; MG/ML
1 SUSPENSION/ DROPS OPHTHALMIC 4 TIMES DAILY
Qty: 2.8 ML | Refills: 0 | Status: SHIPPED | OUTPATIENT
Start: 2025-07-09 | End: 2025-07-10

## 2025-07-09 RX ORDER — DOXYCYCLINE 100 MG/1
100 CAPSULE ORAL 2 TIMES DAILY
Qty: 28 CAPSULE | Refills: 0 | Status: SHIPPED | OUTPATIENT
Start: 2025-07-09 | End: 2025-07-23

## 2025-07-09 ASSESSMENT — TONOMETRY
IOP_METHOD: TONOPEN
OD_IOP_MMHG: 15
OS_IOP_MMHG: 15

## 2025-07-09 ASSESSMENT — VISUAL ACUITY
METHOD: SNELLEN - LINEAR
OS_SC: 20/40
OS_PH_SC: 20/25
OD_SC: 20/20

## 2025-07-09 NOTE — PROGRESS NOTES
Pt phoned in for add on appointment due to c/o redness, discharge, and tearing OU, onset shortly after last visit, 2 weeks ago  Crusting + sticking in AM requiring manual clearing  Tearing OU  Bilateral redness  No significant vision change  No sick contacts; no personal hx of URTI    Assessment/Plan  Follicular conjunctivitis OU, likely viral  Will cover with maxitrol gtts and doxycycline  RTC 2 weeks. Sooner with any acute changes or worsening

## 2025-07-09 NOTE — TELEPHONE ENCOUNTER
Patient experiencing issues since surgery.  Eye constantly swollen/puffy with bright red drainage in the AM and red sclera throughout the day.  Patient reports pain that isn't relieved with Tylenol.   contacted to get patient in for review.

## 2025-07-10 RX ORDER — NEOMYCIN SULFATE, POLYMYXIN B SULFATE AND DEXAMETHASONE 3.5; 10000; 1 MG/ML; [USP'U]/ML; MG/ML
1 SUSPENSION/ DROPS OPHTHALMIC 4 TIMES DAILY
Qty: 2.8 ML | Refills: 0 | Status: SHIPPED | OUTPATIENT
Start: 2025-07-10 | End: 2025-07-24

## 2025-07-11 ASSESSMENT — EXTERNAL EXAM - RIGHT EYE: OD_EXAM: NORMAL

## 2025-07-11 ASSESSMENT — EXTERNAL EXAM - LEFT EYE: OS_EXAM: NORMAL

## 2025-07-22 ENCOUNTER — APPOINTMENT (OUTPATIENT)
Dept: RADIOLOGY | Facility: HOSPITAL | Age: 50
End: 2025-07-22
Payer: MEDICARE

## 2025-07-22 DIAGNOSIS — E11.9 TYPE 2 DIABETES MELLITUS WITHOUT COMPLICATION, WITHOUT LONG-TERM CURRENT USE OF INSULIN: ICD-10-CM

## 2025-07-22 RX ORDER — DULAGLUTIDE 3 MG/.5ML
3 INJECTION, SOLUTION SUBCUTANEOUS
Qty: 2 ML | Refills: 1 | Status: SHIPPED | OUTPATIENT
Start: 2025-07-22

## 2025-07-23 ENCOUNTER — APPOINTMENT (OUTPATIENT)
Dept: OPHTHALMOLOGY | Facility: CLINIC | Age: 50
End: 2025-07-23
Payer: MEDICARE

## 2025-07-23 DIAGNOSIS — H10.013 ACUTE FOLLICULAR CONJUNCTIVITIS OF BOTH EYES: Primary | ICD-10-CM

## 2025-07-23 PROCEDURE — 99024 POSTOP FOLLOW-UP VISIT: CPT

## 2025-07-23 RX ORDER — NEOMYCIN SULFATE, POLYMYXIN B SULFATE, AND DEXAMETHASONE 3.5; 10000; 1 MG/G; [USP'U]/G; MG/G
OINTMENT OPHTHALMIC
Qty: 3.5 G | Refills: 0 | Status: SHIPPED | OUTPATIENT
Start: 2025-07-23

## 2025-07-23 ASSESSMENT — CONF VISUAL FIELD
OD_INFERIOR_TEMPORAL_RESTRICTION: 0
OD_SUPERIOR_NASAL_RESTRICTION: 0
OD_INFERIOR_NASAL_RESTRICTION: 0
OD_SUPERIOR_TEMPORAL_RESTRICTION: 0
OS_SUPERIOR_TEMPORAL_RESTRICTION: 0
OD_NORMAL: 1
OS_INFERIOR_NASAL_RESTRICTION: 0
OS_SUPERIOR_NASAL_RESTRICTION: 0
OS_NORMAL: 1
OS_INFERIOR_TEMPORAL_RESTRICTION: 0

## 2025-07-23 ASSESSMENT — TONOMETRY
IOP_METHOD: TONOPEN
OS_IOP_MMHG: 16
OD_IOP_MMHG: 15

## 2025-07-23 ASSESSMENT — VISUAL ACUITY
OS_SC: 20/40
OS_PH_SC: 20/25
METHOD: SNELLEN - LINEAR
OD_SC: 20/20

## 2025-07-23 ASSESSMENT — EXTERNAL EXAM - RIGHT EYE: OD_EXAM: NORMAL

## 2025-07-23 ASSESSMENT — EXTERNAL EXAM - LEFT EYE: OS_EXAM: NORMAL

## 2025-07-23 NOTE — PROGRESS NOTES
"48 y/o male s/p BUL + BLL FES repair (4/15/25).    Seen post-operatively 5/6/25, during which time patient was doing well. Sutures were removed.   Seen in clinic 7/9/25 due to c/o redness, discharge and tearing OU. Prescribed maxitrol drops (gtts) and doxycycline for follicular conjunctivitis OU (likely viral).  Today,   Patient endorses \"sticking\" of the left upper eyelid (DUDLEY) to left lower lid (LLL) in the morning.   Endorses left upper eyelid (DUDLEY) swelling (fluctuates throughout the day) and pain (1 at best, 7/10 at worst), redness OS, grittiness OS, epiphora OS. No issues with right upper eyelid (RUL)/right lower eyelid (RLL) or right eye.  Using ice packs to help with swelling (used yesterday afternoon)  Symptoms have minimally improved from 7/9/25 appointment  Current ocular regimen: maxitrol gtt taper (currently using BID OU), still using doxycycline       Assessment/Plan  Ongoing follicular conjunctivitis OU. Still appears viral. No concerning features such as vision loss or significant pain. Pt continues to be symptomatic despite current regimen.    Explained to pt that given likely viral etiology, conjunctivitis will run its course and self-resolve within a few weeks. Recommended continuing with current regimen + added maxitrol mary BID OU.    RTC in 1 week. Sooner with any acute changes or worsening.    Also noted to have pyogenic granuloma on tarsal conj of DUDLEY. No concerning features. Can consider excision after resolution of conjunctivitis.       "

## 2025-07-24 ENCOUNTER — APPOINTMENT (OUTPATIENT)
Dept: OPHTHALMOLOGY | Facility: CLINIC | Age: 50
End: 2025-07-24
Payer: MEDICARE

## 2025-07-25 ENCOUNTER — HOSPITAL ENCOUNTER (OUTPATIENT)
Dept: RADIOLOGY | Facility: HOSPITAL | Age: 50
Discharge: HOME | End: 2025-07-25
Payer: MEDICARE

## 2025-07-25 DIAGNOSIS — J98.59 HERNIA OF MEDIASTINUM: ICD-10-CM

## 2025-07-25 PROCEDURE — 74176 CT ABD & PELVIS W/O CONTRAST: CPT

## 2025-07-29 ENCOUNTER — APPOINTMENT (OUTPATIENT)
Facility: HOSPITAL | Age: 50
End: 2025-07-29
Payer: MEDICARE

## 2025-07-30 ENCOUNTER — APPOINTMENT (OUTPATIENT)
Dept: OPHTHALMOLOGY | Facility: CLINIC | Age: 50
End: 2025-07-30
Payer: MEDICARE

## 2025-07-30 DIAGNOSIS — H02.59 FLOPPY LID SYNDROME: ICD-10-CM

## 2025-07-30 DIAGNOSIS — H10.013 ACUTE FOLLICULAR CONJUNCTIVITIS OF BOTH EYES: Primary | ICD-10-CM

## 2025-07-30 DIAGNOSIS — D23.121 PAPILLOMA OF LEFT UPPER EYELID: ICD-10-CM

## 2025-07-30 PROCEDURE — 99213 OFFICE O/P EST LOW 20 MIN: CPT

## 2025-07-30 ASSESSMENT — EXTERNAL EXAM - LEFT EYE: OS_EXAM: NORMAL

## 2025-07-30 ASSESSMENT — ENCOUNTER SYMPTOMS
PSYCHIATRIC NEGATIVE: 0
HEMATOLOGIC/LYMPHATIC NEGATIVE: 0
EYES NEGATIVE: 1
NEUROLOGICAL NEGATIVE: 0
CARDIOVASCULAR NEGATIVE: 0
ALLERGIC/IMMUNOLOGIC NEGATIVE: 0
CONSTITUTIONAL NEGATIVE: 0
GASTROINTESTINAL NEGATIVE: 0
ENDOCRINE NEGATIVE: 0
RESPIRATORY NEGATIVE: 0
MUSCULOSKELETAL NEGATIVE: 0

## 2025-07-30 ASSESSMENT — VISUAL ACUITY
OS_SC: 20/40
OD_SC+: -1
OD_SC: 20/20
METHOD: SNELLEN - LINEAR

## 2025-07-30 ASSESSMENT — EXTERNAL EXAM - RIGHT EYE: OD_EXAM: NORMAL

## 2025-07-30 ASSESSMENT — TONOMETRY
OD_IOP_MMHG: 19
IOP_METHOD: TONOPEN
OS_IOP_MMHG: 17

## 2025-07-31 ENCOUNTER — OFFICE VISIT (OUTPATIENT)
Dept: SURGERY | Facility: CLINIC | Age: 50
End: 2025-07-31
Payer: MEDICARE

## 2025-07-31 VITALS
SYSTOLIC BLOOD PRESSURE: 123 MMHG | DIASTOLIC BLOOD PRESSURE: 74 MMHG | WEIGHT: 279 LBS | BODY MASS INDEX: 46.43 KG/M2 | HEART RATE: 61 BPM

## 2025-07-31 DIAGNOSIS — E11.43 DIABETIC GASTROPARESIS (MULTI): ICD-10-CM

## 2025-07-31 DIAGNOSIS — K31.84 DIABETIC GASTROPARESIS (MULTI): ICD-10-CM

## 2025-07-31 DIAGNOSIS — R11.2 NAUSEA AND VOMITING IN ADULT PATIENT: Primary | ICD-10-CM

## 2025-07-31 PROCEDURE — 3078F DIAST BP <80 MM HG: CPT | Performed by: SURGERY

## 2025-07-31 PROCEDURE — 3051F HG A1C>EQUAL 7.0%<8.0%: CPT | Performed by: SURGERY

## 2025-07-31 PROCEDURE — 3074F SYST BP LT 130 MM HG: CPT | Performed by: SURGERY

## 2025-07-31 PROCEDURE — 99213 OFFICE O/P EST LOW 20 MIN: CPT | Performed by: SURGERY

## 2025-07-31 ASSESSMENT — ENCOUNTER SYMPTOMS
CHEST TIGHTNESS: 0
CHILLS: 0
CONSTIPATION: 0
ABDOMINAL DISTENTION: 0
DIARRHEA: 1
NAUSEA: 1
HEMATURIA: 0
FEVER: 0
SHORTNESS OF BREATH: 0
BLOOD IN STOOL: 0
VOMITING: 1
ABDOMINAL PAIN: 1

## 2025-07-31 NOTE — PROGRESS NOTES
Follicular conjunctivitis now resolved  Continue with cool compresses and nightly ointment for symptom control.      LEFT UPPER eyelid lesion on tarsal conjunctiva  DDx includes pyogenic granuloma, benign cystic lesion, chalazion, or malignancy. Given growth and appearance, recommended excisional biopsy. Patient is aware that there is no guarantee of result. Risks of recurrence, scarring, bleeding, infection, lash loss, and notching explained.     Patient expressed understanding and wishes to proceed with the recommended surgery at next visit.    Will need PA for CPT 50840, ICD D23.121.      
154.9

## 2025-07-31 NOTE — PROGRESS NOTES
Subjective   Patient ID: Tomasz Maxwell is a 49 y.o. male who presents for epigastric bulge x several months.     HPI:  Patient is a 49 y.o. male with PMH of HTN, HLD, T2DM presenting with the chief complaint of epigastric bulge. The bulge is noticed while straining. He is experiencing mild intermittent pain in the epigastric region. He has episodes of nausea and vomiting 2-3 x week. This week he has developed diarrhea. Denies fever, chills, blood in urine or stool. Past abdominal surgery history includes hernia repairs (3) in 2015. He is currently not on blood thinners.     Review of Systems   Constitutional:  Negative for chills and fever.   Respiratory:  Negative for chest tightness and shortness of breath.    Cardiovascular:  Negative for chest pain.   Gastrointestinal:  Positive for abdominal pain, diarrhea, nausea and vomiting. Negative for abdominal distention, blood in stool and constipation.   Genitourinary:  Negative for hematuria.   Skin: Negative.      Objective   Physical Exam  Constitutional:       Appearance: Normal appearance.   Abdominal:      General: There is no distension.      Palpations: Abdomen is soft. There is mass.      Tenderness: There is abdominal tenderness. There is no guarding or rebound.      Hernia: No hernia is present.      Comments: Epigastric bulge present while straining.      Skin:     General: Skin is warm and dry.     Neurological:      Mental Status: He is alert.     07/25/2025 CT abdomen pelvis wo IV contrast findings - small lumbar hernia, similar to prior exam from 2024.  No additional abdominal hernia evident.    Assessment/Plan     Tomasz Maxwell is a 49 y.o. male presenting with epigastric bulge while straining consistent with diastasis recti. A discussion was completed with the patient about the diastasis recti and how it differs from a hernia. Currently, no surgical intervention required. An order was placed for the patient to complete a NM gastric emptying  solid study to determine presence of diabetic gastroparesis that is the cause of nausea and vomiting. An appointment will be scheduled at a later time to discuss the results of the study.      ADELITA CARRILLO 07/31/25 9:12 AM

## 2025-08-14 ENCOUNTER — APPOINTMENT (OUTPATIENT)
Dept: OPHTHALMOLOGY | Facility: CLINIC | Age: 50
End: 2025-08-14
Payer: MEDICARE

## 2025-08-16 DIAGNOSIS — H10.013 ACUTE FOLLICULAR CONJUNCTIVITIS OF BOTH EYES: ICD-10-CM

## 2025-08-18 ENCOUNTER — APPOINTMENT (OUTPATIENT)
Dept: PULMONOLOGY | Facility: HOSPITAL | Age: 50
End: 2025-08-18
Payer: MEDICARE

## 2025-08-18 RX ORDER — NEOMYCIN SULFATE, POLYMYXIN B SULFATE, AND DEXAMETHASONE 3.5; 10000; 1 MG/G; [USP'U]/G; MG/G
OINTMENT OPHTHALMIC
Qty: 3.5 G | Refills: 0 | Status: SHIPPED | OUTPATIENT
Start: 2025-08-18

## 2025-08-21 ENCOUNTER — HOSPITAL ENCOUNTER (OUTPATIENT)
Dept: RADIOLOGY | Facility: HOSPITAL | Age: 50
Discharge: HOME | End: 2025-08-21
Payer: MEDICARE

## 2025-08-21 DIAGNOSIS — E11.43 DIABETIC GASTROPARESIS (MULTI): ICD-10-CM

## 2025-08-21 DIAGNOSIS — K31.84 DIABETIC GASTROPARESIS (MULTI): ICD-10-CM

## 2025-08-21 PROCEDURE — A9541 TC99M SULFUR COLLOID: HCPCS | Performed by: SURGERY

## 2025-08-21 PROCEDURE — 78264 GASTRIC EMPTYING IMG STUDY: CPT

## 2025-08-21 PROCEDURE — 3430000001 HC RX 343 DIAGNOSTIC RADIOPHARMACEUTICALS: Performed by: SURGERY

## 2025-08-21 RX ADMIN — TECHNETIUM TC 99M SULFUR COLLOID KIT 1.1 MILLICURIE: KIT at 09:17

## 2025-08-25 ENCOUNTER — TELEMEDICINE (OUTPATIENT)
Dept: SURGERY | Facility: CLINIC | Age: 50
End: 2025-08-25
Payer: MEDICARE

## 2025-08-25 DIAGNOSIS — R11.2 NAUSEA AND VOMITING IN ADULT PATIENT: Primary | ICD-10-CM

## 2025-08-25 DIAGNOSIS — R11.2 NAUSEA AND VOMITING IN ADULT PATIENT: ICD-10-CM

## 2025-08-25 PROCEDURE — 99212 OFFICE O/P EST SF 10 MIN: CPT | Performed by: SURGERY

## 2025-08-25 PROCEDURE — 3051F HG A1C>EQUAL 7.0%<8.0%: CPT | Performed by: SURGERY

## 2025-09-02 DIAGNOSIS — E78.2 MIXED HYPERLIPIDEMIA: ICD-10-CM

## 2025-09-02 DIAGNOSIS — M96.1 LUMBAR POST-LAMINECTOMY SYNDROME: ICD-10-CM

## 2025-09-02 DIAGNOSIS — E11.9 TYPE 2 DIABETES MELLITUS WITHOUT COMPLICATION, WITHOUT LONG-TERM CURRENT USE OF INSULIN: ICD-10-CM

## 2025-09-02 DIAGNOSIS — E55.9 VITAMIN D DEFICIENCY: Primary | ICD-10-CM

## 2025-09-03 ENCOUNTER — ANESTHESIA EVENT (OUTPATIENT)
Dept: GASTROENTEROLOGY | Facility: HOSPITAL | Age: 50
End: 2025-09-03
Payer: MEDICARE

## 2025-09-03 ENCOUNTER — ANESTHESIA (OUTPATIENT)
Dept: GASTROENTEROLOGY | Facility: HOSPITAL | Age: 50
End: 2025-09-03
Payer: MEDICARE

## 2025-09-03 ENCOUNTER — HOSPITAL ENCOUNTER (OUTPATIENT)
Dept: GASTROENTEROLOGY | Facility: HOSPITAL | Age: 50
Discharge: HOME | End: 2025-09-03
Payer: MEDICARE

## 2025-09-03 VITALS
SYSTOLIC BLOOD PRESSURE: 127 MMHG | TEMPERATURE: 98.1 F | HEART RATE: 72 BPM | OXYGEN SATURATION: 96 % | DIASTOLIC BLOOD PRESSURE: 76 MMHG | RESPIRATION RATE: 20 BRPM

## 2025-09-03 DIAGNOSIS — R11.2 NAUSEA AND VOMITING IN ADULT PATIENT: ICD-10-CM

## 2025-09-03 PROBLEM — T88.4XXA DIFFICULT INTUBATION: Status: ACTIVE | Noted: 2025-09-03

## 2025-09-03 LAB — GLUCOSE BLD MANUAL STRIP-MCNC: 89 MG/DL (ref 74–99)

## 2025-09-03 PROCEDURE — 3700000001 HC GENERAL ANESTHESIA TIME - INITIAL BASE CHARGE: Performed by: STUDENT IN AN ORGANIZED HEALTH CARE EDUCATION/TRAINING PROGRAM

## 2025-09-03 PROCEDURE — 43239 EGD BIOPSY SINGLE/MULTIPLE: CPT | Performed by: SURGERY

## 2025-09-03 PROCEDURE — 7100000009 HC PHASE TWO TIME - INITIAL BASE CHARGE: Performed by: STUDENT IN AN ORGANIZED HEALTH CARE EDUCATION/TRAINING PROGRAM

## 2025-09-03 PROCEDURE — 2500000004 HC RX 250 GENERAL PHARMACY W/ HCPCS (ALT 636 FOR OP/ED)

## 2025-09-03 PROCEDURE — A43239 PR EDG TRANSORAL BIOPSY SINGLE/MULTIPLE: Performed by: ANESTHESIOLOGY

## 2025-09-03 PROCEDURE — 3700000002 HC GENERAL ANESTHESIA TIME - EACH INCREMENTAL 1 MINUTE: Performed by: STUDENT IN AN ORGANIZED HEALTH CARE EDUCATION/TRAINING PROGRAM

## 2025-09-03 PROCEDURE — 7100000010 HC PHASE TWO TIME - EACH INCREMENTAL 1 MINUTE: Performed by: STUDENT IN AN ORGANIZED HEALTH CARE EDUCATION/TRAINING PROGRAM

## 2025-09-03 PROCEDURE — A43239 PR EDG TRANSORAL BIOPSY SINGLE/MULTIPLE

## 2025-09-03 PROCEDURE — 82947 ASSAY GLUCOSE BLOOD QUANT: CPT

## 2025-09-03 RX ORDER — LIDOCAINE HCL/PF 100 MG/5ML
SYRINGE (ML) INTRAVENOUS AS NEEDED
Status: DISCONTINUED | OUTPATIENT
Start: 2025-09-03 | End: 2025-09-03

## 2025-09-03 RX ORDER — CHOLECALCIFEROL (VITAMIN D3) 50 MCG
100 TABLET ORAL DAILY
COMMUNITY
Start: 2025-06-24 | End: 2025-09-03 | Stop reason: SDUPTHER

## 2025-09-03 RX ORDER — FENTANYL CITRATE 50 UG/ML
INJECTION, SOLUTION INTRAMUSCULAR; INTRAVENOUS CONTINUOUS PRN
Status: DISCONTINUED | OUTPATIENT
Start: 2025-09-03 | End: 2025-09-03

## 2025-09-03 RX ORDER — PROPOFOL 10 MG/ML
INJECTION, EMULSION INTRAVENOUS AS NEEDED
Status: DISCONTINUED | OUTPATIENT
Start: 2025-09-03 | End: 2025-09-03

## 2025-09-03 RX ORDER — LIDOCAINE IN NACL,ISO-OSMOT/PF 30 MG/3 ML
0.1 SYRINGE (ML) INJECTION ONCE
OUTPATIENT
Start: 2025-09-03 | End: 2025-09-03

## 2025-09-03 RX ORDER — MIDAZOLAM HYDROCHLORIDE 2 MG/2ML
INJECTION, SOLUTION INTRAMUSCULAR; INTRAVENOUS AS NEEDED
Status: DISCONTINUED | OUTPATIENT
Start: 2025-09-03 | End: 2025-09-03

## 2025-09-03 RX ORDER — CHOLECALCIFEROL (VITAMIN D3) 50 MCG
100 TABLET ORAL DAILY
Qty: 30 TABLET | Refills: 11 | Status: SHIPPED | OUTPATIENT
Start: 2025-09-03

## 2025-09-03 RX ORDER — ATORVASTATIN CALCIUM 80 MG/1
80 TABLET, FILM COATED ORAL NIGHTLY
Qty: 90 TABLET | Refills: 3 | Status: SHIPPED | OUTPATIENT
Start: 2025-09-03

## 2025-09-03 RX ORDER — OXYCODONE AND ACETAMINOPHEN 7.5; 325 MG/1; MG/1
1 TABLET ORAL EVERY 6 HOURS PRN
Qty: 90 TABLET | Refills: 0 | Status: SHIPPED | OUTPATIENT
Start: 2025-09-03 | End: 2025-09-04

## 2025-09-03 RX ORDER — SODIUM CHLORIDE, SODIUM LACTATE, POTASSIUM CHLORIDE, CALCIUM CHLORIDE 600; 310; 30; 20 MG/100ML; MG/100ML; MG/100ML; MG/100ML
100 INJECTION, SOLUTION INTRAVENOUS CONTINUOUS
OUTPATIENT
Start: 2025-09-03 | End: 2025-09-04

## 2025-09-03 RX ADMIN — PROPOFOL 10 MG: 10 INJECTION, EMULSION INTRAVENOUS at 15:03

## 2025-09-03 RX ADMIN — MIDAZOLAM HYDROCHLORIDE 2 MG: 2 INJECTION, SOLUTION INTRAMUSCULAR; INTRAVENOUS at 14:59

## 2025-09-03 RX ADMIN — PROPOFOL 10 MG: 10 INJECTION, EMULSION INTRAVENOUS at 15:08

## 2025-09-03 RX ADMIN — PROPOFOL 10 MG: 10 INJECTION, EMULSION INTRAVENOUS at 15:05

## 2025-09-03 RX ADMIN — PROPOFOL 100 MCG/KG/MIN: 10 INJECTION, EMULSION INTRAVENOUS at 15:01

## 2025-09-03 RX ADMIN — PROPOFOL 20 MG: 10 INJECTION, EMULSION INTRAVENOUS at 15:02

## 2025-09-03 RX ADMIN — PROPOFOL 10 MG: 10 INJECTION, EMULSION INTRAVENOUS at 15:07

## 2025-09-03 RX ADMIN — LIDOCAINE HYDROCHLORIDE 100 MG: 20 INJECTION INTRAVENOUS at 14:59

## 2025-09-03 RX ADMIN — SODIUM CHLORIDE, SODIUM LACTATE, POTASSIUM CHLORIDE, AND CALCIUM CHLORIDE: 600; 310; 30; 20 INJECTION, SOLUTION INTRAVENOUS at 14:54

## 2025-09-03 RX ADMIN — PROPOFOL 20 MG: 10 INJECTION, EMULSION INTRAVENOUS at 15:00

## 2025-09-03 ASSESSMENT — PAIN SCALES - GENERAL
PAINLEVEL_OUTOF10: 0 - NO PAIN
PAINLEVEL_OUTOF10: 0 - NO PAIN
PAIN_LEVEL: 0
PAINLEVEL_OUTOF10: 0 - NO PAIN

## 2025-09-03 ASSESSMENT — PAIN - FUNCTIONAL ASSESSMENT
PAIN_FUNCTIONAL_ASSESSMENT: 0-10

## 2025-09-04 ENCOUNTER — TELEPHONE (OUTPATIENT)
Facility: HOSPITAL | Age: 50
End: 2025-09-04
Payer: MEDICARE

## 2025-09-04 DIAGNOSIS — M96.1 LUMBAR POST-LAMINECTOMY SYNDROME: ICD-10-CM

## 2025-09-04 RX ORDER — OXYCODONE AND ACETAMINOPHEN 7.5; 325 MG/1; MG/1
1 TABLET ORAL EVERY 6 HOURS PRN
Qty: 90 TABLET | Refills: 0 | Status: SHIPPED | OUTPATIENT
Start: 2025-09-04 | End: 2025-10-04

## 2025-09-04 RX ORDER — OXYCODONE AND ACETAMINOPHEN 7.5; 325 MG/1; MG/1
TABLET ORAL
Qty: 90 TABLET | Refills: 0 | OUTPATIENT
Start: 2025-09-04

## 2025-10-07 ENCOUNTER — APPOINTMENT (OUTPATIENT)
Dept: OPHTHALMOLOGY | Facility: CLINIC | Age: 50
End: 2025-10-07
Payer: MEDICARE

## 2025-12-24 ENCOUNTER — APPOINTMENT (OUTPATIENT)
Dept: OPHTHALMOLOGY | Facility: CLINIC | Age: 50
End: 2025-12-24
Payer: MEDICARE

## (undated) DEVICE — OINTMENT, TOPICAL, BACITRACIN

## (undated) DEVICE — PREP TRAY, SKIN, DRY, W/GLOVES

## (undated) DEVICE — NEEDLE, MICRODISSECTION STR 4CM

## (undated) DEVICE — GLOVE, SURGICAL, PROTEXIS PI W/NEU-THERA, 8.0, PF, LF

## (undated) DEVICE — Device

## (undated) DEVICE — SYRINGE, 10 CC, LUER LOCK

## (undated) DEVICE — APPLICATOR, COTTON TIP, 6 IN, 2PK, STERILE

## (undated) DEVICE — PAD, PREP, SKIN BARRIER, WIPE, PREPPIES

## (undated) DEVICE — GLOVE, SURGICAL, PROTEXIS PI , 6.0, PF, LF

## (undated) DEVICE — COVER, BACK TABLE, 65 X 90, HVY REINFORCED

## (undated) DEVICE — MARKER, SKIN, REGULAR TIP, W/FLEXI-RULER

## (undated) DEVICE — SLEEVE, VASO PRESS, CALF GARMENT, MEDIUM, GREEN

## (undated) DEVICE — DRESSING, GAUZE, SPONGE, 12 PLY, CURITY, 4 X 4 IN, RIGID TRAY, STERILE

## (undated) DEVICE — DRAPE, INSTRUMENT, W/POUCH, STERI DRAPE, 7 X 11 IN, DISPOSABLE, STERILE

## (undated) DEVICE — SUTURE, MONOCRYL, 4-0, 18 IN, PS2, UNDYED

## (undated) DEVICE — TIP, SUCTION, YANKAUER, FLEXIBLE

## (undated) DEVICE — FORCEP, BIOPOLAR, ADSON, NON INSUL, 5IN 1.0MM

## (undated) DEVICE — TIP, SUCTION, FRAZIER, 10 FR, DISP

## (undated) DEVICE — ELECTRODE, ELECTROSURGICAL, COATED NEEDLE, EDGE, STERILE

## (undated) DEVICE — DRAPE, SHEET, THREE QUARTER, FAN FOLD, 57 X 77 IN

## (undated) DEVICE — ELECTRODE, ELECTROSURGICAL, NEEDLE, 1.1 IN, LF

## (undated) DEVICE — DRESSING, MEPILEX BORDER, POST-OP AG, 4 X 10 IN

## (undated) DEVICE — BLADE, OSCILLATING/SAGITTAL, AGGRESSIVE, WIDE

## (undated) DEVICE — DRAPE, SHEET, 17 X 23 IN

## (undated) DEVICE — CATHETER, WEDGE PRESSURE, BALLOON, DOUBLE LUMEN, 5 FR, 110 CM

## (undated) DEVICE — INTRODUCER, GLIDESHEATH SLENDER A-KIT, 5FR 10CM

## (undated) DEVICE — SUTURE, ETHIBOND, 2, CUSTOM, GREEN/WHITE

## (undated) DEVICE — MASK, FACE, TENET, FOAM POSITIONING, DISPOSABLE

## (undated) DEVICE — CLEANER, WIPE, INSTRUMENT, 3.25 X 3.25 IN

## (undated) DEVICE — SPONGE GAUZE, XRAY SC+RFID, 8X4 16 PLY, STERILE

## (undated) DEVICE — SYRINGE, 60 CC, IRRIGATION, PISTON, CATH TIP, W/LUER ADAPTER,DISP

## (undated) DEVICE — NEEDLE, TAPERED, W/ NITIONAL LOOP, T-5, 1/2 CIRCLE, 26.5MM LONG

## (undated) DEVICE — SUTURE, VICRYL, 3-0, 18 IN, PS2, UNDYED

## (undated) DEVICE — GLOVE, SURGICAL, PROTEXIS PI MICRO, 7.5, PF, LF

## (undated) DEVICE — MIXER, CEMENT, MIXEVAC III HIGH VACUUM KIT, STERILE

## (undated) DEVICE — DRAPE, INCISE, STERI DRAPE, LARGE, 23 X 17 IN, DISPOSABLE, STERILE

## (undated) DEVICE — WOUND SYSTEM, DEBRIDEMENT & CLEANING, O.R DUOPAK

## (undated) DEVICE — SPEAR, EYE, WECK-CELL, 10 GLASINE

## (undated) DEVICE — PREP TRAY, VAGINAL

## (undated) DEVICE — PIN KIT, GLENOID II, 2.5MM L 200MM

## (undated) DEVICE — PAD, EYE, OVAL, 1 5/8 X 2 5/8 IN, STERILE

## (undated) DEVICE — IMMOBILIZER, SHOULDER, SLING & SWATHE, DELUXE, ADULT

## (undated) DEVICE — NEEDLE, TAPER, MAYO, 0.5 CIRCLE, DISPOSABLE, 6

## (undated) DEVICE — BANDAGE, COFLEX, 4 X 5 YDS, FOAM TAN, STERILE, LF

## (undated) DEVICE — DRAPE, SHEET, EXTREMITY, W/ARM BOARD COVERS, 87 X 106 X 128 IN, DISPOSABLE, LF, STERILE

## (undated) DEVICE — SUTURE, VICRYL, 0, 27 IN, UR-6, VIOLET

## (undated) DEVICE — SLING, ARM, DEEP POCKET, XLARGE